# Patient Record
Sex: MALE | Race: WHITE | Employment: OTHER | URBAN - METROPOLITAN AREA
[De-identification: names, ages, dates, MRNs, and addresses within clinical notes are randomized per-mention and may not be internally consistent; named-entity substitution may affect disease eponyms.]

---

## 2017-04-24 ENCOUNTER — HOSPITAL ENCOUNTER (OUTPATIENT)
Age: 62
Setting detail: SPECIMEN
Discharge: HOME | End: 2017-04-24
Payer: COMMERCIAL

## 2017-04-24 ENCOUNTER — OFFICE VISIT (OUTPATIENT)
Dept: FAMILY MEDICINE CLINIC | Age: 62
End: 2017-04-24

## 2017-04-24 VITALS
HEIGHT: 69 IN | HEART RATE: 81 BPM | TEMPERATURE: 98.1 F | OXYGEN SATURATION: 91 % | BODY MASS INDEX: 34.6 KG/M2 | WEIGHT: 233.6 LBS | RESPIRATION RATE: 20 BRPM | SYSTOLIC BLOOD PRESSURE: 132 MMHG | DIASTOLIC BLOOD PRESSURE: 82 MMHG

## 2017-04-24 DIAGNOSIS — M19.041 PRIMARY OSTEOARTHRITIS OF BOTH HANDS: ICD-10-CM

## 2017-04-24 DIAGNOSIS — M19.042 PRIMARY OSTEOARTHRITIS OF BOTH HANDS: ICD-10-CM

## 2017-04-24 DIAGNOSIS — Z12.11 COLON CANCER SCREENING: ICD-10-CM

## 2017-04-24 DIAGNOSIS — I10 ESSENTIAL HYPERTENSION: ICD-10-CM

## 2017-04-24 DIAGNOSIS — Z00.00 ANNUAL PHYSICAL EXAM: ICD-10-CM

## 2017-04-24 DIAGNOSIS — G47.30 SLEEP APNEA, UNSPECIFIED TYPE: ICD-10-CM

## 2017-04-24 DIAGNOSIS — J44.9 CHRONIC OBSTRUCTIVE PULMONARY DISEASE, UNSPECIFIED COPD TYPE (HCC): ICD-10-CM

## 2017-04-24 DIAGNOSIS — N52.9 ERECTILE DYSFUNCTION, UNSPECIFIED ERECTILE DYSFUNCTION TYPE: ICD-10-CM

## 2017-04-24 DIAGNOSIS — Z23 NEED FOR PROPHYLACTIC VACCINATION AGAINST STREPTOCOCCUS PNEUMONIAE (PNEUMOCOCCUS): ICD-10-CM

## 2017-04-24 DIAGNOSIS — Z72.0 TOBACCO USE: ICD-10-CM

## 2017-04-24 DIAGNOSIS — Z00.00 ANNUAL PHYSICAL EXAM: Primary | ICD-10-CM

## 2017-04-24 DIAGNOSIS — I49.9 IRREGULAR HEART BEAT: ICD-10-CM

## 2017-04-24 LAB
ALBUMIN SERPL-MCNC: 4.2 G/DL (ref 3.9–4.9)
ALP BLD-CCNC: 77 U/L (ref 35–104)
ALT SERPL-CCNC: 12 U/L (ref 0–41)
ANION GAP SERPL CALCULATED.3IONS-SCNC: 12 MEQ/L (ref 7–13)
AST SERPL-CCNC: 10 U/L (ref 0–40)
BILIRUB SERPL-MCNC: 0.6 MG/DL (ref 0–1.2)
BUN BLDV-MCNC: 10 MG/DL (ref 8–23)
CALCIUM SERPL-MCNC: 9.9 MG/DL (ref 8.6–10.2)
CHLORIDE BLD-SCNC: 97 MEQ/L (ref 98–107)
CHOLESTEROL, TOTAL: 140 MG/DL (ref 0–199)
CO2: 26 MEQ/L (ref 22–29)
CREAT SERPL-MCNC: 0.78 MG/DL (ref 0.7–1.2)
GFR AFRICAN AMERICAN: >60
GFR NON-AFRICAN AMERICAN: >60
GLOBULIN: 2.5 G/DL (ref 2.3–3.5)
GLUCOSE BLD-MCNC: 87 MG/DL (ref 74–109)
HDLC SERPL-MCNC: 49 MG/DL (ref 40–59)
LDL CHOLESTEROL CALCULATED: 69 MG/DL (ref 0–129)
POTASSIUM SERPL-SCNC: 4.5 MEQ/L (ref 3.5–5.1)
SODIUM BLD-SCNC: 135 MEQ/L (ref 132–144)
TOTAL PROTEIN: 6.7 G/DL (ref 6.4–8.1)
TRIGL SERPL-MCNC: 112 MG/DL (ref 0–200)

## 2017-04-24 PROCEDURE — 99396 PREV VISIT EST AGE 40-64: CPT | Performed by: FAMILY MEDICINE

## 2017-04-24 PROCEDURE — 90471 IMMUNIZATION ADMIN: CPT | Performed by: FAMILY MEDICINE

## 2017-04-24 PROCEDURE — 80061 LIPID PANEL: CPT

## 2017-04-24 PROCEDURE — 93000 ELECTROCARDIOGRAM COMPLETE: CPT | Performed by: FAMILY MEDICINE

## 2017-04-24 PROCEDURE — 80053 COMPREHEN METABOLIC PANEL: CPT

## 2017-04-24 PROCEDURE — 90732 PPSV23 VACC 2 YRS+ SUBQ/IM: CPT | Performed by: FAMILY MEDICINE

## 2017-04-24 RX ORDER — BUPROPION HYDROCHLORIDE 150 MG/1
150 TABLET, EXTENDED RELEASE ORAL 2 TIMES DAILY
Qty: 60 TABLET | Refills: 3 | Status: SHIPPED | OUTPATIENT
Start: 2017-04-24 | End: 2018-05-01 | Stop reason: SDUPTHER

## 2017-04-24 RX ORDER — LISINOPRIL 40 MG/1
TABLET ORAL
Qty: 30 TABLET | Refills: 11 | Status: SHIPPED | OUTPATIENT
Start: 2017-04-24 | End: 2018-05-01 | Stop reason: SDUPTHER

## 2017-04-24 RX ORDER — SILDENAFIL 100 MG/1
100 TABLET, FILM COATED ORAL PRN
Qty: 2 TABLET | Refills: 0 | Status: SHIPPED | OUTPATIENT
Start: 2017-04-24 | End: 2020-01-22 | Stop reason: SDUPTHER

## 2017-04-24 RX ORDER — CELECOXIB 200 MG/1
CAPSULE ORAL
Refills: 5 | COMMUNITY
Start: 2017-03-04 | End: 2017-04-24 | Stop reason: SDUPTHER

## 2017-04-24 RX ORDER — BUDESONIDE AND FORMOTEROL FUMARATE DIHYDRATE 160; 4.5 UG/1; UG/1
1 AEROSOL RESPIRATORY (INHALATION) 2 TIMES DAILY
Qty: 1 INHALER | Refills: 0 | Status: SHIPPED | OUTPATIENT
Start: 2017-04-24 | End: 2017-12-28 | Stop reason: SDUPTHER

## 2017-04-24 RX ORDER — CELECOXIB 200 MG/1
CAPSULE ORAL
Qty: 60 CAPSULE | Refills: 5 | Status: SHIPPED | OUTPATIENT
Start: 2017-04-24 | End: 2018-05-01 | Stop reason: SDUPTHER

## 2017-04-24 ASSESSMENT — ENCOUNTER SYMPTOMS
DIARRHEA: 0
ABDOMINAL PAIN: 0
CONSTIPATION: 0
SHORTNESS OF BREATH: 0
SORE THROAT: 0
COUGH: 0
RHINORRHEA: 0
WHEEZING: 0

## 2017-07-25 ENCOUNTER — HOSPITAL ENCOUNTER (OUTPATIENT)
Dept: ULTRASOUND IMAGING | Age: 62
Discharge: HOME | End: 2017-07-25
Payer: COMMERCIAL

## 2017-07-25 ENCOUNTER — OFFICE VISIT (OUTPATIENT)
Dept: FAMILY MEDICINE CLINIC | Age: 62
End: 2017-07-25

## 2017-07-25 VITALS
BODY MASS INDEX: 32.35 KG/M2 | TEMPERATURE: 98.4 F | SYSTOLIC BLOOD PRESSURE: 130 MMHG | HEIGHT: 70 IN | WEIGHT: 226 LBS | DIASTOLIC BLOOD PRESSURE: 76 MMHG | HEART RATE: 80 BPM

## 2017-07-25 DIAGNOSIS — F51.04 PSYCHOPHYSIOLOGICAL INSOMNIA: ICD-10-CM

## 2017-07-25 DIAGNOSIS — M79.89 LEFT LEG SWELLING: Primary | ICD-10-CM

## 2017-07-25 DIAGNOSIS — Z72.0 TOBACCO USE: ICD-10-CM

## 2017-07-25 DIAGNOSIS — M79.89 LEFT LEG SWELLING: ICD-10-CM

## 2017-07-25 DIAGNOSIS — I10 ESSENTIAL HYPERTENSION, BENIGN: ICD-10-CM

## 2017-07-25 PROCEDURE — 93971 EXTREMITY STUDY: CPT

## 2017-07-25 PROCEDURE — G8427 DOCREV CUR MEDS BY ELIG CLIN: HCPCS | Performed by: FAMILY MEDICINE

## 2017-07-25 PROCEDURE — G8417 CALC BMI ABV UP PARAM F/U: HCPCS | Performed by: FAMILY MEDICINE

## 2017-07-25 PROCEDURE — 3017F COLORECTAL CA SCREEN DOC REV: CPT | Performed by: FAMILY MEDICINE

## 2017-07-25 PROCEDURE — 99214 OFFICE O/P EST MOD 30 MIN: CPT | Performed by: FAMILY MEDICINE

## 2017-07-25 PROCEDURE — 4004F PT TOBACCO SCREEN RCVD TLK: CPT | Performed by: FAMILY MEDICINE

## 2017-07-25 ASSESSMENT — ENCOUNTER SYMPTOMS
ABDOMINAL PAIN: 0
COUGH: 1
RHINORRHEA: 0
WHEEZING: 0
SORE THROAT: 0
DIARRHEA: 0
SHORTNESS OF BREATH: 0
CONSTIPATION: 0

## 2017-12-28 DIAGNOSIS — J44.9 CHRONIC OBSTRUCTIVE PULMONARY DISEASE, UNSPECIFIED COPD TYPE (HCC): ICD-10-CM

## 2017-12-28 RX ORDER — BUDESONIDE AND FORMOTEROL FUMARATE DIHYDRATE 160; 4.5 UG/1; UG/1
1 AEROSOL RESPIRATORY (INHALATION) 2 TIMES DAILY
Qty: 10.2 G | Refills: 5 | Status: SHIPPED | OUTPATIENT
Start: 2017-12-28 | End: 2018-05-01 | Stop reason: SDUPTHER

## 2018-05-01 ENCOUNTER — OFFICE VISIT (OUTPATIENT)
Dept: FAMILY MEDICINE CLINIC | Age: 63
End: 2018-05-01
Payer: COMMERCIAL

## 2018-05-01 ENCOUNTER — HOSPITAL ENCOUNTER (OUTPATIENT)
Age: 63
Setting detail: SPECIMEN
Discharge: HOME | End: 2018-05-01
Payer: COMMERCIAL

## 2018-05-01 VITALS
SYSTOLIC BLOOD PRESSURE: 136 MMHG | BODY MASS INDEX: 34.3 KG/M2 | DIASTOLIC BLOOD PRESSURE: 88 MMHG | HEIGHT: 70 IN | WEIGHT: 239.6 LBS | RESPIRATION RATE: 16 BRPM | HEART RATE: 90 BPM

## 2018-05-01 DIAGNOSIS — Z11.59 ENCOUNTER FOR HEPATITIS C SCREENING TEST FOR LOW RISK PATIENT: ICD-10-CM

## 2018-05-01 DIAGNOSIS — M19.041 PRIMARY OSTEOARTHRITIS OF BOTH HANDS: ICD-10-CM

## 2018-05-01 DIAGNOSIS — Z00.00 ANNUAL PHYSICAL EXAM: Primary | ICD-10-CM

## 2018-05-01 DIAGNOSIS — Z12.11 COLON CANCER SCREENING: ICD-10-CM

## 2018-05-01 DIAGNOSIS — R40.0 DAYTIME SLEEPINESS: ICD-10-CM

## 2018-05-01 DIAGNOSIS — M79.89 LEFT LEG SWELLING: ICD-10-CM

## 2018-05-01 DIAGNOSIS — M19.042 PRIMARY OSTEOARTHRITIS OF BOTH HANDS: ICD-10-CM

## 2018-05-01 DIAGNOSIS — Z72.0 TOBACCO USE: ICD-10-CM

## 2018-05-01 DIAGNOSIS — J44.9 CHRONIC OBSTRUCTIVE PULMONARY DISEASE, UNSPECIFIED COPD TYPE (HCC): ICD-10-CM

## 2018-05-01 DIAGNOSIS — Z00.00 ANNUAL PHYSICAL EXAM: ICD-10-CM

## 2018-05-01 DIAGNOSIS — I10 ESSENTIAL HYPERTENSION: ICD-10-CM

## 2018-05-01 LAB
ALBUMIN SERPL-MCNC: 4.2 G/DL (ref 3.9–4.9)
ALP BLD-CCNC: 93 U/L (ref 35–104)
ALT SERPL-CCNC: 11 U/L (ref 0–41)
ANION GAP SERPL CALCULATED.3IONS-SCNC: 16 MEQ/L (ref 7–13)
AST SERPL-CCNC: 15 U/L (ref 0–40)
BILIRUB SERPL-MCNC: 0.6 MG/DL (ref 0–1.2)
BUN BLDV-MCNC: 12 MG/DL (ref 8–23)
CALCIUM SERPL-MCNC: 9.4 MG/DL (ref 8.6–10.2)
CHLORIDE BLD-SCNC: 90 MEQ/L (ref 98–107)
CHOLESTEROL, FASTING: 133 MG/DL (ref 0–199)
CO2: 29 MEQ/L (ref 22–29)
CREAT SERPL-MCNC: 0.72 MG/DL (ref 0.7–1.2)
GFR AFRICAN AMERICAN: >60
GFR NON-AFRICAN AMERICAN: >60
GLOBULIN: 2.6 G/DL (ref 2.3–3.5)
GLUCOSE FASTING: 80 MG/DL (ref 74–109)
HDLC SERPL-MCNC: 53 MG/DL (ref 40–59)
HEPATITIS C ANTIBODY INTERPRETATION: NORMAL
LDL CHOLESTEROL CALCULATED: 69 MG/DL (ref 0–129)
POTASSIUM SERPL-SCNC: 4.9 MEQ/L (ref 3.5–5.1)
PROSTATE SPECIFIC ANTIGEN: 0.47 NG/ML (ref 0–5.4)
SODIUM BLD-SCNC: 135 MEQ/L (ref 132–144)
TOTAL PROTEIN: 6.8 G/DL (ref 6.4–8.1)
TRIGLYCERIDE, FASTING: 57 MG/DL (ref 0–200)
VITAMIN B-12: 528 PG/ML (ref 232–1245)

## 2018-05-01 PROCEDURE — 99396 PREV VISIT EST AGE 40-64: CPT | Performed by: FAMILY MEDICINE

## 2018-05-01 PROCEDURE — 80061 LIPID PANEL: CPT

## 2018-05-01 PROCEDURE — 86803 HEPATITIS C AB TEST: CPT

## 2018-05-01 PROCEDURE — 84153 ASSAY OF PSA TOTAL: CPT

## 2018-05-01 PROCEDURE — 80053 COMPREHEN METABOLIC PANEL: CPT

## 2018-05-01 PROCEDURE — 82607 VITAMIN B-12: CPT

## 2018-05-01 RX ORDER — VARENICLINE TARTRATE 1 MG/1
1 TABLET, FILM COATED ORAL 2 TIMES DAILY
Qty: 60 TABLET | Refills: 5 | Status: SHIPPED | OUTPATIENT
Start: 2018-05-01 | End: 2019-08-02 | Stop reason: SDUPTHER

## 2018-05-01 RX ORDER — LISINOPRIL 40 MG/1
TABLET ORAL
Qty: 30 TABLET | Refills: 11 | Status: SHIPPED | OUTPATIENT
Start: 2018-05-01 | End: 2018-12-24 | Stop reason: SDUPTHER

## 2018-05-01 RX ORDER — BUDESONIDE AND FORMOTEROL FUMARATE DIHYDRATE 160; 4.5 UG/1; UG/1
1 AEROSOL RESPIRATORY (INHALATION) 2 TIMES DAILY
Qty: 10.2 G | Refills: 11 | Status: SHIPPED | OUTPATIENT
Start: 2018-05-01 | End: 2018-12-24 | Stop reason: SDUPTHER

## 2018-05-01 RX ORDER — ALBUTEROL SULFATE 90 UG/1
2 AEROSOL, METERED RESPIRATORY (INHALATION) EVERY 6 HOURS PRN
Qty: 1 INHALER | Refills: 3 | Status: SHIPPED | OUTPATIENT
Start: 2018-05-01 | End: 2018-12-24 | Stop reason: SDUPTHER

## 2018-05-01 RX ORDER — BUPROPION HYDROCHLORIDE 150 MG/1
150 TABLET, EXTENDED RELEASE ORAL 2 TIMES DAILY
Qty: 60 TABLET | Refills: 11 | Status: SHIPPED | OUTPATIENT
Start: 2018-05-01 | End: 2018-12-24 | Stop reason: SDUPTHER

## 2018-05-01 RX ORDER — CELECOXIB 200 MG/1
CAPSULE ORAL
Qty: 60 CAPSULE | Refills: 5 | Status: ON HOLD | OUTPATIENT
Start: 2018-05-01 | End: 2019-07-27

## 2018-05-01 RX ORDER — VARENICLINE TARTRATE 25 MG
KIT ORAL
Qty: 1 EACH | Refills: 0 | Status: SHIPPED | OUTPATIENT
Start: 2018-05-01 | End: 2019-08-02

## 2018-05-01 ASSESSMENT — PATIENT HEALTH QUESTIONNAIRE - PHQ9
SUM OF ALL RESPONSES TO PHQ9 QUESTIONS 1 & 2: 0
1. LITTLE INTEREST OR PLEASURE IN DOING THINGS: 0
2. FEELING DOWN, DEPRESSED OR HOPELESS: 0
SUM OF ALL RESPONSES TO PHQ QUESTIONS 1-9: 0

## 2018-05-31 DIAGNOSIS — M79.89 LEFT LEG SWELLING: ICD-10-CM

## 2018-06-15 ENCOUNTER — HOSPITAL ENCOUNTER (OUTPATIENT)
Dept: SLEEP CENTER | Age: 63
Discharge: HOME | End: 2018-06-17
Payer: COMMERCIAL

## 2018-06-15 PROCEDURE — 95810 POLYSOM 6/> YRS 4/> PARAM: CPT

## 2018-06-19 ENCOUNTER — TELEPHONE (OUTPATIENT)
Dept: FAMILY MEDICINE CLINIC | Age: 63
End: 2018-06-19

## 2018-06-19 DIAGNOSIS — G47.33 OSA (OBSTRUCTIVE SLEEP APNEA): Primary | ICD-10-CM

## 2018-07-03 ENCOUNTER — HOSPITAL ENCOUNTER (OUTPATIENT)
Dept: SLEEP CENTER | Age: 63
Discharge: HOME | End: 2018-07-05
Payer: COMMERCIAL

## 2018-07-03 PROCEDURE — 95811 POLYSOM 6/>YRS CPAP 4/> PARM: CPT

## 2018-07-16 ENCOUNTER — OFFICE VISIT (OUTPATIENT)
Dept: PULMONOLOGY | Age: 63
End: 2018-07-16
Payer: COMMERCIAL

## 2018-07-16 VITALS
HEART RATE: 100 BPM | OXYGEN SATURATION: 93 % | SYSTOLIC BLOOD PRESSURE: 134 MMHG | DIASTOLIC BLOOD PRESSURE: 70 MMHG | BODY MASS INDEX: 34.07 KG/M2 | WEIGHT: 238 LBS | HEIGHT: 70 IN | TEMPERATURE: 97.9 F | RESPIRATION RATE: 16 BRPM

## 2018-07-16 DIAGNOSIS — G47.33 OSA (OBSTRUCTIVE SLEEP APNEA): Primary | ICD-10-CM

## 2018-07-16 DIAGNOSIS — J44.9 CHRONIC OBSTRUCTIVE PULMONARY DISEASE, UNSPECIFIED COPD TYPE (HCC): ICD-10-CM

## 2018-07-16 DIAGNOSIS — Z72.0 TOBACCO USE: ICD-10-CM

## 2018-07-16 DIAGNOSIS — R09.02 HYPOXIA: ICD-10-CM

## 2018-07-16 PROCEDURE — 4040F PNEUMOC VAC/ADMIN/RCVD: CPT | Performed by: INTERNAL MEDICINE

## 2018-07-16 PROCEDURE — G8926 SPIRO NO PERF OR DOC: HCPCS | Performed by: INTERNAL MEDICINE

## 2018-07-16 PROCEDURE — 3017F COLORECTAL CA SCREEN DOC REV: CPT | Performed by: INTERNAL MEDICINE

## 2018-07-16 PROCEDURE — 4004F PT TOBACCO SCREEN RCVD TLK: CPT | Performed by: INTERNAL MEDICINE

## 2018-07-16 PROCEDURE — 1123F ACP DISCUSS/DSCN MKR DOCD: CPT | Performed by: INTERNAL MEDICINE

## 2018-07-16 PROCEDURE — 99215 OFFICE O/P EST HI 40 MIN: CPT | Performed by: INTERNAL MEDICINE

## 2018-07-16 PROCEDURE — G8427 DOCREV CUR MEDS BY ELIG CLIN: HCPCS | Performed by: INTERNAL MEDICINE

## 2018-07-16 PROCEDURE — 3023F SPIROM DOC REV: CPT | Performed by: INTERNAL MEDICINE

## 2018-07-16 PROCEDURE — 1101F PT FALLS ASSESS-DOCD LE1/YR: CPT | Performed by: INTERNAL MEDICINE

## 2018-07-16 PROCEDURE — G8417 CALC BMI ABV UP PARAM F/U: HCPCS | Performed by: INTERNAL MEDICINE

## 2018-07-16 ASSESSMENT — ENCOUNTER SYMPTOMS
DIARRHEA: 0
NAUSEA: 0
ABDOMINAL PAIN: 0
SORE THROAT: 0
CHEST TIGHTNESS: 0
SHORTNESS OF BREATH: 1
COUGH: 1
WHEEZING: 1
VOICE CHANGE: 0
VOMITING: 0
RHINORRHEA: 0
EYE ITCHING: 0

## 2018-07-16 NOTE — PROGRESS NOTES
Subjective:     Cruzito Spencer is a 70 y.o. male who complains today of:     Chief Complaint   Patient presents with    Established New Doctor     referred by Dr. Gee Montes for JONNY. HPI  Patient has PSG done on  an shows JONNY. AHI  97.8 RDI 99  CPAP titration study done on shows therapeutic CPAP at 7 cm, still low O2 sat below 89% for 53 min    Patient is complaining of snoring and daytime sleepiness and tiredness. C/o witness apnea. Patient does not wakes up with gasping for air. C/o wakes up frequently during sleep . No complaint of morning headache. Patient does not have restful sleep. Patient does not take daily naps. Patient does  fall asleep while watching TV. Patient does not have a complaint of sleepiness while driving  Patient does not fall a sleep at stop sign. Patient has no c/o vivid dreams or night izaguirre. Patient does not have difficulty falling sleep but staying asleep  20 lbs Weight gain in last 6 month      Patient is using symbicort and proairHFA 2 puff QID prn   C/o shortness of breath with exertion. Occasional Wheezing at night . Cough with  Yellowish Sputum. No Hemoptysis. No Chest tightness   No Chest pain with radiation  or pleuritic pain  No Fever or chills. No Rhinorrhea and postnasal drip.  he smoke 1 ppd, he is trying to quit with chantix. Allergies:  Patient has no known allergies. Past Medical History:   Diagnosis Date    COPD (chronic obstructive pulmonary disease) ([address]     Hypertension     Osteoarthritis     hands worst     Past Surgical History:   Procedure Laterality Date    CHOLECYSTECTOMY      ROTATOR CUFF REPAIR      right     Family History   Problem Relation Age of Onset    Heart Disease Mother     Other Father      Social History     Social History    Marital status: Single     Spouse name: N/A    Number of children: N/A    Years of education: N/A     Occupational History    Not on file.      Social History Main Topics    Smoking status: Current Every Day Smoker     Packs/day: 2.00     Years: 41.00     Types: Cigarettes    Smokeless tobacco: Never Used    Alcohol use 0.0 oz/week      Comment: 12 pk per week    Drug use: No    Sexual activity: Not on file     Other Topics Concern    Not on file     Social History Narrative    No narrative on file         Review of Systems   Constitutional: Negative for chills, diaphoresis, fatigue and fever. HENT: Negative for congestion, mouth sores, nosebleeds, postnasal drip, rhinorrhea, sneezing, sore throat and voice change. Eyes: Negative for itching and visual disturbance. Respiratory: Positive for cough, shortness of breath and wheezing. Negative for chest tightness. Cardiovascular: Negative. Negative for chest pain, palpitations and leg swelling. Gastrointestinal: Negative for abdominal pain, diarrhea, nausea and vomiting. Genitourinary: Negative for difficulty urinating and hematuria. Musculoskeletal: Negative for arthralgias, joint swelling and myalgias. Skin: Negative for rash. Allergic/Immunologic: Negative for environmental allergies. Neurological: Negative for dizziness, tremors, weakness and headaches. Psychiatric/Behavioral: Positive for sleep disturbance. Negative for behavioral problems. Objective:     Vitals:    07/16/18 0848 07/16/18 0920   BP: 134/70    Pulse: 100    Resp: 16    Temp: 97.9 °F (36.6 °C)    TempSrc: Tympanic    SpO2: (!) 80% 93%   Weight: 238 lb (108 kg)    Height: 5' 10\" (1.778 m)          Physical Exam   Constitutional: He is oriented to person, place, and time. He appears well-developed and well-nourished. obese   HENT:   Head: Normocephalic and atraumatic. Nose: Nose normal.   Mouth/Throat: Oropharynx is clear and moist.   Eyes: Conjunctivae and EOM are normal. Pupils are equal, round, and reactive to light. Neck: No JVD present. No tracheal deviation present. No thyromegaly present.    Cardiovascular: Normal rate and regular

## 2018-10-26 ENCOUNTER — HOSPITAL ENCOUNTER (OUTPATIENT)
Dept: PULMONOLOGY | Age: 63
Discharge: HOME | End: 2018-10-26
Payer: COMMERCIAL

## 2018-10-26 ENCOUNTER — HOSPITAL ENCOUNTER (OUTPATIENT)
Dept: GENERAL RADIOLOGY | Age: 63
Discharge: HOME | End: 2018-10-28
Payer: COMMERCIAL

## 2018-10-26 DIAGNOSIS — J44.9 CHRONIC OBSTRUCTIVE PULMONARY DISEASE, UNSPECIFIED COPD TYPE (HCC): ICD-10-CM

## 2018-10-26 PROCEDURE — 71046 X-RAY EXAM CHEST 2 VIEWS: CPT

## 2018-10-26 PROCEDURE — 94060 EVALUATION OF WHEEZING: CPT | Performed by: INTERNAL MEDICINE

## 2018-10-26 PROCEDURE — 94726 PLETHYSMOGRAPHY LUNG VOLUMES: CPT

## 2018-10-26 PROCEDURE — 94726 PLETHYSMOGRAPHY LUNG VOLUMES: CPT | Performed by: INTERNAL MEDICINE

## 2018-10-26 PROCEDURE — 94729 DIFFUSING CAPACITY: CPT

## 2018-10-26 PROCEDURE — 94729 DIFFUSING CAPACITY: CPT | Performed by: INTERNAL MEDICINE

## 2018-10-26 PROCEDURE — 6360000002 HC RX W HCPCS: Performed by: INTERNAL MEDICINE

## 2018-10-26 PROCEDURE — 94060 EVALUATION OF WHEEZING: CPT

## 2018-10-26 RX ORDER — ALBUTEROL SULFATE 2.5 MG/3ML
2.5 SOLUTION RESPIRATORY (INHALATION) ONCE
Status: COMPLETED | OUTPATIENT
Start: 2018-10-26 | End: 2018-10-26

## 2018-10-26 RX ADMIN — ALBUTEROL SULFATE 2.5 MG: 2.5 SOLUTION RESPIRATORY (INHALATION) at 11:14

## 2018-10-30 ENCOUNTER — OFFICE VISIT (OUTPATIENT)
Dept: PULMONOLOGY | Age: 63
End: 2018-10-30
Payer: COMMERCIAL

## 2018-10-30 VITALS
RESPIRATION RATE: 16 BRPM | BODY MASS INDEX: 34.07 KG/M2 | WEIGHT: 238 LBS | SYSTOLIC BLOOD PRESSURE: 126 MMHG | DIASTOLIC BLOOD PRESSURE: 76 MMHG | HEIGHT: 70 IN | HEART RATE: 91 BPM | TEMPERATURE: 97.8 F | OXYGEN SATURATION: 88 %

## 2018-10-30 DIAGNOSIS — J44.9 CHRONIC OBSTRUCTIVE PULMONARY DISEASE, UNSPECIFIED COPD TYPE (HCC): Primary | ICD-10-CM

## 2018-10-30 DIAGNOSIS — E66.9 OBESITY (BMI 30-39.9): ICD-10-CM

## 2018-10-30 DIAGNOSIS — G47.33 OSA (OBSTRUCTIVE SLEEP APNEA): ICD-10-CM

## 2018-10-30 DIAGNOSIS — Z72.0 TOBACCO ABUSE: ICD-10-CM

## 2018-10-30 DIAGNOSIS — R09.02 HYPOXIA: ICD-10-CM

## 2018-10-30 PROCEDURE — G8484 FLU IMMUNIZE NO ADMIN: HCPCS | Performed by: INTERNAL MEDICINE

## 2018-10-30 PROCEDURE — G8427 DOCREV CUR MEDS BY ELIG CLIN: HCPCS | Performed by: INTERNAL MEDICINE

## 2018-10-30 PROCEDURE — 3023F SPIROM DOC REV: CPT | Performed by: INTERNAL MEDICINE

## 2018-10-30 PROCEDURE — 3017F COLORECTAL CA SCREEN DOC REV: CPT | Performed by: INTERNAL MEDICINE

## 2018-10-30 PROCEDURE — 4040F PNEUMOC VAC/ADMIN/RCVD: CPT | Performed by: INTERNAL MEDICINE

## 2018-10-30 PROCEDURE — 99214 OFFICE O/P EST MOD 30 MIN: CPT | Performed by: INTERNAL MEDICINE

## 2018-10-30 PROCEDURE — 4004F PT TOBACCO SCREEN RCVD TLK: CPT | Performed by: INTERNAL MEDICINE

## 2018-10-30 PROCEDURE — G8417 CALC BMI ABV UP PARAM F/U: HCPCS | Performed by: INTERNAL MEDICINE

## 2018-10-30 PROCEDURE — G8926 SPIRO NO PERF OR DOC: HCPCS | Performed by: INTERNAL MEDICINE

## 2018-10-30 PROCEDURE — 1123F ACP DISCUSS/DSCN MKR DOCD: CPT | Performed by: INTERNAL MEDICINE

## 2018-10-30 PROCEDURE — 1101F PT FALLS ASSESS-DOCD LE1/YR: CPT | Performed by: INTERNAL MEDICINE

## 2018-10-30 ASSESSMENT — ENCOUNTER SYMPTOMS
SORE THROAT: 0
EYE ITCHING: 0
WHEEZING: 1
SHORTNESS OF BREATH: 1
VOMITING: 0
COUGH: 1
NAUSEA: 0
DIARRHEA: 0
CHEST TIGHTNESS: 0
RHINORRHEA: 0
VOICE CHANGE: 0
ABDOMINAL PAIN: 0

## 2018-10-30 NOTE — PROGRESS NOTES
activity: Not on file     Other Topics Concern    Not on file     Social History Narrative    No narrative on file         Review of Systems   Constitutional: Negative for chills, diaphoresis, fatigue and fever. HENT: Negative for congestion, mouth sores, nosebleeds, postnasal drip, rhinorrhea, sneezing, sore throat and voice change. Eyes: Negative for itching and visual disturbance. Respiratory: Positive for cough, shortness of breath and wheezing. Negative for chest tightness. Cardiovascular: Negative. Negative for chest pain, palpitations and leg swelling. Gastrointestinal: Negative for abdominal pain, diarrhea, nausea and vomiting. Genitourinary: Negative for difficulty urinating and hematuria. Musculoskeletal: Negative for arthralgias, joint swelling and myalgias. Skin: Negative for rash. Allergic/Immunologic: Negative for environmental allergies. Neurological: Negative for dizziness, tremors, weakness and headaches. Psychiatric/Behavioral: Positive for sleep disturbance. Negative for behavioral problems. Objective:     Vitals:    10/30/18 1007   BP: 126/76   Pulse: 91   Resp: 16   Temp: 97.8 °F (36.6 °C)   TempSrc: Tympanic   SpO2: (!) 88%   Weight: 238 lb (108 kg)   Height: 5' 10\" (1.778 m)         Physical Exam   Constitutional: He is oriented to person, place, and time. He appears well-developed and well-nourished. HENT:   Head: Normocephalic and atraumatic. Nose: Nose normal.   Mouth/Throat: Oropharynx is clear and moist.   Eyes: Pupils are equal, round, and reactive to light. Conjunctivae and EOM are normal.   Neck: No JVD present. No tracheal deviation present. No thyromegaly present. Cardiovascular: Normal rate and regular rhythm. Exam reveals no gallop and no friction rub. No murmur heard. Pulmonary/Chest: Effort normal and breath sounds normal. No respiratory distress. He has no wheezes. He has no rales. He exhibits no tenderness.    Abdominal: He exhibits no distension. Musculoskeletal: Normal range of motion. Lymphadenopathy:     He has no cervical adenopathy. Neurological: He is alert and oriented to person, place, and time. No cranial nerve deficit. Skin: Skin is warm and dry. No rash noted. Psychiatric: He has a normal mood and affect. His behavior is normal.       Current Outpatient Prescriptions   Medication Sig Dispense Refill    CPAP Machine MISC by Does not apply route New CPAP with 7 cm with 3 lit O2 bled 1 each 0    Compression Bandages KIT LLE: knee high: 20-30mmhg 2 kit 1    varenicline (CHANTIX STARTING MONTH PAK) 0.5 MG X 11 & 1 MG X 42 tablet Take by mouth. 1 each 0    varenicline (CHANTIX CONTINUING MONTH PAK) 1 MG tablet Take 1 tablet by mouth 2 times daily 60 tablet 5    budesonide-formoterol (SYMBICORT) 160-4.5 MCG/ACT AERO Inhale 1 puff into the lungs 2 times daily 10.2 g 11    albuterol sulfate HFA (PROAIR HFA) 108 (90 Base) MCG/ACT inhaler Inhale 2 puffs into the lungs every 6 hours as needed for Wheezing 1 Inhaler 3    buPROPion (WELLBUTRIN SR) 150 MG extended release tablet Take 1 tablet by mouth 2 times daily 60 tablet 11    celecoxib (CELEBREX) 200 MG capsule TAKE 1 CAPSULE BY MOUTH DAILY 60 capsule 5    lisinopril (PRINIVIL;ZESTRIL) 40 MG tablet Take 1 tablet by mouth daily. 30 tablet 11    sildenafil (VIAGRA) 100 MG tablet Take 1 tablet by mouth as needed for Erectile Dysfunction LOT [id number] EXP 04/2017 2 tablet 0     No current facility-administered medications for this visit. Results for orders placed during the hospital encounter of 10/26/18   XR CHEST STANDARD (2 VW)    Narrative EXAMINATION: XR CHEST (2 VW)    CLINICAL HISTORY: CHRONIC OBSTRUCTIVE PULMONARY DISEASE    COMPARISONS: None available. FINDINGS: Osseous structures intact.  Cardiopericardial silhouette normal. Pulmonary vasculature normal. Lungs clear      Impression NO ACUTE CARDIOPULMONARY DISEASE      PFT  Done report

## 2018-12-24 DIAGNOSIS — I10 ESSENTIAL HYPERTENSION: ICD-10-CM

## 2018-12-24 DIAGNOSIS — J44.9 CHRONIC OBSTRUCTIVE PULMONARY DISEASE, UNSPECIFIED COPD TYPE (HCC): ICD-10-CM

## 2018-12-24 DIAGNOSIS — Z72.0 TOBACCO USE: ICD-10-CM

## 2018-12-24 RX ORDER — LISINOPRIL 40 MG/1
TABLET ORAL
Qty: 90 TABLET | Refills: 3 | Status: ON HOLD | OUTPATIENT
Start: 2018-12-24 | End: 2019-07-30 | Stop reason: HOSPADM

## 2018-12-24 RX ORDER — BUPROPION HYDROCHLORIDE 150 MG/1
150 TABLET, EXTENDED RELEASE ORAL 2 TIMES DAILY
Qty: 180 TABLET | Refills: 3 | Status: SHIPPED | OUTPATIENT
Start: 2018-12-24 | End: 2020-01-06

## 2018-12-24 NOTE — TELEPHONE ENCOUNTER
Rx requested:  Requested Prescriptions     Pending Prescriptions Disp Refills    lisinopril (PRINIVIL;ZESTRIL) 40 MG tablet 90 tablet 3     Sig: Take 1 tablet by mouth daily.     buPROPion (WELLBUTRIN SR) 150 MG extended release tablet 180 tablet 3     Sig: Take 1 tablet by mouth 2 times daily       Last Office Visit:   Visit date not found    Last Labs:  5/1/2018    Last filled:  Needs 90 day Rx for new insurance    Next Visit Date:  Future Appointments  Date Time Provider Efrem Camacho   3/5/2019 11:15 AM Karmen Persaud MD [facility]

## 2018-12-26 RX ORDER — ALBUTEROL SULFATE 90 UG/1
2 AEROSOL, METERED RESPIRATORY (INHALATION) EVERY 6 HOURS PRN
Qty: 3 INHALER | Refills: 3 | Status: SHIPPED | OUTPATIENT
Start: 2018-12-26 | End: 2022-01-07 | Stop reason: SDUPTHER

## 2018-12-26 RX ORDER — BUDESONIDE AND FORMOTEROL FUMARATE DIHYDRATE 160; 4.5 UG/1; UG/1
1 AEROSOL RESPIRATORY (INHALATION) 2 TIMES DAILY
Qty: 30.6 G | Refills: 3 | Status: SHIPPED | OUTPATIENT
Start: 2018-12-26 | End: 2019-09-06 | Stop reason: SDUPTHER

## 2019-03-05 ENCOUNTER — OFFICE VISIT (OUTPATIENT)
Dept: PULMONOLOGY | Age: 64
End: 2019-03-05
Payer: MEDICARE

## 2019-03-05 VITALS
TEMPERATURE: 98.6 F | BODY MASS INDEX: 33.79 KG/M2 | HEART RATE: 100 BPM | HEIGHT: 70 IN | DIASTOLIC BLOOD PRESSURE: 80 MMHG | RESPIRATION RATE: 16 BRPM | WEIGHT: 236 LBS | OXYGEN SATURATION: 90 % | SYSTOLIC BLOOD PRESSURE: 138 MMHG

## 2019-03-05 DIAGNOSIS — G47.33 OSA (OBSTRUCTIVE SLEEP APNEA): Primary | ICD-10-CM

## 2019-03-05 DIAGNOSIS — J44.9 CHRONIC OBSTRUCTIVE PULMONARY DISEASE, UNSPECIFIED COPD TYPE (HCC): ICD-10-CM

## 2019-03-05 DIAGNOSIS — F51.04 PSYCHOPHYSIOLOGICAL INSOMNIA: ICD-10-CM

## 2019-03-05 DIAGNOSIS — R09.02 HYPOXIA: ICD-10-CM

## 2019-03-05 DIAGNOSIS — E66.9 OBESITY (BMI 30-39.9): ICD-10-CM

## 2019-03-05 PROCEDURE — 99214 OFFICE O/P EST MOD 30 MIN: CPT | Performed by: INTERNAL MEDICINE

## 2019-03-05 ASSESSMENT — ENCOUNTER SYMPTOMS
CHEST TIGHTNESS: 0
SHORTNESS OF BREATH: 1
VOICE CHANGE: 0
EYE ITCHING: 0
VOMITING: 0
WHEEZING: 1
NAUSEA: 0
RHINORRHEA: 0
DIARRHEA: 0
SORE THROAT: 0
ABDOMINAL PAIN: 0
COUGH: 1

## 2019-05-02 ENCOUNTER — HOSPITAL ENCOUNTER (OUTPATIENT)
Age: 64
Setting detail: SPECIMEN
Discharge: HOME | End: 2019-05-02
Payer: MEDICARE

## 2019-05-02 ENCOUNTER — OFFICE VISIT (OUTPATIENT)
Dept: FAMILY MEDICINE CLINIC | Age: 64
End: 2019-05-02
Payer: MEDICARE

## 2019-05-02 VITALS
TEMPERATURE: 97.2 F | OXYGEN SATURATION: 98 % | DIASTOLIC BLOOD PRESSURE: 60 MMHG | SYSTOLIC BLOOD PRESSURE: 126 MMHG | HEART RATE: 76 BPM | HEIGHT: 70 IN | WEIGHT: 238 LBS | RESPIRATION RATE: 14 BRPM | BODY MASS INDEX: 34.07 KG/M2

## 2019-05-02 DIAGNOSIS — Z23 ENCOUNTER FOR IMMUNIZATION: ICD-10-CM

## 2019-05-02 DIAGNOSIS — Z00.00 ANNUAL PHYSICAL EXAM: Primary | ICD-10-CM

## 2019-05-02 DIAGNOSIS — Z11.4 ENCOUNTER FOR SCREENING FOR HIV: ICD-10-CM

## 2019-05-02 DIAGNOSIS — Z00.00 ANNUAL PHYSICAL EXAM: ICD-10-CM

## 2019-05-02 DIAGNOSIS — J44.9 CHRONIC OBSTRUCTIVE PULMONARY DISEASE, UNSPECIFIED COPD TYPE (HCC): ICD-10-CM

## 2019-05-02 DIAGNOSIS — E53.8 LOW SERUM VITAMIN B12: ICD-10-CM

## 2019-05-02 DIAGNOSIS — Z12.11 COLON CANCER SCREENING: ICD-10-CM

## 2019-05-02 LAB
ANION GAP SERPL CALCULATED.3IONS-SCNC: 12 MEQ/L (ref 9–15)
BUN BLDV-MCNC: 12 MG/DL (ref 8–23)
CALCIUM SERPL-MCNC: 10.1 MG/DL (ref 8.5–9.9)
CHLORIDE BLD-SCNC: 95 MEQ/L (ref 95–107)
CHOLESTEROL, FASTING: 152 MG/DL (ref 0–199)
CO2: 32 MEQ/L (ref 20–31)
CREAT SERPL-MCNC: 1.04 MG/DL (ref 0.7–1.2)
GFR AFRICAN AMERICAN: >60
GFR NON-AFRICAN AMERICAN: >60
GLUCOSE BLD-MCNC: 91 MG/DL (ref 70–99)
HDLC SERPL-MCNC: 42 MG/DL (ref 40–59)
LDL CHOLESTEROL CALCULATED: 96 MG/DL (ref 0–129)
POTASSIUM SERPL-SCNC: 6.1 MEQ/L (ref 3.4–4.9)
SODIUM BLD-SCNC: 139 MEQ/L (ref 135–144)
TRIGLYCERIDE, FASTING: 71 MG/DL (ref 0–150)
VITAMIN B-12: 369 PG/ML (ref 232–1245)

## 2019-05-02 PROCEDURE — 80048 BASIC METABOLIC PNL TOTAL CA: CPT

## 2019-05-02 PROCEDURE — 80061 LIPID PANEL: CPT

## 2019-05-02 PROCEDURE — G0438 PPPS, INITIAL VISIT: HCPCS | Performed by: FAMILY MEDICINE

## 2019-05-02 PROCEDURE — G0009 ADMIN PNEUMOCOCCAL VACCINE: HCPCS | Performed by: FAMILY MEDICINE

## 2019-05-02 PROCEDURE — 90670 PCV13 VACCINE IM: CPT | Performed by: FAMILY MEDICINE

## 2019-05-02 PROCEDURE — 82607 VITAMIN B-12: CPT

## 2019-05-02 PROCEDURE — 87389 HIV-1 AG W/HIV-1&-2 AB AG IA: CPT

## 2019-05-02 ASSESSMENT — ENCOUNTER SYMPTOMS
CONSTIPATION: 0
RHINORRHEA: 0
SORE THROAT: 0
COUGH: 0
DIARRHEA: 0
ABDOMINAL PAIN: 0
SHORTNESS OF BREATH: 1
WHEEZING: 0

## 2019-05-02 ASSESSMENT — PATIENT HEALTH QUESTIONNAIRE - PHQ9
2. FEELING DOWN, DEPRESSED OR HOPELESS: 0
SUM OF ALL RESPONSES TO PHQ QUESTIONS 1-9: 0
SUM OF ALL RESPONSES TO PHQ9 QUESTIONS 1 & 2: 0
1. LITTLE INTEREST OR PLEASURE IN DOING THINGS: 0
SUM OF ALL RESPONSES TO PHQ QUESTIONS 1-9: 0

## 2019-05-02 NOTE — PROGRESS NOTES
[address] PRIMARY CARE  [address] [place] [id number]  Dept: [phone]  Dept Fax: : [phone]   Chief Complaint  Chief Complaint   Patient presents with    Annual Exam       HPI:  70 y. o.male who presents for annual exam:    Annual: smoking 3/4ppd from 2ppd previously; Currently retired. Taking symbicort and albuterol for the COPD. Uses CPAP. Past Medical History:   Diagnosis Date    [facility] (chronic obstructive [facility] disease) ([facility]     Hypertension     Lung disease     Osteoarthritis     hands worst     Past Surgical History:   Procedure Laterality Date    CHOLECYSTECTOMY      ROTATOR CUFF REPAIR      right     Social History     Socioeconomic History    Marital status: Single     Spouse name: Not on file    Number of children: Not on file    Years of education: Not on file    Highest education level: Not on file   Occupational History    Not on file   Social Needs    Financial resource strain: Not on file    Food insecurity:     Worry: Not on file     Inability: Not on file    Transportation needs:     Medical: Not on file     Non-medical: Not on file   Tobacco Use    Smoking status: Current Every Day Smoker     Packs/day: 2.00     Years: 41.00     Pack years: 82.00     Types: Cigarettes    Smokeless tobacco: Never Used   Substance and Sexual Activity    Alcohol use:  Yes     Alcohol/week: 0.0 oz     Comment: 12 pk per week    Drug use: No    Sexual activity: Not on file   Lifestyle    Physical activity:     Days per week: Not on file     Minutes per session: Not on file    Stress: Not on file   Relationships    Social connections:     Talks on phone: Not on file     Gets together: Not on file     Attends Sikh service: Not on file     Active member of club or organization: Not on file     Attends meetings of clubs or organizations: Not on file     Relationship status: Not on file    Intimate partner violence:     Fear of current or ex partner: Not on file     Emotionally abused: Not on file     Physically abused: Not on file     Forced sexual activity: Not on file   Other Topics Concern    Not on file   Social History Narrative    Not on file     No Known Allergies  Current Outpatient Medications   Medication Sig Dispense Refill    OXYGEN POC    3 lit via NC     Dx copd 1 Units 0    albuterol sulfate HFA (PROAIR HFA) 108 (90 Base) MCG/ACT inhaler Inhale 2 puffs into the lungs every 6 hours as needed for Wheezing 3 Inhaler 3    budesonide-formoterol (SYMBICORT) 160-4.5 MCG/ACT AERO Inhale 1 puff into the lungs 2 times daily 30.6 g 3    lisinopril (PRINIVIL;ZESTRIL) 40 MG tablet Take 1 tablet by mouth daily. 90 tablet 3    buPROPion (WELLBUTRIN SR) 150 MG extended release tablet Take 1 tablet by mouth 2 times daily 180 tablet 3    CPAP Machine MISC by Does not apply route New CPAP with 7 cm with 3 lit O2 bled 1 each 0    Compression Bandages KIT LLE: knee high: 20-30mmhg 2 kit 1    varenicline (CHANTIX STARTING MONTH PAK) 0.5 MG X 11 & 1 MG X 42 tablet Take by mouth. 1 each 0    varenicline (CHANTIX CONTINUING MONTH PAK) 1 MG tablet Take 1 tablet by mouth 2 times daily 60 tablet 5    celecoxib (CELEBREX) 200 MG capsule TAKE 1 CAPSULE BY MOUTH DAILY 60 capsule 5    sildenafil (VIAGRA) 100 MG tablet Take 1 tablet by mouth as needed for Erectile Dysfunction LOT [id number] EXP 04/2017 2 tablet 0     No current facility-administered medications for this visit. ROS:  Review of Systems   Constitutional: Negative for chills and fever. HENT: Negative for rhinorrhea and sore throat. Respiratory: Positive for shortness of breath. Negative for cough and wheezing. Gastrointestinal: Negative for abdominal pain, constipation and diarrhea. Endocrine: Negative for polydipsia and polyuria. Genitourinary: Negative for dysuria, frequency and urgency.    Neurological: Negative for syncope, light-headedness, numbness and headaches. Psychiatric/Behavioral: Negative for sleep disturbance. The patient is not nervous/anxious. Vitals:    05/02/19 0928   BP: 126/60   Site: Right Upper Arm   Position: Sitting   Cuff Size: Medium Adult   Pulse: 76   Resp: 14   Temp: 97.2 °F (36.2 °C)   TempSrc: Temporal   SpO2: 98%   Weight: 238 lb (108 kg)   Height: 5' 10\" (1.778 m)       Physical exam:  Physical Exam   Constitutional: He is oriented to person, place, and time. He appears well-developed and well-nourished. No distress. HENT:   Head: Normocephalic and atraumatic. Mouth/Throat: No oropharyngeal exudate. Eyes: EOM are normal.   Neck: Normal range of motion. No thyromegaly present. Cardiovascular: Normal rate, regular rhythm and normal heart sounds. No murmur heard. Pulmonary/Chest: Effort normal and breath sounds normal. No respiratory distress. He has no wheezes. Abdominal: Soft. He exhibits no distension. There is no tenderness. There is no rebound and no guarding. Lymphadenopathy:     He has no cervical adenopathy. Neurological: He is alert and oriented to person, place, and time. Skin: Skin is warm and dry. Psychiatric: He has a normal mood and affect. His behavior is normal.   Vitals reviewed. Assessment/Plan:  70 y.o. male here mainly for Annual exam:  - due for routine labs; hx of low B12  - COPD: due for pneumonia vaccines     Diagnosis Orders   1. Annual physical exam  Basic Metabolic Panel    Lipid, Fasting   2. Encounter for screening for HIV  HIV-1,2 Combo Ag/Ab By DAPHNEY, Reflexive Panel   3. Low serum vitamin B12  Vitamin B12   4. Chronic obstructive pulmonary disease, unspecified COPD type (HCC)  PREVNAR 13 IM (Pneumococcal conjugate vaccine 13-valent)   5. Encounter for immunization   PREVNAR 13 IM (Pneumococcal conjugate vaccine 13-valent)   6.  Colon cancer screening  COLOGUARD        Return in about 1 year (around 5/2/2020) for annual exam.    Keenan Avitia MD

## 2019-05-03 DIAGNOSIS — E87.5 HYPERKALEMIA: Primary | ICD-10-CM

## 2019-05-03 RX ORDER — SODIUM POLYSTYRENE SULFONATE 15 G/60ML
30 SUSPENSION ORAL; RECTAL ONCE
Qty: 1 BOTTLE | Refills: 0 | Status: SHIPPED | OUTPATIENT
Start: 2019-05-03 | End: 2019-05-07

## 2019-05-04 LAB — HIV 1,2 COMBO ANTIGEN/ANTIBODY: NEGATIVE

## 2019-05-06 ENCOUNTER — NURSE ONLY (OUTPATIENT)
Dept: FAMILY MEDICINE CLINIC | Age: 64
End: 2019-05-06

## 2019-05-06 ENCOUNTER — HOSPITAL ENCOUNTER (OUTPATIENT)
Age: 64
Setting detail: SPECIMEN
Discharge: HOME | End: 2019-05-06
Payer: MEDICARE

## 2019-05-06 DIAGNOSIS — R89.9 ABNORMAL LABORATORY TEST RESULT: Primary | ICD-10-CM

## 2019-05-06 DIAGNOSIS — R89.9 ABNORMAL LABORATORY TEST RESULT: ICD-10-CM

## 2019-05-06 LAB
ALBUMIN SERPL-MCNC: 3.9 G/DL (ref 3.5–4.6)
ALP BLD-CCNC: 89 U/L (ref 35–104)
ALT SERPL-CCNC: 6 U/L (ref 0–41)
ANION GAP SERPL CALCULATED.3IONS-SCNC: 13 MEQ/L (ref 9–15)
AST SERPL-CCNC: 17 U/L (ref 0–40)
BILIRUB SERPL-MCNC: 0.6 MG/DL (ref 0.2–0.7)
BUN BLDV-MCNC: 12 MG/DL (ref 8–23)
CALCIUM SERPL-MCNC: 9.3 MG/DL (ref 8.5–9.9)
CHLORIDE BLD-SCNC: 96 MEQ/L (ref 95–107)
CO2: 31 MEQ/L (ref 20–31)
CREAT SERPL-MCNC: 0.84 MG/DL (ref 0.7–1.2)
GFR AFRICAN AMERICAN: >60
GFR NON-AFRICAN AMERICAN: >60
GLOBULIN: 2.9 G/DL (ref 2.3–3.5)
GLUCOSE BLD-MCNC: 81 MG/DL (ref 70–99)
POTASSIUM SERPL-SCNC: 4.7 MEQ/L (ref 3.4–4.9)
SODIUM BLD-SCNC: 140 MEQ/L (ref 135–144)
TOTAL PROTEIN: 6.8 G/DL (ref 6.3–8)

## 2019-05-06 PROCEDURE — 80053 COMPREHEN METABOLIC PANEL: CPT

## 2019-05-07 DIAGNOSIS — N52.9 ERECTILE DYSFUNCTION, UNSPECIFIED ERECTILE DYSFUNCTION TYPE: Primary | ICD-10-CM

## 2019-05-09 ENCOUNTER — NURSE ONLY (OUTPATIENT)
Dept: FAMILY MEDICINE CLINIC | Age: 64
End: 2019-05-09
Payer: MEDICARE

## 2019-05-09 ENCOUNTER — HOSPITAL ENCOUNTER (OUTPATIENT)
Age: 64
Setting detail: SPECIMEN
Discharge: HOME | End: 2019-05-09
Payer: MEDICARE

## 2019-05-09 DIAGNOSIS — R53.83 FATIGUE, UNSPECIFIED TYPE: ICD-10-CM

## 2019-05-09 DIAGNOSIS — N52.9 ERECTILE DYSFUNCTION, UNSPECIFIED ERECTILE DYSFUNCTION TYPE: ICD-10-CM

## 2019-05-09 PROCEDURE — 84270 ASSAY OF SEX HORMONE GLOBUL: CPT

## 2019-05-09 PROCEDURE — 84403 ASSAY OF TOTAL TESTOSTERONE: CPT

## 2019-05-09 PROCEDURE — 36415 COLL VENOUS BLD VENIPUNCTURE: CPT | Performed by: FAMILY MEDICINE

## 2019-05-09 NOTE — PROGRESS NOTES
[address] [facility] [address] [facility] PRIMARY CARE  [address] [place] [phone]  Dept: [phone]  Dept Fax: : [phone]   Chief Complaint  Chief Complaint   Patient presents with    Blood Work       HPI:  75 y.o.male who presents for ***:    ***    Past Medical History:   Diagnosis Date    [facility] obstructive [facility]) [address]     Hypertension     Lung disease     Osteoarthritis     hands worst     Past Surgical History:   Procedure Laterality Date    CHOLECYSTECTOMY      ROTATOR CUFF REPAIR      right     Social History     Socioeconomic History    Marital status: Single     Spouse name: Not on file    Number of children: Not on file    Years of education: Not on file    Highest education level: Not on file   Occupational History    Not on file   Social Needs    Financial resource strain: Not on file    Food insecurity:     Worry: Not on file     Inability: Not on file    Transportation needs:     Medical: Not on file     Non-medical: Not on file   Tobacco Use    Smoking status: Current Every Day Smoker     Packs/day: 2.00     Years: 41.00     Pack years: 82.00     Types: Cigarettes    Smokeless tobacco: Never Used   Substance and Sexual Activity    Alcohol use:  Yes     Alcohol/week: 0.0 oz     Comment: 12 pk per week    Drug use: No    Sexual activity: Not on file   Lifestyle    Physical activity:     Days per week: Not on file     Minutes per session: Not on file    Stress: Not on file   Relationships    Social connections:     Talks on phone: Not on file     Gets together: Not on file     Attends Buddhism service: Not on file     Active member of club or organization: Not on file     Attends meetings of clubs or organizations: Not on file     Relationship status: Not on file    Intimate partner violence:     Fear of current or ex partner: Not on file     Emotionally abused: Not on file Physically abused: Not on file     Forced sexual activity: Not on file   Other Topics Concern    Not on file   Social History Narrative    Not on file     No Known Allergies  Current Outpatient Medications   Medication Sig Dispense Refill    OXYGEN POC    3 lit via NC     Dx copd 1 Units 0    albuterol sulfate HFA (PROAIR HFA) 108 (90 Base) MCG/ACT inhaler Inhale 2 puffs into the lungs every 6 hours as needed for Wheezing 3 Inhaler 3    budesonide-formoterol (SYMBICORT) 160-4.5 MCG/ACT AERO Inhale 1 puff into the lungs 2 times daily 30.6 g 3    lisinopril (PRINIVIL;ZESTRIL) 40 MG tablet Take 1 tablet by mouth daily. 90 tablet 3    buPROPion (WELLBUTRIN SR) 150 MG extended release tablet Take 1 tablet by mouth 2 times daily 180 tablet 3    CPAP Machine MISC by Does not apply route New CPAP with 7 cm with 3 lit O2 bled 1 each 0    Compression Bandages KIT LLE: knee high: 20-30mmhg 2 kit 1    varenicline (CHANTIX STARTING MONTH PAK) 0.5 MG X 11 & 1 MG X 42 tablet Take by mouth. 1 each 0    varenicline (CHANTIX CONTINUING MONTH PAK) 1 MG tablet Take 1 tablet by mouth 2 times daily 60 tablet 5    celecoxib (CELEBREX) 200 MG capsule TAKE 1 CAPSULE BY MOUTH DAILY 60 capsule 5    sildenafil (VIAGRA) 100 MG tablet Take 1 tablet by mouth as needed for Erectile Dysfunction LOT [id number] EXP 04/2017 2 tablet 0     No current facility-administered medications for this visit. ROS:  Review of Systems    There were no vitals filed for this visit. Physical exam:  Physical Exam    Assessment/Plan:  75 y.o. male here mainly for ***:  - ***    {No diagnosis found. (Refresh or delete this SmartLink)}     No follow-ups on file.     [address]

## 2019-05-11 LAB
SEX HORMONE BINDING GLOBULIN: 57 NMOL/L (ref 11–80)
TESTOSTERONE FREE PERCENT: 1.3 % (ref 1.6–2.9)
TESTOSTERONE FREE, CALC: 44 PG/ML (ref 47–244)
TESTOSTERONE TOTAL-MALE: 341 NG/DL (ref 300–720)

## 2019-06-11 ENCOUNTER — OFFICE VISIT (OUTPATIENT)
Dept: PULMONOLOGY | Age: 64
End: 2019-06-11
Payer: MEDICARE

## 2019-06-11 VITALS
RESPIRATION RATE: 16 BRPM | HEIGHT: 70 IN | SYSTOLIC BLOOD PRESSURE: 132 MMHG | DIASTOLIC BLOOD PRESSURE: 64 MMHG | OXYGEN SATURATION: 92 % | WEIGHT: 235.8 LBS | TEMPERATURE: 98.7 F | HEART RATE: 104 BPM | BODY MASS INDEX: 33.76 KG/M2

## 2019-06-11 DIAGNOSIS — E66.9 OBESITY (BMI 30-39.9): ICD-10-CM

## 2019-06-11 DIAGNOSIS — J44.9 CHRONIC OBSTRUCTIVE PULMONARY DISEASE, UNSPECIFIED COPD TYPE (HCC): ICD-10-CM

## 2019-06-11 DIAGNOSIS — R09.02 HYPOXIA: ICD-10-CM

## 2019-06-11 DIAGNOSIS — G47.33 OSA (OBSTRUCTIVE SLEEP APNEA): Primary | ICD-10-CM

## 2019-06-11 PROCEDURE — 4040F PNEUMOC VAC/ADMIN/RCVD: CPT | Performed by: INTERNAL MEDICINE

## 2019-06-11 PROCEDURE — 99214 OFFICE O/P EST MOD 30 MIN: CPT | Performed by: INTERNAL MEDICINE

## 2019-06-11 PROCEDURE — G8417 CALC BMI ABV UP PARAM F/U: HCPCS | Performed by: INTERNAL MEDICINE

## 2019-06-11 PROCEDURE — 3017F COLORECTAL CA SCREEN DOC REV: CPT | Performed by: INTERNAL MEDICINE

## 2019-06-11 PROCEDURE — G8926 SPIRO NO PERF OR DOC: HCPCS | Performed by: INTERNAL MEDICINE

## 2019-06-11 PROCEDURE — G8427 DOCREV CUR MEDS BY ELIG CLIN: HCPCS | Performed by: INTERNAL MEDICINE

## 2019-06-11 PROCEDURE — 4004F PT TOBACCO SCREEN RCVD TLK: CPT | Performed by: INTERNAL MEDICINE

## 2019-06-11 PROCEDURE — 1123F ACP DISCUSS/DSCN MKR DOCD: CPT | Performed by: INTERNAL MEDICINE

## 2019-06-11 PROCEDURE — 3023F SPIROM DOC REV: CPT | Performed by: INTERNAL MEDICINE

## 2019-06-11 ASSESSMENT — ENCOUNTER SYMPTOMS
VOMITING: 0
SHORTNESS OF BREATH: 1
CHEST TIGHTNESS: 0
NAUSEA: 0
COUGH: 1
RHINORRHEA: 0
DIARRHEA: 0
ABDOMINAL PAIN: 0
WHEEZING: 0
SORE THROAT: 0
EYE ITCHING: 0
VOICE CHANGE: 0

## 2019-06-11 NOTE — PROGRESS NOTES
Subjective:     Waylon Cool is a 75 y.o. male who complains today of:     Chief Complaint   Patient presents with    Follow-up     f/u for COPD, JONNY, and Hypoxia on O2. HPI  Patient is using CPAP with  7 centimeters of H2O with heated humidity with 3 lit   Patient is using CPAP for about  6-7hours every night. Patient is using CPAP with nasal pillow, he said he is opening his mouth and want to try full face mask   Patient said  sleep is restful with the CPAP use. Patient is compliant with CPAP therapy and benefiting with CPAP use. No snoring with CPAP use. No early morning headache. Patient has no c/o vivid dreams or night izaguirre. No complaint of daytime sleepiness or tiredness with CPAP use. Patient denies taking naps. No sleepiness with driving. Patient denies difficulty falling asleep or staying asleep. Patient is using O2 during daytime , he said he did not get portable O2. C/o shortness of breath , worse with exertion. No Wheezing   C/o Cough with white to green  Sputum  No Hemoptysis  No Chest tightness   No Chest pain with radiation  or pleuritic pain  No Fever or chills. No Rhinorrhea and postnasal drip. Using bronchodilator with symbicort and proair HFA 2 puff QID prn   He is still smoking 1/2 -3/4 ppd. Allergies:  Patient has no known allergies.   Past Medical History:   Diagnosis Date    COPD (chronic obstructive pulmonary disease) ([address]     Hypertension     Lung disease     Osteoarthritis     hands worst    Sleep apnea      Past Surgical History:   Procedure Laterality Date    CHOLECYSTECTOMY      ROTATOR CUFF REPAIR      right     Family History   Problem Relation Age of Onset    Heart Disease Mother     Other Father      Social History     Socioeconomic History    Marital status: Single     Spouse name: Not on file    Number of children: Not on file    Years of education: Not on file    Highest education level: Not on file   Occupational History    Not on file   Social Needs    Financial resource strain: Not on file    Food insecurity:     Worry: Not on file     Inability: Not on file    Transportation needs:     Medical: Not on file     Non-medical: Not on file   Tobacco Use    Smoking status: Current Every Day Smoker     Packs/day: 2.00     Years: 41.00     Pack years: 82.00     Types: Cigarettes    Smokeless tobacco: Never Used   Substance and Sexual Activity    Alcohol use: Yes     Alcohol/week: 0.0 oz     Comment: 12 pk per week    Drug use: No    Sexual activity: Not on file   Lifestyle    Physical activity:     Days per week: Not on file     Minutes per session: Not on file    Stress: Not on file   Relationships    Social connections:     Talks on phone: Not on file     Gets together: Not on file     Attends Yazidi service: Not on file     Active member of club or organization: Not on file     Attends meetings of clubs or organizations: Not on file     Relationship status: Not on file    Intimate partner violence:     Fear of current or ex partner: Not on file     Emotionally abused: Not on file     Physically abused: Not on file     Forced sexual activity: Not on file   Other Topics Concern    Not on file   Social History Narrative    Not on file         Review of Systems   Constitutional: Negative for chills, diaphoresis, fatigue and fever. HENT: Negative for congestion, mouth sores, nosebleeds, postnasal drip, rhinorrhea, sneezing, sore throat and voice change. Eyes: Negative for itching and visual disturbance. Respiratory: Positive for cough and shortness of breath. Negative for chest tightness and wheezing. Cardiovascular: Negative. Negative for chest pain, palpitations and leg swelling. Gastrointestinal: Negative for abdominal pain, diarrhea, nausea and vomiting. Genitourinary: Negative for difficulty urinating and hematuria. Musculoskeletal: Negative for arthralgias, joint swelling and myalgias.    Skin: Negative for rash. Allergic/Immunologic: Negative for environmental allergies. Neurological: Negative for dizziness, tremors, weakness and headaches. Psychiatric/Behavioral: Positive for sleep disturbance. Negative for behavioral problems. Objective:     Vitals:    06/11/19 0927 06/11/19 0936   BP: 132/64    Pulse: 104    Resp: 16    Temp: 98.7 °F (37.1 °C)    TempSrc: Tympanic    SpO2: (!) 83% 92%   Weight: 235 lb 12.8 oz (107 kg)    Height: 5' 10\" (1.778 m)          Physical Exam   Constitutional: He is oriented to person, place, and time. He appears well-developed and well-nourished. HENT:   Head: Normocephalic and atraumatic. Nose: Nose normal.   Mouth/Throat: Oropharynx is clear and moist.   Eyes: Pupils are equal, round, and reactive to light. Conjunctivae and EOM are normal.   Neck: No JVD present. No tracheal deviation present. No thyromegaly present. Cardiovascular: Normal rate and regular rhythm. Exam reveals no gallop and no friction rub. No murmur heard. Pulmonary/Chest: Effort normal and breath sounds normal. No respiratory distress. He has no wheezes. He has no rales. He exhibits no tenderness. Abdominal: He exhibits no distension. Musculoskeletal: Normal range of motion. Lymphadenopathy:     He has no cervical adenopathy. Neurological: He is alert and oriented to person, place, and time. No cranial nerve deficit. Skin: Skin is warm and dry. No rash noted. Psychiatric: He has a normal mood and affect. His behavior is normal.       Current Outpatient Medications   Medication Sig Dispense Refill    albuterol sulfate HFA (PROAIR HFA) 108 (90 Base) MCG/ACT inhaler Inhale 2 puffs into the lungs every 6 hours as needed for Wheezing 3 Inhaler 3    budesonide-formoterol (SYMBICORT) 160-4.5 MCG/ACT AERO Inhale 1 puff into the lungs 2 times daily 30.6 g 3    lisinopril (PRINIVIL;ZESTRIL) 40 MG tablet Take 1 tablet by mouth daily.  90 tablet 3    buPROPion (WELLBUTRIN SR) 150 MG request POC again. 3. Hypoxia  He is on 3 lit O2 with CPAP , he also qualify for portable O2 , he did not receive it    4. Obesity (BMI 30-39. 9)  Patient patient is advised try to lose weight. obesity related risk explained to the patient ,  Current weight:  235 lb 12.8 oz (107 kg) Lbs. BMI:  Body mass index is 33.83 kg/m². Return in about 4 months (around 10/11/2019) for denny, hypoxia on O2.       Osman Penaloza MD

## 2019-07-27 ENCOUNTER — APPOINTMENT (OUTPATIENT)
Dept: GENERAL RADIOLOGY | Age: 64
End: 2019-07-27
Payer: MEDICARE

## 2019-07-27 ENCOUNTER — APPOINTMENT (OUTPATIENT)
Dept: ULTRASOUND IMAGING | Age: 64
DRG: 292 | End: 2019-07-27
Attending: INTERNAL MEDICINE
Payer: MEDICARE

## 2019-07-27 ENCOUNTER — HOSPITAL ENCOUNTER (EMERGENCY)
Age: 64
Discharge: ANOTHER HOSPITAL | End: 2019-07-27
Attending: EMERGENCY MEDICINE
Payer: MEDICARE

## 2019-07-27 ENCOUNTER — HOSPITAL ENCOUNTER (INPATIENT)
Age: 64
LOS: 3 days | Discharge: HOME | DRG: 292 | End: 2019-07-30
Attending: INTERNAL MEDICINE | Admitting: INTERNAL MEDICINE
Payer: MEDICARE

## 2019-07-27 ENCOUNTER — APPOINTMENT (OUTPATIENT)
Dept: CT IMAGING | Age: 64
End: 2019-07-27
Payer: MEDICARE

## 2019-07-27 VITALS
TEMPERATURE: 98.7 F | RESPIRATION RATE: 18 BRPM | HEART RATE: 78 BPM | OXYGEN SATURATION: 95 % | BODY MASS INDEX: 33.64 KG/M2 | WEIGHT: 235 LBS | DIASTOLIC BLOOD PRESSURE: 114 MMHG | SYSTOLIC BLOOD PRESSURE: 159 MMHG | HEIGHT: 70 IN

## 2019-07-27 DIAGNOSIS — R77.8 ELEVATED TROPONIN: ICD-10-CM

## 2019-07-27 DIAGNOSIS — J44.1 COPD EXACERBATION (HCC): ICD-10-CM

## 2019-07-27 DIAGNOSIS — R60.0 BILATERAL LOWER EXTREMITY EDEMA: Primary | ICD-10-CM

## 2019-07-27 DIAGNOSIS — I50.20 SYSTOLIC CONGESTIVE HEART FAILURE, UNSPECIFIED HF CHRONICITY (HCC): ICD-10-CM

## 2019-07-27 LAB
ALBUMIN SERPL-MCNC: 4 G/DL (ref 3.5–4.6)
ALP BLD-CCNC: 104 U/L (ref 35–104)
ALT SERPL-CCNC: 15 U/L (ref 0–41)
ANION GAP SERPL CALCULATED.3IONS-SCNC: 10 MEQ/L (ref 9–15)
AST SERPL-CCNC: 16 U/L (ref 0–40)
BASOPHILS ABSOLUTE: 0 K/UL (ref 0–0.2)
BASOPHILS RELATIVE PERCENT: 0.5 %
BILIRUB SERPL-MCNC: 0.8 MG/DL (ref 0.2–0.7)
BILIRUBIN URINE: NEGATIVE
BLOOD, URINE: NEGATIVE
BUN BLDV-MCNC: 9 MG/DL (ref 8–23)
CALCIUM SERPL-MCNC: 9.5 MG/DL (ref 8.5–9.9)
CHLORIDE BLD-SCNC: 96 MEQ/L (ref 95–107)
CLARITY: CLEAR
CO2: 31 MEQ/L (ref 20–31)
COLOR: COLORLESS
CREAT SERPL-MCNC: 0.83 MG/DL (ref 0.7–1.2)
D DIMER: 0.78 MG/L FEU (ref 0–0.5)
EKG ATRIAL RATE: 92 BPM
EKG P AXIS: 48 DEGREES
EKG P-R INTERVAL: 166 MS
EKG Q-T INTERVAL: 360 MS
EKG QRS DURATION: 88 MS
EKG QTC CALCULATION (BAZETT): 445 MS
EKG R AXIS: -7 DEGREES
EKG T AXIS: 23 DEGREES
EKG VENTRICULAR RATE: 92 BPM
EOSINOPHILS ABSOLUTE: 0.3 K/UL (ref 0–0.7)
EOSINOPHILS RELATIVE PERCENT: 3.7 %
GFR AFRICAN AMERICAN: >60
GFR NON-AFRICAN AMERICAN: >60
GLOBULIN: 3.5 G/DL (ref 2.3–3.5)
GLUCOSE BLD-MCNC: 138 MG/DL (ref 70–99)
GLUCOSE URINE: NEGATIVE MG/DL
HCT VFR BLD CALC: 51.5 % (ref 42–52)
HEMOGLOBIN: 16.9 G/DL (ref 14–18)
KETONES, URINE: NEGATIVE MG/DL
LEUKOCYTE ESTERASE, URINE: NEGATIVE
LYMPHOCYTES ABSOLUTE: 1.8 K/UL (ref 1–4.8)
LYMPHOCYTES RELATIVE PERCENT: 21.2 %
MAGNESIUM: 2.1 MG/DL (ref 1.7–2.4)
MCH RBC QN AUTO: 30.2 PG (ref 27–31.3)
MCHC RBC AUTO-ENTMCNC: 32.9 % (ref 33–37)
MCV RBC AUTO: 91.8 FL (ref 80–100)
MONOCYTES ABSOLUTE: 0.8 K/UL (ref 0.2–0.8)
MONOCYTES RELATIVE PERCENT: 8.8 %
NEUTROPHILS ABSOLUTE: 5.6 K/UL (ref 1.4–6.5)
NEUTROPHILS RELATIVE PERCENT: 65.8 %
NITRITE, URINE: NEGATIVE
PDW BLD-RTO: 14.6 % (ref 11.5–14.5)
PH UA: 6.5 (ref 5–9)
PLATELET # BLD: 229 K/UL (ref 130–400)
POTASSIUM SERPL-SCNC: 4.2 MEQ/L (ref 3.4–4.9)
PRO-BNP: 277 PG/ML
PROTEIN UA: NEGATIVE MG/DL
RBC # BLD: 5.61 M/UL (ref 4.7–6.1)
SODIUM BLD-SCNC: 137 MEQ/L (ref 135–144)
SPECIFIC GRAVITY UA: 1.01 (ref 1–1.03)
TOTAL PROTEIN: 7.5 G/DL (ref 6.3–8)
TROPONIN: 0.01 NG/ML (ref 0–0.01)
URINE REFLEX TO CULTURE: ABNORMAL
URINE TYPE: ABNORMAL
UROBILINOGEN, URINE: 0.2 E.U./DL
WBC # BLD: 8.5 K/UL (ref 4.8–10.8)

## 2019-07-27 PROCEDURE — 6370000000 HC RX 637 (ALT 250 FOR IP): Performed by: INTERNAL MEDICINE

## 2019-07-27 PROCEDURE — 80053 COMPREHEN METABOLIC PANEL: CPT

## 2019-07-27 PROCEDURE — 6360000002 HC RX W HCPCS: Performed by: EMERGENCY MEDICINE

## 2019-07-27 PROCEDURE — 99285 EMERGENCY DEPT VISIT HI MDM: CPT

## 2019-07-27 PROCEDURE — 94664 DEMO&/EVAL PT USE INHALER: CPT

## 2019-07-27 PROCEDURE — 94667 MNPJ CHEST WALL 1ST: CPT

## 2019-07-27 PROCEDURE — 87040 BLOOD CULTURE FOR BACTERIA: CPT

## 2019-07-27 PROCEDURE — 81003 URINALYSIS AUTO W/O SCOPE: CPT

## 2019-07-27 PROCEDURE — 93970 EXTREMITY STUDY: CPT

## 2019-07-27 PROCEDURE — 6360000004 HC RX CONTRAST MEDICATION: Performed by: EMERGENCY MEDICINE

## 2019-07-27 PROCEDURE — 93005 ELECTROCARDIOGRAM TRACING: CPT

## 2019-07-27 PROCEDURE — 71045 X-RAY EXAM CHEST 1 VIEW: CPT

## 2019-07-27 PROCEDURE — 83735 ASSAY OF MAGNESIUM: CPT

## 2019-07-27 PROCEDURE — 6360000002 HC RX W HCPCS: Performed by: INTERNAL MEDICINE

## 2019-07-27 PROCEDURE — 71275 CT ANGIOGRAPHY CHEST: CPT

## 2019-07-27 PROCEDURE — 84484 ASSAY OF TROPONIN QUANT: CPT

## 2019-07-27 PROCEDURE — 36415 COLL VENOUS BLD VENIPUNCTURE: CPT

## 2019-07-27 PROCEDURE — 83880 ASSAY OF NATRIURETIC PEPTIDE: CPT

## 2019-07-27 PROCEDURE — 85379 FIBRIN DEGRADATION QUANT: CPT

## 2019-07-27 PROCEDURE — 2060000000 HC ICU INTERMEDIATE R&B

## 2019-07-27 PROCEDURE — 2580000003 HC RX 258: Performed by: EMERGENCY MEDICINE

## 2019-07-27 PROCEDURE — 85025 COMPLETE CBC W/AUTO DIFF WBC: CPT

## 2019-07-27 PROCEDURE — 96374 THER/PROPH/DIAG INJ IV PUSH: CPT

## 2019-07-27 RX ORDER — FUROSEMIDE 10 MG/ML
40 INJECTION INTRAMUSCULAR; INTRAVENOUS 2 TIMES DAILY
Status: DISCONTINUED | OUTPATIENT
Start: 2019-07-27 | End: 2019-07-28

## 2019-07-27 RX ORDER — CARVEDILOL 3.12 MG/1
3.12 TABLET ORAL 2 TIMES DAILY WITH MEALS
Status: DISCONTINUED | OUTPATIENT
Start: 2019-07-27 | End: 2019-07-30 | Stop reason: HOSPADM

## 2019-07-27 RX ORDER — ASPIRIN 81 MG/1
81 TABLET ORAL DAILY
Status: DISCONTINUED | OUTPATIENT
Start: 2019-07-27 | End: 2019-07-30 | Stop reason: HOSPADM

## 2019-07-27 RX ORDER — VARENICLINE TARTRATE 1 MG/1
1 TABLET, FILM COATED ORAL 2 TIMES DAILY
Status: DISCONTINUED | OUTPATIENT
Start: 2019-07-27 | End: 2019-07-30 | Stop reason: HOSPADM

## 2019-07-27 RX ORDER — IPRATROPIUM BROMIDE AND ALBUTEROL SULFATE 2.5; .5 MG/3ML; MG/3ML
1 SOLUTION RESPIRATORY (INHALATION) EVERY 8 HOURS PRN
Status: DISCONTINUED | OUTPATIENT
Start: 2019-07-27 | End: 2019-07-27

## 2019-07-27 RX ORDER — IPRATROPIUM BROMIDE AND ALBUTEROL SULFATE 2.5; .5 MG/3ML; MG/3ML
1 SOLUTION RESPIRATORY (INHALATION) EVERY 4 HOURS PRN
Status: DISCONTINUED | OUTPATIENT
Start: 2019-07-27 | End: 2019-07-30 | Stop reason: HOSPADM

## 2019-07-27 RX ORDER — HYDRALAZINE HYDROCHLORIDE 20 MG/ML
10 INJECTION INTRAMUSCULAR; INTRAVENOUS EVERY 4 HOURS PRN
Status: DISCONTINUED | OUTPATIENT
Start: 2019-07-27 | End: 2019-07-30 | Stop reason: HOSPADM

## 2019-07-27 RX ORDER — FUROSEMIDE 10 MG/ML
80 INJECTION INTRAMUSCULAR; INTRAVENOUS ONCE
Status: COMPLETED | OUTPATIENT
Start: 2019-07-27 | End: 2019-07-27

## 2019-07-27 RX ORDER — NITROGLYCERIN 0.4 MG/1
0.4 TABLET SUBLINGUAL EVERY 5 MIN PRN
Status: DISCONTINUED | OUTPATIENT
Start: 2019-07-27 | End: 2019-07-30 | Stop reason: HOSPADM

## 2019-07-27 RX ORDER — GUAIFENESIN 600 MG/1
600 TABLET, EXTENDED RELEASE ORAL 2 TIMES DAILY
Status: DISCONTINUED | OUTPATIENT
Start: 2019-07-27 | End: 2019-07-30 | Stop reason: HOSPADM

## 2019-07-27 RX ORDER — SODIUM CHLORIDE 9 MG/ML
INJECTION, SOLUTION INTRAVENOUS CONTINUOUS
Status: DISCONTINUED | OUTPATIENT
Start: 2019-07-27 | End: 2019-07-27 | Stop reason: HOSPADM

## 2019-07-27 RX ADMIN — VARENICLINE TARTRATE 1 MG: 1 TABLET, FILM COATED ORAL at 22:24

## 2019-07-27 RX ADMIN — IOPAMIDOL 100 ML: 612 INJECTION, SOLUTION INTRAVENOUS at 14:53

## 2019-07-27 RX ADMIN — CARVEDILOL 3.12 MG: 3.12 TABLET, FILM COATED ORAL at 20:44

## 2019-07-27 RX ADMIN — FUROSEMIDE 80 MG: 10 INJECTION, SOLUTION INTRAMUSCULAR; INTRAVENOUS at 14:05

## 2019-07-27 RX ADMIN — GUAIFENESIN 600 MG: 600 TABLET, EXTENDED RELEASE ORAL at 20:44

## 2019-07-27 RX ADMIN — ENOXAPARIN SODIUM 40 MG: 40 INJECTION SUBCUTANEOUS at 20:44

## 2019-07-27 RX ADMIN — ASPIRIN 81 MG: 81 TABLET ORAL at 20:44

## 2019-07-27 RX ADMIN — SODIUM CHLORIDE: 9 INJECTION, SOLUTION INTRAVENOUS at 14:05

## 2019-07-27 RX ADMIN — FUROSEMIDE 40 MG: 10 INJECTION, SOLUTION INTRAMUSCULAR; INTRAVENOUS at 20:45

## 2019-07-27 ASSESSMENT — ENCOUNTER SYMPTOMS
SHORTNESS OF BREATH: 0
APNEA: 0
NAUSEA: 0
WHEEZING: 0
ABDOMINAL PAIN: 0
BACK PAIN: 0
CONSTIPATION: 0
VOMITING: 0
DIARRHEA: 0
ABDOMINAL DISTENTION: 0
SINUS PRESSURE: 0
EYE PAIN: 0
COUGH: 0
COLOR CHANGE: 0
PHOTOPHOBIA: 0
SORE THROAT: 0
RHINORRHEA: 0

## 2019-07-27 NOTE — PROGRESS NOTES
pack years  []   Pulmonary Disorder  (acute or chronic)  [x]   Severe or Chronic w/ Exacerbation  []     Surgical Status No [x]   Surgeries     General []   Surgery Lower []   Abdominal Thoracic or []   Upper Abdominal Thoracic with  PulmonaryDisorder  []     Chest X-ray Clear/Not  Ordered     []  Chronic Changes  Results Pending  []  Infiltrates, atelectasis, pleural effusion, or edema  [x]  Infiltrates in more than one lobe []  Infiltrate + Atelectasis, &/or pleural effusion  []    Respiratory Pattern Regular,  RR = 12-20 [x]  Increased,  RR = 21-25 []  GARNETT, irregular,  or RR = 26-30 []  Decreased FEV1  or RR = 31-35 []  Severe SOB, use  of accessory muscles, or RR ? 35  []    Mental Status Alert, oriented,  Cooperative [x]  Confused but Follows commands []  Lethargic or unable to follow commands []  Obtunded  []  Comatose  []    Breath Sounds Clear to  auscultation  [x]  Decreased unilaterally or  in bases only []  Decreased  bilaterally  []  Crackles or intermittent wheezes []  Wheezes []    Cough Strong, Spontan., & nonproductive [x]  Strong,  spontaneous, &  productive []  Weak,  Nonproductive []  Weak, productive or  with wheezes []  No spontaneous  cough or may require suctioning []    Level of Activity Ambulatory [x]  Ambulatory w/ Assist  []  Non-ambulatory []  Paraplegic []  Quadriplegic []    Total    Score:_____5__     Triage Score:____5____      Tri       Triage:     1. (>20) Freq: Q3    2. (16-20) Freq: Q4   3. (11-15) Freq: QID & Albuterol Q2 PRN    4. (6-10) Freq: TID & Albuterol Q2 PRN    5. (0-5) Freq Q4prn

## 2019-07-27 NOTE — H&P
COLORU COLORLESS*   PHUR 6.5   CLARITYU Clear   SPECGRAV 1.010   LEUKOCYTESUR Negative   UROBILINOGEN 0.2   BILIRUBINUR Negative   BLOODU Negative   GLUCOSEU Negative     -----------------------------------------------------------------   Cta Chest W Wo  (pe Study)    Result Date: 7/27/2019  EXAMINATION:  CT ANGIOGRAPHY CHEST WITH 3-D MAXIMUM INTENSITY PROJECTION RENDERING. CLINICAL HISTORY:   DYSPNEA, CARDIAC ORIGIN SUSPECTED COMPARISONS:  NONE AVAILABLE TECHNIQUE: Spiral scans with IV bolus administration of 100 ml of Isovue-370. Images were obtained with bolus tracking in order to opacify the pulmonary arteries. Multiplanar 2D and thick slab MIP 3-D  reconstructions were performed. All CT scans at this  facility use dose modulation, iterative reconstruction, and/or weight based dosing when appropriate to reduce radiation dose to as low as reasonably achievable. FINDINGS:  There is no evidence of acute pulmonary embolism. There is mild cardiomegaly. There are coronary artery calcifications. There are emphysematous changes and peripheral lung reticularity consistent with chronic interstitial disease. There is dilated main pulmonary artery consistent with pulmonary hypertension. Right ventricular chamber size is borderline dilated. There is mild superior mediastinal adenopathy with the largest node being a right paratracheal node with short axis diameter of 2.2 cm. There is no axillary or hilar adenopathy. There are atherosclerotic calcifications in the aortic arch. There is no thoracic aortic aneurysm or dissection. There is a small sliding-type hiatal hernia; the esophagus is otherwise unremarkable. There  is mild spondylosis. No acute or suspicious bone lesions are identified. Limited scans of the subdiaphragmatic regions show no acute pathology. Patient is status post cholecystectomy. 3-D maximum intensity projection reconstructions confirm the above findings.      EMPHYSEMA, CHRONIC INTERSTITIAL LUNG DISEASE, Problem List   Diagnosis Code    Essential hypertension, benign I10    Chronic obstructive pulmonary disease (HCC) J44.9    Osteoarthritis M19.90    Tobacco use Z72.0    Psychophysiological insomnia F51.04    JONNY (obstructive sleep apnea) G47.33    Hypoxia R09.02    Obesity (BMI 30-39. 9) E66.9       Isai López MD  Admitting Hospitalist    Emergency Contact:

## 2019-07-27 NOTE — ED NOTES
Spoke with Arland Gilford at transfer center. Pt assigned bed 166-1. Number for report is [phone].       [facility]  07/27/19 1029

## 2019-07-27 NOTE — ED PROVIDER NOTES
headaches. Psychiatric/Behavioral: Negative for agitation, confusion and hallucinations. All other systems reviewed and are negative. Except as noted above the remainder of the review of systems was reviewed and negative. PAST MEDICAL HISTORY     Past Medical History:   Diagnosis Date    COPD (chronic obstructive pulmonary disease) ([address]     Hypertension     Lung disease     Osteoarthritis     hands worst    Sleep apnea          SURGICAL HISTORY       Past Surgical History:   Procedure Laterality Date    CHOLECYSTECTOMY      ROTATOR CUFF REPAIR      right         CURRENT MEDICATIONS       Discharge Medication List as of 7/27/2019  5:51 PM      CONTINUE these medications which have NOT CHANGED    Details   !! OXYGEN Start oxygen therapy at 3lit via NC , give POC, Disp-1 Units, R-0Print      Respiratory Therapy Supplies DREW Disp-1 Device, R-0, PrintNew CPAP Full face mask and supplies  Air fit F30 large size      !! OXYGEN POC    3 lit via NC     Dx copd, Disp-1 Units, R-0Print      albuterol sulfate HFA (PROAIR HFA) 108 (90 Base) MCG/ACT inhaler Inhale 2 puffs into the lungs every 6 hours as needed for Wheezing, Disp-3 Inhaler, R-3Normal      budesonide-formoterol (SYMBICORT) 160-4.5 MCG/ACT AERO Inhale 1 puff into the lungs 2 times daily, Disp-30.6 g, R-3Normal      lisinopril (PRINIVIL;ZESTRIL) 40 MG tablet Take 1 tablet by mouth daily. , Disp-90 tablet, R-3Normal      buPROPion (WELLBUTRIN SR) 150 MG extended release tablet Take 1 tablet by mouth 2 times daily, Disp-180 tablet, R-3Normal      CPAP Machine MISC Starting Fri 8/3/2018, Disp-1 each, R-0, PrintNew CPAP with 7 cm with 3 lit O2 bled      Compression Bandages KIT Disp-2 kit, R-1, PrintLLE: knee high: 20-30mmhg      varenicline (CHANTIX STARTING MONTH PAK) 0.5 MG X 11 & 1 MG X 42 tablet Take by mouth., Disp-1 each, R-0Normal      varenicline (CHANTIX CONTINUING MONTH PAK) 1 MG tablet Take 1 tablet by mouth 2 times daily, Disp-60

## 2019-07-28 PROBLEM — I50.23 CHF (CONGESTIVE HEART FAILURE), NYHA CLASS III, ACUTE ON CHRONIC, SYSTOLIC (HCC): Status: ACTIVE | Noted: 2019-07-28

## 2019-07-28 LAB
ANION GAP SERPL CALCULATED.3IONS-SCNC: 10 MEQ/L (ref 9–15)
BASOPHILS ABSOLUTE: 0.1 K/UL (ref 0–0.2)
BASOPHILS RELATIVE PERCENT: 0.8 %
BUN BLDV-MCNC: 10 MG/DL (ref 8–23)
CALCIUM SERPL-MCNC: 9.3 MG/DL (ref 8.5–9.9)
CHLORIDE BLD-SCNC: 97 MEQ/L (ref 95–107)
CHOLESTEROL, TOTAL: 129 MG/DL (ref 0–199)
CO2: 34 MEQ/L (ref 20–31)
CREAT SERPL-MCNC: 0.82 MG/DL (ref 0.7–1.2)
EOSINOPHILS ABSOLUTE: 0.4 K/UL (ref 0–0.7)
EOSINOPHILS RELATIVE PERCENT: 5.4 %
GFR AFRICAN AMERICAN: >60
GFR NON-AFRICAN AMERICAN: >60
GLUCOSE BLD-MCNC: 100 MG/DL (ref 70–99)
HCT VFR BLD CALC: 51.1 % (ref 42–52)
HDLC SERPL-MCNC: 43 MG/DL (ref 40–59)
HEMOGLOBIN: 17.1 G/DL (ref 14–18)
LDL CHOLESTEROL CALCULATED: 65 MG/DL (ref 0–129)
LYMPHOCYTES ABSOLUTE: 1.3 K/UL (ref 1–4.8)
LYMPHOCYTES RELATIVE PERCENT: 18 %
MAGNESIUM: 2.1 MG/DL (ref 1.7–2.4)
MCH RBC QN AUTO: 30.9 PG (ref 27–31.3)
MCHC RBC AUTO-ENTMCNC: 33.5 % (ref 33–37)
MCV RBC AUTO: 92.3 FL (ref 80–100)
MONOCYTES ABSOLUTE: 0.8 K/UL (ref 0.2–0.8)
MONOCYTES RELATIVE PERCENT: 10.2 %
NEUTROPHILS ABSOLUTE: 4.8 K/UL (ref 1.4–6.5)
NEUTROPHILS RELATIVE PERCENT: 65.6 %
PDW BLD-RTO: 15.2 % (ref 11.5–14.5)
PLATELET # BLD: 211 K/UL (ref 130–400)
POTASSIUM SERPL-SCNC: 4.4 MEQ/L (ref 3.4–4.9)
PRO-BNP: 159 PG/ML
RBC # BLD: 5.54 M/UL (ref 4.7–6.1)
SODIUM BLD-SCNC: 141 MEQ/L (ref 135–144)
TRIGL SERPL-MCNC: 105 MG/DL (ref 0–150)
TROPONIN: <0.01 NG/ML (ref 0–0.01)
TROPONIN: <0.01 NG/ML (ref 0–0.01)
TSH SERPL DL<=0.05 MIU/L-ACNC: 0.81 UIU/ML (ref 0.44–3.86)
WBC # BLD: 7.4 K/UL (ref 4.8–10.8)

## 2019-07-28 PROCEDURE — 99223 1ST HOSP IP/OBS HIGH 75: CPT | Performed by: INTERNAL MEDICINE

## 2019-07-28 PROCEDURE — 83735 ASSAY OF MAGNESIUM: CPT

## 2019-07-28 PROCEDURE — 83880 ASSAY OF NATRIURETIC PEPTIDE: CPT

## 2019-07-28 PROCEDURE — 2700000000 HC OXYGEN THERAPY PER DAY

## 2019-07-28 PROCEDURE — 6370000000 HC RX 637 (ALT 250 FOR IP): Performed by: INTERNAL MEDICINE

## 2019-07-28 PROCEDURE — 6360000002 HC RX W HCPCS: Performed by: INTERNAL MEDICINE

## 2019-07-28 PROCEDURE — 2060000000 HC ICU INTERMEDIATE R&B

## 2019-07-28 PROCEDURE — 85025 COMPLETE CBC W/AUTO DIFF WBC: CPT

## 2019-07-28 PROCEDURE — 94150 VITAL CAPACITY TEST: CPT

## 2019-07-28 PROCEDURE — 84484 ASSAY OF TROPONIN QUANT: CPT

## 2019-07-28 PROCEDURE — 36415 COLL VENOUS BLD VENIPUNCTURE: CPT

## 2019-07-28 PROCEDURE — 80048 BASIC METABOLIC PNL TOTAL CA: CPT

## 2019-07-28 PROCEDURE — 84443 ASSAY THYROID STIM HORMONE: CPT

## 2019-07-28 PROCEDURE — 80061 LIPID PANEL: CPT

## 2019-07-28 RX ORDER — LISINOPRIL 10 MG/1
10 TABLET ORAL DAILY
Status: DISCONTINUED | OUTPATIENT
Start: 2019-07-28 | End: 2019-07-30 | Stop reason: HOSPADM

## 2019-07-28 RX ORDER — POTASSIUM CHLORIDE 20 MEQ/1
20 TABLET, EXTENDED RELEASE ORAL 2 TIMES DAILY WITH MEALS
Status: DISCONTINUED | OUTPATIENT
Start: 2019-07-28 | End: 2019-07-30

## 2019-07-28 RX ORDER — FUROSEMIDE 10 MG/ML
40 INJECTION INTRAMUSCULAR; INTRAVENOUS 3 TIMES DAILY
Status: DISCONTINUED | OUTPATIENT
Start: 2019-07-28 | End: 2019-07-29

## 2019-07-28 RX ADMIN — POTASSIUM CHLORIDE 20 MEQ: 20 TABLET, EXTENDED RELEASE ORAL at 17:25

## 2019-07-28 RX ADMIN — CARVEDILOL 3.12 MG: 3.12 TABLET, FILM COATED ORAL at 08:27

## 2019-07-28 RX ADMIN — GUAIFENESIN 600 MG: 600 TABLET, EXTENDED RELEASE ORAL at 08:26

## 2019-07-28 RX ADMIN — LISINOPRIL 10 MG: 10 TABLET ORAL at 08:27

## 2019-07-28 RX ADMIN — POTASSIUM CHLORIDE 20 MEQ: 20 TABLET, EXTENDED RELEASE ORAL at 08:30

## 2019-07-28 RX ADMIN — VARENICLINE TARTRATE 1 MG: 1 TABLET, FILM COATED ORAL at 08:27

## 2019-07-28 RX ADMIN — FUROSEMIDE 40 MG: 10 INJECTION, SOLUTION INTRAMUSCULAR; INTRAVENOUS at 17:27

## 2019-07-28 RX ADMIN — ASPIRIN 81 MG: 81 TABLET ORAL at 08:27

## 2019-07-28 RX ADMIN — CARVEDILOL 3.12 MG: 3.12 TABLET, FILM COATED ORAL at 17:25

## 2019-07-28 RX ADMIN — VARENICLINE TARTRATE 1 MG: 1 TABLET, FILM COATED ORAL at 21:18

## 2019-07-28 RX ADMIN — GUAIFENESIN 600 MG: 600 TABLET, EXTENDED RELEASE ORAL at 21:18

## 2019-07-28 RX ADMIN — FUROSEMIDE 40 MG: 10 INJECTION, SOLUTION INTRAMUSCULAR; INTRAVENOUS at 08:26

## 2019-07-28 RX ADMIN — FUROSEMIDE 40 MG: 10 INJECTION, SOLUTION INTRAMUSCULAR; INTRAVENOUS at 13:18

## 2019-07-28 RX ADMIN — ENOXAPARIN SODIUM 40 MG: 40 INJECTION SUBCUTANEOUS at 08:26

## 2019-07-28 ASSESSMENT — ENCOUNTER SYMPTOMS
BLOOD IN STOOL: 0
COUGH: 0
EYES NEGATIVE: 1
RESPIRATORY NEGATIVE: 1
STRIDOR: 0
SHORTNESS OF BREATH: 0
CHEST TIGHTNESS: 0
NAUSEA: 0
WHEEZING: 0
VOMITING: 0
GASTROINTESTINAL NEGATIVE: 1

## 2019-07-28 ASSESSMENT — PAIN SCALES - GENERAL
PAINLEVEL_OUTOF10: 0
PAINLEVEL_OUTOF10: 0

## 2019-07-28 NOTE — FLOWSHEET NOTE
Perfect serve to hospitalist to review/order pts home meds.  Electronically signed by Irene Vivas RN on 7/27/2019 at 9:09 PM

## 2019-07-28 NOTE — CONSULTS
Consults    Patient Name: Christian Cordova  Admit Date: 2019  6:20 PM  MR #: [id number]  : 1955    Attending Physician: Fred Harrison MD  Reason for consult: HF    History of Presenting Illness:      Christian Cordova is a 75 y.o. male on hospital day 1 with a history of . History Obtained From:  patient, electronic medical record  Admitted with 7-10 days of LE edema. Swelling was getting so bad that made ambualtion a little difficult. However, he denies sob. No cp. He has COPD and JONNY. He has not been using his cpap lately. He is a continue 2.5 PPD smoker. No prior documented CAD PAD CVA    History:      EKG:SR  Past Medical History:   Diagnosis Date    [address] obstructive [address]) [address] [address]     Hypertension     Lung disease     Osteoarthritis     hands worst    Sleep apnea      Past Surgical History:   Procedure Laterality Date    CHOLECYSTECTOMY      ROTATOR CUFF REPAIR      right       Family History  Family History   Problem Relation Age of Onset    Heart Disease Mother     Other Father      [] Unable to obtain due to ventilated and/ or neurologic status    Social History     Socioeconomic History    Marital status: Single     Spouse name: Not on file    Number of children: Not on file    Years of education: Not on file    Highest education level: Not on file   Occupational History    Not on file   Social Needs    Financial resource strain: Not on file    Food insecurity:     Worry: Not on file     Inability: Not on file    Transportation needs:     Medical: Not on file     Non-medical: Not on file   Tobacco Use    Smoking status: Current Every Day Smoker     Packs/day: 2.00     Years: 41.00     Pack years: 82.00     Types: Cigarettes    Smokeless tobacco: Never Used   Substance and Sexual Activity    Alcohol use:  Yes     Alcohol/week: 0.0 standard drinks     Comment: 12 pk per week    Drug use: No    Sexual activity: Not on file   Lifestyle    size is borderline dilated. There is mild superior mediastinal adenopathy with the largest node being a right paratracheal node with short axis diameter of 2.2 cm. There is no axillary or hilar adenopathy. There are atherosclerotic calcifications in the aortic arch. There is no thoracic aortic aneurysm or dissection. There is a small sliding-type hiatal hernia; the esophagus is otherwise unremarkable. There  is mild spondylosis. No acute or suspicious bone lesions are identified. Limited scans of the subdiaphragmatic regions show no acute pathology. Patient is status post cholecystectomy. 3-D maximum intensity projection reconstructions confirm the above findings. EMPHYSEMA, CHRONIC INTERSTITIAL LUNG DISEASE, AND PULMONARY HYPERTENSION. MILD SUPERIOR MEDIASTINAL ADENOPATHY. NEGATIVE FOR PULMONARY EMBOLISM. Xr Chest Portable    Result Date: 7/27/2019  EXAMINATION: XR CHEST PORTABLE CLINICAL HISTORY:  Legs swelling, shortness of breath COMPARISONS: None available. FINDINGS: There is moderate cardiomegaly. Pulmonary vascularity is prominent. There are coarsened interstitial markings consistent with fibrosis or interstitial edema. There is blunting of the left costophrenic angle which could represent scarring or trace effusion. There are atherosclerotic changes of the thoracic aorta. There are degenerative changes in the spine and right shoulder. CARDIOMEGALY WITH PULMONARY VASCULAR CONGESTION. COARSE INTERSTITIAL MARKINGS WHICH COULD REPRESENT FIBROSIS OR INTERSTITIAL EDEMA. SMALL LEFT PLEURAL EFFUSION VERSUS PLEURAL SCARRING. Active Hospital Problems    Diagnosis Date Noted    CHF (congestive heart failure), NYHA class III, acute on chronic, systolic (HCC) [F32.54] 17/63/4735     Priority: Low         Impression/Plan:   1. Acute on chronic decompensated RHF with Cor Pulmonale. Suspect baseline Diastolic HF - advance Lasix. Supplement K.  strict I/Os  2. HTN- not to gaol.  Add ACEI  3. IS  4. COPD  5. Stop smoking - counseled. 6. JONNY- noncompliant  7. Will review Echo     Thank you for allowing us to participate in the care of this patient. Will continue to follow. Please call if questions or concerns arise.     Electronically signed by Jojo Dominguez MD on 7/28/2019 at 7:39 AM

## 2019-07-29 LAB
ANION GAP SERPL CALCULATED.3IONS-SCNC: 10 MEQ/L (ref 9–15)
BUN BLDV-MCNC: 19 MG/DL (ref 8–23)
CALCIUM SERPL-MCNC: 9.6 MG/DL (ref 8.5–9.9)
CHLORIDE BLD-SCNC: 92 MEQ/L (ref 95–107)
CO2: 35 MEQ/L (ref 20–31)
CREAT SERPL-MCNC: 1.08 MG/DL (ref 0.7–1.2)
GFR AFRICAN AMERICAN: >60
GFR NON-AFRICAN AMERICAN: >60
GLUCOSE BLD-MCNC: 97 MG/DL (ref 70–99)
LV EF: 60 %
LVEF MODALITY: NORMAL
POTASSIUM SERPL-SCNC: 4.5 MEQ/L (ref 3.4–4.9)
SODIUM BLD-SCNC: 137 MEQ/L (ref 135–144)

## 2019-07-29 PROCEDURE — 99233 SBSQ HOSP IP/OBS HIGH 50: CPT | Performed by: INTERNAL MEDICINE

## 2019-07-29 PROCEDURE — 80048 BASIC METABOLIC PNL TOTAL CA: CPT

## 2019-07-29 PROCEDURE — 6370000000 HC RX 637 (ALT 250 FOR IP): Performed by: INTERNAL MEDICINE

## 2019-07-29 PROCEDURE — 6360000002 HC RX W HCPCS: Performed by: INTERNAL MEDICINE

## 2019-07-29 PROCEDURE — 93306 TTE W/DOPPLER COMPLETE: CPT

## 2019-07-29 PROCEDURE — 2060000000 HC ICU INTERMEDIATE R&B

## 2019-07-29 PROCEDURE — 2700000000 HC OXYGEN THERAPY PER DAY

## 2019-07-29 PROCEDURE — 36415 COLL VENOUS BLD VENIPUNCTURE: CPT

## 2019-07-29 RX ORDER — FUROSEMIDE 10 MG/ML
40 INJECTION INTRAMUSCULAR; INTRAVENOUS 2 TIMES DAILY
Status: DISCONTINUED | OUTPATIENT
Start: 2019-07-29 | End: 2019-07-30

## 2019-07-29 RX ADMIN — CARVEDILOL 3.12 MG: 3.12 TABLET, FILM COATED ORAL at 09:52

## 2019-07-29 RX ADMIN — VARENICLINE TARTRATE 1 MG: 1 TABLET, FILM COATED ORAL at 22:57

## 2019-07-29 RX ADMIN — GUAIFENESIN 600 MG: 600 TABLET, EXTENDED RELEASE ORAL at 09:51

## 2019-07-29 RX ADMIN — CARVEDILOL 3.12 MG: 3.12 TABLET, FILM COATED ORAL at 17:29

## 2019-07-29 RX ADMIN — ENOXAPARIN SODIUM 40 MG: 40 INJECTION SUBCUTANEOUS at 09:51

## 2019-07-29 RX ADMIN — POTASSIUM CHLORIDE 20 MEQ: 20 TABLET, EXTENDED RELEASE ORAL at 17:29

## 2019-07-29 RX ADMIN — FUROSEMIDE 40 MG: 10 INJECTION, SOLUTION INTRAMUSCULAR; INTRAVENOUS at 17:29

## 2019-07-29 RX ADMIN — VARENICLINE TARTRATE 1 MG: 1 TABLET, FILM COATED ORAL at 09:51

## 2019-07-29 RX ADMIN — FUROSEMIDE 40 MG: 10 INJECTION, SOLUTION INTRAMUSCULAR; INTRAVENOUS at 05:29

## 2019-07-29 RX ADMIN — LISINOPRIL 10 MG: 10 TABLET ORAL at 09:51

## 2019-07-29 RX ADMIN — GUAIFENESIN 600 MG: 600 TABLET, EXTENDED RELEASE ORAL at 20:48

## 2019-07-29 RX ADMIN — ASPIRIN 81 MG: 81 TABLET ORAL at 09:51

## 2019-07-29 RX ADMIN — POTASSIUM CHLORIDE 20 MEQ: 20 TABLET, EXTENDED RELEASE ORAL at 09:52

## 2019-07-29 ASSESSMENT — ENCOUNTER SYMPTOMS
NAUSEA: 0
GASTROINTESTINAL NEGATIVE: 1
WHEEZING: 1
COUGH: 1
SHORTNESS OF BREATH: 0
EYES NEGATIVE: 1
STRIDOR: 0
BLOOD IN STOOL: 0
CHEST TIGHTNESS: 0

## 2019-07-29 ASSESSMENT — PAIN SCALES - GENERAL
PAINLEVEL_OUTOF10: 0

## 2019-07-29 NOTE — PROGRESS NOTES
Department of Internal Medicine  Progress Note      SUBJECTIVE: decreased swelling of lower extremities. Blood pressure stable. On chronic 3 L NC prior to admission. No acute events overnight.        ROS:  All 12 systems reviewed and negative except mentioned in HPI and Assessment and plan    MEDICATIONS:  Current Facility-Administered Medications   Medication Dose Route Frequency Provider Last Rate Last Dose    furosemide (LASIX) injection 40 mg  40 mg Intravenous BID Boo Ron MD        potassium chloride (KLOR-CON M) extended release tablet 20 mEq  20 mEq Oral BID  Kina Villagran MD   20 mEq at 07/29/19 0952    lisinopril (PRINIVIL;ZESTRIL) tablet 10 mg  10 mg Oral Daily Kina Villagran MD   10 mg at 07/29/19 0951    enoxaparin (LOVENOX) injection 40 mg  40 mg Subcutaneous Daily Genevie Samira KIMBROUGH Sedar, DO   40 mg at 07/29/19 0951    carvedilol (COREG) tablet 3.125 mg  3.125 mg Oral BID  Kehinde KIMBROUGH Sedar, DO   3.125 mg at 07/29/19 5346    nitroGLYCERIN (NITROSTAT) SL tablet 0.4 mg  0.4 mg Sublingual Q5 Min PRN Anderson Brian Sedar, DO        guaiFENesin [facility]) extended release tablet 600 mg  600 mg Oral BID Anderson Brian Sedar, DO   600 mg at 07/29/19 5973    hydrALAZINE (APRESOLINE) injection 10 mg  10 mg Intravenous Q4H PRN Anderson Brian Sedar, DO        aspirin EC tablet 81 mg  81 mg Oral Daily Kehinde KIMBROUGH Sedar, DO   81 mg at 07/29/19 0951    ipratropium-albuterol (DUONEB) nebulizer solution 1 ampule  1 ampule Inhalation Q4H PRN Palmer Flakes, DO        varenicline (CHANTIX) tablet 1 mg  1 mg Oral BID Samer Sydnee Dunbar MD   1 mg at 07/29/19 0951         OBJECTIVE:  Vital Signs:  Vitals:    07/29/19 0653   BP: 134/84   Pulse: 94   Resp: 18   Temp: 97.9 °F (36.6 °C)   SpO2: 91%       General Exam (except as mentioned above):  CONSTITUTIONAL: Awake, alert, no apparent distress  EYES:  PERRL, conjunctiva normal  ENT:  Normocephalic, atraumatic  NECK:  Supple  BACK:  Symmetric  LUNGS:  CTAB except bilateral basilar

## 2019-07-29 NOTE — PROGRESS NOTES
Nutrition Education    Type and Reason for Visit: Consult, Patient Education    Nutrition Assessment:  CHF education,Pt states he has significantly decreased his sodium intake in the last 2 month. Does not at salt at the table or during cooking. States he 'rarely' eats beans and mercado, his favorite meal becasue he knows its too high in sodium. CHF diet education completed per protocol. Reviewed high sodium foods to avoid and low sodium foods recommended. Utilizing food label information included as well as handout on alternative spices/seasonings provided. · Verbally reviewed information with pt  · Written educational materials provided. · Contact name and number provided. · Refer to Patient Education activity for more details.     Electronically signed by Jordi Vigil RD, LD on 7/29/19 at 12:00 PM

## 2019-07-29 NOTE — PROGRESS NOTES
Progress Note  Patient: Barney Cosme  Unit/Bed: D170/W188-02  YOB: 1955  MRN: [id number]  Acct: [de-identified]   Admitting Diagnosis: CHF (congestive heart failure), NYHA class III, acute on chronic, systolic (HCC) [O60.62]  Admit Date:  7/27/2019  Hospital Day: 2    Chief Complaint: hf htn  Copd denny    Histories:  Past Medical History:   Diagnosis Date    COPD (chronic obstructive pulmonary disease) ([address]     Hypertension     Lung disease     Osteoarthritis     hands worst    Sleep apnea      Past Surgical History:   Procedure Laterality Date    CHOLECYSTECTOMY      ROTATOR CUFF REPAIR      right     Family History   Problem Relation Age of Onset    Heart Disease Mother     Other Father      Social History     Socioeconomic History    Marital status: Single     Spouse name: None    Number of children: None    Years of education: None    Highest education level: None   Occupational History    None   Social Needs    Financial resource strain: None    Food insecurity:     Worry: None     Inability: None    Transportation needs:     Medical: None     Non-medical: None   Tobacco Use    Smoking status: Current Every Day Smoker     Packs/day: 2.00     Years: 41.00     Pack years: 82.00     Types: Cigarettes    Smokeless tobacco: Never Used   Substance and Sexual Activity    Alcohol use:  Yes     Alcohol/week: 0.0 standard drinks     Comment: 12 pk per week    Drug use: No    Sexual activity: None   Lifestyle    Physical activity:     Days per week: None     Minutes per session: None    Stress: None   Relationships    Social connections:     Talks on phone: None     Gets together: None     Attends Orthodox service: None     Active member of club or organization: None     Attends meetings of clubs or organizations: None     Relationship status: None    Intimate partner violence:     Fear of current or ex partner: None     Emotionally abused: None     Physically abused: None     Forced Assessment/Plan:  1. Acute on chronic decompensated RHF with Cor Pulmonale. Suspect baseline Diastolic HF -  Renal stable- continue Lasix iv today. Diuresed well- not confident I/Os accurate. Will discuss with staff. 2. HTN- stable   3. IS  4. COPD with diffuse wheezing and coughing. IS/Acapella  5. Stop smoking - counseled. 6. JONNY- noncompliant  7.  Will review Echo       Electronically signed by Jessica Williamson MD on 7/29/2019 at 6:41 AM

## 2019-07-29 NOTE — CARE COORDINATION
Care Transition Nurse provided CHF books and zone sheets and reviewed at length, Stressed importance of contacting clinician with symptoms in yellow zone and seeking emergency treatment for any symptoms in red zone. Pt states this is a new diagnosis for him. CHF education, causes, symptoms, and management. Discussed importance of daily weights as an early indicator that he may be going into fluid overload. Pt states he never weighs himself and does not have a scale. Will provide scale if unable to purchase one, reviewed calling physician with weight gain of 3 pounds or more in 1 to 7 days, reminded to use his home going weight as dry weight and call clinician with increase of 3 pounds or more in 1 to 7 days, pt states BP has been in good range. He does not have  A BP monitor, but checks it at MD appointments and at drug store when he goes, states he takes his BP meds as ordered, reports he takes all medications as ordered, does not use a pill minder, but states he is consistent with his medication regimen. Reminded to monitor sodium intake and not consume more than 2000 mg of sodium a day, Pt does admit to having a taste for some saltier foods like wieners and beans , but states he has tried not to use table salt for a while now Reminded processed and restaurant foods are higher in sodium content and should be limited, reinforced to check labels for sodium content and trying to keep sodium intake at 600 mg per meal. Pt verbalized understanding. Reinforced need to take all medications as ordered, pt states no problem purchasing meds and he takes them as ordered. Discussed sleep apnea as a contributory factor to his CHF, pt states he uses his CPAP machine, but had been using pillow pads nasal cannula, he did not feel it was providing enough air, as he is a mouth breather, he had just received a mask to use with CPAP, but did not have time to experiment with it as he received it the day he was admitted.  Pt also states it was recommended that he start using O2 during the day , Dr. Debra Gee was in process of ordering o2 and portability, care coordinator made aware, as pt has requested pulmonary eval for his 02 discharge needs. Reviewed causes of COPD, how lungs work, avoiding respiratory infection, good handwashing,  avoiding inhaled irritants,energy conservation, copd medications. Pt states he is aware of  triggers. Pt admits he still smokes, 1 PPD, advised of negative effects of cigarettes on lung and heart and dangers of smoking around 02, pt states he is aware and is trying to stop, states he smoked up to 2 PPD in the recent past, Advised of free smoking cessation programs through [facility] ([facility]) and [place] [id number], pt remains adamant that he is continuing to cut done and will try to quit on his own . Reminded of importance of 7 day follow up with clinician, I left my contact information and requested he contact me with any questions or concerns.

## 2019-07-30 VITALS
BODY MASS INDEX: 33.6 KG/M2 | SYSTOLIC BLOOD PRESSURE: 146 MMHG | OXYGEN SATURATION: 94 % | RESPIRATION RATE: 19 BRPM | TEMPERATURE: 98.3 F | HEART RATE: 75 BPM | DIASTOLIC BLOOD PRESSURE: 88 MMHG | HEIGHT: 70 IN | WEIGHT: 234.7 LBS

## 2019-07-30 PROBLEM — I50.33 ACUTE ON CHRONIC DIASTOLIC (CONGESTIVE) HEART FAILURE (HCC): Status: ACTIVE | Noted: 2019-07-30

## 2019-07-30 LAB
ANION GAP SERPL CALCULATED.3IONS-SCNC: 9 MEQ/L (ref 9–15)
BUN BLDV-MCNC: 20 MG/DL (ref 8–23)
CALCIUM SERPL-MCNC: 9.4 MG/DL (ref 8.5–9.9)
CHLORIDE BLD-SCNC: 94 MEQ/L (ref 95–107)
CO2: 33 MEQ/L (ref 20–31)
CREAT SERPL-MCNC: 0.82 MG/DL (ref 0.7–1.2)
GFR AFRICAN AMERICAN: >60
GFR NON-AFRICAN AMERICAN: >60
GLUCOSE BLD-MCNC: 99 MG/DL (ref 70–99)
POTASSIUM SERPL-SCNC: 4.5 MEQ/L (ref 3.4–4.9)
SODIUM BLD-SCNC: 136 MEQ/L (ref 135–144)

## 2019-07-30 PROCEDURE — 6370000000 HC RX 637 (ALT 250 FOR IP): Performed by: INTERNAL MEDICINE

## 2019-07-30 PROCEDURE — 99232 SBSQ HOSP IP/OBS MODERATE 35: CPT | Performed by: INTERNAL MEDICINE

## 2019-07-30 PROCEDURE — 36415 COLL VENOUS BLD VENIPUNCTURE: CPT

## 2019-07-30 PROCEDURE — 6360000002 HC RX W HCPCS: Performed by: INTERNAL MEDICINE

## 2019-07-30 PROCEDURE — 80048 BASIC METABOLIC PNL TOTAL CA: CPT

## 2019-07-30 RX ORDER — ASPIRIN 81 MG/1
81 TABLET ORAL DAILY
Qty: 30 TABLET | Refills: 3 | Status: SHIPPED | OUTPATIENT
Start: 2019-07-30

## 2019-07-30 RX ORDER — POTASSIUM CHLORIDE 20 MEQ/1
10 TABLET, EXTENDED RELEASE ORAL 2 TIMES DAILY WITH MEALS
Status: DISCONTINUED | OUTPATIENT
Start: 2019-07-30 | End: 2019-07-30 | Stop reason: HOSPADM

## 2019-07-30 RX ORDER — FUROSEMIDE 40 MG/1
40 TABLET ORAL DAILY
Qty: 60 TABLET | Refills: 3 | Status: SHIPPED | OUTPATIENT
Start: 2019-07-31 | End: 2020-08-10 | Stop reason: SDUPTHER

## 2019-07-30 RX ORDER — ATORVASTATIN CALCIUM 20 MG/1
20 TABLET, FILM COATED ORAL NIGHTLY
Qty: 30 TABLET | Refills: 3 | Status: SHIPPED | OUTPATIENT
Start: 2019-07-30 | End: 2019-09-06 | Stop reason: SDUPTHER

## 2019-07-30 RX ORDER — LISINOPRIL 10 MG/1
10 TABLET ORAL DAILY
Qty: 30 TABLET | Refills: 3 | Status: SHIPPED | OUTPATIENT
Start: 2019-07-31 | End: 2020-01-22 | Stop reason: SDUPTHER

## 2019-07-30 RX ORDER — ATORVASTATIN CALCIUM 20 MG/1
20 TABLET, FILM COATED ORAL NIGHTLY
Status: DISCONTINUED | OUTPATIENT
Start: 2019-07-30 | End: 2019-07-30 | Stop reason: HOSPADM

## 2019-07-30 RX ORDER — GUAIFENESIN 600 MG/1
600 TABLET, EXTENDED RELEASE ORAL 2 TIMES DAILY
Qty: 10 TABLET | Refills: 0 | Status: SHIPPED | OUTPATIENT
Start: 2019-07-30

## 2019-07-30 RX ORDER — CARVEDILOL 3.12 MG/1
3.12 TABLET ORAL 2 TIMES DAILY WITH MEALS
Qty: 60 TABLET | Refills: 3 | Status: SHIPPED | OUTPATIENT
Start: 2019-07-30 | End: 2020-01-22 | Stop reason: SDUPTHER

## 2019-07-30 RX ORDER — POTASSIUM CHLORIDE 750 MG/1
10 TABLET, EXTENDED RELEASE ORAL 2 TIMES DAILY WITH MEALS
Qty: 60 TABLET | Refills: 3 | Status: SHIPPED | OUTPATIENT
Start: 2019-07-30 | End: 2020-07-29 | Stop reason: SDUPTHER

## 2019-07-30 RX ORDER — FUROSEMIDE 40 MG/1
40 TABLET ORAL DAILY
Status: DISCONTINUED | OUTPATIENT
Start: 2019-07-30 | End: 2019-07-30 | Stop reason: HOSPADM

## 2019-07-30 RX ORDER — ASPIRIN 81 MG/1
81 TABLET ORAL DAILY
Qty: 30 TABLET | Refills: 3 | Status: CANCELLED | OUTPATIENT
Start: 2019-07-31

## 2019-07-30 RX ADMIN — CARVEDILOL 3.12 MG: 3.12 TABLET, FILM COATED ORAL at 16:31

## 2019-07-30 RX ADMIN — CARVEDILOL 3.12 MG: 3.12 TABLET, FILM COATED ORAL at 07:57

## 2019-07-30 RX ADMIN — ASPIRIN 81 MG: 81 TABLET ORAL at 07:57

## 2019-07-30 RX ADMIN — FUROSEMIDE 40 MG: 40 TABLET ORAL at 07:57

## 2019-07-30 RX ADMIN — LISINOPRIL 10 MG: 10 TABLET ORAL at 07:57

## 2019-07-30 RX ADMIN — POTASSIUM CHLORIDE 10 MEQ: 20 TABLET, EXTENDED RELEASE ORAL at 16:31

## 2019-07-30 RX ADMIN — ENOXAPARIN SODIUM 40 MG: 40 INJECTION SUBCUTANEOUS at 07:57

## 2019-07-30 RX ADMIN — VARENICLINE TARTRATE 1 MG: 1 TABLET, FILM COATED ORAL at 07:57

## 2019-07-30 RX ADMIN — POTASSIUM CHLORIDE 10 MEQ: 20 TABLET, EXTENDED RELEASE ORAL at 07:57

## 2019-07-30 RX ADMIN — GUAIFENESIN 600 MG: 600 TABLET, EXTENDED RELEASE ORAL at 07:57

## 2019-07-30 ASSESSMENT — ENCOUNTER SYMPTOMS
NAUSEA: 0
BLOOD IN STOOL: 0
COUGH: 0
CHEST TIGHTNESS: 0
EYES NEGATIVE: 1
SHORTNESS OF BREATH: 1
STRIDOR: 0
WHEEZING: 0
GASTROINTESTINAL NEGATIVE: 1

## 2019-07-30 ASSESSMENT — PAIN SCALES - GENERAL
PAINLEVEL_OUTOF10: 0
PAINLEVEL_OUTOF10: 0

## 2019-07-30 NOTE — CARE COORDINATION
SPOKE WITH [place] MULTIPLE TIMES. 1ST TIME WAS WITH LATANYA AND SHE STATED PT DID NOT HAVE PORTABLE OR CONTINUOUS 02 AT HOME. LATER I SPOKE WITH THE SUPERVISOR AND SENT 02 ORDER AND POC ORDER. RECEIVED CALL BACK STATING PT DID GET PORTABLE. PT STATES HE DID NOT AND SPOKE WITH THE SUPERVISOR ON THE PHONE. PT RECEIVED A CALL FROM MARTIN AT [facility] IN [facility] OFFICE AND ARRANGED FOR PT TO  PORTABLE TANKS TOMORROW UNTIL POC CAN BE DELIVERED. THEY ARE SENDING SOMEONE TO DROP OFF A PORTABLE TANK TO GO HOME WITH FROM [facility].  PT AWARE OF PROCESS

## 2019-08-01 LAB
BLOOD CULTURE, ROUTINE: NORMAL
CULTURE, BLOOD 2: NORMAL

## 2019-08-02 DIAGNOSIS — Z72.0 TOBACCO USE: ICD-10-CM

## 2019-08-02 DIAGNOSIS — J44.9 CHRONIC OBSTRUCTIVE PULMONARY DISEASE, UNSPECIFIED COPD TYPE (HCC): ICD-10-CM

## 2019-08-02 RX ORDER — VARENICLINE TARTRATE 1 MG/1
TABLET, FILM COATED ORAL
Qty: 60 TABLET | Refills: 0 | Status: SHIPPED | OUTPATIENT
Start: 2019-08-02 | End: 2019-10-15

## 2019-08-06 ENCOUNTER — OFFICE VISIT (OUTPATIENT)
Dept: FAMILY MEDICINE CLINIC | Age: 64
End: 2019-08-06
Payer: MEDICARE

## 2019-08-06 VITALS
WEIGHT: 237.2 LBS | HEIGHT: 70 IN | BODY MASS INDEX: 33.96 KG/M2 | HEART RATE: 94 BPM | OXYGEN SATURATION: 95 % | DIASTOLIC BLOOD PRESSURE: 72 MMHG | SYSTOLIC BLOOD PRESSURE: 130 MMHG | TEMPERATURE: 98.1 F

## 2019-08-06 DIAGNOSIS — Z09 HOSPITAL DISCHARGE FOLLOW-UP: Primary | ICD-10-CM

## 2019-08-06 DIAGNOSIS — J44.9 CHRONIC OBSTRUCTIVE PULMONARY DISEASE, UNSPECIFIED COPD TYPE (HCC): ICD-10-CM

## 2019-08-06 DIAGNOSIS — I50.33 ACUTE ON CHRONIC DIASTOLIC (CONGESTIVE) HEART FAILURE (HCC): ICD-10-CM

## 2019-08-06 PROCEDURE — 4004F PT TOBACCO SCREEN RCVD TLK: CPT | Performed by: FAMILY MEDICINE

## 2019-08-06 PROCEDURE — 1123F ACP DISCUSS/DSCN MKR DOCD: CPT | Performed by: FAMILY MEDICINE

## 2019-08-06 PROCEDURE — 3023F SPIROM DOC REV: CPT | Performed by: FAMILY MEDICINE

## 2019-08-06 PROCEDURE — 99214 OFFICE O/P EST MOD 30 MIN: CPT | Performed by: FAMILY MEDICINE

## 2019-08-06 PROCEDURE — G8926 SPIRO NO PERF OR DOC: HCPCS | Performed by: FAMILY MEDICINE

## 2019-08-06 PROCEDURE — G8417 CALC BMI ABV UP PARAM F/U: HCPCS | Performed by: FAMILY MEDICINE

## 2019-08-06 PROCEDURE — 4040F PNEUMOC VAC/ADMIN/RCVD: CPT | Performed by: FAMILY MEDICINE

## 2019-08-06 PROCEDURE — G8427 DOCREV CUR MEDS BY ELIG CLIN: HCPCS | Performed by: FAMILY MEDICINE

## 2019-08-06 PROCEDURE — 3017F COLORECTAL CA SCREEN DOC REV: CPT | Performed by: FAMILY MEDICINE

## 2019-08-06 PROCEDURE — 1111F DSCHRG MED/CURRENT MED MERGE: CPT | Performed by: FAMILY MEDICINE

## 2019-08-06 RX ORDER — BUDESONIDE AND FORMOTEROL FUMARATE DIHYDRATE 160; 4.5 UG/1; UG/1
2 AEROSOL RESPIRATORY (INHALATION) 2 TIMES DAILY
Qty: 1 INHALER | Refills: 0 | Status: ON HOLD | COMMUNITY
Start: 2019-08-06 | End: 2020-12-14

## 2019-08-06 ASSESSMENT — ENCOUNTER SYMPTOMS
SORE THROAT: 0
COUGH: 0
CONSTIPATION: 0
RHINORRHEA: 0
SHORTNESS OF BREATH: 0
WHEEZING: 0
ABDOMINAL PAIN: 0
DIARRHEA: 0

## 2019-08-06 NOTE — PROGRESS NOTES
[address] PRIMARY CARE  [address] [place] [id number]  Dept: [phone]  Dept Fax: : [phone]   Chief Complaint  Chief Complaint   Patient presents with    Follow-Up from Hospital     pt f/u from hospital on 7/30 for edema in legs and feet     Discuss Medications     pt wants to discuss the chantix, also wants to know if he can get sample of the symbicort        HPI:  75 y.o.male who presents for [facility] f/u:    Seen in ED for leg swelling and SOB; admitted for newly diagnosed HF (diastolic); improved with diuresis. Started on BB and daily lasix. Pt wonders if he truly has HF because the SOB isn't abnormal for him in the setting of COPD; BNP was elevated in the ED with L atrial dilation. He is feeling back to baseline with O2 today. Smoker: working on quitting smoking with chantix; went fro 2.5 ppd to 1 ppd. Past Medical History:   Diagnosis Date    COPD (chronic obstructive pulmonary disease) ([address]     Hypertension     Lung disease     Osteoarthritis     hands worst    Sleep apnea      Past Surgical History:   Procedure Laterality Date    CHOLECYSTECTOMY      ROTATOR CUFF REPAIR      right     Social History     Socioeconomic History    Marital status: Single     Spouse name: Not on file    Number of children: Not on file    Years of education: Not on file    Highest education level: Not on file   Occupational History    Not on file   Social Needs    Financial resource strain: Not on file    Food insecurity:     Worry: Not on file     Inability: Not on file    Transportation needs:     Medical: Not on file     Non-medical: Not on file   Tobacco Use    Smoking status: Current Every Day Smoker     Packs/day: 2.00     Years: 41.00     Pack years: 82.00     Types: Cigarettes    Smokeless tobacco: Never Used   Substance and Sexual Activity    Alcohol use:  Yes     Alcohol/week: 0.0 standard drinks     Comment: 12 pk per week    Drug use: No    Sexual activity: Not on file   Lifestyle    Physical activity:     Days per week: Not on file     Minutes per session: Not on file    Stress: Not on file   Relationships    Social connections:     Talks on phone: Not on file     Gets together: Not on file     Attends [employer] service: Not on file     Active member of club or organization: Not on file     Attends meetings of clubs or organizations: Not on file     Relationship status: Not on file    Intimate partner violence:     Fear of current or ex partner: Not on file     Emotionally abused: Not on file     Physically abused: Not on file     Forced sexual activity: Not on file   Other Topics Concern    Not on file   Social History Narrative    Not on file     No Known Allergies  Current Outpatient Medications   Medication Sig Dispense Refill    budesonide-formoterol (SYMBICORT) 160-4.5 MCG/ACT AERO Inhale 2 puffs into the lungs 2 times daily Sample medication labeled with directions for administration and patient instructed on use.  Lot: [id number] Exp: 06/2020 1 Inhaler 0    CHANTIX 1 MG tablet TAKE 1 TABLET BY MOUTH TWICE DAILY 60 tablet 0    carvedilol (COREG) 3.125 MG tablet Take 1 tablet by mouth 2 times daily (with meals) 60 tablet 3    atorvastatin (LIPITOR) 20 MG tablet Take 1 tablet by mouth nightly 30 tablet 3    furosemide (LASIX) 40 MG tablet Take 1 tablet by mouth daily 60 tablet 3    guaiFENesin (MUCINEX) 600 MG extended release tablet Take 1 tablet by mouth 2 times daily 10 tablet 0    potassium chloride (KLOR-CON M) 10 MEQ extended release tablet Take 1 tablet by mouth 2 times daily (with meals) 60 tablet 3    aspirin EC 81 MG EC tablet Take 1 tablet by mouth daily 30 tablet 3    lisinopril (PRINIVIL;ZESTRIL) 10 MG tablet Take 1 tablet by mouth daily 30 tablet 3    OXYGEN Start oxygen therapy at 3lit via NC , give POC 1 Units 0    Respiratory Therapy Supplies DREW

## 2019-08-15 ENCOUNTER — OFFICE VISIT (OUTPATIENT)
Dept: CARDIOLOGY CLINIC | Age: 64
End: 2019-08-15
Payer: MEDICARE

## 2019-08-15 VITALS
DIASTOLIC BLOOD PRESSURE: 76 MMHG | BODY MASS INDEX: 32.99 KG/M2 | HEIGHT: 70 IN | WEIGHT: 230.4 LBS | RESPIRATION RATE: 22 BRPM | SYSTOLIC BLOOD PRESSURE: 118 MMHG | OXYGEN SATURATION: 100 % | HEART RATE: 95 BPM

## 2019-08-15 DIAGNOSIS — R09.89 BILATERAL CAROTID BRUITS: ICD-10-CM

## 2019-08-15 DIAGNOSIS — I10 ESSENTIAL HYPERTENSION, BENIGN: ICD-10-CM

## 2019-08-15 DIAGNOSIS — I50.33 ACUTE ON CHRONIC DIASTOLIC (CONGESTIVE) HEART FAILURE (HCC): Primary | ICD-10-CM

## 2019-08-15 DIAGNOSIS — J42 CHRONIC BRONCHITIS, UNSPECIFIED CHRONIC BRONCHITIS TYPE (HCC): ICD-10-CM

## 2019-08-15 DIAGNOSIS — F17.200 SMOKER: ICD-10-CM

## 2019-08-15 DIAGNOSIS — R06.02 SHORTNESS OF BREATH: ICD-10-CM

## 2019-08-15 DIAGNOSIS — I27.81 COR PULMONALE (CHRONIC) (HCC): ICD-10-CM

## 2019-08-15 PROCEDURE — 3017F COLORECTAL CA SCREEN DOC REV: CPT | Performed by: INTERNAL MEDICINE

## 2019-08-15 PROCEDURE — 99215 OFFICE O/P EST HI 40 MIN: CPT | Performed by: INTERNAL MEDICINE

## 2019-08-15 PROCEDURE — 3023F SPIROM DOC REV: CPT | Performed by: INTERNAL MEDICINE

## 2019-08-15 PROCEDURE — 1123F ACP DISCUSS/DSCN MKR DOCD: CPT | Performed by: INTERNAL MEDICINE

## 2019-08-15 PROCEDURE — 4004F PT TOBACCO SCREEN RCVD TLK: CPT | Performed by: INTERNAL MEDICINE

## 2019-08-15 PROCEDURE — 1111F DSCHRG MED/CURRENT MED MERGE: CPT | Performed by: INTERNAL MEDICINE

## 2019-08-15 PROCEDURE — G8417 CALC BMI ABV UP PARAM F/U: HCPCS | Performed by: INTERNAL MEDICINE

## 2019-08-15 PROCEDURE — 4040F PNEUMOC VAC/ADMIN/RCVD: CPT | Performed by: INTERNAL MEDICINE

## 2019-08-15 PROCEDURE — G8926 SPIRO NO PERF OR DOC: HCPCS | Performed by: INTERNAL MEDICINE

## 2019-08-15 PROCEDURE — G8427 DOCREV CUR MEDS BY ELIG CLIN: HCPCS | Performed by: INTERNAL MEDICINE

## 2019-08-15 ASSESSMENT — ENCOUNTER SYMPTOMS
CHEST TIGHTNESS: 0
EYES NEGATIVE: 1
SHORTNESS OF BREATH: 1
BLOOD IN STOOL: 0
NAUSEA: 0
STRIDOR: 0
WHEEZING: 0
GASTROINTESTINAL NEGATIVE: 1
COUGH: 0

## 2019-08-15 NOTE — PROGRESS NOTES
Subsequent Progress Note  Patient: Solange Doshi  YOB: 1955  MRN: [id number]    Chief Complaint: hf le edema copd crowder   Chief Complaint   Patient presents with    Follow-Up from [facility] ER    Congestive Heart Failure     ECHO 7/27/19    Hypertension       CV Data:  7/2019 echo EF 60  Subjective/HPI:  + crowder . No cp takes meds. No falls no bleed. Edema much improved   prior 2.5 PPD now < 1ppd   Retired -[employer]    EKG:    Past Medical History:   Diagnosis Date    COPD (chronic obstructive pulmonary disease) ([address]     Hypertension     Lung disease     Osteoarthritis     hands worst    Sleep apnea        Past Surgical History:   Procedure Laterality Date    CHOLECYSTECTOMY      ROTATOR CUFF REPAIR      right       Family History   Problem Relation Age of Onset    Heart Disease Mother     Other Father        Social History     Socioeconomic History    Marital status: Single     Spouse name: None    Number of children: None    Years of education: None    Highest education level: None   Occupational History    None   Social Needs    Financial resource strain: None    Food insecurity:     Worry: None     Inability: None    Transportation needs:     Medical: None     Non-medical: None   Tobacco Use    Smoking status: Current Every Day Smoker     Packs/day: 2.00     Years: 41.00     Pack years: 82.00     Types: Cigarettes    Smokeless tobacco: Never Used   Substance and Sexual Activity    Alcohol use:  Yes     Alcohol/week: 0.0 standard drinks     Comment: 12 pk per week    Drug use: No    Sexual activity: None   Lifestyle    Physical activity:     Days per week: None     Minutes per session: None    Stress: None   Relationships    Social connections:     Talks on phone: None     Gets together: None     Attends Bahai service: None     Active member of club or organization: None     Attends meetings of clubs or organizations: None     Relationship status: None  Intimate partner violence:     Fear of current or ex partner: None     Emotionally abused: None     Physically abused: None     Forced sexual activity: None   Other Topics Concern    None   Social History Narrative    None       No Known Allergies    Current Outpatient Medications   Medication Sig Dispense Refill    budesonide-formoterol (SYMBICORT) 160-4.5 MCG/ACT AERO Inhale 2 puffs into the lungs 2 times daily Sample medication labeled with directions for administration and patient instructed on use.  Lot: [id number] Exp: 06/2020 1 Inhaler 0    CHANTIX 1 MG tablet TAKE 1 TABLET BY MOUTH TWICE DAILY 60 tablet 0    carvedilol (COREG) 3.125 MG tablet Take 1 tablet by mouth 2 times daily (with meals) 60 tablet 3    atorvastatin (LIPITOR) 20 MG tablet Take 1 tablet by mouth nightly 30 tablet 3    furosemide (LASIX) 40 MG tablet Take 1 tablet by mouth daily 60 tablet 3    guaiFENesin (MUCINEX) 600 MG extended release tablet Take 1 tablet by mouth 2 times daily 10 tablet 0    potassium chloride (KLOR-CON M) 10 MEQ extended release tablet Take 1 tablet by mouth 2 times daily (with meals) 60 tablet 3    aspirin EC 81 MG EC tablet Take 1 tablet by mouth daily 30 tablet 3    lisinopril (PRINIVIL;ZESTRIL) 10 MG tablet Take 1 tablet by mouth daily 30 tablet 3    OXYGEN Start oxygen therapy at 3lit via NC , give POC 1 Units 0    Respiratory Therapy Supplies DERW New CPAP Full face mask and supplies    Air fit F30 large size 1 Device 0    OXYGEN POC    3 lit via NC     Dx copd 1 Units 0    albuterol sulfate HFA (PROAIR HFA) 108 (90 Base) MCG/ACT inhaler Inhale 2 puffs into the lungs every 6 hours as needed for Wheezing 3 Inhaler 3    budesonide-formoterol (SYMBICORT) 160-4.5 MCG/ACT AERO Inhale 1 puff into the lungs 2 times daily 30.6 g 3    buPROPion (WELLBUTRIN SR) 150 MG extended release tablet Take 1 tablet by mouth 2 times daily 180 tablet 3    CPAP Machine MISC by Does not apply route New CPAP with 7 cm with 3 lit O2 bled 1 each 0    Compression Bandages KIT LLE: knee high: 20-30mmhg 2 kit 1    sildenafil (VIAGRA) 100 MG tablet Take 1 tablet by mouth as needed for Erectile Dysfunction LOT [id number] EXP 04/2017 2 tablet 0     No current facility-administered medications for this visit. Review of Systems:   Review of Systems   Constitutional: Negative. Negative for diaphoresis and fatigue. HENT: Negative. Eyes: Negative. Respiratory: Positive for shortness of breath. Negative for cough, chest tightness, wheezing and stridor. Cardiovascular: Negative. Negative for chest pain, palpitations and leg swelling. Gastrointestinal: Negative. Negative for blood in stool and nausea. Genitourinary: Negative. Musculoskeletal: Negative. Skin: Negative. Neurological: Negative. Negative for dizziness, syncope, weakness and light-headedness. Hematological: Negative. Psychiatric/Behavioral: Negative. Physical Examination:    /76 (Site: Right Upper Arm, Position: Sitting, Cuff Size: Medium Adult)   Pulse 95   Resp 22   Ht 5' 10\" (1.778 m)   Wt 230 lb 6.4 oz (104.5 kg)   SpO2 100%   BMI 33.06 kg/m²    Physical Exam   Constitutional: No distress. He appears chronically ill. HENT:   Normal cephalic and Atraumatic   Eyes: Pupils are equal, round, and reactive to light. Neck: Normal range of motion and thyroid normal. Neck supple. No JVD present. No neck adenopathy. No thyromegaly present. Cardiovascular: Normal rate, regular rhythm, intact distal pulses and normal pulses. Murmur heard. Pulmonary/Chest: Effort normal and breath sounds normal. He has no wheezes. He has no rales. He exhibits no tenderness. Abdominal: Soft. Bowel sounds are normal. There is no tenderness. Musculoskeletal: Normal range of motion. He exhibits no edema or tenderness. Neurological: He is alert and oriented to person, place, and time. Skin: Skin is warm. No cyanosis.  Nails show no

## 2019-09-03 ENCOUNTER — HOSPITAL ENCOUNTER (OUTPATIENT)
Dept: NON INVASIVE DIAGNOSTICS | Age: 64
Discharge: HOME | End: 2019-09-03
Payer: MEDICARE

## 2019-09-03 ENCOUNTER — HOSPITAL ENCOUNTER (OUTPATIENT)
Dept: ULTRASOUND IMAGING | Age: 64
Discharge: HOME | End: 2019-09-05
Payer: MEDICARE

## 2019-09-03 ENCOUNTER — HOSPITAL ENCOUNTER (OUTPATIENT)
Dept: NUCLEAR MEDICINE | Age: 64
Discharge: HOME | End: 2019-09-05
Payer: MEDICARE

## 2019-09-03 VITALS — SYSTOLIC BLOOD PRESSURE: 120 MMHG | DIASTOLIC BLOOD PRESSURE: 78 MMHG | HEART RATE: 86 BPM

## 2019-09-03 DIAGNOSIS — I50.33 ACUTE ON CHRONIC DIASTOLIC (CONGESTIVE) HEART FAILURE (HCC): ICD-10-CM

## 2019-09-03 DIAGNOSIS — F17.200 SMOKER: ICD-10-CM

## 2019-09-03 DIAGNOSIS — R06.02 SHORTNESS OF BREATH: ICD-10-CM

## 2019-09-03 DIAGNOSIS — R09.89 BILATERAL CAROTID BRUITS: ICD-10-CM

## 2019-09-03 DIAGNOSIS — I10 ESSENTIAL HYPERTENSION, BENIGN: ICD-10-CM

## 2019-09-03 PROCEDURE — 93978 VASCULAR STUDY: CPT

## 2019-09-03 PROCEDURE — 93017 CV STRESS TEST TRACING ONLY: CPT

## 2019-09-03 PROCEDURE — 2580000003 HC RX 258: Performed by: INTERNAL MEDICINE

## 2019-09-03 PROCEDURE — 78452 HT MUSCLE IMAGE SPECT MULT: CPT

## 2019-09-03 PROCEDURE — 3430000000 HC RX DIAGNOSTIC RADIOPHARMACEUTICAL: Performed by: INTERNAL MEDICINE

## 2019-09-03 PROCEDURE — 6360000002 HC RX W HCPCS: Performed by: INTERNAL MEDICINE

## 2019-09-03 PROCEDURE — A9502 TC99M TETROFOSMIN: HCPCS | Performed by: INTERNAL MEDICINE

## 2019-09-03 PROCEDURE — 93880 EXTRACRANIAL BILAT STUDY: CPT | Performed by: INTERNAL MEDICINE

## 2019-09-03 PROCEDURE — 93880 EXTRACRANIAL BILAT STUDY: CPT

## 2019-09-03 RX ORDER — SODIUM CHLORIDE 0.9 % (FLUSH) 0.9 %
10 SYRINGE (ML) INJECTION PRN
Status: DISCONTINUED | OUTPATIENT
Start: 2019-09-03 | End: 2019-09-06 | Stop reason: HOSPADM

## 2019-09-03 RX ADMIN — REGADENOSON 0.4 MG: 0.08 INJECTION, SOLUTION INTRAVENOUS at 11:23

## 2019-09-03 RX ADMIN — TETROFOSMIN 11.7 MILLICURIE: 1.38 INJECTION, POWDER, LYOPHILIZED, FOR SOLUTION INTRAVENOUS at 09:23

## 2019-09-03 RX ADMIN — TETROFOSMIN 32.1 MILLICURIE: 1.38 INJECTION, POWDER, LYOPHILIZED, FOR SOLUTION INTRAVENOUS at 11:24

## 2019-09-03 RX ADMIN — Medication 10 ML: at 11:23

## 2019-09-03 RX ADMIN — Medication 10 ML: at 09:23

## 2019-09-03 RX ADMIN — Medication 10 ML: at 11:24

## 2019-09-05 PROCEDURE — 78452 HT MUSCLE IMAGE SPECT MULT: CPT | Performed by: INTERNAL MEDICINE

## 2019-09-05 PROCEDURE — 93016 CV STRESS TEST SUPVJ ONLY: CPT | Performed by: INTERNAL MEDICINE

## 2019-09-05 PROCEDURE — 93018 CV STRESS TEST I&R ONLY: CPT | Performed by: INTERNAL MEDICINE

## 2019-09-06 ENCOUNTER — OFFICE VISIT (OUTPATIENT)
Dept: CARDIOLOGY CLINIC | Age: 64
End: 2019-09-06
Payer: MEDICARE

## 2019-09-06 VITALS
DIASTOLIC BLOOD PRESSURE: 76 MMHG | OXYGEN SATURATION: 91 % | WEIGHT: 223 LBS | BODY MASS INDEX: 32 KG/M2 | RESPIRATION RATE: 16 BRPM | SYSTOLIC BLOOD PRESSURE: 126 MMHG | HEART RATE: 94 BPM

## 2019-09-06 DIAGNOSIS — I27.81 COR PULMONALE (CHRONIC) (HCC): ICD-10-CM

## 2019-09-06 DIAGNOSIS — I10 ESSENTIAL HYPERTENSION, BENIGN: Primary | ICD-10-CM

## 2019-09-06 DIAGNOSIS — R06.02 SHORTNESS OF BREATH: ICD-10-CM

## 2019-09-06 DIAGNOSIS — I50.33 ACUTE ON CHRONIC DIASTOLIC (CONGESTIVE) HEART FAILURE (HCC): ICD-10-CM

## 2019-09-06 PROCEDURE — G8427 DOCREV CUR MEDS BY ELIG CLIN: HCPCS | Performed by: INTERNAL MEDICINE

## 2019-09-06 PROCEDURE — 4040F PNEUMOC VAC/ADMIN/RCVD: CPT | Performed by: INTERNAL MEDICINE

## 2019-09-06 PROCEDURE — 99214 OFFICE O/P EST MOD 30 MIN: CPT | Performed by: INTERNAL MEDICINE

## 2019-09-06 PROCEDURE — G8417 CALC BMI ABV UP PARAM F/U: HCPCS | Performed by: INTERNAL MEDICINE

## 2019-09-06 PROCEDURE — 1123F ACP DISCUSS/DSCN MKR DOCD: CPT | Performed by: INTERNAL MEDICINE

## 2019-09-06 PROCEDURE — 3017F COLORECTAL CA SCREEN DOC REV: CPT | Performed by: INTERNAL MEDICINE

## 2019-09-06 PROCEDURE — 4004F PT TOBACCO SCREEN RCVD TLK: CPT | Performed by: INTERNAL MEDICINE

## 2019-09-06 RX ORDER — ATORVASTATIN CALCIUM 20 MG/1
20 TABLET, FILM COATED ORAL NIGHTLY
Qty: 90 TABLET | Refills: 2 | Status: SHIPPED | OUTPATIENT
Start: 2019-09-06 | End: 2021-03-04 | Stop reason: SDUPTHER

## 2019-09-06 ASSESSMENT — ENCOUNTER SYMPTOMS
GASTROINTESTINAL NEGATIVE: 1
STRIDOR: 0
NAUSEA: 0
SHORTNESS OF BREATH: 1
EYES NEGATIVE: 1
BLOOD IN STOOL: 0
CHEST TIGHTNESS: 0
COUGH: 0
WHEEZING: 0

## 2019-09-06 NOTE — PROGRESS NOTES
Subsequent Progress Note  Patient: Sofia Farias  YOB: 1955  MRN: [id number]    Chief Complaint: hf LE edema copd crowder   Chief Complaint   Patient presents with    Results     STRESS/ECHO U/S    Hypertension    Congestive Heart Failure       CV Data:  7/2019 echo EF 60  9/2019 spect negative   9/2019 CUS- mild   9/2019 Abd US negative AAA    Subjective/HPI: no edema anymore on diuretics. No cp +crowder chronic 3L o2      prior 2.5 PPD now < 1ppd   Retired -[employer]    EKG:    Past Medical History:   Diagnosis Date    COPD (chronic obstructive pulmonary disease) ([address]     Hypertension     Lung disease     Osteoarthritis     hands worst    Sleep apnea        Past Surgical History:   Procedure Laterality Date    CHOLECYSTECTOMY      ROTATOR CUFF REPAIR      right       Family History   Problem Relation Age of Onset    Heart Disease Mother     Other Father        Social History     Socioeconomic History    Marital status: Single     Spouse name: None    Number of children: None    Years of education: None    Highest education level: None   Occupational History    None   Social Needs    Financial resource strain: None    Food insecurity:     Worry: None     Inability: None    Transportation needs:     Medical: None     Non-medical: None   Tobacco Use    Smoking status: Current Every Day Smoker     Packs/day: 2.00     Years: 41.00     Pack years: 82.00     Types: Cigarettes    Smokeless tobacco: Never Used   Substance and Sexual Activity    Alcohol use:  Yes     Alcohol/week: 0.0 standard drinks     Comment: 12 pk per week    Drug use: No    Sexual activity: None   Lifestyle    Physical activity:     Days per week: None     Minutes per session: None    Stress: None   Relationships    Social connections:     Talks on phone: None     Gets together: None     Attends [employer] service: None     Active member of club or organization: None     Attends meetings of clubs or organizations: None 07/28/2019    RBC 5.51 05/29/2012    HGB 17.1 07/28/2019    HCT 51.1 07/28/2019    MCV 92.3 07/28/2019    MCH 30.9 07/28/2019    MCHC 33.5 07/28/2019    RDW 15.2 07/28/2019     07/28/2019    MPV 10.1 10/16/2014     Lipids:  Lab Results   Component Value Date    CHOL 129 07/28/2019    CHOL 140 04/24/2017    CHOL 135 04/21/2016     Lab Results   Component Value Date    TRIG 105 07/28/2019    TRIG 112 04/24/2017    TRIG 69 04/21/2016     Lab Results   Component Value Date    HDL 43 07/28/2019    HDL 42 05/02/2019    HDL 53 05/01/2018     Lab Results   Component Value Date    LDLCALC 65 07/28/2019    LDLCALC 96 05/02/2019    LDLCALC 69 05/01/2018     No results found for: LABVLDL, VLDL  Lab Results   Component Value Date    CHOLHDLRATIO 3.0 05/29/2012     CMP:    Lab Results   Component Value Date     07/30/2019    K 4.5 07/30/2019    CL 94 07/30/2019    CO2 33 07/30/2019    BUN 20 07/30/2019    CREATININE 0.82 07/30/2019    GFRAA >60.0 07/30/2019    LABGLOM >60.0 07/30/2019    GLUCOSE 99 07/30/2019    GLUCOSE 91 05/29/2012    PROT 7.5 07/27/2019    LABALBU 4.0 07/27/2019    LABALBU 4.6 05/29/2012    CALCIUM 9.4 07/30/2019    BILITOT 0.8 07/27/2019    ALKPHOS 104 07/27/2019    AST 16 07/27/2019    ALT 15 07/27/2019     BMP:    Lab Results   Component Value Date     07/30/2019    K 4.5 07/30/2019    CL 94 07/30/2019    CO2 33 07/30/2019    BUN 20 07/30/2019    LABALBU 4.0 07/27/2019    LABALBU 4.6 05/29/2012    CREATININE 0.82 07/30/2019    CALCIUM 9.4 07/30/2019    GFRAA >60.0 07/30/2019    LABGLOM >60.0 07/30/2019    GLUCOSE 99 07/30/2019    GLUCOSE 91 05/29/2012     Magnesium:    Lab Results   Component Value Date    MG 2.1 07/28/2019     TSH:  Lab Results   Component Value Date    TSH 0.813 07/28/2019       Patient Active Problem List   Diagnosis    Essential hypertension, benign    Chronic obstructive pulmonary disease (HCC)    Osteoarthritis    Tobacco use    Psychophysiological insomnia   

## 2019-10-15 ENCOUNTER — OFFICE VISIT (OUTPATIENT)
Dept: PULMONOLOGY | Age: 64
End: 2019-10-15
Payer: MEDICARE

## 2019-10-15 VITALS
HEIGHT: 70 IN | RESPIRATION RATE: 16 BRPM | WEIGHT: 217 LBS | OXYGEN SATURATION: 90 % | BODY MASS INDEX: 31.07 KG/M2 | TEMPERATURE: 97.6 F | HEART RATE: 71 BPM | SYSTOLIC BLOOD PRESSURE: 120 MMHG | DIASTOLIC BLOOD PRESSURE: 74 MMHG

## 2019-10-15 DIAGNOSIS — E66.9 OBESITY (BMI 30-39.9): ICD-10-CM

## 2019-10-15 DIAGNOSIS — Z72.0 TOBACCO ABUSE: ICD-10-CM

## 2019-10-15 DIAGNOSIS — R09.02 HYPOXIA: ICD-10-CM

## 2019-10-15 DIAGNOSIS — J44.9 CHRONIC OBSTRUCTIVE PULMONARY DISEASE, UNSPECIFIED COPD TYPE (HCC): ICD-10-CM

## 2019-10-15 DIAGNOSIS — G47.33 OSA (OBSTRUCTIVE SLEEP APNEA): Primary | ICD-10-CM

## 2019-10-15 PROCEDURE — G8484 FLU IMMUNIZE NO ADMIN: HCPCS | Performed by: INTERNAL MEDICINE

## 2019-10-15 PROCEDURE — G8926 SPIRO NO PERF OR DOC: HCPCS | Performed by: INTERNAL MEDICINE

## 2019-10-15 PROCEDURE — 3017F COLORECTAL CA SCREEN DOC REV: CPT | Performed by: INTERNAL MEDICINE

## 2019-10-15 PROCEDURE — 99214 OFFICE O/P EST MOD 30 MIN: CPT | Performed by: INTERNAL MEDICINE

## 2019-10-15 PROCEDURE — 4004F PT TOBACCO SCREEN RCVD TLK: CPT | Performed by: INTERNAL MEDICINE

## 2019-10-15 PROCEDURE — G8427 DOCREV CUR MEDS BY ELIG CLIN: HCPCS | Performed by: INTERNAL MEDICINE

## 2019-10-15 PROCEDURE — 4040F PNEUMOC VAC/ADMIN/RCVD: CPT | Performed by: INTERNAL MEDICINE

## 2019-10-15 PROCEDURE — 1123F ACP DISCUSS/DSCN MKR DOCD: CPT | Performed by: INTERNAL MEDICINE

## 2019-10-15 PROCEDURE — G8417 CALC BMI ABV UP PARAM F/U: HCPCS | Performed by: INTERNAL MEDICINE

## 2019-10-15 PROCEDURE — 3023F SPIROM DOC REV: CPT | Performed by: INTERNAL MEDICINE

## 2019-10-15 RX ORDER — ALBUTEROL SULFATE 2.5 MG/3ML
2.5 SOLUTION RESPIRATORY (INHALATION) EVERY 6 HOURS PRN
Qty: 120 EACH | Refills: 3 | Status: SHIPPED | OUTPATIENT
Start: 2019-10-15 | End: 2019-11-13 | Stop reason: SDUPTHER

## 2019-10-15 RX ORDER — ALBUTEROL SULFATE 2.5 MG/3ML
2.5 SOLUTION RESPIRATORY (INHALATION) EVERY 6 HOURS PRN
COMMUNITY
End: 2019-10-15 | Stop reason: SDUPTHER

## 2019-10-15 ASSESSMENT — ENCOUNTER SYMPTOMS
EYE ITCHING: 0
COUGH: 1
VOICE CHANGE: 0
SORE THROAT: 0
DIARRHEA: 0
SHORTNESS OF BREATH: 1
ABDOMINAL PAIN: 0
CHEST TIGHTNESS: 0
WHEEZING: 1
VOMITING: 0
NAUSEA: 0
RHINORRHEA: 0

## 2019-11-13 RX ORDER — ALBUTEROL SULFATE 2.5 MG/3ML
2.5 SOLUTION RESPIRATORY (INHALATION) EVERY 6 HOURS PRN
Qty: 120 EACH | Refills: 3 | Status: SHIPPED | OUTPATIENT
Start: 2019-11-13 | End: 2020-06-05

## 2020-01-06 ENCOUNTER — OFFICE VISIT (OUTPATIENT)
Dept: CARDIOLOGY CLINIC | Age: 65
End: 2020-01-06
Payer: MEDICARE

## 2020-01-06 VITALS
RESPIRATION RATE: 20 BRPM | WEIGHT: 238.4 LBS | BODY MASS INDEX: 34.21 KG/M2 | DIASTOLIC BLOOD PRESSURE: 90 MMHG | SYSTOLIC BLOOD PRESSURE: 140 MMHG | OXYGEN SATURATION: 93 % | HEART RATE: 115 BPM

## 2020-01-06 PROBLEM — R09.89 BILATERAL CAROTID BRUITS: Status: ACTIVE | Noted: 2020-01-06

## 2020-01-06 PROBLEM — F17.200 SMOKER: Status: ACTIVE | Noted: 2020-01-06

## 2020-01-06 PROCEDURE — 4004F PT TOBACCO SCREEN RCVD TLK: CPT | Performed by: INTERNAL MEDICINE

## 2020-01-06 PROCEDURE — 3017F COLORECTAL CA SCREEN DOC REV: CPT | Performed by: INTERNAL MEDICINE

## 2020-01-06 PROCEDURE — 99214 OFFICE O/P EST MOD 30 MIN: CPT | Performed by: INTERNAL MEDICINE

## 2020-01-06 PROCEDURE — 3023F SPIROM DOC REV: CPT | Performed by: INTERNAL MEDICINE

## 2020-01-06 PROCEDURE — 4040F PNEUMOC VAC/ADMIN/RCVD: CPT | Performed by: INTERNAL MEDICINE

## 2020-01-06 PROCEDURE — G8926 SPIRO NO PERF OR DOC: HCPCS | Performed by: INTERNAL MEDICINE

## 2020-01-06 PROCEDURE — G8417 CALC BMI ABV UP PARAM F/U: HCPCS | Performed by: INTERNAL MEDICINE

## 2020-01-06 PROCEDURE — G8484 FLU IMMUNIZE NO ADMIN: HCPCS | Performed by: INTERNAL MEDICINE

## 2020-01-06 PROCEDURE — 1123F ACP DISCUSS/DSCN MKR DOCD: CPT | Performed by: INTERNAL MEDICINE

## 2020-01-06 PROCEDURE — G8427 DOCREV CUR MEDS BY ELIG CLIN: HCPCS | Performed by: INTERNAL MEDICINE

## 2020-01-06 ASSESSMENT — ENCOUNTER SYMPTOMS
BLOOD IN STOOL: 0
COUGH: 0
SHORTNESS OF BREATH: 1
NAUSEA: 0
GASTROINTESTINAL NEGATIVE: 1
WHEEZING: 0
STRIDOR: 0
CHEST TIGHTNESS: 0
EYES NEGATIVE: 1

## 2020-01-06 NOTE — PROGRESS NOTES
together: None     Attends Nondenominational service: None     Active member of club or organization: None     Attends meetings of clubs or organizations: None     Relationship status: None    Intimate partner violence:     Fear of current or ex partner: None     Emotionally abused: None     Physically abused: None     Forced sexual activity: None   Other Topics Concern    None   Social History Narrative    None       No Known Allergies    Current Outpatient Medications   Medication Sig Dispense Refill    albuterol (PROVENTIL) (2.5 MG/3ML) 0.083% nebulizer solution Take 3 mLs by nebulization every 6 hours as needed for Wheezing 120 each 3    atorvastatin (LIPITOR) 20 MG tablet Take 1 tablet by mouth nightly 90 tablet 2    budesonide-formoterol (SYMBICORT) 160-4.5 MCG/ACT AERO Inhale 2 puffs into the lungs 2 times daily Sample medication labeled with directions for administration and patient instructed on use.  Lot: [id number] Exp: 06/2020 1 Inhaler 0    carvedilol (COREG) 3.125 MG tablet Take 1 tablet by mouth 2 times daily (with meals) 60 tablet 3    furosemide (LASIX) 40 MG tablet Take 1 tablet by mouth daily 60 tablet 3    guaiFENesin (MUCINEX) 600 MG extended release tablet Take 1 tablet by mouth 2 times daily 10 tablet 0    potassium chloride (KLOR-CON M) 10 MEQ extended release tablet Take 1 tablet by mouth 2 times daily (with meals) 60 tablet 3    aspirin EC 81 MG EC tablet Take 1 tablet by mouth daily 30 tablet 3    lisinopril (PRINIVIL;ZESTRIL) 10 MG tablet Take 1 tablet by mouth daily 30 tablet 3    OXYGEN Start oxygen therapy at 3lit via NC , give POC 1 Units 0    Respiratory Therapy Supplies DREW New CPAP Full face mask and supplies    Air fit F30 large size 1 Device 0    OXYGEN POC    3 lit via NC     Dx copd 1 Units 0    albuterol sulfate HFA (PROAIR HFA) 108 (90 Base) MCG/ACT inhaler Inhale 2 puffs into the lungs every 6 hours as needed for Wheezing 3 Inhaler 3    CPAP Machine MISC by Does not apply route New CPAP with 7 cm with 3 lit O2 bled 1 each 0    Compression Bandages KIT LLE: knee high: 20-30mmhg 2 kit 1    sildenafil (VIAGRA) 100 MG tablet Take 1 tablet by mouth as needed for Erectile Dysfunction LOT [id number] EXP 04/2017 2 tablet 0     No current facility-administered medications for this visit. Review of Systems:   Review of Systems   Constitutional: Negative. Negative for diaphoresis and fatigue. HENT: Negative. Eyes: Negative. Respiratory: Positive for shortness of breath. Negative for cough, chest tightness, wheezing and stridor. Cardiovascular: Negative. Negative for chest pain, palpitations and leg swelling. Gastrointestinal: Negative. Negative for blood in stool and nausea. Genitourinary: Negative. Musculoskeletal: Negative. Skin: Negative. Neurological: Negative. Negative for dizziness, syncope, weakness and light-headedness. Hematological: Negative. Psychiatric/Behavioral: Negative. Physical Examination:    BP (!) 140/90 (Site: Left Upper Arm, Position: Sitting, Cuff Size: Medium Adult)   Pulse 115   Resp 20   Wt 238 lb 6.4 oz (108.1 kg)   SpO2 93% Comment: 3L O2  BMI 34.21 kg/m²    Physical Exam   Constitutional: He appears healthy. No distress. HENT:   Normal cephalic and Atraumatic   Eyes: Pupils are equal, round, and reactive to light. Neck: Normal range of motion and thyroid normal. Neck supple. No JVD present. No neck adenopathy. No thyromegaly present. Cardiovascular: Normal rate, regular rhythm, intact distal pulses and normal pulses. Murmur heard. Pulmonary/Chest: Effort normal and breath sounds normal. He has no wheezes. He has no rales. He exhibits no tenderness. Abdominal: Soft. Bowel sounds are normal. There is no tenderness. Musculoskeletal: Normal range of motion. General: No tenderness or edema. Neurological: He is alert and oriented to person, place, and time. Skin: Skin is warm.  No benign    Chronic obstructive pulmonary disease (HCC)    Osteoarthritis    Tobacco abuse    Psychophysiological insomnia    JONNY (obstructive sleep apnea)    Hypoxia    Obesity (BMI 30-39. 9)    Acute on chronic diastolic (congestive) heart failure (HCC)    Cor pulmonale (chronic) (HCC)    Shortness of breath    Chronic bronchitis (HCC)    Smoker    Bilateral carotid bruits       Medications Discontinued During This Encounter   Medication Reason    buPROPion (WELLBUTRIN SR) 150 MG extended release tablet Patient Choice       Modified Medications    No medications on file       No orders of the defined types were placed in this encounter. Assessment/Plan:    1. Essential hypertension, benign  Little elevated. 2. Acute on chronic diastolic (congestive) heart failure (HCC)   compensated    3. Cor pulmonale (chronic) (HCC)   compensated     4. Shortness of breath  No worse- on O2        Counseling:  Heart Healthy Lifestyle, Stop Smoking, Low Salt Diet, Take Precautions to Prevent Falls, Regular Exercise and Walk Daily    Return in about 4 months (around 5/6/2020) for Cardiovascular care. .      Electronically signed by Jan Walker MD on 1/6/2020 at 2:18 PM

## 2020-01-22 RX ORDER — LISINOPRIL 10 MG/1
10 TABLET ORAL DAILY
Qty: 90 TABLET | Refills: 2 | Status: SHIPPED | OUTPATIENT
Start: 2020-01-22 | End: 2020-12-21 | Stop reason: SDUPTHER

## 2020-01-22 RX ORDER — SILDENAFIL 100 MG/1
100 TABLET, FILM COATED ORAL PRN
Qty: 2 TABLET | Refills: 5 | Status: SHIPPED | OUTPATIENT
Start: 2020-01-22 | End: 2022-08-15

## 2020-01-22 RX ORDER — CARVEDILOL 3.12 MG/1
3.12 TABLET ORAL 2 TIMES DAILY WITH MEALS
Qty: 180 TABLET | Refills: 2 | Status: SHIPPED | OUTPATIENT
Start: 2020-01-22 | End: 2020-07-27 | Stop reason: SDUPTHER

## 2020-02-10 ENCOUNTER — OFFICE VISIT (OUTPATIENT)
Dept: PULMONOLOGY | Age: 65
End: 2020-02-10
Payer: MEDICARE

## 2020-02-10 VITALS
OXYGEN SATURATION: 96 % | SYSTOLIC BLOOD PRESSURE: 120 MMHG | HEART RATE: 91 BPM | HEIGHT: 70 IN | DIASTOLIC BLOOD PRESSURE: 60 MMHG | RESPIRATION RATE: 16 BRPM | WEIGHT: 238 LBS | TEMPERATURE: 97.2 F | BODY MASS INDEX: 34.07 KG/M2

## 2020-02-10 PROCEDURE — G8926 SPIRO NO PERF OR DOC: HCPCS | Performed by: INTERNAL MEDICINE

## 2020-02-10 PROCEDURE — G8427 DOCREV CUR MEDS BY ELIG CLIN: HCPCS | Performed by: INTERNAL MEDICINE

## 2020-02-10 PROCEDURE — 1123F ACP DISCUSS/DSCN MKR DOCD: CPT | Performed by: INTERNAL MEDICINE

## 2020-02-10 PROCEDURE — 3017F COLORECTAL CA SCREEN DOC REV: CPT | Performed by: INTERNAL MEDICINE

## 2020-02-10 PROCEDURE — 3023F SPIROM DOC REV: CPT | Performed by: INTERNAL MEDICINE

## 2020-02-10 PROCEDURE — G8417 CALC BMI ABV UP PARAM F/U: HCPCS | Performed by: INTERNAL MEDICINE

## 2020-02-10 PROCEDURE — G8484 FLU IMMUNIZE NO ADMIN: HCPCS | Performed by: INTERNAL MEDICINE

## 2020-02-10 PROCEDURE — 99214 OFFICE O/P EST MOD 30 MIN: CPT | Performed by: INTERNAL MEDICINE

## 2020-02-10 PROCEDURE — 4004F PT TOBACCO SCREEN RCVD TLK: CPT | Performed by: INTERNAL MEDICINE

## 2020-02-10 PROCEDURE — 4040F PNEUMOC VAC/ADMIN/RCVD: CPT | Performed by: INTERNAL MEDICINE

## 2020-02-10 ASSESSMENT — ENCOUNTER SYMPTOMS
NAUSEA: 0
SHORTNESS OF BREATH: 1
CHEST TIGHTNESS: 0
ABDOMINAL PAIN: 0
EYE ITCHING: 0
VOICE CHANGE: 0
RHINORRHEA: 0
VOMITING: 0
DIARRHEA: 0
SORE THROAT: 0
WHEEZING: 1
COUGH: 1

## 2020-04-15 ENCOUNTER — VIRTUAL VISIT (OUTPATIENT)
Dept: PULMONOLOGY | Age: 65
End: 2020-04-15
Payer: MEDICARE

## 2020-04-15 PROCEDURE — 99214 OFFICE O/P EST MOD 30 MIN: CPT | Performed by: INTERNAL MEDICINE

## 2020-04-15 PROCEDURE — 1123F ACP DISCUSS/DSCN MKR DOCD: CPT | Performed by: INTERNAL MEDICINE

## 2020-04-15 PROCEDURE — G8428 CUR MEDS NOT DOCUMENT: HCPCS | Performed by: INTERNAL MEDICINE

## 2020-04-15 PROCEDURE — 4040F PNEUMOC VAC/ADMIN/RCVD: CPT | Performed by: INTERNAL MEDICINE

## 2020-04-15 PROCEDURE — 3017F COLORECTAL CA SCREEN DOC REV: CPT | Performed by: INTERNAL MEDICINE

## 2020-04-15 ASSESSMENT — ENCOUNTER SYMPTOMS
DIARRHEA: 0
RHINORRHEA: 0
CHEST TIGHTNESS: 0
NAUSEA: 0
EYE ITCHING: 0
SORE THROAT: 0
VOICE CHANGE: 0
VOMITING: 0
WHEEZING: 1
ABDOMINAL PAIN: 0
COUGH: 1
SHORTNESS OF BREATH: 1

## 2020-04-15 NOTE — PROGRESS NOTES
Subjective:     Desiree Tran is a 75 y.o. male who complains today of:     Chief Complaint   Patient presents with    Follow-up       HPI  Patient is using  3 lit 24 hour a day. C/o shortness of breath with exertion. Occasional Wheezing   C/o Cough with white to yellow Sputum  No Hemoptysis  No Chest tightness   No Chest pain with radiation  or pleuritic pain  No Fever or chills. No Rhinorrhea and postnasal drip. Smoking less than 1 ppd  Using  bronchodilator with symbicort and proair HFA 2 puff QID prn , nebulizer with albuterol .  He has been having complaint of dry mouth and he is not able to use CPAP therapy. Allergies:  Patient has no known allergies. Past Medical History:   Diagnosis Date    COPD (chronic obstructive pulmonary disease) ([address]     Hypertension     Lung disease     Osteoarthritis     hands worst    Sleep apnea      Past Surgical History:   Procedure Laterality Date    CHOLECYSTECTOMY      ROTATOR CUFF REPAIR      right     Family History   Problem Relation Age of Onset    Heart Disease Mother     Other Father      Social History     Socioeconomic History    Marital status: Single     Spouse name: Not on file    Number of children: Not on file    Years of education: Not on file    Highest education level: Not on file   Occupational History    Not on file   Social Needs    Financial resource strain: Not on file    Food insecurity     Worry: Not on file     Inability: Not on file    Transportation needs     Medical: Not on file     Non-medical: Not on file   Tobacco Use    Smoking status: Current Every Day Smoker     Packs/day: 2.00     Years: 41.00     Pack years: 82.00     Types: Cigarettes    Smokeless tobacco: Never Used   Substance and Sexual Activity    Alcohol use:  Yes     Alcohol/week: 0.0 standard drinks     Comment: 12 pk per week    Drug use: No    Sexual activity: Not on file   Lifestyle    Physical activity     Days per week: Not on file     Minutes Respiratory Therapy Supplies DREW New CPAP Full face  mask and supplies. Dispense heated tubing and adjust humidity in CPAP due to c/o dry mouth. 1 Device 0    carvedilol (COREG) 3.125 MG tablet Take 1 tablet by mouth 2 times daily (with meals) 180 tablet 2    lisinopril (PRINIVIL;ZESTRIL) 10 MG tablet Take 1 tablet by mouth daily 90 tablet 2    sildenafil (VIAGRA) 100 MG tablet Take 1 tablet by mouth as needed for Erectile Dysfunction LOT [id number] EXP 04/2017 2 tablet 5    albuterol (PROVENTIL) (2.5 MG/3ML) 0.083% nebulizer solution Take 3 mLs by nebulization every 6 hours as needed for Wheezing 120 each 3    atorvastatin (LIPITOR) 20 MG tablet Take 1 tablet by mouth nightly 90 tablet 2    budesonide-formoterol (SYMBICORT) 160-4.5 MCG/ACT AERO Inhale 2 puffs into the lungs 2 times daily Sample medication labeled with directions for administration and patient instructed on use. Lot: [id number] Exp: 06/2020 1 Inhaler 0    furosemide (LASIX) 40 MG tablet Take 1 tablet by mouth daily 60 tablet 3    guaiFENesin (MUCINEX) 600 MG extended release tablet Take 1 tablet by mouth 2 times daily 10 tablet 0    potassium chloride (KLOR-CON M) 10 MEQ extended release tablet Take 1 tablet by mouth 2 times daily (with meals) 60 tablet 3    aspirin EC 81 MG EC tablet Take 1 tablet by mouth daily 30 tablet 3    OXYGEN Start oxygen therapy at 3lit via NC , give POC 1 Units 0    OXYGEN POC    3 lit via NC     Dx copd 1 Units 0    albuterol sulfate HFA (PROAIR HFA) 108 (90 Base) MCG/ACT inhaler Inhale 2 puffs into the lungs every 6 hours as needed for Wheezing 3 Inhaler 3    CPAP Machine MISC by Does not apply route New CPAP with 7 cm with 3 lit O2 bled 1 each 0    Compression Bandages KIT LLE: knee high: 20-30mmhg 2 kit 1     No current facility-administered medications for this visit.         Results for orders placed during the hospital encounter of 10/26/18   XR CHEST STANDARD (2 VW)    Narrative EXAMINATION: XR CHEST (2 VW)    CLINICAL HISTORY: CHRONIC OBSTRUCTIVE PULMONARY DISEASE    COMPARISONS: None available. FINDINGS: Osseous structures intact. Cardiopericardial silhouette normal. Pulmonary vasculature normal. Lungs clear      Impression NO ACUTE CARDIOPULMONARY DISEASE    ]  Results for orders placed during the hospital encounter of 07/27/19   XR CHEST PORTABLE    Narrative EXAMINATION: XR CHEST PORTABLE    CLINICAL HISTORY:  Legs swelling, shortness of breath     COMPARISONS: None available. FINDINGS: There is moderate cardiomegaly. Pulmonary vascularity is prominent. There are coarsened interstitial markings consistent with fibrosis or interstitial edema. There is blunting of the left costophrenic angle which could represent scarring or   trace effusion. There are atherosclerotic changes of the thoracic aorta. There are degenerative changes in the spine and right shoulder. Impression CARDIOMEGALY WITH PULMONARY VASCULAR CONGESTION. COARSE INTERSTITIAL MARKINGS WHICH COULD REPRESENT FIBROSIS OR INTERSTITIAL EDEMA. SMALL LEFT PLEURAL EFFUSION VERSUS PLEURAL SCARRING. Assessment/Plan:     1. Chronic obstructive pulmonary disease, unspecified COPD type ([address]  he  is using  3 lit 24 hour a day. C/o shortness of breath with exertion. Occasional Wheezing . C/o Cough with white to yellow Sputum. She is on  bronchodilator with symbicort and proair HFA 2 puff QID prn , nebulizer with albuterol .  Continue bronchodilator therapy as before    2. Hypoxia  He is on 3 L O2 via nasal cannula 24 hours a day. 3. Obesity (BMI 30-39. 9)  Patient patient is advised try to lose weight. obesity related risk explained to the patient ,  Current weight:  238 lb (108 kg) Lbs. BMI:  Body mass index is 34.15 kg/m². Suggested weight control approaches, including dietary changes , exercise, behavioral modification. 4. Tobacco abuse  Patient advised try to quit smoking.   Risks related to smoking explained to the

## 2020-04-19 VITALS — HEIGHT: 70 IN | WEIGHT: 238 LBS | BODY MASS INDEX: 34.07 KG/M2

## 2020-05-19 ENCOUNTER — VIRTUAL VISIT (OUTPATIENT)
Dept: CARDIOLOGY CLINIC | Age: 65
End: 2020-05-19
Payer: MEDICARE

## 2020-05-19 PROCEDURE — 3017F COLORECTAL CA SCREEN DOC REV: CPT | Performed by: INTERNAL MEDICINE

## 2020-05-19 PROCEDURE — 4040F PNEUMOC VAC/ADMIN/RCVD: CPT | Performed by: INTERNAL MEDICINE

## 2020-05-19 PROCEDURE — G8428 CUR MEDS NOT DOCUMENT: HCPCS | Performed by: INTERNAL MEDICINE

## 2020-05-19 PROCEDURE — 99214 OFFICE O/P EST MOD 30 MIN: CPT | Performed by: INTERNAL MEDICINE

## 2020-05-19 PROCEDURE — 1123F ACP DISCUSS/DSCN MKR DOCD: CPT | Performed by: INTERNAL MEDICINE

## 2020-05-19 ASSESSMENT — ENCOUNTER SYMPTOMS
COUGH: 0
GASTROINTESTINAL NEGATIVE: 1
EYES NEGATIVE: 1
CHEST TIGHTNESS: 0
WHEEZING: 0
BLOOD IN STOOL: 0
STRIDOR: 0
NAUSEA: 0
SHORTNESS OF BREATH: 1

## 2020-05-19 NOTE — PROGRESS NOTES
activity     Days per week: Not on file     Minutes per session: Not on file    Stress: Not on file   Relationships    Social connections     Talks on phone: Not on file     Gets together: Not on file     Attends [employer] service: Not on file     Active member of club or organization: Not on file     Attends meetings of clubs or organizations: Not on file     Relationship status: Not on file    Intimate partner violence     Fear of current or ex partner: Not on file     Emotionally abused: Not on file     Physically abused: Not on file     Forced sexual activity: Not on file   Other Topics Concern    Not on file   Social History Narrative    Not on file       No Known Allergies    Current Outpatient Medications   Medication Sig Dispense Refill    Respiratory Therapy Supplies DREW Heated tubing , full face mask 1 Device 0    Respiratory Therapy Supplies DREW New CPAP Full face  mask and supplies. Dispense heated tubing and adjust humidity in CPAP due to c/o dry mouth. 1 Device 0    carvedilol (COREG) 3.125 MG tablet Take 1 tablet by mouth 2 times daily (with meals) 180 tablet 2    lisinopril (PRINIVIL;ZESTRIL) 10 MG tablet Take 1 tablet by mouth daily 90 tablet 2    sildenafil (VIAGRA) 100 MG tablet Take 1 tablet by mouth as needed for Erectile Dysfunction LOT [id number] EXP 04/2017 2 tablet 5    albuterol (PROVENTIL) (2.5 MG/3ML) 0.083% nebulizer solution Take 3 mLs by nebulization every 6 hours as needed for Wheezing 120 each 3    atorvastatin (LIPITOR) 20 MG tablet Take 1 tablet by mouth nightly 90 tablet 2    budesonide-formoterol (SYMBICORT) 160-4.5 MCG/ACT AERO Inhale 2 puffs into the lungs 2 times daily Sample medication labeled with directions for administration and patient instructed on use.  Lot: [id number] Exp: 06/2020 1 Inhaler 0    furosemide (LASIX) 40 MG tablet Take 1 tablet by mouth daily 60 tablet 3    guaiFENesin (MUCINEX) 600 MG extended release tablet Take 1 tablet by mouth 2 times daily 10 tablet 0    potassium chloride (KLOR-CON M) 10 MEQ extended release tablet Take 1 tablet by mouth 2 times daily (with meals) 60 tablet 3    aspirin EC 81 MG EC tablet Take 1 tablet by mouth daily 30 tablet 3    OXYGEN Start oxygen therapy at 3lit via NC , give POC 1 Units 0    OXYGEN POC    3 lit via NC     Dx copd 1 Units 0    albuterol sulfate HFA (PROAIR HFA) 108 (90 Base) MCG/ACT inhaler Inhale 2 puffs into the lungs every 6 hours as needed for Wheezing 3 Inhaler 3    CPAP Machine MISC by Does not apply route New CPAP with 7 cm with 3 lit O2 bled 1 each 0    Compression Bandages KIT LLE: knee high: 20-30mmhg 2 kit 1     No current facility-administered medications for this visit. Review of Systems:   Review of Systems   Constitutional: Negative. Negative for diaphoresis and fatigue. HENT: Negative. Eyes: Negative. Respiratory: Positive for shortness of breath. Negative for cough, chest tightness, wheezing and stridor. Cardiovascular: Negative. Negative for chest pain, palpitations and leg swelling. Gastrointestinal: Negative. Negative for blood in stool and nausea. Genitourinary: Negative. Musculoskeletal: Negative. Skin: Negative. Neurological: Negative. Negative for dizziness, syncope, weakness and light-headedness. Hematological: Negative. Psychiatric/Behavioral: Negative. Physical Examination:    There were no vitals taken for this visit.    Physical Exam       LABS:  CBC:   Lab Results   Component Value Date    WBC 7.4 07/28/2019    RBC 5.54 07/28/2019    RBC 5.51 05/29/2012    HGB 17.1 07/28/2019    HCT 51.1 07/28/2019    MCV 92.3 07/28/2019    MCH 30.9 07/28/2019    MCHC 33.5 07/28/2019    RDW 15.2 07/28/2019     07/28/2019    MPV 10.1 10/16/2014     Lipids:  Lab Results   Component Value Date    CHOL 129 07/28/2019    CHOL 140 04/24/2017    CHOL 135 04/21/2016     Lab Results   Component Value Date    TRIG 105 07/28/2019    TRIG 112 04/24/2017    TRIG 69 04/21/2016     Lab Results   Component Value Date    HDL 43 07/28/2019    HDL 42 05/02/2019    HDL 53 05/01/2018     Lab Results   Component Value Date    LDLCALC 65 07/28/2019    LDLCALC 96 05/02/2019    LDLCALC 69 05/01/2018     No results found for: LABVLDL, VLDL  Lab Results   Component Value Date    CHOLHDLRATIO 3.0 05/29/2012     CMP:    Lab Results   Component Value Date     07/30/2019    K 4.5 07/30/2019    CL 94 07/30/2019    CO2 33 07/30/2019    BUN 20 07/30/2019    CREATININE 0.82 07/30/2019    GFRAA >60.0 07/30/2019    LABGLOM >60.0 07/30/2019    GLUCOSE 99 07/30/2019    GLUCOSE 91 05/29/2012    PROT 7.5 07/27/2019    LABALBU 4.0 07/27/2019    LABALBU 4.6 05/29/2012    CALCIUM 9.4 07/30/2019    BILITOT 0.8 07/27/2019    ALKPHOS 104 07/27/2019    AST 16 07/27/2019    ALT 15 07/27/2019     BMP:    Lab Results   Component Value Date     07/30/2019    K 4.5 07/30/2019    CL 94 07/30/2019    CO2 33 07/30/2019    BUN 20 07/30/2019    LABALBU 4.0 07/27/2019    LABALBU 4.6 05/29/2012    CREATININE 0.82 07/30/2019    CALCIUM 9.4 07/30/2019    GFRAA >60.0 07/30/2019    LABGLOM >60.0 07/30/2019    GLUCOSE 99 07/30/2019    GLUCOSE 91 05/29/2012     Magnesium:    Lab Results   Component Value Date    MG 2.1 07/28/2019     TSH:  Lab Results   Component Value Date    TSH 0.813 07/28/2019       Patient Active Problem List   Diagnosis    Essential hypertension, benign    Chronic obstructive pulmonary disease (HCC)    Osteoarthritis    Tobacco abuse    Psychophysiological insomnia    JONNY (obstructive sleep apnea)    Hypoxia    Obesity (BMI 30-39. 9)    Acute on chronic diastolic (congestive) heart failure (HCC)    Cor pulmonale (chronic) (HCC)    Shortness of breath    Chronic bronchitis (HCC)    Smoker    Bilateral carotid bruits       There are no discontinued medications.     Modified Medications    No medications on file       No orders of the defined types were placed in this encounter. Assessment/Plan:    1. Essential hypertension, benign      2. Acute on chronic diastolic (congestive) heart failure (HCC)   compensated - denies Leg edema    3. Cor pulmonale (chronic) (HCC)   compensated     4. Shortness of breath  No worse- on O2      Pursuant to the emergency declaration under the [facility], [facility] waiver authority and the [employer] Act, this Virtual Visit was conducted, with patient's consent, to reduce the patient's risk of exposure to COVID-19 and provide continuity of care for an established patient. Services were provided through a video synchronous discussion virtually to substitute for in-person clinic visit. Visit completed using interclick me. Patient was located at home and I was located in the clinic. Counseling:  Heart Healthy Lifestyle, Stop Smoking, Low Salt Diet, Take Precautions to Prevent Falls, Regular Exercise and Walk Daily    Return in about 2 months (around 7/19/2020).       Electronically signed by Nae Persaud MD on 5/19/2020 at 12:06 PM

## 2020-06-02 ENCOUNTER — OFFICE VISIT (OUTPATIENT)
Dept: PULMONOLOGY | Age: 65
End: 2020-06-02
Payer: MEDICARE

## 2020-06-02 VITALS
DIASTOLIC BLOOD PRESSURE: 82 MMHG | WEIGHT: 238 LBS | HEART RATE: 101 BPM | BODY MASS INDEX: 34.07 KG/M2 | OXYGEN SATURATION: 90 % | TEMPERATURE: 97.6 F | SYSTOLIC BLOOD PRESSURE: 140 MMHG | RESPIRATION RATE: 16 BRPM | HEIGHT: 70 IN

## 2020-06-02 PROCEDURE — 1123F ACP DISCUSS/DSCN MKR DOCD: CPT | Performed by: INTERNAL MEDICINE

## 2020-06-02 PROCEDURE — G8417 CALC BMI ABV UP PARAM F/U: HCPCS | Performed by: INTERNAL MEDICINE

## 2020-06-02 PROCEDURE — 4004F PT TOBACCO SCREEN RCVD TLK: CPT | Performed by: INTERNAL MEDICINE

## 2020-06-02 PROCEDURE — 3017F COLORECTAL CA SCREEN DOC REV: CPT | Performed by: INTERNAL MEDICINE

## 2020-06-02 PROCEDURE — G8926 SPIRO NO PERF OR DOC: HCPCS | Performed by: INTERNAL MEDICINE

## 2020-06-02 PROCEDURE — 99214 OFFICE O/P EST MOD 30 MIN: CPT | Performed by: INTERNAL MEDICINE

## 2020-06-02 PROCEDURE — 3023F SPIROM DOC REV: CPT | Performed by: INTERNAL MEDICINE

## 2020-06-02 PROCEDURE — 4040F PNEUMOC VAC/ADMIN/RCVD: CPT | Performed by: INTERNAL MEDICINE

## 2020-06-02 PROCEDURE — G8427 DOCREV CUR MEDS BY ELIG CLIN: HCPCS | Performed by: INTERNAL MEDICINE

## 2020-06-02 ASSESSMENT — ENCOUNTER SYMPTOMS
RHINORRHEA: 0
CHEST TIGHTNESS: 0
WHEEZING: 1
NAUSEA: 0
SHORTNESS OF BREATH: 1
VOICE CHANGE: 0
DIARRHEA: 0
EYE ITCHING: 0
VOMITING: 0
SORE THROAT: 0
ABDOMINAL PAIN: 0
COUGH: 1

## 2020-06-02 NOTE — PROGRESS NOTES
and atraumatic. Nose: Nose normal.   Eyes:      Conjunctiva/sclera: Conjunctivae normal.      Pupils: Pupils are equal, round, and reactive to light. Neck:      Thyroid: No thyromegaly. Vascular: No JVD. Trachea: No tracheal deviation. Cardiovascular:      Rate and Rhythm: Normal rate and regular rhythm. Heart sounds: No murmur. No friction rub. No gallop. Pulmonary:      Effort: Pulmonary effort is normal. No respiratory distress. Breath sounds: Normal breath sounds. No wheezing or rales. Comments: diminished Breath sound bilaterally. Chest:      Chest wall: No tenderness. Abdominal:      General: There is no distension. Musculoskeletal: Normal range of motion. Right lower leg: Edema (trace) present. Left lower leg: Edema (trace) present. Lymphadenopathy:      Cervical: No cervical adenopathy. Skin:     General: Skin is warm and dry. Findings: No rash. Neurological:      Mental Status: He is alert and oriented to person, place, and time. Cranial Nerves: No cranial nerve deficit. Psychiatric:         Behavior: Behavior normal.         Current Outpatient Medications   Medication Sig Dispense Refill    Respiratory Therapy Supplies DREW Heated tubing , full face mask 1 Device 0    Respiratory Therapy Supplies DREW New CPAP Full face  mask and supplies. Dispense heated tubing and adjust humidity in CPAP due to c/o dry mouth.  1 Device 0    carvedilol (COREG) 3.125 MG tablet Take 1 tablet by mouth 2 times daily (with meals) 180 tablet 2    lisinopril (PRINIVIL;ZESTRIL) 10 MG tablet Take 1 tablet by mouth daily 90 tablet 2    sildenafil (VIAGRA) 100 MG tablet Take 1 tablet by mouth as needed for Erectile Dysfunction LOT [id number] EXP 04/2017 2 tablet 5    albuterol (PROVENTIL) (2.5 MG/3ML) 0.083% nebulizer solution Take 3 mLs by nebulization every 6 hours as needed for Wheezing 120 each 3    atorvastatin (LIPITOR) 20 MG tablet Take 1 tablet by mouth nightly 90 tablet 2    budesonide-formoterol (SYMBICORT) 160-4.5 MCG/ACT AERO Inhale 2 puffs into the lungs 2 times daily Sample medication labeled with directions for administration and patient instructed on use. Lot: [id number] Exp: 06/2020 1 Inhaler 0    furosemide (LASIX) 40 MG tablet Take 1 tablet by mouth daily 60 tablet 3    guaiFENesin (MUCINEX) 600 MG extended release tablet Take 1 tablet by mouth 2 times daily 10 tablet 0    potassium chloride (KLOR-CON M) 10 MEQ extended release tablet Take 1 tablet by mouth 2 times daily (with meals) 60 tablet 3    aspirin EC 81 MG EC tablet Take 1 tablet by mouth daily 30 tablet 3    OXYGEN Start oxygen therapy at 3lit via NC , give POC 1 Units 0    OXYGEN POC    3 lit via NC     Dx copd 1 Units 0    albuterol sulfate HFA (PROAIR HFA) 108 (90 Base) MCG/ACT inhaler Inhale 2 puffs into the lungs every 6 hours as needed for Wheezing 3 Inhaler 3    CPAP Machine MISC by Does not apply route New CPAP with 7 cm with 3 lit O2 bled 1 each 0    Compression Bandages KIT LLE: knee high: 20-30mmhg 2 kit 1     No current facility-administered medications for this visit. Results for orders placed during the hospital encounter of 10/26/18   XR CHEST STANDARD (2 VW)    Narrative EXAMINATION: XR CHEST (2 VW)    CLINICAL HISTORY: CHRONIC OBSTRUCTIVE PULMONARY DISEASE    COMPARISONS: None available. FINDINGS: Osseous structures intact. Cardiopericardial silhouette normal. Pulmonary vasculature normal. Lungs clear      Impression NO ACUTE CARDIOPULMONARY DISEASE    ]  Results for orders placed during the hospital encounter of 07/27/19   XR CHEST PORTABLE    Narrative EXAMINATION: XR CHEST PORTABLE    CLINICAL HISTORY:  Legs swelling, shortness of breath     COMPARISONS: None available. FINDINGS: There is moderate cardiomegaly. Pulmonary vascularity is prominent. There are coarsened interstitial markings consistent with fibrosis or interstitial edema.  There is

## 2020-06-05 RX ORDER — ALBUTEROL SULFATE 2.5 MG/3ML
SOLUTION RESPIRATORY (INHALATION)
Qty: 360 ML | Refills: 0 | Status: SHIPPED | OUTPATIENT
Start: 2020-06-05 | End: 2020-08-03

## 2020-06-08 ENCOUNTER — HOSPITAL ENCOUNTER (OUTPATIENT)
Age: 65
Setting detail: SPECIMEN
Discharge: HOME | End: 2020-06-08
Payer: MEDICARE

## 2020-06-08 ENCOUNTER — OFFICE VISIT (OUTPATIENT)
Dept: FAMILY MEDICINE CLINIC | Age: 65
End: 2020-06-08
Payer: MEDICARE

## 2020-06-08 VITALS
WEIGHT: 240 LBS | BODY MASS INDEX: 34.36 KG/M2 | HEART RATE: 87 BPM | OXYGEN SATURATION: 88 % | DIASTOLIC BLOOD PRESSURE: 76 MMHG | HEIGHT: 70 IN | TEMPERATURE: 98.2 F | SYSTOLIC BLOOD PRESSURE: 124 MMHG

## 2020-06-08 LAB
ALBUMIN SERPL-MCNC: 4.1 G/DL (ref 3.5–4.6)
ALP BLD-CCNC: 108 U/L (ref 35–104)
ALT SERPL-CCNC: 21 U/L (ref 0–41)
ANION GAP SERPL CALCULATED.3IONS-SCNC: 9 MEQ/L (ref 9–15)
AST SERPL-CCNC: 22 U/L (ref 0–40)
BILIRUB SERPL-MCNC: 1.2 MG/DL (ref 0.2–0.7)
BUN BLDV-MCNC: 9 MG/DL (ref 8–23)
CALCIUM SERPL-MCNC: 10.2 MG/DL (ref 8.5–9.9)
CHLORIDE BLD-SCNC: 94 MEQ/L (ref 95–107)
CHOLESTEROL, FASTING: 142 MG/DL (ref 0–199)
CO2: 35 MEQ/L (ref 20–31)
CREAT SERPL-MCNC: 0.87 MG/DL (ref 0.7–1.2)
GFR AFRICAN AMERICAN: >60
GFR NON-AFRICAN AMERICAN: >60
GLOBULIN: 3 G/DL (ref 2.3–3.5)
GLUCOSE FASTING: 75 MG/DL (ref 70–99)
HDLC SERPL-MCNC: 45 MG/DL (ref 40–59)
LDL CHOLESTEROL CALCULATED: 78 MG/DL (ref 0–129)
POTASSIUM SERPL-SCNC: 4.7 MEQ/L (ref 3.4–4.9)
SODIUM BLD-SCNC: 138 MEQ/L (ref 135–144)
TOTAL PROTEIN: 7.1 G/DL (ref 6.3–8)
TRIGLYCERIDE, FASTING: 93 MG/DL (ref 0–150)

## 2020-06-08 PROCEDURE — 1123F ACP DISCUSS/DSCN MKR DOCD: CPT | Performed by: FAMILY MEDICINE

## 2020-06-08 PROCEDURE — 80061 LIPID PANEL: CPT

## 2020-06-08 PROCEDURE — 4040F PNEUMOC VAC/ADMIN/RCVD: CPT | Performed by: FAMILY MEDICINE

## 2020-06-08 PROCEDURE — G0439 PPPS, SUBSEQ VISIT: HCPCS | Performed by: FAMILY MEDICINE

## 2020-06-08 PROCEDURE — 3017F COLORECTAL CA SCREEN DOC REV: CPT | Performed by: FAMILY MEDICINE

## 2020-06-08 PROCEDURE — 80053 COMPREHEN METABOLIC PANEL: CPT

## 2020-06-08 ASSESSMENT — LIFESTYLE VARIABLES
HOW OFTEN DO YOU HAVE SIX OR MORE DRINKS ON ONE OCCASION: 1
AUDIT-C TOTAL SCORE: 4
HOW MANY STANDARD DRINKS CONTAINING ALCOHOL DO YOU HAVE ON A TYPICAL DAY: 1
HOW OFTEN DO YOU HAVE A DRINK CONTAINING ALCOHOL: 2

## 2020-06-08 ASSESSMENT — PATIENT HEALTH QUESTIONNAIRE - PHQ9
SUM OF ALL RESPONSES TO PHQ QUESTIONS 1-9: 0
SUM OF ALL RESPONSES TO PHQ QUESTIONS 1-9: 0

## 2020-06-08 NOTE — PROGRESS NOTES
problems were reviewed today and where indicated follow up appointments were made and/or referrals ordered. Positive Risk Factor Screenings with Interventions:     Substance Abuse:  Social History     Tobacco History     Smoking Status  Current Every Day Smoker Smoking Frequency  2 packs/day for 41 years (80 pk yrs) Smoking Tobacco Type  Cigarettes    Smokeless Tobacco Use  Never Used          Alcohol History     Alcohol Use Status  Yes Drinks/Week  0 Standard drinks or equivalent per week Amount  0.0 standard drinks of alcohol/wk Comment  12 pk per week          Drug Use     Drug Use Status  No          Sexual Activity     Sexually Active  Not Asked               Audit Questionnaire: Screen for Alcohol Misuse  How often do you have a drink containing alcohol?: Two to four times a month  How many standard drinks containing alcohol do you have on a typical day when drinking?: Three or four  How often do you have six or more drinks on one occasion?: Less than monthly  Audit-C Score: 4  Substance Abuse Interventions:  · Tobacco abuse:  patient is not ready to work toward tobacco cessation at this time  · Alcohol misuse/dependence:  patient is not ready to change his/her alcohol consumption behavior at this time    General Health:  General  In general, how would you say your health is?: Good  In the past 7 days, have you experienced any of the following?  New or Increased Pain, New or Increased Fatigue, Loneliness, Social Isolation, Stress or Anger?: None of These  Do you get the social and emotional support that you need?: Yes  Do you have a Living Will?: (!) No  General Health Risk Interventions:  · No Living Will: patient declined    Health Habits/Nutrition:  Health Habits/Nutrition  Do you exercise for at least 20 minutes 2-3 times per week?: (!) No  Have you lost any weight without trying in the past 3 months?: No  Do you eat fewer than 2 meals per day?: No  Have you seen a dentist within the past year?: (!)

## 2020-06-10 ENCOUNTER — TELEPHONE (OUTPATIENT)
Dept: FAMILY MEDICINE CLINIC | Age: 65
End: 2020-06-10

## 2020-06-12 ENCOUNTER — TELEPHONE (OUTPATIENT)
Dept: CARDIOLOGY CLINIC | Age: 65
End: 2020-06-12

## 2020-06-22 ENCOUNTER — VIRTUAL VISIT (OUTPATIENT)
Dept: CARDIOLOGY CLINIC | Age: 65
End: 2020-06-22
Payer: MEDICARE

## 2020-06-22 PROBLEM — R60.0 BILATERAL LOWER EXTREMITY EDEMA: Status: ACTIVE | Noted: 2020-06-22

## 2020-06-22 PROCEDURE — 4040F PNEUMOC VAC/ADMIN/RCVD: CPT | Performed by: INTERNAL MEDICINE

## 2020-06-22 PROCEDURE — G8428 CUR MEDS NOT DOCUMENT: HCPCS | Performed by: INTERNAL MEDICINE

## 2020-06-22 PROCEDURE — 99214 OFFICE O/P EST MOD 30 MIN: CPT | Performed by: INTERNAL MEDICINE

## 2020-06-22 PROCEDURE — 1123F ACP DISCUSS/DSCN MKR DOCD: CPT | Performed by: INTERNAL MEDICINE

## 2020-06-22 PROCEDURE — 3017F COLORECTAL CA SCREEN DOC REV: CPT | Performed by: INTERNAL MEDICINE

## 2020-06-22 ASSESSMENT — ENCOUNTER SYMPTOMS
SHORTNESS OF BREATH: 1
STRIDOR: 0
GASTROINTESTINAL NEGATIVE: 1
CHEST TIGHTNESS: 0
BLOOD IN STOOL: 0
WHEEZING: 0
COUGH: 0
NAUSEA: 0
EYES NEGATIVE: 1

## 2020-06-22 NOTE — PROGRESS NOTES
Subsequent Progress Note  Patient: Denise Greene  YOB: 1955  MRN: [id number]    Chief Complaint: hf LE edema copd crowder   Chief Complaint   Patient presents with    Leg Swelling       CV Data:  7/2019 echo EF 60  9/2019 spect negative   9/2019 CUS- mild   9/2019 Abd US negative AAA    Subjective/HPI: no edema anymore on diuretics. No cp +crowder chronic 3L o2      1/6/2020 still on 3L O2 SUBJECTIVE: till struggles to stop smoke. No cp. No falls no bleed. Takes meds. Walks. 5/19/2020 TELEHEALTH EVALUATION -- Audio/Visual (During IOWVK-77 public health emergency)    Doing well no cp still has sob. Still trying to stop smoke. Uses 3L O2    6/22/2020 TELEHEALTH EVALUATION -- Audio/Visual (During PLXAZ-31 public health emergency)    Called in 10 days ago c/o LE Edema. We advised low salt, walk and compression. Edema is now completely resolved. He feels much better. He admits he took lots of salty foods few weeks ago.  No cp    prior 2.5 PPD now < 1ppd   Retired -[employer]    EKG:    Past Medical History:   Diagnosis Date    COPD (chronic obstructive pulmonary disease) ([address]     Hypertension     Lung disease     Osteoarthritis     hands worst    Sleep apnea        Past Surgical History:   Procedure Laterality Date    CHOLECYSTECTOMY      ROTATOR CUFF REPAIR      right       Family History   Problem Relation Age of Onset    Heart Disease Mother     Other Father        Social History     Socioeconomic History    Marital status: Single     Spouse name: Not on file    Number of children: Not on file    Years of education: Not on file    Highest education level: Not on file   Occupational History    Not on file   Social Needs    Financial resource strain: Not on file    Food insecurity     Worry: Not on file     Inability: Not on file    Transportation needs     Medical: Not on file     Non-medical: Not on file   Tobacco Use    Smoking status: Current Every Day Smoker     Packs/day: 2.00 Years: 41.00     Pack years: 82.00     Types: Cigarettes    Smokeless tobacco: Never Used   Substance and Sexual Activity    Alcohol use: Yes     Alcohol/week: 0.0 standard drinks     Comment: 12 pk per week    Drug use: No    Sexual activity: Not on file   Lifestyle    Physical activity     Days per week: Not on file     Minutes per session: Not on file    Stress: Not on file   Relationships    Social connections     Talks on phone: Not on file     Gets together: Not on file     Attends Religion service: Not on file     Active member of club or organization: Not on file     Attends meetings of clubs or organizations: Not on file     Relationship status: Not on file    Intimate partner violence     Fear of current or ex partner: Not on file     Emotionally abused: Not on file     Physically abused: Not on file     Forced sexual activity: Not on file   Other Topics Concern    Not on file   Social History Narrative    Not on file       No Known Allergies    Current Outpatient Medications   Medication Sig Dispense Refill    Elastic Bandages & Supports (MEDICAL COMPRESSION SOCKS) MISC Disp knee high compression socks 20-30 mmHg, on daily off nightly 2 each 1    albuterol (PROVENTIL) (2.5 MG/3ML) 0.083% nebulizer solution USE 1 VIAL PER NEBULIZER EVERY 6 HOURS AS NEEDED FOR WHEEZING 360 mL 0    Respiratory Therapy Supplies DREW Heated tubing , full face mask 1 Device 0    Respiratory Therapy Supplies DREW New CPAP Full face  mask and supplies. Dispense heated tubing and adjust humidity in CPAP due to c/o dry mouth.  1 Device 0    carvedilol (COREG) 3.125 MG tablet Take 1 tablet by mouth 2 times daily (with meals) 180 tablet 2    lisinopril (PRINIVIL;ZESTRIL) 10 MG tablet Take 1 tablet by mouth daily 90 tablet 2    sildenafil (VIAGRA) 100 MG tablet Take 1 tablet by mouth as needed for Erectile Dysfunction LOT [id number] EXP 04/2017 2 tablet 5    atorvastatin (LIPITOR) 20 MG tablet Take 1 tablet by mouth nightly 90 tablet 2    budesonide-formoterol (SYMBICORT) 160-4.5 MCG/ACT AERO Inhale 2 puffs into the lungs 2 times daily Sample medication labeled with directions for administration and patient instructed on use. Lot: [id number] Exp: 06/2020 1 Inhaler 0    furosemide (LASIX) 40 MG tablet Take 1 tablet by mouth daily 60 tablet 3    guaiFENesin (MUCINEX) 600 MG extended release tablet Take 1 tablet by mouth 2 times daily 10 tablet 0    potassium chloride (KLOR-CON M) 10 MEQ extended release tablet Take 1 tablet by mouth 2 times daily (with meals) 60 tablet 3    aspirin EC 81 MG EC tablet Take 1 tablet by mouth daily 30 tablet 3    OXYGEN Start oxygen therapy at 3lit via NC , give POC 1 Units 0    OXYGEN POC    3 lit via NC     Dx copd 1 Units 0    albuterol sulfate HFA (PROAIR HFA) 108 (90 Base) MCG/ACT inhaler Inhale 2 puffs into the lungs every 6 hours as needed for Wheezing 3 Inhaler 3    CPAP Machine MISC by Does not apply route New CPAP with 7 cm with 3 lit O2 bled 1 each 0    Compression Bandages KIT LLE: knee high: 20-30mmhg 2 kit 1     No current facility-administered medications for this visit. Review of Systems:   Review of Systems   Constitutional: Negative. Negative for diaphoresis and fatigue. HENT: Negative. Eyes: Negative. Respiratory: Positive for shortness of breath. Negative for cough, chest tightness, wheezing and stridor. Cardiovascular: Negative. Negative for chest pain, palpitations and leg swelling. Gastrointestinal: Negative. Negative for blood in stool and nausea. Genitourinary: Negative. Musculoskeletal: Negative. Skin: Negative. Neurological: Negative. Negative for dizziness, syncope, weakness and light-headedness. Hematological: Negative. Psychiatric/Behavioral: Negative. Physical Examination:    There were no vitals taken for this visit.    Physical Exam    LABS:  CBC:   Lab Results   Component Value Date    WBC 7.4 07/28/2019

## 2020-07-27 ENCOUNTER — OFFICE VISIT (OUTPATIENT)
Dept: CARDIOLOGY CLINIC | Age: 65
End: 2020-07-27
Payer: MEDICARE

## 2020-07-27 VITALS
RESPIRATION RATE: 18 BRPM | SYSTOLIC BLOOD PRESSURE: 116 MMHG | HEART RATE: 98 BPM | OXYGEN SATURATION: 92 % | DIASTOLIC BLOOD PRESSURE: 74 MMHG | BODY MASS INDEX: 34.44 KG/M2 | WEIGHT: 240 LBS

## 2020-07-27 PROCEDURE — 4040F PNEUMOC VAC/ADMIN/RCVD: CPT | Performed by: INTERNAL MEDICINE

## 2020-07-27 PROCEDURE — 1123F ACP DISCUSS/DSCN MKR DOCD: CPT | Performed by: INTERNAL MEDICINE

## 2020-07-27 PROCEDURE — 4004F PT TOBACCO SCREEN RCVD TLK: CPT | Performed by: INTERNAL MEDICINE

## 2020-07-27 PROCEDURE — 99215 OFFICE O/P EST HI 40 MIN: CPT | Performed by: INTERNAL MEDICINE

## 2020-07-27 PROCEDURE — 3017F COLORECTAL CA SCREEN DOC REV: CPT | Performed by: INTERNAL MEDICINE

## 2020-07-27 PROCEDURE — 93000 ELECTROCARDIOGRAM COMPLETE: CPT | Performed by: INTERNAL MEDICINE

## 2020-07-27 PROCEDURE — G8427 DOCREV CUR MEDS BY ELIG CLIN: HCPCS | Performed by: INTERNAL MEDICINE

## 2020-07-27 PROCEDURE — G8417 CALC BMI ABV UP PARAM F/U: HCPCS | Performed by: INTERNAL MEDICINE

## 2020-07-27 RX ORDER — CARVEDILOL 3.12 MG/1
3.12 TABLET ORAL 2 TIMES DAILY WITH MEALS
Qty: 180 TABLET | Refills: 2 | Status: ON HOLD | OUTPATIENT
Start: 2020-07-27 | End: 2020-12-15 | Stop reason: HOSPADM

## 2020-07-27 ASSESSMENT — ENCOUNTER SYMPTOMS
GASTROINTESTINAL NEGATIVE: 1
WHEEZING: 0
COUGH: 0
SHORTNESS OF BREATH: 1
STRIDOR: 0
EYES NEGATIVE: 1
BLOOD IN STOOL: 0
CHEST TIGHTNESS: 0
NAUSEA: 0

## 2020-07-27 NOTE — PROGRESS NOTES
Subsequent Progress Note  Patient: Tesha Brown  YOB: 1955  MRN: [id number]    Chief Complaint: hf LE edema copd crowder   Chief Complaint   Patient presents with    1 Month Follow-Up    Congestive Heart Failure    Hypertension    Leg Swelling     BLE-intermittent       CV Data:  7/2019 echo EF 60  9/2019 spect negative   9/2019 CUS- mild   9/2019 Abd US negative AAA    Subjective/HPI: no edema anymore on diuretics. No cp +crowder chronic 3L o2      1/6/2020 still on 3L O2 SUBJECTIVE: till struggles to stop smoke. No cp. No falls no bleed. Takes meds. Walks. 5/19/2020 TELEHEALTH EVALUATION -- Audio/Visual (During QJKQA-07 public health emergency)    Doing well no cp still has sob. Still trying to stop smoke. Uses 3L O2    6/22/2020 TELEHEALTH EVALUATION -- Audio/Visual (During JOJXV-34 public health emergency)    Called in 10 days ago c/o LE Edema. We advised low salt, walk and compression. Edema is now completely resolved. He feels much better. He admits he took lots of salty foods few weeks ago. No cp    7/27/2020  Leg edema was better but now came back again.  He recently ran out of couple of his meds to include ACEI and BB.     prior 2.5 PPD now < 1ppd   Retired [employer]    EKG:    Past Medical History:   Diagnosis Date    [facility]estive [facility] failure) [address] [facility]     COPD (chronic obstructive pulmonary disease) [facility]     Hypertension     Lung disease     Osteoarthritis     hands worst    Sleep apnea        Past Surgical History:   Procedure Laterality Date    CHOLECYSTECTOMY      ROTATOR CUFF REPAIR      right       Family History   Problem Relation Age of Onset    Heart Disease Mother     Other Father        Social History     Socioeconomic History    Marital status: Single     Spouse name: None    Number of children: None    Years of education: None    Highest education level: None   Occupational History    None   Social Needs    Financial resource strain: None    Food insecurity Worry: None     Inability: None    Transportation needs     Medical: None     Non-medical: None   Tobacco Use    Smoking status: Current Every Day Smoker     Packs/day: 2.00     Years: 41.00     Pack years: 82.00     Types: Cigarettes    Smokeless tobacco: Never Used   Substance and Sexual Activity    Alcohol use: Yes     Alcohol/week: 0.0 standard drinks     Comment: 12 pk per week    Drug use: No    Sexual activity: None   Lifestyle    Physical activity     Days per week: None     Minutes per session: None    Stress: None   Relationships    Social connections     Talks on phone: None     Gets together: None     Attends Restoration service: None     Active member of club or organization: None     Attends meetings of clubs or organizations: None     Relationship status: None    Intimate partner violence     Fear of current or ex partner: None     Emotionally abused: None     Physically abused: None     Forced sexual activity: None   Other Topics Concern    None   Social History Narrative    None       No Known Allergies    Current Outpatient Medications   Medication Sig Dispense Refill    carvedilol (COREG) 3.125 MG tablet Take 1 tablet by mouth 2 times daily (with meals) 180 tablet 2    Elastic Bandages & Supports (MEDICAL COMPRESSION SOCKS) MISC Disp knee high compression socks 20-30 mmHg, on daily off nightly 2 each 1    albuterol (PROVENTIL) (2.5 MG/3ML) 0.083% nebulizer solution USE 1 VIAL PER NEBULIZER EVERY 6 HOURS AS NEEDED FOR WHEEZING 360 mL 0    Respiratory Therapy Supplies DREW Heated tubing , full face mask 1 Device 0    Respiratory Therapy Supplies DREW New CPAP Full face  mask and supplies. Dispense heated tubing and adjust humidity in CPAP due to c/o dry mouth.  1 Device 0    lisinopril (PRINIVIL;ZESTRIL) 10 MG tablet Take 1 tablet by mouth daily 90 tablet 2    sildenafil (VIAGRA) 100 MG tablet Take 1 tablet by mouth as needed for Erectile Dysfunction LOT [id number] EXP 04/2017 2 tablet 5    atorvastatin (LIPITOR) 20 MG tablet Take 1 tablet by mouth nightly 90 tablet 2    budesonide-formoterol (SYMBICORT) 160-4.5 MCG/ACT AERO Inhale 2 puffs into the lungs 2 times daily Sample medication labeled with directions for administration and patient instructed on use. Lot: [id number] Exp: 06/2020 1 Inhaler 0    furosemide (LASIX) 40 MG tablet Take 1 tablet by mouth daily 60 tablet 3    guaiFENesin (MUCINEX) 600 MG extended release tablet Take 1 tablet by mouth 2 times daily 10 tablet 0    potassium chloride (KLOR-CON M) 10 MEQ extended release tablet Take 1 tablet by mouth 2 times daily (with meals) 60 tablet 3    aspirin EC 81 MG EC tablet Take 1 tablet by mouth daily 30 tablet 3    OXYGEN Start oxygen therapy at 3lit via NC , give POC 1 Units 0    OXYGEN POC    3 lit via NC     Dx copd 1 Units 0    albuterol sulfate HFA (PROAIR HFA) 108 (90 Base) MCG/ACT inhaler Inhale 2 puffs into the lungs every 6 hours as needed for Wheezing 3 Inhaler 3    CPAP Machine MISC by Does not apply route New CPAP with 7 cm with 3 lit O2 bled 1 each 0    Compression Bandages KIT LLE: knee high: 20-30mmhg 2 kit 1     No current facility-administered medications for this visit. Review of Systems:   Review of Systems   Constitutional: Negative. Negative for diaphoresis and fatigue. HENT: Negative. Eyes: Negative. Respiratory: Positive for shortness of breath. Negative for cough, chest tightness, wheezing and stridor. Cardiovascular: Negative. Negative for chest pain, palpitations and leg swelling. Gastrointestinal: Negative. Negative for blood in stool and nausea. Genitourinary: Negative. Musculoskeletal: Negative. Skin: Negative. Neurological: Negative. Negative for dizziness, syncope, weakness and light-headedness. Hematological: Negative. Psychiatric/Behavioral: Negative.           Physical Examination:    /74 (Site: Right Upper Arm, Position: Sitting, Cuff Size: Medium Adult)   Pulse 98   Resp 18   Wt 240 lb (108.9 kg)   SpO2 92% Comment: 3L O2  BMI 34.44 kg/m²    Physical Exam   Constitutional: He appears healthy. No distress. HENT:   Normal cephalic and Atraumatic   Eyes: Pupils are equal, round, and reactive to light. Neck: Normal range of motion and thyroid normal. Neck supple. No JVD present. No neck adenopathy. No thyromegaly present. Cardiovascular: Normal rate, regular rhythm, intact distal pulses and normal pulses. Murmur heard. Pulmonary/Chest: Effort normal and breath sounds normal. He has no wheezes. He has no rales. He exhibits no tenderness. Abdominal: Soft. Bowel sounds are normal. There is no abdominal tenderness. Musculoskeletal: Normal range of motion. General: Edema (2+) present. No tenderness. Neurological: He is alert and oriented to person, place, and time. Skin: Skin is warm. No cyanosis. Nails show no clubbing.        LABS:  CBC:   Lab Results   Component Value Date    WBC 7.4 07/28/2019    RBC 5.54 07/28/2019    RBC 5.51 05/29/2012    HGB 17.1 07/28/2019    HCT 51.1 07/28/2019    MCV 92.3 07/28/2019    MCH 30.9 07/28/2019    MCHC 33.5 07/28/2019    RDW 15.2 07/28/2019     07/28/2019    MPV 10.1 10/16/2014     Lipids:  Lab Results   Component Value Date    CHOL 129 07/28/2019    CHOL 140 04/24/2017    CHOL 135 04/21/2016     Lab Results   Component Value Date    TRIG 105 07/28/2019    TRIG 112 04/24/2017    TRIG 69 04/21/2016     Lab Results   Component Value Date    HDL 45 06/08/2020    HDL 43 07/28/2019    HDL 42 05/02/2019     Lab Results   Component Value Date    LDLCALC 78 06/08/2020    LDLCALC 65 07/28/2019    LDLCALC 96 05/02/2019     No results found for: LABVLDL, VLDL  Lab Results   Component Value Date    CHOLHDLRATIO 3.0 05/29/2012     CMP:    Lab Results   Component Value Date     06/08/2020    K 4.7 06/08/2020    CL 94 06/08/2020    CO2 35 06/08/2020    BUN 9 06/08/2020    CREATININE 0.87

## 2020-07-29 RX ORDER — POTASSIUM CHLORIDE 750 MG/1
10 TABLET, EXTENDED RELEASE ORAL 2 TIMES DAILY WITH MEALS
Qty: 60 TABLET | Refills: 3 | Status: SHIPPED | OUTPATIENT
Start: 2020-07-29 | End: 2021-03-04 | Stop reason: SDUPTHER

## 2020-08-03 RX ORDER — ALBUTEROL SULFATE 2.5 MG/3ML
SOLUTION RESPIRATORY (INHALATION)
Qty: 360 ML | Refills: 0 | Status: SHIPPED | OUTPATIENT
Start: 2020-08-03 | End: 2020-10-27

## 2020-08-10 ENCOUNTER — OFFICE VISIT (OUTPATIENT)
Dept: CARDIOLOGY CLINIC | Age: 65
End: 2020-08-10
Payer: MEDICARE

## 2020-08-10 VITALS
BODY MASS INDEX: 33.58 KG/M2 | SYSTOLIC BLOOD PRESSURE: 126 MMHG | RESPIRATION RATE: 18 BRPM | OXYGEN SATURATION: 94 % | DIASTOLIC BLOOD PRESSURE: 74 MMHG | WEIGHT: 234 LBS | HEART RATE: 83 BPM

## 2020-08-10 PROCEDURE — 4040F PNEUMOC VAC/ADMIN/RCVD: CPT | Performed by: INTERNAL MEDICINE

## 2020-08-10 PROCEDURE — 3023F SPIROM DOC REV: CPT | Performed by: INTERNAL MEDICINE

## 2020-08-10 PROCEDURE — G8926 SPIRO NO PERF OR DOC: HCPCS | Performed by: INTERNAL MEDICINE

## 2020-08-10 PROCEDURE — 99214 OFFICE O/P EST MOD 30 MIN: CPT | Performed by: INTERNAL MEDICINE

## 2020-08-10 PROCEDURE — 4004F PT TOBACCO SCREEN RCVD TLK: CPT | Performed by: INTERNAL MEDICINE

## 2020-08-10 PROCEDURE — 1123F ACP DISCUSS/DSCN MKR DOCD: CPT | Performed by: INTERNAL MEDICINE

## 2020-08-10 PROCEDURE — G8427 DOCREV CUR MEDS BY ELIG CLIN: HCPCS | Performed by: INTERNAL MEDICINE

## 2020-08-10 PROCEDURE — G8417 CALC BMI ABV UP PARAM F/U: HCPCS | Performed by: INTERNAL MEDICINE

## 2020-08-10 PROCEDURE — 3017F COLORECTAL CA SCREEN DOC REV: CPT | Performed by: INTERNAL MEDICINE

## 2020-08-10 RX ORDER — FUROSEMIDE 40 MG/1
60 TABLET ORAL DAILY
Qty: 90 TABLET | Refills: 3 | Status: SHIPPED | OUTPATIENT
Start: 2020-08-10 | End: 2021-06-07 | Stop reason: SDUPTHER

## 2020-08-10 ASSESSMENT — ENCOUNTER SYMPTOMS
BLOOD IN STOOL: 0
SHORTNESS OF BREATH: 1
NAUSEA: 0
STRIDOR: 0
WHEEZING: 0
COUGH: 0
GASTROINTESTINAL NEGATIVE: 1
CHEST TIGHTNESS: 0
EYES NEGATIVE: 1

## 2020-08-10 NOTE — PROGRESS NOTES
Subsequent Progress Note  Patient: Desmond Brock  YOB: 1955  MRN: [id number]    Chief Complaint: hf LE edema copd crowder   Chief Complaint   Patient presents with    2 Week Follow-Up    Congestive Heart Failure    Hypertension    Shortness of Breath     wears O2    Edema     BLE-improvement from last visit       CV Data:  7/2019 echo EF 60  9/2019 spect negative   9/2019 CUS- mild   9/2019 Abd US negative AAA    Subjective/HPI: no edema anymore on diuretics. No cp +crowder chronic 3L o2      1/6/2020 still on 3L O2 SUBJECTIVE: till struggles to stop smoke. No cp. No falls no bleed. Takes meds. Walks. 5/19/2020 TELEHEALTH EVALUATION -- Audio/Visual (During AYBNN-94 public health emergency)    Doing well no cp still has sob. Still trying to stop smoke. Uses 3L O2    6/22/2020 TELEHEALTH EVALUATION -- Audio/Visual (During GCFOR-10 public health emergency)    Called in 10 days ago c/o LE Edema. We advised low salt, walk and compression. Edema is now completely resolved. He feels much better. He admits he took lots of salty foods few weeks ago. No cp    7/27/2020  Leg edema was better but now came back again. He recently ran out of couple of his meds to include ACEI and BB.     8/10/2020 leg edema much improved with low salt and Fluid restrict. Now has 1-2+.  No cp chronic SOB on O2.     prior 2.5 PPD now < 1ppd   Retired [employer]    EKG:    Past Medical History:   Diagnosis Date    CHF (congestive heart failure) ([address]     COPD [facility]chronic obstructive pulmonary disease) [facility] [address]     Hypertension     Lung disease     Osteoarthritis     hands worst    Sleep apnea        Past Surgical History:   Procedure Laterality Date    CHOLECYSTECTOMY      ROTATOR CUFF REPAIR      right       Family History   Problem Relation Age of Onset    Heart Disease Mother     Other Father        Social History     Socioeconomic History    Marital status: Single     Spouse name: None    Number of children: None    Years of education: None    Highest education level: None   Occupational History    None   Social Needs    Financial resource strain: None    Food insecurity     Worry: None     Inability: None    Transportation needs     Medical: None     Non-medical: None   Tobacco Use    Smoking status: Current Every Day Smoker     Packs/day: 2.00     Years: 41.00     Pack years: 82.00     Types: Cigarettes    Smokeless tobacco: Never Used   Substance and Sexual Activity    Alcohol use: Yes     Alcohol/week: 0.0 standard drinks     Comment: 12 pk per week    Drug use: No    Sexual activity: None   Lifestyle    Physical activity     Days per week: None     Minutes per session: None    Stress: None   Relationships    Social connections     Talks on phone: None     Gets together: None     Attends [employer] service: None     Active member of club or organization: None     Attends meetings of clubs or organizations: None     Relationship status: None    Intimate partner violence     Fear of current or ex partner: None     Emotionally abused: None     Physically abused: None     Forced sexual activity: None   Other Topics Concern    None   Social History Narrative    None       No Known Allergies    Current Outpatient Medications   Medication Sig Dispense Refill    furosemide (LASIX) 40 MG tablet Take 1.5 tablets by mouth daily 90 tablet 3    Elastic Bandages & Supports (MEDICAL COMPRESSION STOCKINGS) MISC 2 each by Does not apply route daily On in QAM and off QHS.   B/L Knee high 1 each 1    albuterol (PROVENTIL) (2.5 MG/3ML) 0.083% nebulizer solution USE 1 VIAL PER NEBULIZER EVERY 6 HOURS AS NEEDED FOR WHEEZING 360 mL 0    potassium chloride (KLOR-CON M) 10 MEQ extended release tablet Take 1 tablet by mouth 2 times daily (with meals) 60 tablet 3    carvedilol (COREG) 3.125 MG tablet Take 1 tablet by mouth 2 times daily (with meals) 180 tablet 2    Elastic Bandages & Supports (MEDICAL COMPRESSION SOCKS) MISC Disp knee high compression socks 20-30 mmHg, on daily off nightly 2 each 1    Respiratory Therapy Supplies DREW Heated tubing , full face mask 1 Device 0    Respiratory Therapy Supplies DREW New CPAP Full face  mask and supplies. Dispense heated tubing and adjust humidity in CPAP due to c/o dry mouth. 1 Device 0    lisinopril (PRINIVIL;ZESTRIL) 10 MG tablet Take 1 tablet by mouth daily 90 tablet 2    sildenafil (VIAGRA) 100 MG tablet Take 1 tablet by mouth as needed for Erectile Dysfunction LOT [id number] EXP 04/2017 2 tablet 5    atorvastatin (LIPITOR) 20 MG tablet Take 1 tablet by mouth nightly 90 tablet 2    budesonide-formoterol (SYMBICORT) 160-4.5 MCG/ACT AERO Inhale 2 puffs into the lungs 2 times daily Sample medication labeled with directions for administration and patient instructed on use. Lot: [id number] Exp: 06/2020 1 Inhaler 0    guaiFENesin (MUCINEX) 600 MG extended release tablet Take 1 tablet by mouth 2 times daily 10 tablet 0    aspirin EC 81 MG EC tablet Take 1 tablet by mouth daily 30 tablet 3    OXYGEN Start oxygen therapy at 3lit via NC , give POC 1 Units 0    OXYGEN POC    3 lit via NC     Dx copd 1 Units 0    albuterol sulfate HFA (PROAIR HFA) 108 (90 Base) MCG/ACT inhaler Inhale 2 puffs into the lungs every 6 hours as needed for Wheezing 3 Inhaler 3    CPAP Machine MISC by Does not apply route New CPAP with 7 cm with 3 lit O2 bled 1 each 0    Compression Bandages KIT LLE: knee high: 20-30mmhg 2 kit 1     No current facility-administered medications for this visit. Review of Systems:   Review of Systems   Constitutional: Negative. Negative for diaphoresis and fatigue. HENT: Negative. Eyes: Negative. Respiratory: Positive for shortness of breath. Negative for cough, chest tightness, wheezing and stridor. Cardiovascular: Negative. Negative for chest pain, palpitations and leg swelling. Gastrointestinal: Negative. Negative for blood in stool and nausea.    Genitourinary: Negative. Musculoskeletal: Negative. Skin: Negative. Neurological: Negative. Negative for dizziness, syncope, weakness and light-headedness. Hematological: Negative. Psychiatric/Behavioral: Negative. Physical Examination:    /74 (Site: Left Upper Arm, Position: Sitting, Cuff Size: Large Adult)   Pulse 83   Resp 18   Wt 234 lb (106.1 kg)   SpO2 94% Comment: 3 L of O2  BMI 33.58 kg/m²    Physical Exam   Constitutional: He appears healthy. No distress. HENT:   Normal cephalic and Atraumatic   Eyes: Pupils are equal, round, and reactive to light. Neck: Normal range of motion and thyroid normal. Neck supple. No JVD present. No neck adenopathy. No thyromegaly present. Cardiovascular: Normal rate, regular rhythm, intact distal pulses and normal pulses. Murmur heard. Pulmonary/Chest: Effort normal and breath sounds normal. He has no wheezes. He has no rales. He exhibits no tenderness. Abdominal: Soft. Bowel sounds are normal. There is no abdominal tenderness. Musculoskeletal: Normal range of motion. General: Edema (2+) present. No tenderness. Neurological: He is alert and oriented to person, place, and time. Skin: Skin is warm. No cyanosis. Nails show no clubbing.        LABS:  CBC:   Lab Results   Component Value Date    WBC 7.4 07/28/2019    RBC 5.54 07/28/2019    RBC 5.51 05/29/2012    HGB 17.1 07/28/2019    HCT 51.1 07/28/2019    MCV 92.3 07/28/2019    MCH 30.9 07/28/2019    MCHC 33.5 07/28/2019    RDW 15.2 07/28/2019     07/28/2019    MPV 10.1 10/16/2014     Lipids:  Lab Results   Component Value Date    CHOL 129 07/28/2019    CHOL 140 04/24/2017    CHOL 135 04/21/2016     Lab Results   Component Value Date    TRIG 105 07/28/2019    TRIG 112 04/24/2017    TRIG 69 04/21/2016     Lab Results   Component Value Date    HDL 45 06/08/2020    HDL 43 07/28/2019    HDL 42 05/02/2019     Lab Results   Component Value Date    LDLCALC 78 06/08/2020    LDLCALC 65 07/28/2019    LDLCALC 96 05/02/2019     No results found for: LABVLDL, VLDL  Lab Results   Component Value Date    CHOLHDLRATIO 3.0 05/29/2012     CMP:    Lab Results   Component Value Date     06/08/2020    K 4.7 06/08/2020    CL 94 06/08/2020    CO2 35 06/08/2020    BUN 9 06/08/2020    CREATININE 0.87 06/08/2020    GFRAA >60.0 06/08/2020    LABGLOM >60.0 06/08/2020    GLUCOSE 99 07/30/2019    GLUCOSE 91 05/29/2012    PROT 7.1 06/08/2020    LABALBU 4.1 06/08/2020    LABALBU 4.6 05/29/2012    CALCIUM 10.2 06/08/2020    BILITOT 1.2 06/08/2020    ALKPHOS 108 06/08/2020    AST 22 06/08/2020    ALT 21 06/08/2020     BMP:    Lab Results   Component Value Date     06/08/2020    K 4.7 06/08/2020    CL 94 06/08/2020    CO2 35 06/08/2020    BUN 9 06/08/2020    LABALBU 4.1 06/08/2020    LABALBU 4.6 05/29/2012    CREATININE 0.87 06/08/2020    CALCIUM 10.2 06/08/2020    GFRAA >60.0 06/08/2020    LABGLOM >60.0 06/08/2020    GLUCOSE 99 07/30/2019    GLUCOSE 91 05/29/2012     Magnesium:    Lab Results   Component Value Date    MG 2.1 07/28/2019     TSH:  Lab Results   Component Value Date    TSH 0.813 07/28/2019       Patient Active Problem List   Diagnosis    Essential hypertension, benign    Chronic obstructive pulmonary disease (HCC)    Osteoarthritis    Tobacco abuse    Psychophysiological insomnia    JONNY (obstructive sleep apnea)    Hypoxia    Obesity (BMI 30-39. 9)    Acute on chronic diastolic (congestive) heart failure (HCC)    Cor pulmonale (chronic) (HCC)    Shortness of breath    Chronic bronchitis (HCC)    Smoker    Bilateral carotid bruits    Bilateral lower extremity edema       Medications Discontinued During This Encounter   Medication Reason    furosemide (LASIX) 40 MG tablet REORDER       Modified Medications    Modified Medication Previous Medication    FUROSEMIDE (LASIX) 40 MG TABLET furosemide (LASIX) 40 MG tablet       Take 1.5 tablets by mouth daily    Take 1 tablet by mouth daily Orders Placed This Encounter   Medications    furosemide (LASIX) 40 MG tablet     Sig: Take 1.5 tablets by mouth daily     Dispense:  90 tablet     Refill:  3    Elastic Bandages & Supports (MEDICAL COMPRESSION STOCKINGS) MISC     Si each by Does not apply route daily On in QAM and off QHS. B/L Knee high     Dispense:  1 each     Refill:  1       Assessment/Plan:    1. Essential hypertension, benign      2. Acute on chronic diastolic (congestive) heart failure (HCC)   decompensated due to noncompliance. Resume all meds. Fluid restric to 1.5L Low salt. -  Advance Lasix to 60 mg qam.  Compression. Continue  Low salt diet. 3. Cor pulmonale (chronic)     4. Shortness of breath  No worse- on O2        Counseling:  Heart Healthy Lifestyle, Stop Smoking, Low Salt Diet, Take Precautions to Prevent Falls, Regular Exercise and Walk Daily    Return in about 1 month (around 9/10/2020).       Electronically signed by Ridge Escoto MD on 8/10/2020 at 3:33 PM

## 2020-08-12 ENCOUNTER — TELEPHONE (OUTPATIENT)
Dept: PULMONOLOGY | Age: 65
End: 2020-08-12

## 2020-08-12 NOTE — TELEPHONE ENCOUNTER
Inocencia Driscoll from [facility] faxed over his respiratory assessment of [facility] and wanted you to look at it. I scanned it into media.    Thanks

## 2020-09-01 ENCOUNTER — OFFICE VISIT (OUTPATIENT)
Dept: PULMONOLOGY | Age: 65
End: 2020-09-01
Payer: MEDICARE

## 2020-09-01 VITALS
TEMPERATURE: 97.6 F | WEIGHT: 238 LBS | DIASTOLIC BLOOD PRESSURE: 64 MMHG | SYSTOLIC BLOOD PRESSURE: 120 MMHG | HEART RATE: 94 BPM | OXYGEN SATURATION: 90 % | RESPIRATION RATE: 16 BRPM | BODY MASS INDEX: 34.07 KG/M2 | HEIGHT: 70 IN

## 2020-09-01 PROCEDURE — 1123F ACP DISCUSS/DSCN MKR DOCD: CPT | Performed by: INTERNAL MEDICINE

## 2020-09-01 PROCEDURE — 3023F SPIROM DOC REV: CPT | Performed by: INTERNAL MEDICINE

## 2020-09-01 PROCEDURE — 4040F PNEUMOC VAC/ADMIN/RCVD: CPT | Performed by: INTERNAL MEDICINE

## 2020-09-01 PROCEDURE — 4004F PT TOBACCO SCREEN RCVD TLK: CPT | Performed by: INTERNAL MEDICINE

## 2020-09-01 PROCEDURE — 99215 OFFICE O/P EST HI 40 MIN: CPT | Performed by: INTERNAL MEDICINE

## 2020-09-01 PROCEDURE — G8417 CALC BMI ABV UP PARAM F/U: HCPCS | Performed by: INTERNAL MEDICINE

## 2020-09-01 PROCEDURE — G8427 DOCREV CUR MEDS BY ELIG CLIN: HCPCS | Performed by: INTERNAL MEDICINE

## 2020-09-01 PROCEDURE — G8926 SPIRO NO PERF OR DOC: HCPCS | Performed by: INTERNAL MEDICINE

## 2020-09-01 PROCEDURE — 3017F COLORECTAL CA SCREEN DOC REV: CPT | Performed by: INTERNAL MEDICINE

## 2020-09-01 RX ORDER — PREDNISONE 10 MG/1
TABLET ORAL
Qty: 40 TABLET | Refills: 0 | Status: ON HOLD | OUTPATIENT
Start: 2020-09-01 | End: 2020-12-14

## 2020-09-01 ASSESSMENT — ENCOUNTER SYMPTOMS
DIARRHEA: 0
SORE THROAT: 0
VOMITING: 0
EYE ITCHING: 0
ABDOMINAL PAIN: 0
VOICE CHANGE: 0
COUGH: 1
RHINORRHEA: 0
SHORTNESS OF BREATH: 1
CHEST TIGHTNESS: 0
WHEEZING: 1
NAUSEA: 0

## 2020-09-01 NOTE — PROGRESS NOTES
file   Lifestyle    Physical activity     Days per week: Not on file     Minutes per session: Not on file    Stress: Not on file   Relationships    Social connections     Talks on phone: Not on file     Gets together: Not on file     Attends Roman [employer] service: Not on file     Active member of club or organization: Not on file     Attends meetings of clubs or organizations: Not on file     Relationship status: Not on file    Intimate partner violence     Fear of current or ex partner: Not on file     Emotionally abused: Not on file     Physically abused: Not on file     Forced sexual activity: Not on file   Other Topics Concern    Not on file   Social History Narrative    Not on file         Review of Systems   Constitutional: Negative for chills, diaphoresis, fatigue and fever. HENT: Negative for congestion, mouth sores, nosebleeds, postnasal drip, rhinorrhea, sneezing, sore throat and voice change. Eyes: Negative for itching and visual disturbance. Respiratory: Positive for cough, shortness of breath and wheezing. Negative for chest tightness. Cardiovascular: Negative. Negative for chest pain, palpitations and leg swelling. Gastrointestinal: Negative for abdominal pain, diarrhea, nausea and vomiting. Genitourinary: Negative for difficulty urinating and hematuria. Musculoskeletal: Negative for arthralgias, joint swelling and myalgias. Skin: Negative for rash. Allergic/Immunologic: Negative for environmental allergies. Neurological: Negative for dizziness, tremors, weakness and headaches.    Psychiatric/Behavioral: Negative for behavioral problems and sleep disturbance.         :     Vitals:    09/01/20 1110 09/01/20 1120   BP: 120/64    Pulse: 94    Resp: 16    Temp: 97.6 °F (36.4 °C)    TempSrc: Temporal    SpO2: (!) 67% 90%   Weight: 238 lb (108 kg)    Height: 5' 10\" (1.778 m)      Wt Readings from Last 3 Encounters:   09/01/20 238 lb (108 kg)   08/10/20 234 lb (106.1 kg)   07/27/20 240 times daily (with meals) 180 tablet 2    Elastic Bandages & Supports (MEDICAL COMPRESSION SOCKS) MISC Disp knee high compression socks 20-30 mmHg, on daily off nightly 2 each 1    Respiratory Therapy Supplies DREW Heated tubing , full face mask 1 Device 0    Respiratory Therapy Supplies DREW New CPAP Full face  mask and supplies. Dispense heated tubing and adjust humidity in CPAP due to c/o dry mouth. 1 Device 0    lisinopril (PRINIVIL;ZESTRIL) 10 MG tablet Take 1 tablet by mouth daily 90 tablet 2    sildenafil (VIAGRA) 100 MG tablet Take 1 tablet by mouth as needed for Erectile Dysfunction LOT [id number] EXP 04/2017 2 tablet 5    atorvastatin (LIPITOR) 20 MG tablet Take 1 tablet by mouth nightly 90 tablet 2    budesonide-formoterol (SYMBICORT) 160-4.5 MCG/ACT AERO Inhale 2 puffs into the lungs 2 times daily Sample medication labeled with directions for administration and patient instructed on use. Lot: [id number] Exp: 06/2020 1 Inhaler 0    guaiFENesin (MUCINEX) 600 MG extended release tablet Take 1 tablet by mouth 2 times daily 10 tablet 0    aspirin EC 81 MG EC tablet Take 1 tablet by mouth daily 30 tablet 3    OXYGEN Start oxygen therapy at 3lit via NC , give POC 1 Units 0    OXYGEN POC    3 lit via NC     Dx copd 1 Units 0    albuterol sulfate HFA (PROAIR HFA) 108 (90 Base) MCG/ACT inhaler Inhale 2 puffs into the lungs every 6 hours as needed for Wheezing 3 Inhaler 3    CPAP Machine MISC by Does not apply route New CPAP with 7 cm with 3 lit O2 bled 1 each 0    Compression Bandages KIT LLE: knee high: 20-30mmhg 2 kit 1     No current facility-administered medications for this visit. Results for orders placed during the hospital encounter of 10/26/18   XR CHEST STANDARD (2 VW)    Narrative EXAMINATION: XR CHEST (2 VW)    CLINICAL HISTORY: CHRONIC OBSTRUCTIVE PULMONARY DISEASE    COMPARISONS: None available. FINDINGS: Osseous structures intact.  Cardiopericardial silhouette normal. Pulmonary using 3 L O2 via nasal cannula 24 hours a day. 5. Obesity (BMI 30-39. 9)  Patient patient is advised try to lose weight. obesity related risk explained to the patient ,  Current weight:  238 lb (108 kg) Lbs. BMI:  Body mass index is 34.15 kg/m². Suggested weight control approaches, including dietary changes , exercise, behavioral modification. Time spent over 40 minutes    Return in about 3 months (around 12/1/2020).       Lisa Gonzalez MD

## 2020-09-11 ENCOUNTER — OFFICE VISIT (OUTPATIENT)
Dept: CARDIOLOGY CLINIC | Age: 65
End: 2020-09-11
Payer: MEDICARE

## 2020-09-11 VITALS
OXYGEN SATURATION: 81 % | WEIGHT: 239 LBS | HEIGHT: 70 IN | DIASTOLIC BLOOD PRESSURE: 97 MMHG | HEART RATE: 101 BPM | BODY MASS INDEX: 34.22 KG/M2 | RESPIRATION RATE: 18 BRPM | SYSTOLIC BLOOD PRESSURE: 169 MMHG

## 2020-09-11 PROCEDURE — 99214 OFFICE O/P EST MOD 30 MIN: CPT | Performed by: INTERNAL MEDICINE

## 2020-09-11 PROCEDURE — G8417 CALC BMI ABV UP PARAM F/U: HCPCS | Performed by: INTERNAL MEDICINE

## 2020-09-11 PROCEDURE — 4040F PNEUMOC VAC/ADMIN/RCVD: CPT | Performed by: INTERNAL MEDICINE

## 2020-09-11 PROCEDURE — G8427 DOCREV CUR MEDS BY ELIG CLIN: HCPCS | Performed by: INTERNAL MEDICINE

## 2020-09-11 PROCEDURE — 4004F PT TOBACCO SCREEN RCVD TLK: CPT | Performed by: INTERNAL MEDICINE

## 2020-09-11 PROCEDURE — 1123F ACP DISCUSS/DSCN MKR DOCD: CPT | Performed by: INTERNAL MEDICINE

## 2020-09-11 PROCEDURE — 3017F COLORECTAL CA SCREEN DOC REV: CPT | Performed by: INTERNAL MEDICINE

## 2020-09-11 ASSESSMENT — ENCOUNTER SYMPTOMS
BLOOD IN STOOL: 0
CHEST TIGHTNESS: 0
COUGH: 0
WHEEZING: 0
SHORTNESS OF BREATH: 1
GASTROINTESTINAL NEGATIVE: 1
EYES NEGATIVE: 1
STRIDOR: 0
NAUSEA: 0

## 2020-09-11 NOTE — PROGRESS NOTES
Subsequent Progress Note  Patient: Dean Fischer  YOB: 1955  MRN: [id number]    Chief Complaint: hf LE edema copd garnett   Chief Complaint   Patient presents with    1 Month Follow-Up    Hypertension    Congestive Heart Failure       CV Data:  7/2019 echo EF 60  9/2019 spect negative   9/2019 CUS- mild   9/2019 Abd US negative AAA    Subjective/HPI: no edema anymore on diuretics. No cp +garnett chronic 3L o2      1/6/2020 still on 3L O2 SUBJECTIVE: till struggles to stop smoke. No cp. No falls no bleed. Takes meds. Walks. 5/19/2020 TELEHEALTH EVALUATION -- Audio/Visual (During ZWTBX-70 public health emergency)    Doing well no cp still has sob. Still trying to stop smoke. Uses 3L O2    6/22/2020 TELEHEALTH EVALUATION -- Audio/Visual (During ENCVW-04 public health emergency)    Called in 10 days ago c/o LE Edema. We advised low salt, walk and compression. Edema is now completely resolved. He feels much better. He admits he took lots of salty foods few weeks ago. No cp    7/27/2020  Leg edema was better but now came back again. He recently ran out of couple of his meds to include ACEI and BB.     8/10/2020 leg edema much improved with low salt and Fluid restrict. Now has 1-2+. No cp chronic SOB on O2.     9/11/2020 still has GARNETT. Uses 3L O2. Sate are 80s but feels comfortable at rest.  No CP no bleed no falls.      prior 2.5 PPD now < 1ppd   Retired -[employer]    EKG:    Past Medical History:   Diagnosis Date    CHF (congestive heart failure) ([address]     COPD (chronic obstructive pulmonary disease) ([address]     Hypertension     Lung disease     Osteoarthritis     hands worst    Sleep apnea        Past Surgical History:   Procedure Laterality Date    CHOLECYSTECTOMY      ROTATOR CUFF REPAIR      right       Family History   Problem Relation Age of Onset    Heart Disease Mother     Other Father        Social History     Socioeconomic History    Marital status: Single     Spouse name: None    Number daily (with meals) 60 tablet 3    carvedilol (COREG) 3.125 MG tablet Take 1 tablet by mouth 2 times daily (with meals) 180 tablet 2    Elastic Bandages & Supports (MEDICAL COMPRESSION SOCKS) MISC Disp knee high compression socks 20-30 mmHg, on daily off nightly 2 each 1    Respiratory Therapy Supplies DREW Heated tubing , full face mask 1 Device 0    Respiratory Therapy Supplies DREW New CPAP Full face  mask and supplies. Dispense heated tubing and adjust humidity in CPAP due to c/o dry mouth. 1 Device 0    lisinopril (PRINIVIL;ZESTRIL) 10 MG tablet Take 1 tablet by mouth daily 90 tablet 2    sildenafil (VIAGRA) 100 MG tablet Take 1 tablet by mouth as needed for Erectile Dysfunction LOT [id number] EXP 04/2017 2 tablet 5    atorvastatin (LIPITOR) 20 MG tablet Take 1 tablet by mouth nightly 90 tablet 2    budesonide-formoterol (SYMBICORT) 160-4.5 MCG/ACT AERO Inhale 2 puffs into the lungs 2 times daily Sample medication labeled with directions for administration and patient instructed on use. Lot: [id number] Exp: 06/2020 1 Inhaler 0    guaiFENesin (MUCINEX) 600 MG extended release tablet Take 1 tablet by mouth 2 times daily 10 tablet 0    aspirin EC 81 MG EC tablet Take 1 tablet by mouth daily 30 tablet 3    OXYGEN Start oxygen therapy at 3lit via NC , give POC 1 Units 0    OXYGEN POC    3 lit via NC     Dx copd 1 Units 0    albuterol sulfate HFA (PROAIR HFA) 108 (90 Base) MCG/ACT inhaler Inhale 2 puffs into the lungs every 6 hours as needed for Wheezing 3 Inhaler 3    CPAP Machine MISC by Does not apply route New CPAP with 7 cm with 3 lit O2 bled 1 each 0    Compression Bandages KIT LLE: knee high: 20-30mmhg 2 kit 1     No current facility-administered medications for this visit. Review of Systems:   Review of Systems   Constitutional: Negative. Negative for diaphoresis and fatigue. HENT: Negative. Eyes: Negative. Respiratory: Positive for shortness of breath.  Negative for cough, chest tightness, wheezing and stridor. Cardiovascular: Positive for leg swelling. Negative for chest pain and palpitations. Gastrointestinal: Negative. Negative for blood in stool and nausea. Genitourinary: Negative. Musculoskeletal: Negative. Skin: Negative. Neurological: Negative. Negative for dizziness, syncope, weakness and light-headedness. Hematological: Negative. Psychiatric/Behavioral: Negative. Physical Examination:    BP (!) 169/97 (Site: Left Upper Arm, Position: Sitting, Cuff Size: Large Adult)   Pulse 101   Resp 18   Ht 5' 10\" (1.778 m)   Wt 239 lb (108.4 kg)   SpO2 (!) 81% Comment: 3 liters oxygen continuous  BMI 34.29 kg/m²    Physical Exam   Constitutional: He appears healthy. No distress. HENT:   Normal cephalic and Atraumatic   Eyes: Pupils are equal, round, and reactive to light. Neck: Normal range of motion and thyroid normal. Neck supple. No JVD present. No neck adenopathy. No thyromegaly present. Cardiovascular: Normal rate, regular rhythm, intact distal pulses and normal pulses. Murmur heard. Pulmonary/Chest: Effort normal and breath sounds normal. He has no wheezes. He has no rales. He exhibits no tenderness. Abdominal: Soft. Bowel sounds are normal. There is no abdominal tenderness. Musculoskeletal: Normal range of motion. General: Edema (2+) present. No tenderness. Neurological: He is alert and oriented to person, place, and time. Skin: Skin is warm. No cyanosis. Nails show no clubbing.        LABS:  CBC:   Lab Results   Component Value Date    WBC 7.4 07/28/2019    RBC 5.54 07/28/2019    RBC 5.51 05/29/2012    HGB 17.1 07/28/2019    HCT 51.1 07/28/2019    MCV 92.3 07/28/2019    MCH 30.9 07/28/2019    MCHC 33.5 07/28/2019    RDW 15.2 07/28/2019     07/28/2019    MPV 10.1 10/16/2014     Lipids:  Lab Results   Component Value Date    CHOL 129 07/28/2019    CHOL 140 04/24/2017    CHOL 135 04/21/2016     Lab Results   Component Value Date    TRIG 105 07/28/2019    TRIG 112 04/24/2017    TRIG 69 04/21/2016     Lab Results   Component Value Date    HDL 45 06/08/2020    HDL 43 07/28/2019    HDL 42 05/02/2019     Lab Results   Component Value Date    LDLCALC 78 06/08/2020    LDLCALC 65 07/28/2019    LDLCALC 96 05/02/2019     No results found for: LABVLDL, VLDL  Lab Results   Component Value Date    CHOLHDLRATIO 3.0 05/29/2012     CMP:    Lab Results   Component Value Date     06/08/2020    K 4.7 06/08/2020    CL 94 06/08/2020    CO2 35 06/08/2020    BUN 9 06/08/2020    CREATININE 0.87 06/08/2020    GFRAA >60.0 06/08/2020    LABGLOM >60.0 06/08/2020    GLUCOSE 99 07/30/2019    GLUCOSE 91 05/29/2012    PROT 7.1 06/08/2020    LABALBU 4.1 06/08/2020    LABALBU 4.6 05/29/2012    CALCIUM 10.2 06/08/2020    BILITOT 1.2 06/08/2020    ALKPHOS 108 06/08/2020    AST 22 06/08/2020    ALT 21 06/08/2020     BMP:    Lab Results   Component Value Date     06/08/2020    K 4.7 06/08/2020    CL 94 06/08/2020    CO2 35 06/08/2020    BUN 9 06/08/2020    LABALBU 4.1 06/08/2020    LABALBU 4.6 05/29/2012    CREATININE 0.87 06/08/2020    CALCIUM 10.2 06/08/2020    GFRAA >60.0 06/08/2020    LABGLOM >60.0 06/08/2020    GLUCOSE 99 07/30/2019    GLUCOSE 91 05/29/2012     Magnesium:    Lab Results   Component Value Date    MG 2.1 07/28/2019     TSH:  Lab Results   Component Value Date    TSH 0.813 07/28/2019       Patient Active Problem List   Diagnosis    Essential hypertension, benign    Chronic obstructive pulmonary disease (HCC)    Osteoarthritis    Tobacco abuse    Psychophysiological insomnia    JONNY (obstructive sleep apnea)    Hypoxia    Obesity (BMI 30-39. 9)    Acute on chronic diastolic (congestive) heart failure (HCC)    Cor pulmonale (chronic) (HCC)    Shortness of breath    Chronic bronchitis (HCC)    Smoker    Bilateral carotid bruits    Bilateral lower extremity edema       There are no discontinued medications.     Modified

## 2020-09-28 ENCOUNTER — TELEPHONE (OUTPATIENT)
Dept: PULMONOLOGY | Age: 65
End: 2020-09-28

## 2020-09-28 NOTE — TELEPHONE ENCOUNTER
Patient needs new hoses and supplies for his nebulizer.  Please send it to [facility]

## 2020-10-22 ENCOUNTER — TELEPHONE (OUTPATIENT)
Dept: PULMONOLOGY | Age: 65
End: 2020-10-22

## 2020-10-27 RX ORDER — ALBUTEROL SULFATE 2.5 MG/3ML
SOLUTION RESPIRATORY (INHALATION)
Qty: 360 ML | Refills: 0 | Status: SHIPPED | OUTPATIENT
Start: 2020-10-27

## 2020-12-03 ENCOUNTER — OFFICE VISIT (OUTPATIENT)
Dept: PULMONOLOGY | Age: 65
End: 2020-12-03
Payer: MEDICARE

## 2020-12-03 ENCOUNTER — PREP FOR PROCEDURE (OUTPATIENT)
Dept: CARDIOLOGY CLINIC | Age: 65
End: 2020-12-03

## 2020-12-03 ENCOUNTER — OFFICE VISIT (OUTPATIENT)
Dept: CARDIOLOGY CLINIC | Age: 65
End: 2020-12-03
Payer: MEDICARE

## 2020-12-03 VITALS
HEIGHT: 70 IN | TEMPERATURE: 97 F | WEIGHT: 231 LBS | SYSTOLIC BLOOD PRESSURE: 112 MMHG | OXYGEN SATURATION: 91 % | BODY MASS INDEX: 33.07 KG/M2 | HEART RATE: 102 BPM | RESPIRATION RATE: 16 BRPM | DIASTOLIC BLOOD PRESSURE: 70 MMHG

## 2020-12-03 VITALS
HEART RATE: 76 BPM | DIASTOLIC BLOOD PRESSURE: 70 MMHG | WEIGHT: 232 LBS | SYSTOLIC BLOOD PRESSURE: 110 MMHG | BODY MASS INDEX: 33.29 KG/M2 | OXYGEN SATURATION: 80 %

## 2020-12-03 PROCEDURE — 1123F ACP DISCUSS/DSCN MKR DOCD: CPT | Performed by: INTERNAL MEDICINE

## 2020-12-03 PROCEDURE — G8417 CALC BMI ABV UP PARAM F/U: HCPCS | Performed by: INTERNAL MEDICINE

## 2020-12-03 PROCEDURE — G8427 DOCREV CUR MEDS BY ELIG CLIN: HCPCS | Performed by: INTERNAL MEDICINE

## 2020-12-03 PROCEDURE — G8926 SPIRO NO PERF OR DOC: HCPCS | Performed by: INTERNAL MEDICINE

## 2020-12-03 PROCEDURE — G8484 FLU IMMUNIZE NO ADMIN: HCPCS | Performed by: INTERNAL MEDICINE

## 2020-12-03 PROCEDURE — 99214 OFFICE O/P EST MOD 30 MIN: CPT | Performed by: INTERNAL MEDICINE

## 2020-12-03 PROCEDURE — 3017F COLORECTAL CA SCREEN DOC REV: CPT | Performed by: INTERNAL MEDICINE

## 2020-12-03 PROCEDURE — 99215 OFFICE O/P EST HI 40 MIN: CPT | Performed by: INTERNAL MEDICINE

## 2020-12-03 PROCEDURE — 4040F PNEUMOC VAC/ADMIN/RCVD: CPT | Performed by: INTERNAL MEDICINE

## 2020-12-03 PROCEDURE — 3023F SPIROM DOC REV: CPT | Performed by: INTERNAL MEDICINE

## 2020-12-03 PROCEDURE — 4004F PT TOBACCO SCREEN RCVD TLK: CPT | Performed by: INTERNAL MEDICINE

## 2020-12-03 RX ORDER — ONDANSETRON 2 MG/ML
4 INJECTION INTRAMUSCULAR; INTRAVENOUS EVERY 6 HOURS PRN
Status: CANCELLED | OUTPATIENT
Start: 2020-12-03

## 2020-12-03 RX ORDER — IPRATROPIUM BROMIDE AND ALBUTEROL SULFATE 2.5; .5 MG/3ML; MG/3ML
1 SOLUTION RESPIRATORY (INHALATION) EVERY 4 HOURS
Qty: 360 ML | Refills: 3 | Status: SHIPPED | OUTPATIENT
Start: 2020-12-03 | End: 2021-06-01

## 2020-12-03 RX ORDER — SODIUM CHLORIDE 450 MG/100ML
75 INJECTION, SOLUTION INTRAVENOUS CONTINUOUS
Status: CANCELLED | OUTPATIENT
Start: 2020-12-03

## 2020-12-03 RX ORDER — DIPHENHYDRAMINE HYDROCHLORIDE 50 MG/ML
50 INJECTION INTRAMUSCULAR; INTRAVENOUS ONCE
Status: CANCELLED | OUTPATIENT
Start: 2020-12-03 | End: 2020-12-03

## 2020-12-03 RX ORDER — NITROGLYCERIN 0.4 MG/1
0.4 TABLET SUBLINGUAL EVERY 5 MIN PRN
Status: CANCELLED | OUTPATIENT
Start: 2020-12-03

## 2020-12-03 RX ORDER — SODIUM CHLORIDE 0.9 % (FLUSH) 0.9 %
10 SYRINGE (ML) INJECTION PRN
Status: CANCELLED | OUTPATIENT
Start: 2020-12-03

## 2020-12-03 RX ORDER — SODIUM CHLORIDE 0.9 % (FLUSH) 0.9 %
10 SYRINGE (ML) INJECTION EVERY 12 HOURS SCHEDULED
Status: CANCELLED | OUTPATIENT
Start: 2020-12-03

## 2020-12-03 ASSESSMENT — ENCOUNTER SYMPTOMS
CHEST TIGHTNESS: 0
NAUSEA: 0
GASTROINTESTINAL NEGATIVE: 1
CHEST TIGHTNESS: 0
DIARRHEA: 0
WHEEZING: 0
WHEEZING: 1
SORE THROAT: 0
STRIDOR: 0
EYES NEGATIVE: 1
ABDOMINAL PAIN: 0
BLOOD IN STOOL: 0
SHORTNESS OF BREATH: 1
SHORTNESS OF BREATH: 1
VOICE CHANGE: 0
COUGH: 1
EYE ITCHING: 0
VOMITING: 0
NAUSEA: 0
RHINORRHEA: 0
COUGH: 0

## 2020-12-03 NOTE — PROGRESS NOTES
Subjective:     Lion Polk is a 77 y.o. male who complains today of:     Chief Complaint   Patient presents with    Follow-up     three month f/u for COPD and JONNY. HPI  He is using NIV/Trilogy  By brook and he is trying to use it. C/o shortness of breath  with exertion. C/o Occasional Wheezing   C/o Cough with yellow Sputum which is chronic   No Hemoptysis  No Chest tightness   No Chest pain with radiation  or pleuritic pain  No Fever or chills. No Rhinorrhea and postnasal drip. He is using bronchodilator with bronchodilator with  nebulizer with albuterol . He said symbicort is expensive and can not afford it. He is still smoking less than 1 ppd  He is going for cardiac cath 12/14/20 by dr. Cristobal Montez        Allergies:  Patient has no known allergies. Past Medical History:   Diagnosis Date    CHF (congestive heart failure) (HCC)     COPD (chronic obstructive pulmonary disease) (HCC)     Hypertension     Lung disease     Osteoarthritis     hands worst    Sleep apnea      Past Surgical History:   Procedure Laterality Date    CHOLECYSTECTOMY      ROTATOR CUFF REPAIR      right     Family History   Problem Relation Age of Onset    Heart Disease Mother     Other Father      Social History     Socioeconomic History    Marital status: Single     Spouse name: Not on file    Number of children: Not on file    Years of education: Not on file    Highest education level: Not on file   Occupational History    Not on file   Social Needs    Financial resource strain: Not on file    Food insecurity     Worry: Not on file     Inability: Not on file    Transportation needs     Medical: Not on file     Non-medical: Not on file   Tobacco Use    Smoking status: Current Every Day Smoker     Packs/day: 2.00     Years: 41.00     Pack years: 82.00     Types: Cigarettes    Smokeless tobacco: Never Used   Substance and Sexual Activity    Alcohol use:  Yes     Alcohol/week: 0.0 standard drinks 12/03/20 231 lb (104.8 kg)   12/03/20 232 lb (105.2 kg)   09/11/20 239 lb (108.4 kg)         Physical Exam  Constitutional:       Appearance: He is well-developed. HENT:      Head: Normocephalic and atraumatic. Nose: Nose normal.   Eyes:      Conjunctiva/sclera: Conjunctivae normal.      Pupils: Pupils are equal, round, and reactive to light. Neck:      Thyroid: No thyromegaly. Vascular: No JVD. Trachea: No tracheal deviation. Cardiovascular:      Rate and Rhythm: Normal rate and regular rhythm. Heart sounds: No murmur. No friction rub. No gallop. Pulmonary:      Effort: Pulmonary effort is normal. No respiratory distress. Breath sounds: Wheezing present. No rales. Comments: diminished Breath sound bilaterally. Chest:      Chest wall: No tenderness. Abdominal:      General: There is no distension. Musculoskeletal: Normal range of motion. Lymphadenopathy:      Cervical: No cervical adenopathy. Skin:     General: Skin is warm and dry. Findings: No rash. Neurological:      Mental Status: He is alert and oriented to person, place, and time. Cranial Nerves: No cranial nerve deficit. Psychiatric:         Behavior: Behavior normal.         Current Outpatient Medications   Medication Sig Dispense Refill    ipratropium-albuterol (DUONEB) 0.5-2.5 (3) MG/3ML SOLN nebulizer solution Inhale 3 mLs into the lungs every 4 hours 360 mL 3    albuterol (PROVENTIL) (2.5 MG/3ML) 0.083% nebulizer solution USE 1 VIAL PER NEBULIZER EVERY 6 HOURS AS NEEDED FOR WHEEZING 360 mL 0    Respiratory Therapy Supplies DREW New CPAP mask and supplies 1 Device 0    predniSONE (DELTASONE) 10 MG tablet 4 tablets daily for 4 days, 3 tablets daily for 4 days, 2 tablets daily for 4 days, then 1 tablet daily for 4 days 40 tablet 0    Elastic Bandages & Supports (MEDICAL COMPRESSION STOCKINGS) MISC 2 each by Does not apply route daily On in QAM and off QHS.   B/L Knee high, 20-30mmHg 1 each 1    furosemide (LASIX) 40 MG tablet Take 1.5 tablets by mouth daily 90 tablet 3    potassium chloride (KLOR-CON M) 10 MEQ extended release tablet Take 1 tablet by mouth 2 times daily (with meals) 60 tablet 3    carvedilol (COREG) 3.125 MG tablet Take 1 tablet by mouth 2 times daily (with meals) 180 tablet 2    Elastic Bandages & Supports (MEDICAL COMPRESSION SOCKS) MISC Disp knee high compression socks 20-30 mmHg, on daily off nightly 2 each 1    Respiratory Therapy Supplies DREW Heated tubing , full face mask 1 Device 0    Respiratory Therapy Supplies DREW New CPAP Full face  mask and supplies. Dispense heated tubing and adjust humidity in CPAP due to c/o dry mouth. 1 Device 0    lisinopril (PRINIVIL;ZESTRIL) 10 MG tablet Take 1 tablet by mouth daily 90 tablet 2    sildenafil (VIAGRA) 100 MG tablet Take 1 tablet by mouth as needed for Erectile Dysfunction LOT [id number] EXP 04/2017 2 tablet 5    atorvastatin (LIPITOR) 20 MG tablet Take 1 tablet by mouth nightly 90 tablet 2    budesonide-formoterol (SYMBICORT) 160-4.5 MCG/ACT AERO Inhale 2 puffs into the lungs 2 times daily Sample medication labeled with directions for administration and patient instructed on use. Lot: [id number] Exp: 06/2020 1 Inhaler 0    guaiFENesin (MUCINEX) 600 MG extended release tablet Take 1 tablet by mouth 2 times daily 10 tablet 0    aspirin EC 81 MG EC tablet Take 1 tablet by mouth daily 30 tablet 3    OXYGEN Start oxygen therapy at 3lit via NC , give POC 1 Units 0    OXYGEN POC    3 lit via NC     Dx copd 1 Units 0    albuterol sulfate HFA (PROAIR HFA) 108 (90 Base) MCG/ACT inhaler Inhale 2 puffs into the lungs every 6 hours as needed for Wheezing 3 Inhaler 3    CPAP Machine MISC by Does not apply route New CPAP with 7 cm with 3 lit O2 bled 1 each 0    Compression Bandages KIT LLE: knee high: 20-30mmhg 2 kit 1     No current facility-administered medications for this visit.         Results for orders placed during the trilogy was given by Amaya Keller. 3. Tobacco abuse  He is advised try to quit smoking. Risks related to smoking explained to the patient. Different ways to help to quit smoking were discussed today. He is still smoking less than 1 ppd    4. JONNY (obstructive sleep apnea)  She could not tolerate CPAP but he is trying to use noninvasive ventilator/trilogy at night    5. Obesity (BMI 30-39. 9)  He is advised try to lose weight. obesity related risk explained to the patient ,  Current weight:  231 lb (104.8 kg) Lbs. BMI:  Body mass index is 33.15 kg/m². Suggested weight control approaches, including dietary changes , exercise, behavioral modification. Return in about 3 months (around 3/3/2021) for COPD, hypoxia on O2, chronic respiratory failure.       Donna Stephens MD

## 2020-12-03 NOTE — PROGRESS NOTES
Subsequent Progress Note  Patient: Diego Sheets  YOB: 1955  MRN: [id number]    Chief Complaint: hf LE edema copd garnett   Chief Complaint   Patient presents with    Hypertension    3 Month Follow-Up       CV Data:  7/2019 echo EF 60  9/2019 spect negative   9/2019 CUS- mild   9/2019 Abd US negative AAA    Subjective/HPI: no edema anymore on diuretics. No cp +garnett chronic 3L o2      1/6/2020 still on 3L O2 SUBJECTIVE: till struggles to stop smoke. No cp. No falls no bleed. Takes meds. Walks. 5/19/2020 TELEHEALTH EVALUATION -- Audio/Visual (During KLK-14 public health emergency)    Doing well no cp still has sob. Still trying to stop smoke. Uses 3L O2    6/22/2020 TELEHEALTH EVALUATION -- Audio/Visual (During KEDXK-19 public health emergency)    Called in 10 days ago c/o LE Edema. We advised low salt, walk and compression. Edema is now completely resolved. He feels much better. He admits he took lots of salty foods few weeks ago. No cp    7/27/2020  Leg edema was better but now came back again. He recently ran out of couple of his meds to include ACEI and BB.     8/10/2020 leg edema much improved with low salt and Fluid restrict. Now has 1-2+. No cp chronic SOB on O2.     9/11/2020 still has GARNETT. Uses 3L O2. Sate are 80s but feels comfortable at rest.  No CP no bleed no falls. 12/3/2020 no cp . GARNETT is worse. Weak and tired. No bleed. No falls.    prior 2.5 PPD now < 1ppd   Retired -[employer]    EKG:    Past Medical History:   Diagnosis Date    CHF (congestive heart failure) ([address]     COPD [facility] disease[facility] [address]     Hypertension     Lung disease     Osteoarthritis     hands worst    Sleep apnea        Past Surgical History:   Procedure Laterality Date    CHOLECYSTECTOMY      ROTATOR CUFF REPAIR      right       Family History   Problem Relation Age of Onset    Heart Disease Mother     Other Father        Social History     Socioeconomic History    Marital status: Single     Spouse name: Not on file    Number of children: Not on file    Years of education: Not on file    Highest education level: Not on file   Occupational History    Not on file   Social Needs    Financial resource strain: Not on file    Food insecurity     Worry: Not on file     Inability: Not on file    Transportation needs     Medical: Not on file     Non-medical: Not on file   Tobacco Use    Smoking status: Current Every Day Smoker     Packs/day: 2.00     Years: 41.00     Pack years: 82.00     Types: Cigarettes    Smokeless tobacco: Never Used   Substance and Sexual Activity    Alcohol use:  Yes     Alcohol/week: 0.0 standard drinks     Comment: 12 pk per week    Drug use: No    Sexual activity: Not on file   Lifestyle    Physical activity     Days per week: Not on file     Minutes per session: Not on file    Stress: Not on file   Relationships    Social connections     Talks on phone: Not on file     Gets together: Not on file     Attends Zoroastrian service: Not on file     Active member of club or organization: Not on file     Attends meetings of clubs or organizations: Not on file     Relationship status: Not on file    Intimate partner violence     Fear of current or ex partner: Not on file     Emotionally abused: Not on file     Physically abused: Not on file     Forced sexual activity: Not on file   Other Topics Concern    Not on file   Social History Narrative    Not on file       No Known Allergies    Current Outpatient Medications   Medication Sig Dispense Refill    albuterol (PROVENTIL) (2.5 MG/3ML) 0.083% nebulizer solution USE 1 VIAL PER NEBULIZER EVERY 6 HOURS AS NEEDED FOR WHEEZING 360 mL 0    Respiratory Therapy Supplies DREW New CPAP mask and supplies 1 Device 0    predniSONE (DELTASONE) 10 MG tablet 4 tablets daily for 4 days, 3 tablets daily for 4 days, 2 tablets daily for 4 days, then 1 tablet daily for 4 days 40 tablet 0    Elastic Bandages & Supports (MEDICAL COMPRESSION STOCKINGS) MISC 2 each by Does not apply route daily On in QAM and off QHS. B/L Knee high, 20-30mmHg 1 each 1    furosemide (LASIX) 40 MG tablet Take 1.5 tablets by mouth daily 90 tablet 3    potassium chloride (KLOR-CON M) 10 MEQ extended release tablet Take 1 tablet by mouth 2 times daily (with meals) 60 tablet 3    carvedilol (COREG) 3.125 MG tablet Take 1 tablet by mouth 2 times daily (with meals) 180 tablet 2    Elastic Bandages & Supports (MEDICAL COMPRESSION SOCKS) MISC Disp knee high compression socks 20-30 mmHg, on daily off nightly 2 each 1    Respiratory Therapy Supplies DREW Heated tubing , full face mask 1 Device 0    Respiratory Therapy Supplies DREW New CPAP Full face  mask and supplies. Dispense heated tubing and adjust humidity in CPAP due to c/o dry mouth. 1 Device 0    lisinopril (PRINIVIL;ZESTRIL) 10 MG tablet Take 1 tablet by mouth daily 90 tablet 2    sildenafil (VIAGRA) 100 MG tablet Take 1 tablet by mouth as needed for Erectile Dysfunction LOT [id number] EXP 04/2017 2 tablet 5    atorvastatin (LIPITOR) 20 MG tablet Take 1 tablet by mouth nightly 90 tablet 2    budesonide-formoterol (SYMBICORT) 160-4.5 MCG/ACT AERO Inhale 2 puffs into the lungs 2 times daily Sample medication labeled with directions for administration and patient instructed on use.  Lot: [id number] Exp: 06/2020 1 Inhaler 0    guaiFENesin (MUCINEX) 600 MG extended release tablet Take 1 tablet by mouth 2 times daily 10 tablet 0    aspirin EC 81 MG EC tablet Take 1 tablet by mouth daily 30 tablet 3    OXYGEN Start oxygen therapy at 3lit via NC , give POC 1 Units 0    OXYGEN POC    3 lit via NC     Dx copd 1 Units 0    albuterol sulfate HFA (PROAIR HFA) 108 (90 Base) MCG/ACT inhaler Inhale 2 puffs into the lungs every 6 hours as needed for Wheezing 3 Inhaler 3    CPAP Machine MISC by Does not apply route New CPAP with 7 cm with 3 lit O2 bled 1 each 0    Compression Bandages KIT LLE: knee high: 20-30mmhg 2 kit 1     No current facility-administered medications for this visit. Review of Systems:   Review of Systems   Constitutional: Negative. Negative for diaphoresis and fatigue. HENT: Negative. Eyes: Negative. Respiratory: Positive for shortness of breath. Negative for cough, chest tightness, wheezing and stridor. Cardiovascular: Positive for leg swelling. Negative for chest pain and palpitations. Gastrointestinal: Negative. Negative for blood in stool and nausea. Genitourinary: Negative. Musculoskeletal: Negative. Skin: Negative. Neurological: Negative. Negative for dizziness, syncope, weakness and light-headedness. Hematological: Negative. Psychiatric/Behavioral: Negative. Physical Examination:    /70 (Site: Left Upper Arm, Position: Sitting, Cuff Size: Large Adult)   Pulse 76   Wt 232 lb (105.2 kg)   SpO2 (!) 80%   BMI 33.29 kg/m²    Physical Exam   Constitutional: He appears healthy. No distress. HENT:   Normal cephalic and Atraumatic   Eyes: Pupils are equal, round, and reactive to light. Neck: Normal range of motion and thyroid normal. Neck supple. No JVD present. No neck adenopathy. No thyromegaly present. Cardiovascular: Normal rate and regular rhythm. Exam reveals decreased pulses. Murmur heard. Pulses:       Carotid pulses are on the right side with bruit and on the left side with bruit. Pulmonary/Chest: Effort normal and breath sounds normal. He has no wheezes. He has no rales. He exhibits no tenderness. Abdominal: Soft. Bowel sounds are normal. There is no abdominal tenderness. Musculoskeletal: Normal range of motion. General: Edema (2+) present. No tenderness. Neurological: He is alert and oriented to person, place, and time. Skin: Skin is warm. No cyanosis. Nails show no clubbing.        LABS:  CBC:   Lab Results   Component Value Date    WBC 7.4 07/28/2019    RBC 5.54 07/28/2019    RBC 5.51 05/29/2012    HGB Hypoxia    Obesity (BMI 30-39. 9)    Acute on chronic diastolic (congestive) heart failure (HCC)    Cor pulmonale (chronic) (HCC)    Shortness of breath    Chronic bronchitis (HCC)    Smoker    Bilateral carotid bruits    Bilateral lower extremity edema       There are no discontinued medications. Modified Medications    No medications on file       No orders of the defined types were placed in this encounter. Assessment/Plan:    1. Essential hypertension, benign - stable     2. Acute on chronic diastolic (congestive) heart failure (HCC)   decompensated due to noncompliance. Resume all meds. Fluid restric to 1.5L Low salt. -  Advance Lasix to 60 mg qam.  Compression. Continue  Low salt diet. - Edema is better. breating is worse. - RBA R&L Cath discussed- casey proceed    3. Cor pulmonale (chronic)     4. Shortness of breath  No worse- on O2 3L     5. LE pulse weak- PVR    6. Carotid Bruits- CUS      Counseling:  Heart Healthy Lifestyle, Stop Smoking, Low Salt Diet, Take Precautions to Prevent Falls, Regular Exercise and Walk Daily    No follow-ups on file.       Electronically signed by Cori Jones MD on 12/3/2020 at 12:55 PM

## 2020-12-07 ENCOUNTER — NURSE ONLY (OUTPATIENT)
Dept: PRIMARY CARE CLINIC | Age: 65
End: 2020-12-07

## 2020-12-08 ENCOUNTER — HOSPITAL ENCOUNTER (OUTPATIENT)
Dept: LAB | Age: 65
Discharge: HOME | End: 2020-12-08
Payer: MEDICARE

## 2020-12-08 LAB
ANION GAP SERPL CALCULATED.3IONS-SCNC: 4 MEQ/L (ref 9–15)
BUN BLDV-MCNC: 7 MG/DL (ref 8–23)
CALCIUM SERPL-MCNC: 9.7 MG/DL (ref 8.5–9.9)
CHLORIDE BLD-SCNC: 92 MEQ/L (ref 95–107)
CO2: 38 MEQ/L (ref 20–31)
CREAT SERPL-MCNC: 0.76 MG/DL (ref 0.7–1.2)
GFR AFRICAN AMERICAN: >60
GFR NON-AFRICAN AMERICAN: >60
GLUCOSE BLD-MCNC: 99 MG/DL (ref 70–99)
HCT VFR BLD CALC: 56.4 % (ref 42–52)
HEMOGLOBIN: 18.6 G/DL (ref 14–18)
MCH RBC QN AUTO: 30.7 PG (ref 27–31.3)
MCHC RBC AUTO-ENTMCNC: 33 % (ref 33–37)
MCV RBC AUTO: 93.1 FL (ref 80–100)
PDW BLD-RTO: 15.3 % (ref 11.5–14.5)
PLATELET # BLD: 212 K/UL (ref 130–400)
POTASSIUM SERPL-SCNC: 4.2 MEQ/L (ref 3.4–4.9)
RBC # BLD: 6.05 M/UL (ref 4.7–6.1)
SARS-COV-2: NOT DETECTED
SODIUM BLD-SCNC: 134 MEQ/L (ref 135–144)
SOURCE: NORMAL
WBC # BLD: 7.3 K/UL (ref 4.8–10.8)

## 2020-12-08 PROCEDURE — 85027 COMPLETE CBC AUTOMATED: CPT

## 2020-12-08 PROCEDURE — 80048 BASIC METABOLIC PNL TOTAL CA: CPT

## 2020-12-08 PROCEDURE — 36415 COLL VENOUS BLD VENIPUNCTURE: CPT

## 2020-12-14 ENCOUNTER — HOSPITAL ENCOUNTER (OUTPATIENT)
Dept: CARDIAC CATH/INVASIVE PROCEDURES | Age: 65
Discharge: HOME | End: 2020-12-15
Attending: INTERNAL MEDICINE | Admitting: INTERNAL MEDICINE
Payer: MEDICARE

## 2020-12-14 PROBLEM — R06.09 DOE (DYSPNEA ON EXERTION): Status: ACTIVE | Noted: 2019-08-15

## 2020-12-14 LAB
EKG ATRIAL RATE: 91 BPM
EKG P AXIS: 59 DEGREES
EKG P-R INTERVAL: 184 MS
EKG Q-T INTERVAL: 362 MS
EKG QRS DURATION: 80 MS
EKG QTC CALCULATION (BAZETT): 445 MS
EKG R AXIS: 87 DEGREES
EKG T AXIS: 46 DEGREES
EKG VENTRICULAR RATE: 91 BPM
PERFORMED ON: ABNORMAL
PERFORMED ON: ABNORMAL
POC ACTIVATED CLOTTING TIME KAOLIN: 153 SEC (ref 82–152)
POC ACTIVATED CLOTTING TIME KAOLIN: 247 SEC (ref 82–152)
POC SAMPLE TYPE: ABNORMAL
POC SAMPLE TYPE: ABNORMAL

## 2020-12-14 PROCEDURE — C1751 CATH, INF, PER/CENT/MIDLINE: HCPCS

## 2020-12-14 PROCEDURE — 6360000004 HC RX CONTRAST MEDICATION: Performed by: INTERNAL MEDICINE

## 2020-12-14 PROCEDURE — 2580000003 HC RX 258

## 2020-12-14 PROCEDURE — C1874 STENT, COATED/COV W/DEL SYS: HCPCS

## 2020-12-14 PROCEDURE — C1894 INTRO/SHEATH, NON-LASER: HCPCS

## 2020-12-14 PROCEDURE — 92928 PRQ TCAT PLMT NTRAC ST 1 LES: CPT | Performed by: INTERNAL MEDICINE

## 2020-12-14 PROCEDURE — 93005 ELECTROCARDIOGRAM TRACING: CPT | Performed by: INTERNAL MEDICINE

## 2020-12-14 PROCEDURE — C1769 GUIDE WIRE: HCPCS

## 2020-12-14 PROCEDURE — 85347 COAGULATION TIME ACTIVATED: CPT

## 2020-12-14 PROCEDURE — 2580000003 HC RX 258: Performed by: INTERNAL MEDICINE

## 2020-12-14 PROCEDURE — C1887 CATHETER, GUIDING: HCPCS

## 2020-12-14 PROCEDURE — 2709999900 HC NON-CHARGEABLE SUPPLY

## 2020-12-14 PROCEDURE — 94664 DEMO&/EVAL PT USE INHALER: CPT

## 2020-12-14 PROCEDURE — C9600 PERC DRUG-EL COR STENT SING: HCPCS | Performed by: INTERNAL MEDICINE

## 2020-12-14 PROCEDURE — 93460 R&L HRT ART/VENTRICLE ANGIO: CPT | Performed by: INTERNAL MEDICINE

## 2020-12-14 PROCEDURE — 93010 ELECTROCARDIOGRAM REPORT: CPT | Performed by: INTERNAL MEDICINE

## 2020-12-14 PROCEDURE — 6370000000 HC RX 637 (ALT 250 FOR IP): Performed by: INTERNAL MEDICINE

## 2020-12-14 PROCEDURE — C1725 CATH, TRANSLUMIN NON-LASER: HCPCS

## 2020-12-14 PROCEDURE — 6360000002 HC RX W HCPCS

## 2020-12-14 PROCEDURE — 6370000000 HC RX 637 (ALT 250 FOR IP)

## 2020-12-14 PROCEDURE — 2500000003 HC RX 250 WO HCPCS

## 2020-12-14 RX ORDER — LISINOPRIL 10 MG/1
10 TABLET ORAL DAILY
Status: DISCONTINUED | OUTPATIENT
Start: 2020-12-14 | End: 2020-12-15 | Stop reason: HOSPADM

## 2020-12-14 RX ORDER — SODIUM CHLORIDE 0.9 % (FLUSH) 0.9 %
10 SYRINGE (ML) INJECTION EVERY 12 HOURS SCHEDULED
Status: DISCONTINUED | OUTPATIENT
Start: 2020-12-14 | End: 2020-12-15 | Stop reason: HOSPADM

## 2020-12-14 RX ORDER — ATORVASTATIN CALCIUM 20 MG/1
20 TABLET, FILM COATED ORAL NIGHTLY
Status: DISCONTINUED | OUTPATIENT
Start: 2020-12-14 | End: 2020-12-15 | Stop reason: HOSPADM

## 2020-12-14 RX ORDER — SODIUM CHLORIDE 450 MG/100ML
75 INJECTION, SOLUTION INTRAVENOUS CONTINUOUS
Status: DISCONTINUED | OUTPATIENT
Start: 2020-12-14 | End: 2020-12-15 | Stop reason: HOSPADM

## 2020-12-14 RX ORDER — IPRATROPIUM BROMIDE AND ALBUTEROL SULFATE 2.5; .5 MG/3ML; MG/3ML
1 SOLUTION RESPIRATORY (INHALATION) EVERY 4 HOURS
Status: DISCONTINUED | OUTPATIENT
Start: 2020-12-14 | End: 2020-12-14

## 2020-12-14 RX ORDER — ASPIRIN 81 MG/1
81 TABLET ORAL DAILY
Status: DISCONTINUED | OUTPATIENT
Start: 2020-12-14 | End: 2020-12-15 | Stop reason: HOSPADM

## 2020-12-14 RX ORDER — LABETALOL HYDROCHLORIDE 5 MG/ML
10 INJECTION, SOLUTION INTRAVENOUS EVERY 30 MIN PRN
Status: DISCONTINUED | OUTPATIENT
Start: 2020-12-14 | End: 2020-12-15 | Stop reason: HOSPADM

## 2020-12-14 RX ORDER — ACETAMINOPHEN 325 MG/1
650 TABLET ORAL EVERY 4 HOURS PRN
Status: DISCONTINUED | OUTPATIENT
Start: 2020-12-14 | End: 2020-12-15 | Stop reason: HOSPADM

## 2020-12-14 RX ORDER — ALBUTEROL SULFATE 90 UG/1
2 AEROSOL, METERED RESPIRATORY (INHALATION) EVERY 6 HOURS PRN
Status: DISCONTINUED | OUTPATIENT
Start: 2020-12-14 | End: 2020-12-15 | Stop reason: HOSPADM

## 2020-12-14 RX ORDER — CARVEDILOL 3.12 MG/1
3.12 TABLET ORAL 2 TIMES DAILY WITH MEALS
Status: DISCONTINUED | OUTPATIENT
Start: 2020-12-14 | End: 2020-12-15 | Stop reason: HOSPADM

## 2020-12-14 RX ORDER — MORPHINE SULFATE 2 MG/ML
2 INJECTION, SOLUTION INTRAMUSCULAR; INTRAVENOUS
Status: ACTIVE | OUTPATIENT
Start: 2020-12-14 | End: 2020-12-14

## 2020-12-14 RX ORDER — ONDANSETRON 2 MG/ML
4 INJECTION INTRAMUSCULAR; INTRAVENOUS EVERY 6 HOURS PRN
Status: DISCONTINUED | OUTPATIENT
Start: 2020-12-14 | End: 2020-12-15 | Stop reason: HOSPADM

## 2020-12-14 RX ORDER — IPRATROPIUM BROMIDE AND ALBUTEROL SULFATE 2.5; .5 MG/3ML; MG/3ML
1 SOLUTION RESPIRATORY (INHALATION) EVERY 4 HOURS PRN
Status: DISCONTINUED | OUTPATIENT
Start: 2020-12-14 | End: 2020-12-15 | Stop reason: HOSPADM

## 2020-12-14 RX ORDER — NITROGLYCERIN 0.4 MG/1
0.4 TABLET SUBLINGUAL EVERY 5 MIN PRN
Status: DISCONTINUED | OUTPATIENT
Start: 2020-12-14 | End: 2020-12-15 | Stop reason: HOSPADM

## 2020-12-14 RX ORDER — POTASSIUM CHLORIDE 20 MEQ/1
10 TABLET, EXTENDED RELEASE ORAL 2 TIMES DAILY
Status: DISCONTINUED | OUTPATIENT
Start: 2020-12-14 | End: 2020-12-15 | Stop reason: HOSPADM

## 2020-12-14 RX ORDER — DIPHENHYDRAMINE HYDROCHLORIDE 50 MG/ML
50 INJECTION INTRAMUSCULAR; INTRAVENOUS ONCE
Status: DISCONTINUED | OUTPATIENT
Start: 2020-12-14 | End: 2020-12-15 | Stop reason: HOSPADM

## 2020-12-14 RX ORDER — SODIUM CHLORIDE 9 MG/ML
INJECTION, SOLUTION INTRAVENOUS CONTINUOUS
Status: DISCONTINUED | OUTPATIENT
Start: 2020-12-14 | End: 2020-12-15 | Stop reason: HOSPADM

## 2020-12-14 RX ORDER — SODIUM CHLORIDE 0.9 % (FLUSH) 0.9 %
10 SYRINGE (ML) INJECTION PRN
Status: DISCONTINUED | OUTPATIENT
Start: 2020-12-14 | End: 2020-12-15 | Stop reason: HOSPADM

## 2020-12-14 RX ORDER — GUAIFENESIN 600 MG/1
600 TABLET, EXTENDED RELEASE ORAL 2 TIMES DAILY
Status: DISCONTINUED | OUTPATIENT
Start: 2020-12-14 | End: 2020-12-15 | Stop reason: HOSPADM

## 2020-12-14 RX ORDER — ALBUTEROL SULFATE 2.5 MG/3ML
2.5 SOLUTION RESPIRATORY (INHALATION) EVERY 6 HOURS PRN
Status: DISCONTINUED | OUTPATIENT
Start: 2020-12-14 | End: 2020-12-15 | Stop reason: HOSPADM

## 2020-12-14 RX ADMIN — CARVEDILOL 3.12 MG: 3.12 TABLET, FILM COATED ORAL at 21:57

## 2020-12-14 RX ADMIN — IOPAMIDOL 230 ML: 612 INJECTION, SOLUTION INTRAVENOUS at 10:19

## 2020-12-14 RX ADMIN — SODIUM CHLORIDE 75 ML/HR: 4.5 INJECTION, SOLUTION INTRAVENOUS at 16:17

## 2020-12-14 RX ADMIN — TICAGRELOR 90 MG: 90 TABLET ORAL at 21:57

## 2020-12-14 RX ADMIN — POTASSIUM CHLORIDE 10 MEQ: 20 TABLET, EXTENDED RELEASE ORAL at 21:57

## 2020-12-14 RX ADMIN — Medication 10 ML: at 21:58

## 2020-12-14 RX ADMIN — ATORVASTATIN CALCIUM 20 MG: 20 TABLET, FILM COATED ORAL at 21:56

## 2020-12-14 RX ADMIN — GUAIFENESIN 600 MG: 600 TABLET ORAL at 21:58

## 2020-12-14 RX ADMIN — SODIUM CHLORIDE: 9 INJECTION, SOLUTION INTRAVENOUS at 17:52

## 2020-12-14 NOTE — BRIEF OP NOTE
Brief Postoperative Note      Patient: Irena Roman  YOB: 1955  MRN: [id number]    Section of Cardiology  Adult Brief Cardiac Cath Procedure Note        Procedure(s):  LHC, RHC LVGram b/l coronary angio    Pre-operative Diagnosis:   GARNETT    H&P Status: Completed and reviewed.      Post-operative Diagnosis:  RCA 80-90 Mid  LAD mid 30  EF 60 EDP 10  PCWP 14    Findings:  See full report    Complications:  none    Primary Proceduralist:   Tiffany Starks MD

## 2020-12-14 NOTE — PROGRESS NOTES
Dung Pulled 7 fr venous sheath at 12:45 and held for 15min. Site is soft with no hematoma. Pt laying flat no complaints of pain.

## 2020-12-14 NOTE — PROGRESS NOTES
Arrived to pre/post from home and name and date of birth verified along with consents for procedure and allergies.   Oriented to surroundings and rights and responsibility handout given to patient

## 2020-12-14 NOTE — PROGRESS NOTES
[facility] EMERGENCY [facility] AT [facility] Respiratory Therapy Evaluation   Current Order:  Albuterol Q6PRN, Alb 2 Puffs Q6PRN, Duoneb Q4   Home Regimen: PRN      Ordering Physician: Solo  Re-evaluation Date:       Diagnosis: Heart Cath      Patient Status: Stable     The following MDI Criteria must be met in order to convert aerosol to MDI with spacer. If unable to meet, MDI will be converted to aerosol:  []  Patient able to demonstrate the ability to use MDI effectively  []  Patient alert and cooperative  []  Patient able to take deep breath with 5-10 second hold  []  Medication(s) available in this delivery method   []  Peak flow greater than or equal to 200 ml/min            Current Order Substituted To  (same drug, same frequency)   Aerosol to MDI [] Albuterol Sulfate 0.083% unit dose by aerosol Albuterol Sulfate MDI 2 puffs by inhalation with spacer    [] Levalbuterol 1.25 mg unit dose by aerosol Levalbuterol MDI 2 puffs by inhalation with spacer    [] Levalbuterol 0.63 mg unit dose by aerosol Levalbuterol MDI 2 puffs by inhalation with spacer    [] Ipratropium Bromide 0.02% unit dose by aerosol Ipratropium Bromide MDI 2 puffs by inhalation with spacer    [] Duoneb (Ipratropium + Albuterol) unit dose by aerosol Ipratropium MDI + Albuterol MDI 2 puffs by inhalation w/spacer   MDI to Aerosol [] Albuterol Sulfate MDI Albuterol Sulfate 0.083% unit dose by aerosol    [] Levalbuterol MDI 2 puffs by inhalation Levalbuterol 1.25 mg unit dose by aerosol    [] Ipratropium Bromide MDI by inhalation Ipratropium Bromide 0.02% unit dose by aerosol    [] Combivent (Ipratropium + Albuterol) MDI by inhalation Duoneb (Ipratropium + Albuterol) unit dose by aerosol   Treatment Assessment [Frequency/Schedule]:  Change frequency to: ______Duoneb Q4PRN____________________________________________per Protocol, P&T, MEC      Points 0 1 2 3 4   Pulmonary Status  Non-Smoker  []   Smoking history   < 20 pack years  []   Smoking history  ?  20 pack years  []   Pulmonary Disorder  (acute or chronic)  [x]   Severe or Chronic w/ Exacerbation  []     Surgical Status No [x]   Surgeries     General []   Surgery Lower []   Abdominal Thoracic or []   Upper Abdominal Thoracic with  PulmonaryDisorder  []     Chest X-ray Clear/Not  Ordered     [x]  Chronic Changes  Results Pending  []  Infiltrates, atelectasis, pleural effusion, or edema  []  Infiltrates in more than one lobe []  Infiltrate + Atelectasis, &/or pleural effusion  []    Respiratory Pattern Regular,  RR = 12-20 [x]  Increased,  RR = 21-25 []  GARNETT, irregular,  or RR = 26-30 []  Decreased FEV1  or RR = 31-35 []  Severe SOB, use  of accessory muscles, or RR ? 35  []    Mental Status Alert, oriented,  Cooperative [x]  Confused but Follows commands []  Lethargic or unable to follow commands []  Obtunded  []  Comatose  []    Breath Sounds Clear to  auscultation  [x]  Decreased unilaterally or  in bases only []  Decreased  bilaterally  []  Crackles or intermittent wheezes []  Wheezes []    Cough Strong, Spontan., & nonproductive [x]  Strong,  spontaneous, &  productive []  Weak,  Nonproductive []  Weak, productive or  with wheezes []  No spontaneous  cough or may require suctioning []    Level of Activity Ambulatory []  Ambulatory w/ Assist  []  Non-ambulatory [x]  Paraplegic []  Quadriplegic []    Total    Score:___5___     Triage Score:___5_____      Tri       Triage:     1. (>20) Freq: Q3    2. (16-20) Freq: Q4   3. (11-15) Freq: QID & Albuterol Q2 PRN    4. (6-10) Freq: TID & Albuterol Q2 PRN    5. (0-5) Freq Q4prn

## 2020-12-14 NOTE — BRIEF OP NOTE
Section of Cardiology  Adult Brief Cardiac Cath Procedure Note        Procedure(s):  PCI of RCA with DESx2    Pre-operative Diagnosis:  angina    H&P Status: Completed and reviewed. Post-operative Diagnosis:      90% mid RCA    Findings:  See full report    Complications:  none    Primary Proceduralist:   Dr.Wes Walden DO    Plan    DAPT. IVF hydration.        Full procedure note to follow

## 2020-12-15 VITALS
HEIGHT: 70 IN | WEIGHT: 232 LBS | HEART RATE: 80 BPM | DIASTOLIC BLOOD PRESSURE: 75 MMHG | RESPIRATION RATE: 19 BRPM | SYSTOLIC BLOOD PRESSURE: 128 MMHG | BODY MASS INDEX: 33.21 KG/M2 | TEMPERATURE: 97.7 F | OXYGEN SATURATION: 93 %

## 2020-12-15 LAB
ANION GAP SERPL CALCULATED.3IONS-SCNC: 5 MEQ/L (ref 9–15)
BUN BLDV-MCNC: 9 MG/DL (ref 8–23)
CALCIUM SERPL-MCNC: 9 MG/DL (ref 8.5–9.9)
CHLORIDE BLD-SCNC: 96 MEQ/L (ref 95–107)
CO2: 38 MEQ/L (ref 20–31)
CREAT SERPL-MCNC: 0.79 MG/DL (ref 0.7–1.2)
GFR AFRICAN AMERICAN: >60
GFR NON-AFRICAN AMERICAN: >60
GLUCOSE BLD-MCNC: 95 MG/DL (ref 70–99)
HCT VFR BLD CALC: 51.7 % (ref 42–52)
HEMOGLOBIN: 17 G/DL (ref 14–18)
MCH RBC QN AUTO: 30.8 PG (ref 27–31.3)
MCHC RBC AUTO-ENTMCNC: 32.9 % (ref 33–37)
MCV RBC AUTO: 93.4 FL (ref 80–100)
PDW BLD-RTO: 14.4 % (ref 11.5–14.5)
PLATELET # BLD: 171 K/UL (ref 130–400)
POTASSIUM SERPL-SCNC: 4.2 MEQ/L (ref 3.4–4.9)
RBC # BLD: 5.53 M/UL (ref 4.7–6.1)
SODIUM BLD-SCNC: 139 MEQ/L (ref 135–144)
WBC # BLD: 9 K/UL (ref 4.8–10.8)

## 2020-12-15 PROCEDURE — 6370000000 HC RX 637 (ALT 250 FOR IP): Performed by: INTERNAL MEDICINE

## 2020-12-15 PROCEDURE — 85027 COMPLETE CBC AUTOMATED: CPT

## 2020-12-15 PROCEDURE — 80048 BASIC METABOLIC PNL TOTAL CA: CPT

## 2020-12-15 PROCEDURE — 2700000000 HC OXYGEN THERAPY PER DAY

## 2020-12-15 PROCEDURE — 2580000003 HC RX 258: Performed by: INTERNAL MEDICINE

## 2020-12-15 PROCEDURE — 99217 PR OBSERVATION CARE DISCHARGE MANAGEMENT: CPT | Performed by: INTERNAL MEDICINE

## 2020-12-15 PROCEDURE — 36415 COLL VENOUS BLD VENIPUNCTURE: CPT

## 2020-12-15 RX ORDER — NITROGLYCERIN 0.4 MG/1
TABLET SUBLINGUAL
Qty: 25 TABLET | Refills: 3 | Status: SHIPPED | OUTPATIENT
Start: 2020-12-15

## 2020-12-15 RX ADMIN — CARVEDILOL 3.12 MG: 3.12 TABLET, FILM COATED ORAL at 09:21

## 2020-12-15 RX ADMIN — LISINOPRIL 10 MG: 10 TABLET ORAL at 09:21

## 2020-12-15 RX ADMIN — TICAGRELOR 90 MG: 90 TABLET ORAL at 09:21

## 2020-12-15 RX ADMIN — ASPIRIN 81 MG: 81 TABLET, COATED ORAL at 09:21

## 2020-12-15 RX ADMIN — Medication 10 ML: at 09:20

## 2020-12-15 RX ADMIN — GUAIFENESIN 600 MG: 600 TABLET ORAL at 09:21

## 2020-12-15 RX ADMIN — POTASSIUM CHLORIDE 10 MEQ: 20 TABLET, EXTENDED RELEASE ORAL at 09:20

## 2020-12-15 ASSESSMENT — PAIN SCALES - GENERAL: PAINLEVEL_OUTOF10: 0

## 2020-12-15 NOTE — FLOWSHEET NOTE
Dr. Era Nieto notified of pt having \"small bursts of complete heart block. \" Pt asymptomatic.  Electronically signed by Jer Hollis RN on 12/15/2020 at 8:07 AM

## 2020-12-15 NOTE — DISCHARGE SUMMARY
Cardiology Discharge Summary      Patient Identification:  Jason Browning  : 1955  MRN: [id number]   Account: [de-identified]     Admit date: 2020  Discharge date: 12/15/2020   Attending provider: Corina Cordoba MD        Primary care provider: Nikki Cobos MD     Admission Diagnoses:  <principal problem not specified>     GARNETT  CAD    Discharge Diagnoses: Active Hospital Problems    Diagnosis Date Noted    Angina at rest [facility]) [I20.8]      Priority: High    GARNETT (dyspnea on exertion) [R06.00] 08/15/2019          Hospital Course:   Jason Browning is a 77 y.o. male admitted to [facility] on 2020 for . SHIRIN X2 RCA  Telemetry revealed early am nonconducted P waves briefly - pt asymptomatic  OBS for iv Fluid      Procedures:   1. CaTH/pci     Consults:   IP CONSULT TO CARDIAC REHAB    Examination:  /75   Pulse 80   Temp 97.7 °F (36.5 °C) (Oral)   Resp 19   Ht 5' 10\" (1.778 m)   Wt 232 lb (105.2 kg)   SpO2 93%   BMI 33.29 kg/m²    Physical Exam   Constitutional: He appears healthy. No distress. HENT:   Normal cephalic and Atraumatic   Eyes: Pupils are equal, round, and reactive to light. Neck: Normal range of motion and thyroid normal. Neck supple. No JVD present. No neck adenopathy. No thyromegaly present. Cardiovascular: Normal rate, regular rhythm, intact distal pulses and normal pulses. Murmur heard. Pulmonary/Chest: Effort normal. He has no rales. He has scattered wheezes. He exhibits no tenderness. Abdominal: Soft. Bowel sounds are normal. There is no abdominal tenderness. Musculoskeletal: Normal range of motion. General: No tenderness or edema. Neurological: He is alert and oriented to person, place, and time. Skin: Skin is warm. No cyanosis. Nails show no clubbing.    Right Groin site stable     Medications:  Current Discharge Medication List      START taking these medications Details   nitroGLYCERIN (NITROSTAT) 0.4 MG SL tablet up to max of 3 total doses. If no relief after 1 dose, call 911. Qty: 25 tablet, Refills: 3      ticagrelor (BRILINTA) 90 MG TABS tablet Take 1 tablet by mouth 2 times daily  Qty: 60 tablet, Refills: 5         CONTINUE these medications which have CHANGED    Details   !! Elastic Bandages & Supports (MEDICAL COMPRESSION SOCKS) MISC Disp knee high compression socks 20-30 mmHg, on daily off nightly  Qty: 2 each, Refills: 1    Associated Diagnoses: Acute on chronic diastolic (congestive) heart failure ([facility] [address] Bilateral lower extremity edema       !! - Potential duplicate medications found. Please discuss with provider. CONTINUE these medications which have NOT CHANGED    Details   ipratropium-albuterol (DUONEB) 0.5-2.5 (3) MG/3ML SOLN nebulizer solution Inhale 3 mLs into the lungs every 4 hours  Qty: 360 mL, Refills: 3    Associated Diagnoses: Chronic obstructive pulmonary disease, unspecified COPD type ([facility])      albuterol (PROVENTIL) (2.5 MG/3ML) 0.083% nebulizer solution USE 1 VIAL PER NEBULIZER EVERY 6 HOURS AS NEEDED FOR WHEEZING  Qty: 360 mL, Refills: 0      !! Respiratory Therapy Supplies DREW New CPAP mask and supplies  Qty: 1 Device, Refills: 0      !! Elastic Bandages & Supports (MEDICAL COMPRESSION STOCKINGS) MISC 2 each by Does not apply route daily On in QAM and off QHS. B/L Knee high, 20-30mmHg  Qty: 1 each, Refills: 1    Associated Diagnoses: Bilateral leg edema; Acute on chronic diastolic (congestive) heart failure (HCC)      furosemide (LASIX) 40 MG tablet Take 1.5 tablets by mouth daily  Qty: 90 tablet, Refills: 3      potassium chloride (KLOR-CON M) 10 MEQ extended release tablet Take 1 tablet by mouth 2 times daily (with meals)  Qty: 60 tablet, Refills: 3      !! Respiratory Therapy Supplies DREW Heated tubing , full face mask  Qty: 1 Device, Refills: 0    Associated Diagnoses: JONNY (obstructive sleep apnea)      !!  Respiratory Therapy Supplies DREW New CPAP Full face  mask and supplies. Dispense heated tubing and adjust humidity in CPAP due to c/o dry mouth. Qty: 1 Device, Refills: 0    Associated Diagnoses: JONNY (obstructive sleep apnea)      lisinopril (PRINIVIL;ZESTRIL) 10 MG tablet Take 1 tablet by mouth daily  Qty: 90 tablet, Refills: 2      atorvastatin (LIPITOR) 20 MG tablet Take 1 tablet by mouth nightly  Qty: 90 tablet, Refills: 2      guaiFENesin (MUCINEX) 600 MG extended release tablet Take 1 tablet by mouth 2 times daily  Qty: 10 tablet, Refills: 0      aspirin EC 81 MG EC tablet Take 1 tablet by mouth daily  Qty: 30 tablet, Refills: 3      OXYGEN POC    3 lit via NC     Dx copd  Qty: 1 Units, Refills: 0      albuterol sulfate HFA (PROAIR HFA) 108 (90 Base) MCG/ACT inhaler Inhale 2 puffs into the lungs every 6 hours as needed for Wheezing  Qty: 3 Inhaler, Refills: 3    Associated Diagnoses: Chronic obstructive pulmonary disease, unspecified COPD type ([facility])      CPAP Machine MISC by Does not apply route New CPAP with 7 cm with 3 lit O2 bled  Qty: 1 each, Refills: 0      sildenafil (VIAGRA) 100 MG tablet Take 1 tablet by mouth as needed for Erectile Dysfunction LOT [id number] EXP 04/2017  Qty: 2 tablet, Refills: 5    Associated Diagnoses: Erectile dysfunction, unspecified erectile dysfunction type      Compression Bandages KIT LLE: knee high: 20-30mmhg  Qty: 2 kit, Refills: 1    Associated Diagnoses: Left leg swelling       !! - Potential duplicate medications found. Please discuss with provider. STOP taking these medications       predniSONE (DELTASONE) 10 MG tablet Comments:   Reason for Stopping:         carvedilol (COREG) 3.125 MG tablet Comments:   Reason for Stopping:         budesonide-formoterol (SYMBICORT) 160-4.5 MCG/ACT AERO Comments:   Reason for Stopping:               Significant Diagnostics:   Radiology: No results found.     Labs:   Recent Results (from the past 72 hour(s))   EKG 12 Lead    Collection Time: 12/14/20 8:12 AM   Result Value Ref Range    Ventricular Rate 91 BPM    Atrial Rate 91 BPM    P-R Interval 184 ms    QRS Duration 80 ms    Q-T Interval 362 ms    QTc Calculation (Bazett) 445 ms    P Axis 59 degrees    R Axis 87 degrees    T Axis 46 degrees   POCT Arterial    Collection Time: 12/14/20  9:59 AM   Result Value Ref Range    ACT-K 247 (H) 82 - 152 sec    Sample Type ART     Performed on SEE BELOW    POCT Arterial    Collection Time: 12/14/20 12:05 PM   Result Value Ref Range    ACT-K 153 (H) 82 - 152 sec    Sample Type ART     Performed on SEE BELOW    CBC    Collection Time: 12/15/20  5:35 AM   Result Value Ref Range    WBC 9.0 4.8 - 10.8 K/uL    RBC 5.53 4.70 - 6.10 M/uL    Hemoglobin 17.0 14.0 - 18.0 g/dL    Hematocrit 51.7 42.0 - 52.0 %    MCV 93.4 80.0 - 100.0 fL    MCH 30.8 27.0 - 31.3 pg    MCHC 32.9 (L) 33.0 - 37.0 %    RDW 14.4 11.5 - 14.5 %    Platelets 118 768 - 959 K/uL   Basic Metabolic Panel    Collection Time: 12/15/20  5:35 AM   Result Value Ref Range    Sodium 139 135 - 144 mEq/L    Potassium 4.2 3.4 - 4.9 mEq/L    Chloride 96 95 - 107 mEq/L    CO2 38 (H) 20 - 31 mEq/L    Anion Gap 5 (L) 9 - 15 mEq/L    Glucose 95 70 - 99 mg/dL    BUN 9 8 - 23 mg/dL    CREATININE 0.79 0.70 - 1.20 mg/dL    GFR Non-African American >60.0 >60    GFR  >60.0 >60    Calcium 9.0 8.5 - 9.9 mg/dL           Follow-up visits:   Larene Canavan, MD  [address] [facility]  [place] [address]  [phone]    In 1 week         [facility]    Diagnosis Date Noted    Angina at rest [facility]) [I20.8]      Priority: High    GARNETT (dyspnea on exertion) [R06.00] 08/15/2019     1. CAD -SHIRIN X2 RCA  2. LVEF 65%  3. Nonconducted P waves- dc Coreg 3.125 bid. RTO 1 week.  Will plan for EM               Electronically signed by Hitesh Ibrahim MD on 12/15/2020 at 10:14 AM

## 2020-12-15 NOTE — CONSULTS
Consult received with thanks. Program introduced & brochure given. Pt has concerns due to COVID and his increased risk with COPD. Will follow up on location nearer to him in [facility].

## 2020-12-15 NOTE — FLOWSHEET NOTE
DC instructions given to pt, pt verbalized understanding of info. Brilinta card provided. Pt dressed and waiting for friend to pick him up.  Electronically signed by Suleman Griffiths RN on 12/15/2020 at 1:26 PM    [phone]- Pt left floor via wc by nurse to front where ride is Electronically signed by Suleman Griffiths RN on 12/15/2020 at 2:14 PM

## 2020-12-21 ENCOUNTER — OFFICE VISIT (OUTPATIENT)
Dept: CARDIOLOGY CLINIC | Age: 65
End: 2020-12-21
Payer: MEDICARE

## 2020-12-21 VITALS
RESPIRATION RATE: 18 BRPM | HEIGHT: 70 IN | WEIGHT: 228 LBS | DIASTOLIC BLOOD PRESSURE: 80 MMHG | OXYGEN SATURATION: 90 % | SYSTOLIC BLOOD PRESSURE: 107 MMHG | BODY MASS INDEX: 32.64 KG/M2 | HEART RATE: 105 BPM

## 2020-12-21 PROCEDURE — 4040F PNEUMOC VAC/ADMIN/RCVD: CPT | Performed by: INTERNAL MEDICINE

## 2020-12-21 PROCEDURE — 99214 OFFICE O/P EST MOD 30 MIN: CPT | Performed by: INTERNAL MEDICINE

## 2020-12-21 PROCEDURE — 4004F PT TOBACCO SCREEN RCVD TLK: CPT | Performed by: INTERNAL MEDICINE

## 2020-12-21 PROCEDURE — 1123F ACP DISCUSS/DSCN MKR DOCD: CPT | Performed by: INTERNAL MEDICINE

## 2020-12-21 PROCEDURE — G8484 FLU IMMUNIZE NO ADMIN: HCPCS | Performed by: INTERNAL MEDICINE

## 2020-12-21 PROCEDURE — G8427 DOCREV CUR MEDS BY ELIG CLIN: HCPCS | Performed by: INTERNAL MEDICINE

## 2020-12-21 PROCEDURE — 3017F COLORECTAL CA SCREEN DOC REV: CPT | Performed by: INTERNAL MEDICINE

## 2020-12-21 PROCEDURE — G8417 CALC BMI ABV UP PARAM F/U: HCPCS | Performed by: INTERNAL MEDICINE

## 2020-12-21 RX ORDER — LISINOPRIL 10 MG/1
10 TABLET ORAL EVERY EVENING
Qty: 90 TABLET | Refills: 2 | Status: SHIPPED | OUTPATIENT
Start: 2020-12-21 | End: 2022-02-24 | Stop reason: SDUPTHER

## 2020-12-21 ASSESSMENT — ENCOUNTER SYMPTOMS
SHORTNESS OF BREATH: 1
BLOOD IN STOOL: 0
GASTROINTESTINAL NEGATIVE: 1
COUGH: 0
STRIDOR: 0
EYES NEGATIVE: 1
NAUSEA: 0
CHEST TIGHTNESS: 0
WHEEZING: 0

## 2020-12-21 NOTE — PROGRESS NOTES
Subsequent Progress Note  Patient: Junior Lerner  YOB: 1955  MRN: [id number]    Chief Complaint: hf LE edema copd garnett   Chief Complaint   Patient presents with    Follow Up After Procedure     S/P CATH 12/14    Congestive Heart Failure    Hypertension       CV Data:  7/2019 echo EF 60  9/2019 spect negative   9/2019 CUS- mild   9/2019 Abd US negative AAA  12/14/2020 RCA SHIRIN EF 60    Subjective/HPI: no edema anymore on diuretics. No cp +garnett chronic 3L o2      1/6/2020 still on 3L O2 SUBJECTIVE: till struggles to stop smoke. No cp. No falls no bleed. Takes meds. Walks. 5/19/2020 TELEHEALTH EVALUATION -- Audio/Visual (During XHWXV-28 public health emergency)    Doing well no cp still has sob. Still trying to stop smoke. Uses 3L O2    6/22/2020 TELEHEALTH EVALUATION -- Audio/Visual (During NSCRI-63 public health emergency)    Called in 10 days ago c/o LE Edema. We advised low salt, walk and compression. Edema is now completely resolved. He feels much better. He admits he took lots of salty foods few weeks ago. No cp    7/27/2020  Leg edema was better but now came back again. He recently ran out of couple of his meds to include ACEI and BB.     8/10/2020 leg edema much improved with low salt and Fluid restrict. Now has 1-2+. No cp chronic SOB on O2.     9/11/2020 still has GARNETT. Uses 3L O2. Sate are 80s but feels comfortable at rest.  No CP no bleed no falls. 12/3/2020 no cp . GARNETT is worse. Weak and tired. No bleed. No falls. 12/121/220 feels ok. No cp no sob no falls no bleed. Takes meds.  Trying to quit cigs  prior 2.5 PPD now < 1ppd   Retired -[employer]    EKG:    Past Medical History:   Diagnosis Date    [facility]F [facility]congestive heart failure) ([facility] [address]     COPD (chronic obstructive pulmonary disease) ([facility] [address]     Hypertension     Lung disease     Osteoarthritis     hands worst    Sleep apnea        Past Surgical History:   Procedure Laterality Date    CHOLECYSTECTOMY  CORONARY ANGIOPLASTY WITH STENT PLACEMENT  12/14/2020    DIAGNOSTIC CARDIAC CATH LAB PROCEDURE  12/14/2020    PTCA  12/14/2020    ROTATOR CUFF REPAIR      right       Family History   Problem Relation Age of Onset    Heart Disease Mother     Other Father        Social History     Socioeconomic History    Marital status: Single     Spouse name: None    Number of children: None    Years of education: None    Highest education level: None   Occupational History    None   Social Needs    Financial resource strain: None    Food insecurity     Worry: None     Inability: None    Transportation needs     Medical: None     Non-medical: None   Tobacco Use    Smoking status: Current Every Day Smoker     Packs/day: 2.00     Years: 41.00     Pack years: 82.00     Types: Cigarettes    Smokeless tobacco: Never Used   Substance and Sexual Activity    Alcohol use:  Yes     Alcohol/week: 0.0 standard drinks     Comment: 12 pk per week    Drug use: No    Sexual activity: None   Lifestyle    Physical activity     Days per week: None     Minutes per session: None    Stress: None   Relationships    Social connections     Talks on phone: None     Gets together: None     Attends Sabianism service: None     Active member of club or organization: None     Attends meetings of clubs or organizations: None     Relationship status: None    Intimate partner violence     Fear of current or ex partner: None     Emotionally abused: None     Physically abused: None     Forced sexual activity: None   Other Topics Concern    None   Social History Narrative    None       No Known Allergies    Current Outpatient Medications   Medication Sig Dispense Refill    lisinopril (PRINIVIL;ZESTRIL) 10 MG tablet Take 1 tablet by mouth every evening 90 tablet 2    metoprolol tartrate (LOPRESSOR) 25 MG tablet Take 1 tablet by mouth 2 times daily 180 tablet 3  Elastic Bandages & Supports (MEDICAL COMPRESSION SOCKS) MISC Disp knee high compression socks 20-30 mmHg, on daily off nightly 2 each 1    nitroGLYCERIN (NITROSTAT) 0.4 MG SL tablet up to max of 3 total doses. If no relief after 1 dose, call 911. 25 tablet 3    ticagrelor (BRILINTA) 90 MG TABS tablet Take 1 tablet by mouth 2 times daily 60 tablet 5    ipratropium-albuterol (DUONEB) 0.5-2.5 (3) MG/3ML SOLN nebulizer solution Inhale 3 mLs into the lungs every 4 hours 360 mL 3    albuterol (PROVENTIL) (2.5 MG/3ML) 0.083% nebulizer solution USE 1 VIAL PER NEBULIZER EVERY 6 HOURS AS NEEDED FOR WHEEZING (Patient taking differently: Take 2.5 mg by nebulization every 6 hours as needed ) 360 mL 0    Respiratory Therapy Supplies DREW New CPAP mask and supplies (Patient taking differently: daily New CPAP mask and supplies) 1 Device 0    Elastic Bandages & Supports (MEDICAL COMPRESSION STOCKINGS) MISC 2 each by Does not apply route daily On in QAM and off QHS. B/L Knee high, 20-30mmHg 1 each 1    furosemide (LASIX) 40 MG tablet Take 1.5 tablets by mouth daily (Patient taking differently: Take 40 mg by mouth daily ) 90 tablet 3    potassium chloride (KLOR-CON M) 10 MEQ extended release tablet Take 1 tablet by mouth 2 times daily (with meals) (Patient taking differently: Take 10 mEq by mouth 2 times daily ) 60 tablet 3    Respiratory Therapy Supplies DREW Heated tubing , full face mask (Patient taking differently: 1 Device daily Heated tubing , full face mask) 1 Device 0    Respiratory Therapy Supplies DREW New CPAP Full face  mask and supplies. Dispense heated tubing and adjust humidity in CPAP due to c/o dry mouth. (Patient taking differently: 1 Device daily New CPAP Full face  mask and supplies.  Dispense heated tubing and adjust humidity in CPAP due to c/o dry mouth.) 1 Device 0    sildenafil (VIAGRA) 100 MG tablet Take 1 tablet by mouth as needed for Erectile Dysfunction LOT [id number] EXP 04/2017 2 tablet 5  atorvastatin (LIPITOR) 20 MG tablet Take 1 tablet by mouth nightly 90 tablet 2    guaiFENesin (MUCINEX) 600 MG extended release tablet Take 1 tablet by mouth 2 times daily 10 tablet 0    aspirin EC 81 MG EC tablet Take 1 tablet by mouth daily 30 tablet 3    OXYGEN POC    3 lit via NC     Dx copd 1 Units 0    albuterol sulfate HFA (PROAIR HFA) 108 (90 Base) MCG/ACT inhaler Inhale 2 puffs into the lungs every 6 hours as needed for Wheezing 3 Inhaler 3    CPAP Machine MISC by Does not apply route New CPAP with 7 cm with 3 lit O2 bled (Patient taking differently: 1 Device by Does not apply route New CPAP with 7 cm with 3 lit O2 bled) 1 each 0    Compression Bandages KIT LLE: knee high: 20-30mmhg 2 kit 1     No current facility-administered medications for this visit. Review of Systems:   Review of Systems   Constitutional: Negative. Negative for diaphoresis and fatigue. HENT: Negative. Eyes: Negative. Respiratory: Positive for shortness of breath. Negative for cough, chest tightness, wheezing and stridor. Cardiovascular: Positive for leg swelling. Negative for chest pain and palpitations. Gastrointestinal: Negative. Negative for blood in stool and nausea. Genitourinary: Negative. Musculoskeletal: Negative. Skin: Negative. Neurological: Negative. Negative for dizziness, syncope, weakness and light-headedness. Hematological: Negative. Psychiatric/Behavioral: Negative. Physical Examination:    /80 (Site: Left Upper Arm, Position: Sitting, Cuff Size: Medium Adult)   Pulse 105   Resp 18   Ht 5' 10\" (1.778 m)   Wt 228 lb (103.4 kg)   SpO2 90% Comment: 3 L O2  BMI 32.71 kg/m²    Physical Exam   Constitutional: He appears healthy. No distress. HENT:   Normal cephalic and Atraumatic   Eyes: Pupils are equal, round, and reactive to light. Neck: Normal range of motion and thyroid normal. Neck supple. No JVD present. No neck adenopathy. No thyromegaly present. Cardiovascular: Normal rate and regular rhythm. Exam reveals decreased pulses. Murmur heard. Pulses:       Carotid pulses are on the right side with bruit and on the left side with bruit. Pulmonary/Chest: Effort normal and breath sounds normal. He has no wheezes. He has no rales. He exhibits no tenderness. Abdominal: Soft. Bowel sounds are normal. There is no abdominal tenderness. Musculoskeletal: Normal range of motion. General: Edema (2+) present. No tenderness. Neurological: He is alert and oriented to person, place, and time. Skin: Skin is warm. No cyanosis. Nails show no clubbing.        LABS:  CBC:   Lab Results   Component Value Date    WBC 9.0 12/15/2020    RBC 5.53 12/15/2020    RBC 5.51 05/29/2012    HGB 17.0 12/15/2020    HCT 51.7 12/15/2020    MCV 93.4 12/15/2020    MCH 30.8 12/15/2020    MCHC 32.9 12/15/2020    RDW 14.4 12/15/2020     12/15/2020    MPV 10.1 10/16/2014     Lipids:  Lab Results   Component Value Date    CHOL 129 07/28/2019    CHOL 140 04/24/2017    CHOL 135 04/21/2016     Lab Results   Component Value Date    TRIG 105 07/28/2019    TRIG 112 04/24/2017    TRIG 69 04/21/2016     Lab Results   Component Value Date    HDL 45 06/08/2020    HDL 43 07/28/2019    HDL 42 05/02/2019     Lab Results   Component Value Date    LDLCALC 78 06/08/2020    LDLCALC 65 07/28/2019    LDLCALC 96 05/02/2019     No results found for: LABVLDL, VLDL  Lab Results   Component Value Date    CHOLHDLRATIO 3.0 05/29/2012     CMP:    Lab Results   Component Value Date     12/15/2020    K 4.2 12/15/2020    CL 96 12/15/2020    CO2 38 12/15/2020    BUN 9 12/15/2020    CREATININE 0.79 12/15/2020    GFRAA >60.0 12/15/2020    LABGLOM >60.0 12/15/2020    GLUCOSE 95 12/15/2020    GLUCOSE 91 05/29/2012    PROT 7.1 06/08/2020    LABALBU 4.1 06/08/2020 LABALBU 4.6 05/29/2012    CALCIUM 9.0 12/15/2020    BILITOT 1.2 06/08/2020    ALKPHOS 108 06/08/2020    AST 22 06/08/2020    ALT 21 06/08/2020     BMP:    Lab Results   Component Value Date     12/15/2020    K 4.2 12/15/2020    CL 96 12/15/2020    CO2 38 12/15/2020    BUN 9 12/15/2020    LABALBU 4.1 06/08/2020    LABALBU 4.6 05/29/2012    CREATININE 0.79 12/15/2020    CALCIUM 9.0 12/15/2020    GFRAA >60.0 12/15/2020    LABGLOM >60.0 12/15/2020    GLUCOSE 95 12/15/2020    GLUCOSE 91 05/29/2012     Magnesium:    Lab Results   Component Value Date    MG 2.1 07/28/2019     TSH:  Lab Results   Component Value Date    TSH 0.813 07/28/2019       Patient Active Problem List   Diagnosis    Essential hypertension, benign    Chronic obstructive pulmonary disease (HCC)    Osteoarthritis    Tobacco abuse    Psychophysiological insomnia    JONNY (obstructive sleep apnea)    Hypoxia    Obesity (BMI 30-39. 9)    Acute on chronic diastolic (congestive) heart failure (HCC)    Cor pulmonale (chronic) (HCC)    GARNETT (dyspnea on exertion)    Chronic bronchitis (HCC)    Smoker    Bilateral carotid bruits    Bilateral lower extremity edema    Angina at rest [facility])       Medications Discontinued During This Encounter   Medication Reason    lisinopril (PRINIVIL;ZESTRIL) 10 MG tablet REORDER       Modified Medications    Modified Medication Previous Medication    LISINOPRIL (PRINIVIL;ZESTRIL) 10 MG TABLET lisinopril (PRINIVIL;ZESTRIL) 10 MG tablet       Take 1 tablet by mouth every evening    Take 1 tablet by mouth daily       Orders Placed This Encounter   Medications    lisinopril (PRINIVIL;ZESTRIL) 10 MG tablet     Sig: Take 1 tablet by mouth every evening     Dispense:  90 tablet     Refill:  2    metoprolol tartrate (LOPRESSOR) 25 MG tablet     Sig: Take 1 tablet by mouth 2 times daily     Dispense:  180 tablet     Refill:  3       Assessment/Plan:    1.  Essential hypertension, benign - stable

## 2021-01-07 ENCOUNTER — HOSPITAL ENCOUNTER (OUTPATIENT)
Dept: ULTRASOUND IMAGING | Age: 66
Discharge: HOME | End: 2021-01-09
Payer: MEDICARE

## 2021-01-07 DIAGNOSIS — R09.89 BILATERAL CAROTID BRUITS: ICD-10-CM

## 2021-01-07 DIAGNOSIS — R09.89 DECREASED PULSE: ICD-10-CM

## 2021-01-07 PROCEDURE — 93880 EXTRACRANIAL BILAT STUDY: CPT

## 2021-01-07 PROCEDURE — 93880 EXTRACRANIAL BILAT STUDY: CPT | Performed by: INTERNAL MEDICINE

## 2021-01-07 PROCEDURE — 93922 UPR/L XTREMITY ART 2 LEVELS: CPT | Performed by: INTERNAL MEDICINE

## 2021-01-07 PROCEDURE — 93922 UPR/L XTREMITY ART 2 LEVELS: CPT

## 2021-01-13 ENCOUNTER — TELEPHONE (OUTPATIENT)
Dept: FAMILY MEDICINE CLINIC | Age: 66
End: 2021-01-13

## 2021-01-13 NOTE — TELEPHONE ENCOUNTER
Yes. He has multiple medical problems that put him at high risk of complications if he developed pneumonia. He should get the pneumonia vaccination.

## 2021-03-03 ENCOUNTER — OFFICE VISIT (OUTPATIENT)
Dept: CARDIOLOGY CLINIC | Age: 66
End: 2021-03-03
Payer: MEDICARE

## 2021-03-03 ENCOUNTER — OFFICE VISIT (OUTPATIENT)
Dept: PULMONOLOGY | Age: 66
End: 2021-03-03
Payer: MEDICARE

## 2021-03-03 VITALS
BODY MASS INDEX: 34.66 KG/M2 | WEIGHT: 234 LBS | TEMPERATURE: 97.5 F | SYSTOLIC BLOOD PRESSURE: 138 MMHG | HEART RATE: 68 BPM | DIASTOLIC BLOOD PRESSURE: 78 MMHG | HEIGHT: 69 IN | OXYGEN SATURATION: 90 %

## 2021-03-03 VITALS
DIASTOLIC BLOOD PRESSURE: 86 MMHG | OXYGEN SATURATION: 92 % | HEART RATE: 67 BPM | BODY MASS INDEX: 33.64 KG/M2 | HEIGHT: 70 IN | SYSTOLIC BLOOD PRESSURE: 122 MMHG | WEIGHT: 235 LBS | RESPIRATION RATE: 20 BRPM

## 2021-03-03 DIAGNOSIS — I10 ESSENTIAL HYPERTENSION, BENIGN: ICD-10-CM

## 2021-03-03 DIAGNOSIS — M19.042 PRIMARY OSTEOARTHRITIS OF BOTH HANDS: ICD-10-CM

## 2021-03-03 DIAGNOSIS — R60.0 BILATERAL LOWER EXTREMITY EDEMA: ICD-10-CM

## 2021-03-03 DIAGNOSIS — R06.09 DOE (DYSPNEA ON EXERTION): ICD-10-CM

## 2021-03-03 DIAGNOSIS — J44.9 CHRONIC OBSTRUCTIVE PULMONARY DISEASE, UNSPECIFIED COPD TYPE (HCC): ICD-10-CM

## 2021-03-03 DIAGNOSIS — I50.33 ACUTE ON CHRONIC DIASTOLIC (CONGESTIVE) HEART FAILURE (HCC): Primary | ICD-10-CM

## 2021-03-03 DIAGNOSIS — I50.33 ACUTE ON CHRONIC DIASTOLIC (CONGESTIVE) HEART FAILURE (HCC): ICD-10-CM

## 2021-03-03 DIAGNOSIS — J96.10 CHRONIC RESPIRATORY FAILURE, UNSPECIFIED WHETHER WITH HYPOXIA OR HYPERCAPNIA (HCC): ICD-10-CM

## 2021-03-03 DIAGNOSIS — I27.81 COR PULMONALE (CHRONIC) (HCC): Primary | ICD-10-CM

## 2021-03-03 DIAGNOSIS — Z72.0 TOBACCO ABUSE: ICD-10-CM

## 2021-03-03 DIAGNOSIS — I25.10 CORONARY ARTERY DISEASE INVOLVING NATIVE CORONARY ARTERY OF NATIVE HEART WITHOUT ANGINA PECTORIS: ICD-10-CM

## 2021-03-03 DIAGNOSIS — J44.9 CHRONIC OBSTRUCTIVE PULMONARY DISEASE, UNSPECIFIED COPD TYPE (HCC): Primary | ICD-10-CM

## 2021-03-03 DIAGNOSIS — G47.33 OSA (OBSTRUCTIVE SLEEP APNEA): ICD-10-CM

## 2021-03-03 DIAGNOSIS — M19.041 PRIMARY OSTEOARTHRITIS OF BOTH HANDS: ICD-10-CM

## 2021-03-03 DIAGNOSIS — E66.9 OBESITY (BMI 30-39.9): ICD-10-CM

## 2021-03-03 PROCEDURE — G8427 DOCREV CUR MEDS BY ELIG CLIN: HCPCS | Performed by: INTERNAL MEDICINE

## 2021-03-03 PROCEDURE — G8417 CALC BMI ABV UP PARAM F/U: HCPCS | Performed by: INTERNAL MEDICINE

## 2021-03-03 PROCEDURE — 3017F COLORECTAL CA SCREEN DOC REV: CPT | Performed by: INTERNAL MEDICINE

## 2021-03-03 PROCEDURE — 3023F SPIROM DOC REV: CPT | Performed by: INTERNAL MEDICINE

## 2021-03-03 PROCEDURE — 99214 OFFICE O/P EST MOD 30 MIN: CPT | Performed by: INTERNAL MEDICINE

## 2021-03-03 PROCEDURE — G8926 SPIRO NO PERF OR DOC: HCPCS | Performed by: INTERNAL MEDICINE

## 2021-03-03 PROCEDURE — G8484 FLU IMMUNIZE NO ADMIN: HCPCS | Performed by: INTERNAL MEDICINE

## 2021-03-03 PROCEDURE — 4040F PNEUMOC VAC/ADMIN/RCVD: CPT | Performed by: INTERNAL MEDICINE

## 2021-03-03 PROCEDURE — 4004F PT TOBACCO SCREEN RCVD TLK: CPT | Performed by: INTERNAL MEDICINE

## 2021-03-03 PROCEDURE — 1123F ACP DISCUSS/DSCN MKR DOCD: CPT | Performed by: INTERNAL MEDICINE

## 2021-03-03 ASSESSMENT — ENCOUNTER SYMPTOMS
EYES NEGATIVE: 1
STRIDOR: 0
GASTROINTESTINAL NEGATIVE: 1
NAUSEA: 0
BLOOD IN STOOL: 0
VOMITING: 0
DIARRHEA: 0
NAUSEA: 0
WHEEZING: 1
VOICE CHANGE: 0
ABDOMINAL PAIN: 0
WHEEZING: 0
SORE THROAT: 0
SHORTNESS OF BREATH: 1
EYE ITCHING: 0
SHORTNESS OF BREATH: 1
CHEST TIGHTNESS: 0
RHINORRHEA: 0
CHEST TIGHTNESS: 0
COUGH: 0
COUGH: 1

## 2021-03-03 NOTE — PROGRESS NOTES
Subsequent Progress Note  Patient: Serina Acosta  YOB: 1955  MRN: [id number]    Chief Complaint: hf LE edema copd garnett   Chief Complaint   Patient presents with    Follow-up     2 month    Hypertension    Congestive Heart Failure    Discuss Medications     Brilinta cost       CV Data:  7/2019 echo EF 60  9/2019 spect negative   9/2019 CUS- mild   9/2019 Abd US negative AAA  12/14/2020 RCA SHIRIN EF 60  1/21 PVR negative  1/21 CUS mild     Subjective/HPI: no edema anymore on diuretics. No cp +garnett chronic 3L o2      1/6/2020 still on 3L O2 SUBJECTIVE: till struggles to stop smoke. No cp. No falls no bleed. Takes meds. Walks. 5/19/2020 TELEHEALTH EVALUATION -- Audio/Visual (During BPTTV-92 public health emergency)    Doing well no cp still has sob. Still trying to stop smoke. Uses 3L O2    6/22/2020 TELEHEALTH EVALUATION -- Audio/Visual (During DXTIU-03 public health emergency)    Called in 10 days ago c/o LE Edema. We advised low salt, walk and compression. Edema is now completely resolved. He feels much better. He admits he took lots of salty foods few weeks ago. No cp    7/27/2020  Leg edema was better but now came back again. He recently ran out of couple of his meds to include ACEI and BB.     8/10/2020 leg edema much improved with low salt and Fluid restrict. Now has 1-2+. No cp chronic SOB on O2.     9/11/2020 still has GARNETT. Uses 3L O2. Sate are 80s but feels comfortable at rest.  No CP no bleed no falls. 12/3/2020 no cp . GARNETT is worse. Weak and tired. No bleed. No falls. 12/121/220 feels ok. No cp no sob no falls no bleed. Takes meds. Trying to quit cigs    3/3/21 still SOB using 3L O2. Low stamina no cp no falls no bleed no edema takes meds. Not ilene active.        prior 2.5 PPD - stopped 12/2020   Retired -[employer]    EKG:    Past Medical History:   Diagnosis Date    [facility])     COPD (chronic obstructive pulmonary disease) ([facility] [address]     Hypertension     Lung disease     Osteoarthritis     hands worst    Sleep apnea        Past Surgical History:   Procedure Laterality Date    CHOLECYSTECTOMY      CORONARY ANGIOPLASTY WITH STENT PLACEMENT  12/14/2020    DIAGNOSTIC CARDIAC CATH LAB PROCEDURE  12/14/2020    PTCA  12/14/2020    ROTATOR CUFF REPAIR      right       Family History   Problem Relation Age of Onset    Heart Disease Mother     Other Father        Social History     Socioeconomic History    Marital status: Single     Spouse name: None    Number of children: None    Years of education: None    Highest education level: None   Occupational History    None   Social Needs    Financial resource strain: None    Food insecurity     Worry: None     Inability: None    Transportation needs     Medical: None     Non-medical: None   Tobacco Use    Smoking status: Current Every Day Smoker     Packs/day: 2.00     Years: 41.00     Pack years: 82.00     Types: Cigarettes    Smokeless tobacco: Never Used   Substance and Sexual Activity    Alcohol use:  Yes     Alcohol/week: 0.0 standard drinks     Comment: 12 pk per week    Drug use: No    Sexual activity: None   Lifestyle    Physical activity     Days per week: None     Minutes per session: None    Stress: None   Relationships    Social connections     Talks on phone: None     Gets together: None     Attends Taoism service: None     Active member of club or organization: None     Attends meetings of clubs or organizations: None     Relationship status: None    Intimate partner violence     Fear of current or ex partner: None     Emotionally abused: None     Physically abused: None     Forced sexual activity: None   Other Topics Concern    None   Social History Narrative    None       No Known Allergies    Current Outpatient Medications   Medication Sig Dispense Refill    lisinopril (PRINIVIL;ZESTRIL) 10 MG tablet Take 1 tablet by mouth every evening 90 tablet 2    metoprolol tartrate (LOPRESSOR) 25 MG tablet Take 1 tablet by mouth 2 times daily 180 tablet 3    Elastic Bandages & Supports (MEDICAL COMPRESSION SOCKS) MISC Disp knee high compression socks 20-30 mmHg, on daily off nightly 2 each 1    nitroGLYCERIN (NITROSTAT) 0.4 MG SL tablet up to max of 3 total doses. If no relief after 1 dose, call 911. 25 tablet 3    ticagrelor (BRILINTA) 90 MG TABS tablet Take 1 tablet by mouth 2 times daily 60 tablet 5    ipratropium-albuterol (DUONEB) 0.5-2.5 (3) MG/3ML SOLN nebulizer solution Inhale 3 mLs into the lungs every 4 hours 360 mL 3    albuterol (PROVENTIL) (2.5 MG/3ML) 0.083% nebulizer solution USE 1 VIAL PER NEBULIZER EVERY 6 HOURS AS NEEDED FOR WHEEZING 360 mL 0    Respiratory Therapy Supplies DREW New CPAP mask and supplies (Patient taking differently: daily New CPAP mask and supplies) 1 Device 0    Elastic Bandages & Supports (MEDICAL COMPRESSION STOCKINGS) MISC 2 each by Does not apply route daily On in QAM and off QHS. B/L Knee high, 20-30mmHg 1 each 1    furosemide (LASIX) 40 MG tablet Take 1.5 tablets by mouth daily (Patient taking differently: Take 40 mg by mouth daily ) 90 tablet 3    potassium chloride (KLOR-CON M) 10 MEQ extended release tablet Take 1 tablet by mouth 2 times daily (with meals) 60 tablet 3    Respiratory Therapy Supplies DREW Heated tubing , full face mask (Patient taking differently: 1 Device daily Heated tubing , full face mask) 1 Device 0    Respiratory Therapy Supplies DREW New CPAP Full face  mask and supplies. Dispense heated tubing and adjust humidity in CPAP due to c/o dry mouth. (Patient taking differently: 1 Device daily New CPAP Full face  mask and supplies.  Dispense heated tubing and adjust humidity in CPAP due to c/o dry mouth.) 1 Device 0    sildenafil (VIAGRA) 100 MG tablet Take 1 tablet by mouth as needed for Erectile Dysfunction LOT [id number] EXP 04/2017 2 tablet 5    atorvastatin (LIPITOR) 20 MG tablet Take 1 tablet by mouth nightly 90 tablet 2    guaiFENesin (MUCINEX) 600 MG extended release tablet Take 1 tablet by mouth 2 times daily 10 tablet 0    aspirin EC 81 MG EC tablet Take 1 tablet by mouth daily 30 tablet 3    OXYGEN POC    3 lit via NC     Dx copd 1 Units 0    albuterol sulfate HFA (PROAIR HFA) 108 (90 Base) MCG/ACT inhaler Inhale 2 puffs into the lungs every 6 hours as needed for Wheezing 3 Inhaler 3    CPAP Machine MISC by Does not apply route New CPAP with 7 cm with 3 lit O2 bled (Patient taking differently: 1 Device by Does not apply route New CPAP with 7 cm with 3 lit O2 bled) 1 each 0    Compression Bandages KIT LLE: knee high: 20-30mmhg 2 kit 1     No current facility-administered medications for this visit. Review of Systems:   Review of Systems   Constitutional: Negative. Negative for diaphoresis and fatigue. HENT: Negative. Eyes: Negative. Respiratory: Positive for shortness of breath. Negative for cough, chest tightness, wheezing and stridor. Cardiovascular: Positive for leg swelling. Negative for chest pain and palpitations. Gastrointestinal: Negative. Negative for blood in stool and nausea. Genitourinary: Negative. Musculoskeletal: Negative. Skin: Negative. Neurological: Negative. Negative for dizziness, syncope, weakness and light-headedness. Hematological: Negative. Psychiatric/Behavioral: Negative. Physical Examination:    /86 (Site: Left Upper Arm, Position: Sitting, Cuff Size: Medium Adult)   Pulse 67   Resp 20   Ht 5' 10\" (1.778 m)   Wt 235 lb (106.6 kg)   SpO2 92% Comment: 3L O2  BMI 33.72 kg/m²    Physical Exam   Constitutional: He appears healthy. No distress. HENT:   Normal cephalic and Atraumatic   Eyes: Pupils are equal, round, and reactive to light. Neck: Normal range of motion and thyroid normal. Neck supple. No JVD present. No neck adenopathy. No thyromegaly present. Cardiovascular: Normal rate and regular rhythm. Exam reveals decreased pulses. Murmur heard. Pulses:       Carotid pulses are on the right side with bruit and on the left side with bruit. Pulmonary/Chest: Effort normal and breath sounds normal. He has no wheezes. He has no rales. He exhibits no tenderness. Abdominal: Soft. Bowel sounds are normal. There is no abdominal tenderness. Musculoskeletal: Normal range of motion. General: Edema (Trace ) present. No tenderness. Neurological: He is alert and oriented to person, place, and time. Skin: Skin is warm. No cyanosis. Nails show no clubbing.        LABS:  CBC:   Lab Results   Component Value Date    WBC 9.0 12/15/2020    RBC 5.53 12/15/2020    RBC 5.51 05/29/2012    HGB 17.0 12/15/2020    HCT 51.7 12/15/2020    MCV 93.4 12/15/2020    MCH 30.8 12/15/2020    MCHC 32.9 12/15/2020    RDW 14.4 12/15/2020     12/15/2020    MPV 10.1 10/16/2014     Lipids:  Lab Results   Component Value Date    CHOL 129 07/28/2019    CHOL 140 04/24/2017    CHOL 135 04/21/2016     Lab Results   Component Value Date    TRIG 105 07/28/2019    TRIG 112 04/24/2017    TRIG 69 04/21/2016     Lab Results   Component Value Date    HDL 45 06/08/2020    HDL 43 07/28/2019    HDL 42 05/02/2019     Lab Results   Component Value Date    LDLCALC 78 06/08/2020    LDLCALC 65 07/28/2019    LDLCALC 96 05/02/2019     No results found for: LABVLDL, VLDL  Lab Results   Component Value Date    CHOLHDLRATIO 3.0 05/29/2012     CMP:    Lab Results   Component Value Date     12/15/2020    K 4.2 12/15/2020    CL 96 12/15/2020    CO2 38 12/15/2020    BUN 9 12/15/2020    CREATININE 0.79 12/15/2020    GFRAA >60.0 12/15/2020    LABGLOM >60.0 12/15/2020    GLUCOSE 95 12/15/2020    GLUCOSE 91 05/29/2012    PROT 7.1 06/08/2020    LABALBU 4.1 06/08/2020    LABALBU 4.6 05/29/2012    CALCIUM 9.0 12/15/2020    BILITOT 1.2 06/08/2020    ALKPHOS 108 06/08/2020    AST 22 06/08/2020    ALT 21 06/08/2020     BMP:    Lab Results   Component Value Date     12/15/2020    K 4.2 12/15/2020    CL 96 12/15/2020    CO2 38 12/15/2020    BUN 9 12/15/2020    LABALBU 4.1 06/08/2020    LABALBU 4.6 05/29/2012    CREATININE 0.79 12/15/2020    CALCIUM 9.0 12/15/2020    GFRAA >60.0 12/15/2020    LABGLOM >60.0 12/15/2020    GLUCOSE 95 12/15/2020    GLUCOSE 91 05/29/2012     Magnesium:    Lab Results   Component Value Date    MG 2.1 07/28/2019     TSH:  Lab Results   Component Value Date    TSH 0.813 07/28/2019       Patient Active Problem List   Diagnosis    Essential hypertension, benign    Chronic obstructive pulmonary disease (HCC)    Osteoarthritis    Tobacco abuse    Psychophysiological insomnia    JONNY (obstructive sleep apnea)    Hypoxia    Obesity (BMI 30-39. 9)    Acute on chronic diastolic (congestive) heart failure (HCC)    Cor pulmonale (chronic) (HCC)    GARNETT (dyspnea on exertion)    Chronic bronchitis (HCC)    Smoker    Bilateral carotid bruits    Bilateral lower extremity edema    Angina at rest [facility])       There are no discontinued medications. Modified Medications    No medications on file       No orders of the defined types were placed in this encounter. Assessment/Plan:    1. Essential hypertension, benign - stable     2. Acute on chronic diastolic (congestive) heart failure (HCC)   compensated    3. Cor pulmonale (chronic)     4. Shortness of breath  No worse- on O2 3L. Recently stopped smoking    5. LE pulse weak- PVR    6. Carotid Bruits- CUS      Counseling:  Heart Healthy Lifestyle, Stop Smoking, Low Salt Diet, Take Precautions to Prevent Falls, Regular Exercise and Walk Daily    Return in about 3 months (around 6/3/2021).       Electronically signed by Shania Mathew MD on 3/3/2021 at 2:35 PM

## 2021-03-03 NOTE — PROGRESS NOTES
Subjective:     Humera Saini is a 77 y.o. male who complains today of:     Chief Complaint   Patient presents with    Sleep Apnea     3 month f/u       HPI  He is using NIV/Trilogy  By brook and he is using it but he has c/o dry mouth and can use few hours  C/o shortness of breath with exertion and at Rest.  He is on 3 L O2 via nasal cannula 24 hours a day. C/o on and off  Wheezing   C/o Cough with chronic Yellow Sputum  No Hemoptysis  No Chest tightness   No Chest pain with radiation  or pleuritic pain  No Fever or chills. No Rhinorrhea and postnasal drip. He is using bronchodilator with bronchodilator with  nebulizer with albuterol   He is still smoking less than 1 ppd. Allergies:  Patient has no known allergies.   Past Medical History:   Diagnosis Date    [facility]cong[facility] failure) ([facility])     COPD [facility]chronic obstructive pulmonary disease) ([facility])     Hypertension     Lung disease     Osteoarthritis     hands worst    Sleep apnea      Past Surgical History:   Procedure Laterality Date    CHOLECYSTECTOMY      CORONARY ANGIOPLASTY WITH STENT PLACEMENT  12/14/2020    DIAGNOSTIC CARDIAC CATH LAB PROCEDURE  12/14/2020    PTCA  12/14/2020    ROTATOR CUFF REPAIR      right     Family History   Problem Relation Age of Onset    Heart Disease Mother     Other Father      Social History     Socioeconomic History    Marital status: Single     Spouse name: Not on file    Number of children: Not on file    Years of education: Not on file    Highest education level: Not on file   Occupational History    Not on file   Social Needs    Financial resource strain: Not on file    Food insecurity     Worry: Not on file     Inability: Not on file   [employer] needs     Medical: Not on file     Non-medical: Not on file   Tobacco Use    Smoking status: Current Every Day Smoker     Packs/day: 2.00     Years: 41.00     Pack years: 82.00     Types: Cigarettes    Smokeless tobacco: Never Used Substance and Sexual Activity    Alcohol use: Yes     Alcohol/week: 0.0 standard drinks     Comment: 12 pk per week    Drug use: No    Sexual activity: Not on file   Lifestyle    Physical activity     Days per week: Not on file     Minutes per session: Not on file    Stress: Not on file   Relationships    Social connections     Talks on phone: Not on file     Gets together: Not on file     Attends Hindu service: Not on file     Active member of club or organization: Not on file     Attends meetings of clubs or organizations: Not on file     Relationship status: Not on file    Intimate partner violence     Fear of current or ex partner: Not on file     Emotionally abused: Not on file     Physically abused: Not on file     Forced sexual activity: Not on file   Other Topics Concern    Not on file   Social History Narrative    Not on file         Review of Systems   Constitutional: Negative for chills, diaphoresis, fatigue and fever. HENT: Negative for congestion, mouth sores, nosebleeds, postnasal drip, rhinorrhea, sneezing, sore throat and voice change. Eyes: Negative for itching and visual disturbance. Respiratory: Positive for cough, shortness of breath and wheezing. Negative for chest tightness. Cardiovascular: Negative. Negative for chest pain, palpitations and leg swelling. Gastrointestinal: Negative for abdominal pain, diarrhea, nausea and vomiting. Genitourinary: Negative for difficulty urinating and hematuria. Musculoskeletal: Negative for arthralgias, joint swelling and myalgias. Skin: Negative for rash. Allergic/Immunologic: Negative for environmental allergies. Neurological: Negative for dizziness, tremors, weakness and headaches.    Psychiatric/Behavioral: Negative for behavioral problems and sleep disturbance.         :     Vitals:    03/03/21 1508 03/03/21 1512   BP:  138/78   Pulse:  68   Temp: 97.5 °F (36.4 °C) 97.5 °F (36.4 °C)   SpO2: (!) 87% 90%   Weight: 237 lb  nitroGLYCERIN (NITROSTAT) 0.4 MG SL tablet up to max of 3 total doses. If no relief after 1 dose, call 911. 25 tablet 3    ticagrelor (BRILINTA) 90 MG TABS tablet Take 1 tablet by mouth 2 times daily 60 tablet 5    ipratropium-albuterol (DUONEB) 0.5-2.5 (3) MG/3ML SOLN nebulizer solution Inhale 3 mLs into the lungs every 4 hours 360 mL 3    albuterol (PROVENTIL) (2.5 MG/3ML) 0.083% nebulizer solution USE 1 VIAL PER NEBULIZER EVERY 6 HOURS AS NEEDED FOR WHEEZING 360 mL 0    Respiratory Therapy Supplies DREW New CPAP mask and supplies (Patient taking differently: daily New CPAP mask and supplies) 1 Device 0    Elastic Bandages & Supports (MEDICAL COMPRESSION STOCKINGS) MISC 2 each by Does not apply route daily On in QAM and off QHS. B/L Knee high, 20-30mmHg 1 each 1    furosemide (LASIX) 40 MG tablet Take 1.5 tablets by mouth daily (Patient taking differently: Take 40 mg by mouth daily ) 90 tablet 3    potassium chloride (KLOR-CON M) 10 MEQ extended release tablet Take 1 tablet by mouth 2 times daily (with meals) 60 tablet 3    Respiratory Therapy Supplies DREW Heated tubing , full face mask (Patient taking differently: 1 Device daily Heated tubing , full face mask) 1 Device 0    Respiratory Therapy Supplies DREW New CPAP Full face  mask and supplies. Dispense heated tubing and adjust humidity in CPAP due to c/o dry mouth. (Patient taking differently: 1 Device daily New CPAP Full face  mask and supplies.  Dispense heated tubing and adjust humidity in CPAP due to c/o dry mouth.) 1 Device 0    sildenafil (VIAGRA) 100 MG tablet Take 1 tablet by mouth as needed for Erectile Dysfunction LOT [id number] EXP 04/2017 2 tablet 5    atorvastatin (LIPITOR) 20 MG tablet Take 1 tablet by mouth nightly 90 tablet 2    guaiFENesin (MUCINEX) 600 MG extended release tablet Take 1 tablet by mouth 2 times daily 10 tablet 0    aspirin EC 81 MG EC tablet Take 1 tablet by mouth daily 30 tablet 3    OXYGEN POC    3 lit via NC Dx copd 1 Units 0    albuterol sulfate HFA (PROAIR HFA) 108 (90 Base) MCG/ACT inhaler Inhale 2 puffs into the lungs every 6 hours as needed for Wheezing 3 Inhaler 3    CPAP Machine MISC by Does not apply route New CPAP with 7 cm with 3 lit O2 bled (Patient taking differently: 1 Device by Does not apply route New CPAP with 7 cm with 3 lit O2 bled) 1 each 0    Compression Bandages KIT LLE: knee high: 20-30mmhg 2 kit 1     No current facility-administered medications for this visit. Results for orders placed during the hospital encounter of 10/26/18   XR CHEST STANDARD (2 VW)    Narrative EXAMINATION: XR CHEST (2 VW)    CLINICAL HISTORY: CHRONIC OBSTRUCTIVE PULMONARY DISEASE    COMPARISONS: None available. FINDINGS: Osseous structures intact. Cardiopericardial silhouette normal. Pulmonary vasculature normal. Lungs clear      Impression NO ACUTE CARDIOPULMONARY DISEASE    ]  Results for orders placed during the hospital encounter of 07/27/19   XR CHEST PORTABLE    Narrative EXAMINATION: XR CHEST PORTABLE    CLINICAL HISTORY:  Legs swelling, shortness of breath     COMPARISONS: None available. FINDINGS: There is moderate cardiomegaly. Pulmonary vascularity is prominent. There are coarsened interstitial markings consistent with fibrosis or interstitial edema. There is blunting of the left costophrenic angle which could represent scarring or   trace effusion. There are atherosclerotic changes of the thoracic aorta. There are degenerative changes in the spine and right shoulder. Impression CARDIOMEGALY WITH PULMONARY VASCULAR CONGESTION. COARSE INTERSTITIAL MARKINGS WHICH COULD REPRESENT FIBROSIS OR INTERSTITIAL EDEMA. SMALL LEFT PLEURAL EFFUSION VERSUS PLEURAL SCARRING. Assessment/Plan:     1. Chronic obstructive pulmonary disease, unspecified COPD type ([address]  C/o shortness of breath with exertion and at Rest. C/o on and off  Wheezing C/o Cough with chronic Yellow Sputum.  He is using bronchodilator with bronchodilator with  nebulizer with albuterol. He is on 3 L O2 via nasal cannula 24 hours a day. Continue same. 2. Chronic respiratory failure, unspecified whether with hypoxia or hypercapnia ([address]  He is using NIV/Trilogy   given by Jane Dunbar and he is using it but he has c/o dry mouth and can use few hours. Advised to use oxygen with trilogy during sleep. 3. Tobacco abuse  He is advised try to quit smoking. Risks related to smoking explained to the patient. Different ways to help to quit smoking were discussed today. He is still smoking less than 1 ppd.    4. JONNY (obstructive sleep apnea)  He is using noninvasive ventilator/trilogy during sleep advised to use every night at least 4 to 6 hours during sleep. 5. Obesity (BMI 30-39. 9)  He is advised try to lose weight. obesity related risk explained to the patient ,  Current weight:  234 lb (106.1 kg) Lbs. BMI:  Body mass index is 34.56 kg/m². Suggested weight control approaches, including dietary changes , exercise, behavioral modification. Return in about 3 months (around 6/3/2021) for jonny, COPD, chronic respiratory failure.       Angie Sánchez MD

## 2021-03-04 RX ORDER — POTASSIUM CHLORIDE 750 MG/1
10 TABLET, EXTENDED RELEASE ORAL 2 TIMES DAILY WITH MEALS
Qty: 60 TABLET | Refills: 5 | Status: SHIPPED | OUTPATIENT
Start: 2021-03-04 | End: 2022-01-10 | Stop reason: SDUPTHER

## 2021-03-04 RX ORDER — ATORVASTATIN CALCIUM 20 MG/1
20 TABLET, FILM COATED ORAL NIGHTLY
Qty: 90 TABLET | Refills: 2 | Status: SHIPPED | OUTPATIENT
Start: 2021-03-04 | End: 2022-02-24 | Stop reason: SDUPTHER

## 2021-04-19 DIAGNOSIS — J42 CHRONIC BRONCHITIS, UNSPECIFIED CHRONIC BRONCHITIS TYPE (HCC): ICD-10-CM

## 2021-04-19 DIAGNOSIS — R06.09 DOE (DYSPNEA ON EXERTION): Primary | ICD-10-CM

## 2021-04-19 DIAGNOSIS — I27.81 COR PULMONALE (CHRONIC) (HCC): ICD-10-CM

## 2021-04-19 DIAGNOSIS — J44.9 CHRONIC OBSTRUCTIVE PULMONARY DISEASE, UNSPECIFIED COPD TYPE (HCC): ICD-10-CM

## 2021-04-19 DIAGNOSIS — R60.0 BILATERAL LOWER EXTREMITY EDEMA: ICD-10-CM

## 2021-04-19 DIAGNOSIS — I50.33 ACUTE ON CHRONIC DIASTOLIC (CONGESTIVE) HEART FAILURE (HCC): ICD-10-CM

## 2021-04-19 DIAGNOSIS — I20.8 ANGINA AT REST (HCC): ICD-10-CM

## 2021-06-01 DIAGNOSIS — J44.9 CHRONIC OBSTRUCTIVE PULMONARY DISEASE, UNSPECIFIED COPD TYPE (HCC): ICD-10-CM

## 2021-06-01 RX ORDER — IPRATROPIUM BROMIDE AND ALBUTEROL SULFATE 2.5; .5 MG/3ML; MG/3ML
SOLUTION RESPIRATORY (INHALATION)
Qty: 360 ML | Refills: 0 | Status: SHIPPED | OUTPATIENT
Start: 2021-06-01 | End: 2021-08-13

## 2021-06-03 ENCOUNTER — OFFICE VISIT (OUTPATIENT)
Dept: CARDIOLOGY CLINIC | Age: 66
End: 2021-06-03
Payer: MEDICARE

## 2021-06-03 VITALS
DIASTOLIC BLOOD PRESSURE: 73 MMHG | SYSTOLIC BLOOD PRESSURE: 109 MMHG | HEIGHT: 70 IN | BODY MASS INDEX: 32.78 KG/M2 | OXYGEN SATURATION: 93 % | RESPIRATION RATE: 18 BRPM | WEIGHT: 229 LBS | HEART RATE: 63 BPM

## 2021-06-03 DIAGNOSIS — I50.33 ACUTE ON CHRONIC DIASTOLIC (CONGESTIVE) HEART FAILURE (HCC): Primary | ICD-10-CM

## 2021-06-03 DIAGNOSIS — I10 ESSENTIAL HYPERTENSION, BENIGN: ICD-10-CM

## 2021-06-03 DIAGNOSIS — I25.10 CORONARY ARTERY DISEASE INVOLVING NATIVE CORONARY ARTERY OF NATIVE HEART WITHOUT ANGINA PECTORIS: ICD-10-CM

## 2021-06-03 PROCEDURE — G8417 CALC BMI ABV UP PARAM F/U: HCPCS | Performed by: INTERNAL MEDICINE

## 2021-06-03 PROCEDURE — 4004F PT TOBACCO SCREEN RCVD TLK: CPT | Performed by: INTERNAL MEDICINE

## 2021-06-03 PROCEDURE — 4040F PNEUMOC VAC/ADMIN/RCVD: CPT | Performed by: INTERNAL MEDICINE

## 2021-06-03 PROCEDURE — 99214 OFFICE O/P EST MOD 30 MIN: CPT | Performed by: INTERNAL MEDICINE

## 2021-06-03 PROCEDURE — G8427 DOCREV CUR MEDS BY ELIG CLIN: HCPCS | Performed by: INTERNAL MEDICINE

## 2021-06-03 PROCEDURE — 3017F COLORECTAL CA SCREEN DOC REV: CPT | Performed by: INTERNAL MEDICINE

## 2021-06-03 PROCEDURE — 1123F ACP DISCUSS/DSCN MKR DOCD: CPT | Performed by: INTERNAL MEDICINE

## 2021-06-03 ASSESSMENT — ENCOUNTER SYMPTOMS
COUGH: 0
CHEST TIGHTNESS: 0
STRIDOR: 0
SHORTNESS OF BREATH: 1
GASTROINTESTINAL NEGATIVE: 1
BLOOD IN STOOL: 0
WHEEZING: 0
EYES NEGATIVE: 1
NAUSEA: 0

## 2021-06-03 NOTE — PROGRESS NOTES
Subsequent Progress Note  Patient: Thao Valdez  YOB: 1955  MRN: [id number]    Chief Complaint: hf LE edema copd garnett   Chief Complaint   Patient presents with    3 Month Follow-Up    Congestive Heart Failure    Coronary Artery Disease    Hypertension    Shortness of Breath     wearing O2 (3L)       CV Data:  7/2019 echo EF 60  9/2019 spect negative   9/2019 CUS- mild   9/2019 Abd US negative AAA  12/14/2020 RCA SHIRIN EF 60  1/21 PVR negative  1/21 CUS mild     Subjective/HPI: no edema anymore on diuretics. No cp +garnett chronic 3L o2      1/6/2020 still on 3L O2 SUBJECTIVE: till struggles to stop smoke. No cp. No falls no bleed. Takes meds. Walks. 5/19/2020 TELEHEALTH EVALUATION -- Audio/Visual (During XDRGW-20 public health emergency)    Doing well no cp still has sob. Still trying to stop smoke. Uses 3L O2    6/22/2020 TELEHEALTH EVALUATION -- Audio/Visual (During YRZMR-92 public health emergency)    Called in 10 days ago c/o LE Edema. We advised low salt, walk and compression. Edema is now completely resolved. He feels much better. He admits he took lots of salty foods few weeks ago. No cp    7/27/2020  Leg edema was better but now came back again. He recently ran out of couple of his meds to include ACEI and BB.     8/10/2020 leg edema much improved with low salt and Fluid restrict. Now has 1-2+. No cp chronic SOB on O2.     9/11/2020 still has GARNETT. Uses 3L O2. Sate are 80s but feels comfortable at rest.  No CP no bleed no falls. 12/3/2020 no cp . GARNETT is worse. Weak and tired. No bleed. No falls. 12/121/220 feels ok. No cp no sob no falls no bleed. Takes meds. Trying to quit cigs    3/3/21 still SOB using 3L O2. Low stamina no cp no falls no bleed no edema takes meds. Not ilene active. 6/3/21 no cp still uses 3L O2. No falls no bleed.        prior 2.5 PPD - stopped 12/2020   Retired -[employer]    EKG:    Past Medical History:   Diagnosis Date    CHF (congestive heart failure) ([facility] [address]  Physically Abused:     Sexually Abused:        No Known Allergies    Current Outpatient Medications   Medication Sig Dispense Refill    ipratropium-albuterol (DUONEB) 0.5-2.5 (3) MG/3ML SOLN nebulizer solution USE 1 VIAL PER NEBULIZER EVERY 4 HOURS 360 mL 0    Handicap Placard MISC by Does not apply route The patient requires a disability parking placard due to difficulties ambulating long distances. Good 4/19/2021-4/19/2026 1 each 0    ticagrelor (BRILINTA) 60 MG TABS tablet Take 1.5 tablets by mouth 2 times daily 28 tablet 0    atorvastatin (LIPITOR) 20 MG tablet Take 1 tablet by mouth nightly 90 tablet 2    potassium chloride (KLOR-CON M) 10 MEQ extended release tablet Take 1 tablet by mouth 2 times daily (with meals) 60 tablet 5    Handicap Placard MISC by Does not apply route The patient requires a disability parking placard done due to difficulties ambulating long distances. Good 3/3/2021--3/3/2026 1 each 1    lisinopril (PRINIVIL;ZESTRIL) 10 MG tablet Take 1 tablet by mouth every evening 90 tablet 2    metoprolol tartrate (LOPRESSOR) 25 MG tablet Take 1 tablet by mouth 2 times daily 180 tablet 3    Elastic Bandages & Supports (MEDICAL COMPRESSION SOCKS) MISC Disp knee high compression socks 20-30 mmHg, on daily off nightly 2 each 1    nitroGLYCERIN (NITROSTAT) 0.4 MG SL tablet up to max of 3 total doses. If no relief after 1 dose, call 911. 25 tablet 3    albuterol (PROVENTIL) (2.5 MG/3ML) 0.083% nebulizer solution USE 1 VIAL PER NEBULIZER EVERY 6 HOURS AS NEEDED FOR WHEEZING 360 mL 0    Respiratory Therapy Supplies DREW New CPAP mask and supplies (Patient taking differently: daily New CPAP mask and supplies) 1 Device 0    Elastic Bandages & Supports (MEDICAL COMPRESSION STOCKINGS) MISC 2 each by Does not apply route daily On in QAM and off QHS.   B/L Knee high, 20-30mmHg 1 each 1    furosemide (LASIX) 40 MG tablet Take 1.5 tablets by mouth daily (Patient taking differently: Negative. Psychiatric/Behavioral: Negative. Physical Examination:    /73 (Site: Left Upper Arm, Position: Sitting, Cuff Size: Medium Adult)   Pulse 63   Resp 18   Ht 5' 10\" (1.778 m)   Wt 229 lb (103.9 kg)   SpO2 93% Comment: 3L O2  BMI 32.86 kg/m²    Physical Exam   Constitutional: He appears healthy. No distress. HENT:   Normal cephalic and Atraumatic   Eyes: Pupils are equal, round, and reactive to light. Neck: Thyroid normal. No JVD present. No neck adenopathy. No thyromegaly present. Cardiovascular: Normal rate and regular rhythm. Exam reveals decreased pulses. Murmur heard. Pulses:       Carotid pulses are on the right side with bruit and on the left side with bruit. Pulmonary/Chest: Effort normal and breath sounds normal. He has no wheezes. He has no rales. He exhibits no tenderness. Abdominal: Soft. Bowel sounds are normal. There is no abdominal tenderness. Musculoskeletal:         General: Edema (Trace ) present. No tenderness. Normal range of motion. Cervical back: Normal range of motion and neck supple. Neurological: He is alert and oriented to person, place, and time. Skin: Skin is warm. No cyanosis. Nails show no clubbing.        LABS:  CBC:   Lab Results   Component Value Date    WBC 9.0 12/15/2020    RBC 5.53 12/15/2020    RBC 5.51 05/29/2012    HGB 17.0 12/15/2020    HCT 51.7 12/15/2020    MCV 93.4 12/15/2020    MCH 30.8 12/15/2020    MCHC 32.9 12/15/2020    RDW 14.4 12/15/2020     12/15/2020    MPV 10.1 10/16/2014     Lipids:  Lab Results   Component Value Date    CHOL 129 07/28/2019    CHOL 140 04/24/2017    CHOL 135 04/21/2016     Lab Results   Component Value Date    TRIG 105 07/28/2019    TRIG 112 04/24/2017    TRIG 69 04/21/2016     Lab Results   Component Value Date    HDL 45 06/08/2020    HDL 43 07/28/2019    HDL 42 05/02/2019     Lab Results   Component Value Date    LDLCALC 78 06/08/2020    LDLCALC 65 07/28/2019    LDLCALC 96 05/02/2019 No results found for: LABVLDL, VLDL  Lab Results   Component Value Date    CHOLHDLRATIO 3.0 05/29/2012     CMP:    Lab Results   Component Value Date     12/15/2020    K 4.2 12/15/2020    CL 96 12/15/2020    CO2 38 12/15/2020    BUN 9 12/15/2020    CREATININE 0.79 12/15/2020    GFRAA >60.0 12/15/2020    LABGLOM >60.0 12/15/2020    GLUCOSE 95 12/15/2020    GLUCOSE 91 05/29/2012    PROT 7.1 06/08/2020    LABALBU 4.1 06/08/2020    LABALBU 4.6 05/29/2012    CALCIUM 9.0 12/15/2020    BILITOT 1.2 06/08/2020    ALKPHOS 108 06/08/2020    AST 22 06/08/2020    ALT 21 06/08/2020     BMP:    Lab Results   Component Value Date     12/15/2020    K 4.2 12/15/2020    CL 96 12/15/2020    CO2 38 12/15/2020    BUN 9 12/15/2020    LABALBU 4.1 06/08/2020    LABALBU 4.6 05/29/2012    CREATININE 0.79 12/15/2020    CALCIUM 9.0 12/15/2020    GFRAA >60.0 12/15/2020    LABGLOM >60.0 12/15/2020    GLUCOSE 95 12/15/2020    GLUCOSE 91 05/29/2012     Magnesium:    Lab Results   Component Value Date    MG 2.1 07/28/2019     TSH:  Lab Results   Component Value Date    TSH 0.813 07/28/2019       Patient Active Problem List   Diagnosis    Essential hypertension, benign    Chronic obstructive pulmonary disease (HCC)    Osteoarthritis    Tobacco abuse    Psychophysiological insomnia    JONNY (obstructive sleep apnea)    Hypoxia    Obesity (BMI 30-39. 9)    Acute on chronic diastolic (congestive) heart failure (HCC)    Cor pulmonale (chronic) (HCC)    GARNETT (dyspnea on exertion)    Chronic bronchitis (HCC)    Smoker    Bilateral carotid bruits    Bilateral lower extremity edema    Angina at rest [facility])    Chronic respiratory failure (HCC)       There are no discontinued medications. Modified Medications    No medications on file       No orders of the defined types were placed in this encounter. Assessment/Plan:    1. Essential hypertension, benign - stable     2.  Acute on chronic diastolic (congestive) heart failure ([facility] [address]   compensated    3. Cor pulmonale (chronic)     4. Shortness of breath  No worse- on O2 3L. Recently stopped smoking    5. LE pulse weak- PVR    6. Carotid Bruits- CUS - stable      Counseling:  Heart Healthy Lifestyle, Stop Smoking, Low Salt Diet, Take Precautions to Prevent Falls, Regular Exercise and Walk Daily    Return in about 3 months (around 9/3/2021).       Electronically signed by Ruddy Agustin MD on 6/3/2021 at 2:10 PM

## 2021-06-07 ENCOUNTER — OFFICE VISIT (OUTPATIENT)
Dept: PULMONOLOGY | Age: 66
End: 2021-06-07
Payer: MEDICARE

## 2021-06-07 VITALS
HEART RATE: 88 BPM | BODY MASS INDEX: 32.78 KG/M2 | DIASTOLIC BLOOD PRESSURE: 88 MMHG | TEMPERATURE: 98.1 F | OXYGEN SATURATION: 94 % | WEIGHT: 229 LBS | HEIGHT: 70 IN | SYSTOLIC BLOOD PRESSURE: 132 MMHG

## 2021-06-07 DIAGNOSIS — Z72.0 TOBACCO ABUSE: ICD-10-CM

## 2021-06-07 DIAGNOSIS — R60.0 BILATERAL LEG EDEMA: ICD-10-CM

## 2021-06-07 DIAGNOSIS — E66.9 OBESITY (BMI 30-39.9): ICD-10-CM

## 2021-06-07 DIAGNOSIS — J96.10 CHRONIC RESPIRATORY FAILURE, UNSPECIFIED WHETHER WITH HYPOXIA OR HYPERCAPNIA (HCC): Primary | ICD-10-CM

## 2021-06-07 DIAGNOSIS — J44.9 CHRONIC OBSTRUCTIVE PULMONARY DISEASE, UNSPECIFIED COPD TYPE (HCC): ICD-10-CM

## 2021-06-07 DIAGNOSIS — G47.33 OSA (OBSTRUCTIVE SLEEP APNEA): ICD-10-CM

## 2021-06-07 PROCEDURE — 1123F ACP DISCUSS/DSCN MKR DOCD: CPT | Performed by: INTERNAL MEDICINE

## 2021-06-07 PROCEDURE — 3023F SPIROM DOC REV: CPT | Performed by: INTERNAL MEDICINE

## 2021-06-07 PROCEDURE — G8417 CALC BMI ABV UP PARAM F/U: HCPCS | Performed by: INTERNAL MEDICINE

## 2021-06-07 PROCEDURE — G8926 SPIRO NO PERF OR DOC: HCPCS | Performed by: INTERNAL MEDICINE

## 2021-06-07 PROCEDURE — G8427 DOCREV CUR MEDS BY ELIG CLIN: HCPCS | Performed by: INTERNAL MEDICINE

## 2021-06-07 PROCEDURE — 4040F PNEUMOC VAC/ADMIN/RCVD: CPT | Performed by: INTERNAL MEDICINE

## 2021-06-07 PROCEDURE — 3017F COLORECTAL CA SCREEN DOC REV: CPT | Performed by: INTERNAL MEDICINE

## 2021-06-07 PROCEDURE — 4004F PT TOBACCO SCREEN RCVD TLK: CPT | Performed by: INTERNAL MEDICINE

## 2021-06-07 PROCEDURE — 99214 OFFICE O/P EST MOD 30 MIN: CPT | Performed by: INTERNAL MEDICINE

## 2021-06-07 RX ORDER — FUROSEMIDE 40 MG/1
60 TABLET ORAL DAILY
Qty: 135 TABLET | Refills: 2 | Status: SHIPPED | OUTPATIENT
Start: 2021-06-07 | End: 2021-06-07 | Stop reason: SDUPTHER

## 2021-06-07 RX ORDER — FUROSEMIDE 40 MG/1
60 TABLET ORAL DAILY
Qty: 135 TABLET | Refills: 2 | Status: SHIPPED | OUTPATIENT
Start: 2021-06-07 | End: 2022-02-24 | Stop reason: SDUPTHER

## 2021-06-07 ASSESSMENT — ENCOUNTER SYMPTOMS
SHORTNESS OF BREATH: 1
WHEEZING: 1
CHEST TIGHTNESS: 0
COUGH: 1
DIARRHEA: 0
NAUSEA: 0
VOMITING: 0
RHINORRHEA: 0
VOICE CHANGE: 0
EYE ITCHING: 0
ABDOMINAL PAIN: 0
SORE THROAT: 0

## 2021-06-07 NOTE — PROGRESS NOTES
Subjective:     Dolores Eduardo is a 74 y.o. male who complains today of:     Chief Complaint   Patient presents with    COPD     3 month f/u       HPI  He is using 3 lit 24 hour day. He has NIV/trilogy  Trying to use but could not use it   He is talking with [employer]. C/o shortness of breath  with exertion. C/o Wheezing on and off . C/o Cough with  Yellowish Sputum, thick, he is taking mucinex  . No Hemoptysis. No Chest tightness. No Chest pain with radiation  or pleuritic pain. c/o leg edema. No orthopnea. No Fever or chills. No Rhinorrhea and postnasal drip. He is using bronchodilator with nebulizer with albuterol and Atrovent. He quit smoking in December as per patient . Allergies:  Patient has no known allergies. Past Medical History:   Diagnosis Date    [facility]     COPD (chronic obstructive pulmonary disease) ([facility]     Hypertension     Lung disease     Osteoarthritis     hands worst    Sleep apnea      Past Surgical History:   Procedure Laterality Date    CHOLECYSTECTOMY      CORONARY ANGIOPLASTY WITH STENT PLACEMENT  12/14/2020    DIAGNOSTIC CARDIAC CATH LAB PROCEDURE  12/14/2020    PTCA  12/14/2020    ROTATOR CUFF REPAIR      right     Family History   Problem Relation Age of Onset    Heart Disease Mother     Other Father      Social History     Socioeconomic History    Marital status: Single     Spouse name: Not on file    Number of children: Not on file    Years of education: Not on file    Highest education level: Not on file   Occupational History    Not on file   Tobacco Use    Smoking status: Current Every Day Smoker     Packs/day: 2.00     Years: 41.00     Pack years: 82.00     Types: Cigarettes    Smokeless tobacco: Never Used   Substance and Sexual Activity    Alcohol use:  Yes     Alcohol/week: 0.0 standard drinks     Comment: 12 pk per week    Drug use: No    Sexual activity: Not on file   Other Topics Concern    Not on file   Social History Narrative    Not on file     Social Determinants of Health     Financial Resource Strain:     Difficulty of Paying Living Expenses:    Food Insecurity:     Worried About Running Out of Food in the Last Year:     [address] in the Last Year:    Transportation Needs:     Lack of Transportation (Medical):  Lack of Transportation (Non-Medical):    Physical Activity:     Days of Exercise per Week:     Minutes of Exercise per Session:    Stress:     Feeling of Stress :    Social Connections:     Frequency of Communication with Friends and Family:     Frequency of Social Gatherings with Friends and Family:     Attends [employer] Services:     Active Member of Clubs or Organizations:     Attends Club or Organization Meetings:     Marital Status:    Intimate Partner Violence:     Fear of Current or Ex-Partner:     Emotionally Abused:     Physically Abused:     Sexually Abused:          Review of Systems   Constitutional: Negative for chills, diaphoresis, fatigue and fever. HENT: Negative for congestion, mouth sores, nosebleeds, postnasal drip, rhinorrhea, sneezing, sore throat and voice change. Eyes: Negative for itching and visual disturbance. Respiratory: Positive for cough, shortness of breath and wheezing. Negative for chest tightness. Cardiovascular: Positive for leg swelling. Negative for chest pain and palpitations. Gastrointestinal: Negative for abdominal pain, diarrhea, nausea and vomiting. Genitourinary: Negative for difficulty urinating and hematuria. Musculoskeletal: Negative for arthralgias, joint swelling and myalgias. Skin: Negative for rash. Allergic/Immunologic: Negative for environmental allergies. Neurological: Negative for dizziness, tremors, weakness and headaches.    Psychiatric/Behavioral: Negative for behavioral problems and sleep disturbance.         :     Vitals:    06/07/21 1038   BP: 132/88   Pulse: 88   Temp: 98.1 °F (36.7 °C) SpO2: 94%   Weight: 229 lb (103.9 kg)   Height: 5' 10\" (1.778 m)     Wt Readings from Last 3 Encounters:   06/07/21 229 lb (103.9 kg)   06/03/21 229 lb (103.9 kg)   03/03/21 234 lb (106.1 kg)         Physical Exam  Constitutional:       Appearance: He is well-developed. HENT:      Head: Normocephalic and atraumatic. Nose: Nose normal.   Eyes:      Conjunctiva/sclera: Conjunctivae normal.      Pupils: Pupils are equal, round, and reactive to light. Neck:      Thyroid: No thyromegaly. Vascular: No JVD. Trachea: No tracheal deviation. Cardiovascular:      Rate and Rhythm: Normal rate and regular rhythm. Heart sounds: No murmur heard. No friction rub. No gallop. Pulmonary:      Effort: Pulmonary effort is normal. No respiratory distress. Breath sounds: Normal breath sounds. No wheezing or rales. Chest:      Chest wall: No tenderness. Abdominal:      General: There is no distension. Musculoskeletal:         General: Normal range of motion. Lymphadenopathy:      Cervical: No cervical adenopathy. Skin:     General: Skin is warm and dry. Findings: No rash. Neurological:      Mental Status: He is alert and oriented to person, place, and time. Cranial Nerves: No cranial nerve deficit. Psychiatric:         Behavior: Behavior normal.         Current Outpatient Medications   Medication Sig Dispense Refill    furosemide (LASIX) 40 MG tablet Take 1.5 tablets by mouth daily 135 tablet 2    ipratropium-albuterol (DUONEB) 0.5-2.5 (3) MG/3ML SOLN nebulizer solution USE 1 VIAL PER NEBULIZER EVERY 4 HOURS 360 mL 0    Handicap Placard MISC by Does not apply route The patient requires a disability parking placard due to difficulties ambulating long distances.        Good 4/19/2021-4/19/2026 1 each 0    ticagrelor (BRILINTA) 60 MG TABS tablet Take 1.5 tablets by mouth 2 times daily 28 tablet 0    atorvastatin (LIPITOR) 20 MG tablet Take 1 tablet by mouth nightly 90 tablet 2  potassium chloride (KLOR-CON M) 10 MEQ extended release tablet Take 1 tablet by mouth 2 times daily (with meals) 60 tablet 5    Handicap Placard MISC by Does not apply route The patient requires a disability parking placard done due to difficulties ambulating long distances. Good 3/3/2021--3/3/2026 1 each 1    lisinopril (PRINIVIL;ZESTRIL) 10 MG tablet Take 1 tablet by mouth every evening 90 tablet 2    metoprolol tartrate (LOPRESSOR) 25 MG tablet Take 1 tablet by mouth 2 times daily 180 tablet 3    Elastic Bandages & Supports (MEDICAL COMPRESSION SOCKS) MISC Disp knee high compression socks 20-30 mmHg, on daily off nightly 2 each 1    nitroGLYCERIN (NITROSTAT) 0.4 MG SL tablet up to max of 3 total doses. If no relief after 1 dose, call 911. 25 tablet 3    albuterol (PROVENTIL) (2.5 MG/3ML) 0.083% nebulizer solution USE 1 VIAL PER NEBULIZER EVERY 6 HOURS AS NEEDED FOR WHEEZING 360 mL 0    Respiratory Therapy Supplies DREW New CPAP mask and supplies (Patient taking differently: daily New CPAP mask and supplies) 1 Device 0    Elastic Bandages & Supports (MEDICAL COMPRESSION STOCKINGS) MISC 2 each by Does not apply route daily On in QAM and off QHS. B/L Knee high, 20-30mmHg 1 each 1    Respiratory Therapy Supplies DREW Heated tubing , full face mask (Patient taking differently: 1 Device daily Heated tubing , full face mask) 1 Device 0    Respiratory Therapy Supplies DREW New CPAP Full face  mask and supplies. Dispense heated tubing and adjust humidity in CPAP due to c/o dry mouth. (Patient taking differently: 1 Device daily New CPAP Full face  mask and supplies.  Dispense heated tubing and adjust humidity in CPAP due to c/o dry mouth.) 1 Device 0    sildenafil (VIAGRA) 100 MG tablet Take 1 tablet by mouth as needed for Erectile Dysfunction LOT [id number] EXP 04/2017 2 tablet 5    guaiFENesin (MUCINEX) 600 MG extended release tablet Take 1 tablet by mouth 2 times daily 10 tablet 0    aspirin EC 81 MG EC tablet Take 1 tablet by mouth daily 30 tablet 3    OXYGEN POC    3 lit via NC     Dx copd 1 Units 0    albuterol sulfate HFA (PROAIR HFA) 108 (90 Base) MCG/ACT inhaler Inhale 2 puffs into the lungs every 6 hours as needed for Wheezing 3 Inhaler 3    CPAP Machine MISC by Does not apply route New CPAP with 7 cm with 3 lit O2 bled (Patient taking differently: 1 Device by Does not apply route New CPAP with 7 cm with 3 lit O2 bled) 1 each 0    Compression Bandages KIT LLE: knee high: 20-30mmhg 2 kit 1     No current facility-administered medications for this visit. Results for orders placed during the hospital encounter of 10/26/18    XR CHEST STANDARD (2 VW)    Narrative  EXAMINATION: XR CHEST (2 VW)    CLINICAL HISTORY: CHRONIC OBSTRUCTIVE PULMONARY DISEASE    COMPARISONS: None available. FINDINGS: Osseous structures intact. Cardiopericardial silhouette normal. Pulmonary vasculature normal. Lungs clear    Impression  NO ACUTE CARDIOPULMONARY DISEASE  ]  Results for orders placed during the hospital encounter of 07/27/19    XR CHEST PORTABLE    Narrative  EXAMINATION: XR CHEST PORTABLE    CLINICAL HISTORY:  Legs swelling, shortness of breath    COMPARISONS: None available. FINDINGS: There is moderate cardiomegaly. Pulmonary vascularity is prominent. There are coarsened interstitial markings consistent with fibrosis or interstitial edema. There is blunting of the left costophrenic angle which could represent scarring or  trace effusion. There are atherosclerotic changes of the thoracic aorta. There are degenerative changes in the spine and right shoulder. Impression  CARDIOMEGALY WITH PULMONARY VASCULAR CONGESTION. COARSE INTERSTITIAL MARKINGS WHICH COULD REPRESENT FIBROSIS OR INTERSTITIAL EDEMA. SMALL LEFT PLEURAL EFFUSION VERSUS PLEURAL SCARRING. Assessment/Plan:     1. Chronic respiratory failure, unspecified whether with hypoxia or hypercapnia (HCC)  He is using 3 lit 24 hour day.  He has NIV/trilogy  Trying to use but could not use it. He think it needs to be adjusted. He is talking with [facility]. 2. Chronic obstructive pulmonary disease, unspecified COPD type ([address]  C/o shortness of breath  with exertion. C/o Wheezing on and off . C/o Cough with  Yellowish Sputum, thick, he is taking mucinex . C/o leg edema. He is using bronchodilator with nebulizer with albuterol and Atrovent. Continue bronchodilator therapy and oxygen as before  - XR CHEST STANDARD (2 VW); Future    3. Tobacco abuse  He said he quit smoking in December 2020. Advised to travel from smoking risks related to smoking explained    4. JONNY (obstructive sleep apnea)  He is on noninvasive ventilator/trilogy for sleep apnea and respiratory failure    5. Obesity (BMI 30-39. 9)  He is advised try to lose weight. obesity related risk explained to the patient ,  Current weight:  229 lb (103.9 kg) Lbs. BMI:  Body mass index is 32.86 kg/m². Suggested weight control approaches, including dietary changes , exercise, behavioral modification. 6. Bilateral leg edema  He is on diuretic therapy and following with cardiology      Return in about 3 months (around 9/7/2021) for COPD, chronic respiratory failure.       Julieth Gonzales MD

## 2021-06-09 ENCOUNTER — TELEPHONE (OUTPATIENT)
Dept: PULMONOLOGY | Age: 66
End: 2021-06-09

## 2021-06-09 NOTE — TELEPHONE ENCOUNTER
Patient states he discussed getting a portable oxygen machine at his last visit because he can not take his oxygen tanks on the plane. Patient states [address] in [place] is requesting the order and chart notes for this faxed to them. He did not have the fax number.

## 2021-06-10 DIAGNOSIS — J44.9 CHRONIC OBSTRUCTIVE PULMONARY DISEASE, UNSPECIFIED COPD TYPE (HCC): Primary | ICD-10-CM

## 2021-06-10 NOTE — TELEPHONE ENCOUNTER
Patient states he had already talked to [address] and they had not received the orders/chart notes. He states it is to replace the machine he uses at night, but he can not take the oxygen tanks on the plane. He states that if he can't get one right away, he would like to rent one so he can schedule his trip.

## 2021-06-11 ENCOUNTER — OFFICE VISIT (OUTPATIENT)
Dept: INTERNAL MEDICINE | Age: 66
End: 2021-06-11
Payer: MEDICARE

## 2021-06-11 VITALS
WEIGHT: 232 LBS | DIASTOLIC BLOOD PRESSURE: 84 MMHG | SYSTOLIC BLOOD PRESSURE: 130 MMHG | TEMPERATURE: 97.7 F | HEIGHT: 70 IN | BODY MASS INDEX: 33.21 KG/M2 | OXYGEN SATURATION: 88 % | HEART RATE: 72 BPM

## 2021-06-11 DIAGNOSIS — R79.9 ABNORMAL FINDING OF BLOOD CHEMISTRY, UNSPECIFIED: ICD-10-CM

## 2021-06-11 DIAGNOSIS — Z13.1 SCREENING FOR DIABETES MELLITUS (DM): ICD-10-CM

## 2021-06-11 DIAGNOSIS — Z00.00 ROUTINE GENERAL MEDICAL EXAMINATION AT A HEALTH CARE FACILITY: Primary | ICD-10-CM

## 2021-06-11 DIAGNOSIS — Z00.00 ROUTINE GENERAL MEDICAL EXAMINATION AT A HEALTH CARE FACILITY: ICD-10-CM

## 2021-06-11 DIAGNOSIS — I20.8 ANGINA AT REST (HCC): ICD-10-CM

## 2021-06-11 LAB
ALBUMIN SERPL-MCNC: 4.3 G/DL (ref 3.5–4.6)
ALP BLD-CCNC: 101 U/L (ref 35–104)
ALT SERPL-CCNC: <5 U/L (ref 0–41)
ANION GAP SERPL CALCULATED.3IONS-SCNC: 11 MEQ/L (ref 9–15)
AST SERPL-CCNC: 11 U/L (ref 0–40)
BILIRUB SERPL-MCNC: 0.9 MG/DL (ref 0.2–0.7)
BUN BLDV-MCNC: 14 MG/DL (ref 8–23)
CALCIUM SERPL-MCNC: 9.9 MG/DL (ref 8.5–9.9)
CHLORIDE BLD-SCNC: 95 MEQ/L (ref 95–107)
CHOLESTEROL, FASTING: 120 MG/DL (ref 0–199)
CO2: 30 MEQ/L (ref 20–31)
CREAT SERPL-MCNC: 0.95 MG/DL (ref 0.7–1.2)
GFR AFRICAN AMERICAN: >60
GFR NON-AFRICAN AMERICAN: >60
GLOBULIN: 3.1 G/DL (ref 2.3–3.5)
GLUCOSE FASTING: 95 MG/DL (ref 70–99)
HBA1C MFR BLD: 5.5 % (ref 4.8–5.9)
HDLC SERPL-MCNC: 41 MG/DL (ref 40–59)
LDL CHOLESTEROL CALCULATED: 63 MG/DL (ref 0–129)
POTASSIUM SERPL-SCNC: 3.8 MEQ/L (ref 3.4–4.9)
SODIUM BLD-SCNC: 136 MEQ/L (ref 135–144)
TOTAL PROTEIN: 7.4 G/DL (ref 6.3–8)
TRIGLYCERIDE, FASTING: 80 MG/DL (ref 0–150)

## 2021-06-11 PROCEDURE — 1123F ACP DISCUSS/DSCN MKR DOCD: CPT | Performed by: FAMILY MEDICINE

## 2021-06-11 PROCEDURE — G0439 PPPS, SUBSEQ VISIT: HCPCS | Performed by: FAMILY MEDICINE

## 2021-06-11 PROCEDURE — 4040F PNEUMOC VAC/ADMIN/RCVD: CPT | Performed by: FAMILY MEDICINE

## 2021-06-11 PROCEDURE — 3017F COLORECTAL CA SCREEN DOC REV: CPT | Performed by: FAMILY MEDICINE

## 2021-06-11 SDOH — ECONOMIC STABILITY: FOOD INSECURITY: WITHIN THE PAST 12 MONTHS, YOU WORRIED THAT YOUR FOOD WOULD RUN OUT BEFORE YOU GOT MONEY TO BUY MORE.: NEVER TRUE

## 2021-06-11 SDOH — ECONOMIC STABILITY: FOOD INSECURITY: WITHIN THE PAST 12 MONTHS, THE FOOD YOU BOUGHT JUST DIDN'T LAST AND YOU DIDN'T HAVE MONEY TO GET MORE.: NEVER TRUE

## 2021-06-11 ASSESSMENT — LIFESTYLE VARIABLES
HOW OFTEN DURING THE LAST YEAR HAVE YOU FOUND THAT YOU WERE NOT ABLE TO STOP DRINKING ONCE YOU HAD STARTED: 0
HOW OFTEN DURING THE LAST YEAR HAVE YOU NEEDED AN ALCOHOLIC DRINK FIRST THING IN THE MORNING TO GET YOURSELF GOING AFTER A NIGHT OF HEAVY DRINKING: 0
HOW OFTEN DO YOU HAVE SIX OR MORE DRINKS ON ONE OCCASION: 0
HAS A RELATIVE, FRIEND, DOCTOR, OR ANOTHER HEALTH PROFESSIONAL EXPRESSED CONCERN ABOUT YOUR DRINKING OR SUGGESTED YOU CUT DOWN: 0
HOW OFTEN DURING THE LAST YEAR HAVE YOU FAILED TO DO WHAT WAS NORMALLY EXPECTED FROM YOU BECAUSE OF DRINKING: 0
AUDIT TOTAL SCORE: 1
HAVE YOU OR SOMEONE ELSE BEEN INJURED AS A RESULT OF YOUR DRINKING: 0
AUDIT-C TOTAL SCORE: 1
HOW OFTEN DURING THE LAST YEAR HAVE YOU BEEN UNABLE TO REMEMBER WHAT HAPPENED THE NIGHT BEFORE BECAUSE YOU HAD BEEN DRINKING: 0
HOW OFTEN DURING THE LAST YEAR HAVE YOU HAD A FEELING OF GUILT OR REMORSE AFTER DRINKING: 0
HOW OFTEN DO YOU HAVE A DRINK CONTAINING ALCOHOL: 1
HOW MANY STANDARD DRINKS CONTAINING ALCOHOL DO YOU HAVE ON A TYPICAL DAY: 0

## 2021-06-11 ASSESSMENT — PATIENT HEALTH QUESTIONNAIRE - PHQ9
SUM OF ALL RESPONSES TO PHQ QUESTIONS 1-9: 0
1. LITTLE INTEREST OR PLEASURE IN DOING THINGS: 0
2. FEELING DOWN, DEPRESSED OR HOPELESS: 0
SUM OF ALL RESPONSES TO PHQ9 QUESTIONS 1 & 2: 0
SUM OF ALL RESPONSES TO PHQ QUESTIONS 1-9: 0
SUM OF ALL RESPONSES TO PHQ QUESTIONS 1-9: 0

## 2021-06-11 ASSESSMENT — SOCIAL DETERMINANTS OF HEALTH (SDOH): HOW HARD IS IT FOR YOU TO PAY FOR THE VERY BASICS LIKE FOOD, HOUSING, MEDICAL CARE, AND HEATING?: NOT HARD AT ALL

## 2021-06-11 NOTE — PROGRESS NOTES
Medicare Annual Wellness Visit  Name: Emma Quinones Date: 2021   MRN: [id number] Sex: Male   Age: 74 y.o. Ethnicity: Non-/Non    : 1955 Race: Segundo Fischer is here for Medicare AWV    Screenings for behavioral, psychosocial and functional/safety risks, and cognitive dysfunction are all negative except as indicated below. These results, as well as other patient data from the [address] [facility], [place], operated by [facility] form, are documented in Flowsheets linked to this Encounter. No Known Allergies    Prior to Visit Medications    Medication Sig Taking? Authorizing Provider   Respiratory Therapy Supplies MISC POC 3 on 3L on exertion Yes Nicolas Arreguin MD   furosemide (LASIX) 40 MG tablet Take 1.5 tablets by mouth daily Yes Dalila Bartlett MD   ipratropium-albuterol (DUONEB) 0.5-2.5 (3) MG/3ML SOLN nebulizer solution USE 1 VIAL PER NEBULIZER EVERY 4 HOURS Yes Nicolas Arreguin MD   Handicap Placard MISC by Does not apply route The patient requires a disability parking placard due to difficulties ambulating long distances. Good 2021-2026 Yes Dalila Bartlett MD   ticagrelor (BRILINTA) 60 MG TABS tablet Take 1.5 tablets by mouth 2 times daily Yes Dalila Bartlett MD   atorvastatin (LIPITOR) 20 MG tablet Take 1 tablet by mouth nightly Yes Dalila Bartlett MD   potassium chloride (KLOR-CON M) 10 MEQ extended release tablet Take 1 tablet by mouth 2 times daily (with meals) Yes Dalila Bartlett MD   Handicap Placard MISC by Does not apply route The patient requires a disability parking placard done due to difficulties ambulating long distances.          Good 3/3/2021--3/3/2026 Yes Dalila Bartlett MD   lisinopril (PRINIVIL;ZESTRIL) 10 MG tablet Take 1 tablet by mouth every evening Yes Dalila Bartlett MD   metoprolol tartrate (LOPRESSOR) 25 MG tablet Take 1 tablet by mouth 2 times daily Yes Dalila Bartlett MD   Elastic Bandages & Supports (MEDICAL COMPRESSION SOCKS) MISC Disp knee high compression socks 20-30 mmHg, on daily off nightly Yes Lorrie Reynaga MD   nitroGLYCERIN (NITROSTAT) 0.4 MG SL tablet up to max of 3 total doses. If no relief after 1 dose, call 911. Yes Lorrie Reynaga MD   albuterol (PROVENTIL) (2.5 MG/3ML) 0.083% nebulizer solution USE 1 VIAL PER NEBULIZER EVERY 6 HOURS AS NEEDED FOR WHEEZING Yes Natali Arceo MD   Respiratory Therapy Supplies DREW New CPAP mask and supplies  Patient taking differently: daily New CPAP mask and supplies Yes Natali Arceo MD   Elastic Bandages & Supports (MEDICAL COMPRESSION STOCKINGS) MISC 2 each by Does not apply route daily On in QAM and off QHS. B/L Knee high, 20-30mmHg Yes Lorrie Reynaga MD   Respiratory Therapy Supplies DREW Heated tubing , full face mask  Patient taking differently: 1 Device daily Heated tubing , full face mask Yes Natali Arceo MD   Respiratory Therapy Supplies DREW New CPAP Full face  mask and supplies. Dispense heated tubing and adjust humidity in CPAP due to c/o dry mouth. Patient taking differently: 1 Device daily New CPAP Full face  mask and supplies. Dispense heated tubing and adjust humidity in CPAP due to c/o dry mouth.  Yes Natali Arceo MD   sildenafil (VIAGRA) 100 MG tablet Take 1 tablet by mouth as needed for Erectile Dysfunction LOT [id number] EXP 04/2017 Yes Lorrie Reynaga MD   guaiFENesin (MUCINEX) 600 MG extended release tablet Take 1 tablet by mouth 2 times daily Yes Boo Bentley MD   aspirin EC 81 MG EC tablet Take 1 tablet by mouth daily Yes Loulou Encarnacion MD   OXYGEN POC    3 lit via NC     Dx copd Yes Natali Arceo MD   albuterol sulfate HFA (PROAIR HFA) 108 (90 Base) MCG/ACT inhaler Inhale 2 puffs into the lungs every 6 hours as needed for Wheezing Yes Nisa Dewitt MD   CPAP Machine MISC by Does not apply route New CPAP with 7 cm with 3 lit O2 bled  Patient taking differently: 1 Device by Does not apply route New CPAP with 7 cm with 3 lit O2 bled Yes Natali Arceo MD   Compression Bandages KIT LLE: knee high: 20-30mmhg Yes Axel Mcgowan MD       Past Medical History:   Diagnosis Date    CHF (congestive heart failure) (HCC)     COPD (chronic obstructive pulmonary disease) (HCC)     Hypertension     Lung disease     Osteoarthritis     hands worst    Sleep apnea        Past Surgical History:   Procedure Laterality Date    CHOLECYSTECTOMY      CORONARY ANGIOPLASTY WITH STENT PLACEMENT  12/14/2020    DIAGNOSTIC CARDIAC CATH LAB PROCEDURE  12/14/2020    PTCA  12/14/2020    ROTATOR CUFF REPAIR      right       Family History   Problem Relation Age of Onset    Heart Disease Mother     Other Father        CareTeam (Including outside providers/suppliers regularly involved in providing care):   Patient Care Team:  Axel Mcgowan MD as PCP - General (Family Medicine)  Axel Mcgowan MD as PCP - [facility] Empaneled Provider  Jewels Shelton MD as Cardiologist (Cardiology)    Wt Readings from Last 3 Encounters:   06/11/21 232 lb (105.2 kg)   06/07/21 229 lb (103.9 kg)   06/03/21 229 lb (103.9 kg)     Vitals:    06/11/21 0825   BP: 130/84   Pulse: 72   Temp: 97.7 °F (36.5 °C)   SpO2: (!) 88%   Weight: 232 lb (105.2 kg)   Height: 5' 10\" (1.778 m)     Body mass index is 33.29 kg/m². Based upon direct observation of the patient, evaluation of cognition reveals recent and remote memory intact. Patient's complete Health Risk Assessment and screening values have been reviewed and are found in Flowsheets. The following problems were reviewed today and where indicated follow up appointments were made and/or referrals ordered. Positive Risk Factor Screenings with Interventions:          General Health and ACP:  General  In general, how would you say your health is?: Good  In the past 7 days, have you experienced any of the following?  New or Increased Pain, New or Increased Fatigue, Loneliness, Social Isolation, Stress or Anger?: None of These  Do you get the social and emotional support that you need?: Yes  Do you have a Living Will?: (!) No  Advance Directives     Power of  Living Will ACP-Advance Directive ACP-Power of     Not on File Not on File Not on File Not on File      General Health Risk Interventions:  · No Living Will: Patient declines ACP discussion/assistance    Health Habits/Nutrition:  Health Habits/Nutrition  Do you exercise for at least 20 minutes 2-3 times per week?: (!) No  Have you lost any weight without trying in the past 3 months?: (!) Yes  Do you eat only one meal per day?: No  Have you seen the dentist within the past year?: N/A - wear dentures  Body mass index: (!) 33.28  Health Habits/Nutrition Interventions:  · Inadequate physical activity:  limited by the COPD    Hearing/Vision:  No exam data present  Hearing/Vision  Do you or your family notice any trouble with your hearing that hasn't been managed with hearing aids?: No  Do you have difficulty driving, watching TV, or doing any of your daily activities because of your eyesight?: No  Have you had an eye exam within the past year?: (!) No  Hearing/Vision Interventions:  · Vision concerns:  patient encouraged to make appointment with his/her eye specialist      Personalized Preventive Plan   Current Health Maintenance Status  Immunization History   Administered Date(s) Administered    Pneumococcal Conjugate 13-valent ([id number]) 05/02/2019    Pneumococcal Polysaccharide ([id number]) 04/24/2017        Health Maintenance   Topic Date Due    COVID-19 Vaccine (1) Never done    DTaP/Tdap/Td vaccine (1 - Tdap) Never done    Shingles Vaccine (1 of 2) Never done    Colon cancer screen colonoscopy  Never done    Low dose CT lung screening  07/27/2020    Lipid screen  06/08/2021    Annual Wellness Visit (AWV)  08/06/2021 (Originally 7/30/2019)    Flu vaccine (Season Ended) 09/01/2021    Potassium monitoring  12/15/2021    Creatinine monitoring  12/15/2021    Pneumococcal 65+ years Vaccine (2 of 2 - PPSV23) 04/24/2022    AAA screen Completed    Hepatitis C screen  Completed    Hepatitis A vaccine  Aged Out    Hepatitis B vaccine  Aged Out    Hib vaccine  Aged Out    Meningococcal (ACWY) vaccine  Aged Out     Recommendations for [employer] Due: see orders and patient instructions/AVS.  . Recommended screening schedule for the next 5-10 years is provided to the patient in written form: see Patient Instructions/AVS.    There are no diagnoses linked to this encounter.

## 2021-07-13 ENCOUNTER — TELEPHONE (OUTPATIENT)
Dept: INTERNAL MEDICINE | Age: 66
End: 2021-07-13

## 2021-08-12 DIAGNOSIS — J44.9 CHRONIC OBSTRUCTIVE PULMONARY DISEASE, UNSPECIFIED COPD TYPE (HCC): ICD-10-CM

## 2021-08-13 RX ORDER — IPRATROPIUM BROMIDE AND ALBUTEROL SULFATE 2.5; .5 MG/3ML; MG/3ML
SOLUTION RESPIRATORY (INHALATION)
Qty: 360 ML | Refills: 0 | Status: SHIPPED | OUTPATIENT
Start: 2021-08-13 | End: 2021-09-09 | Stop reason: SDUPTHER

## 2021-09-02 ENCOUNTER — HOSPITAL ENCOUNTER (OUTPATIENT)
Dept: GENERAL RADIOLOGY | Age: 66
Discharge: HOME | End: 2021-09-04
Payer: MEDICARE

## 2021-09-02 ENCOUNTER — HOSPITAL ENCOUNTER (OUTPATIENT)
Age: 66
Discharge: HOME | End: 2021-09-04
Payer: MEDICARE

## 2021-09-02 DIAGNOSIS — J44.9 CHRONIC OBSTRUCTIVE PULMONARY DISEASE, UNSPECIFIED COPD TYPE (HCC): ICD-10-CM

## 2021-09-02 PROCEDURE — 71046 X-RAY EXAM CHEST 2 VIEWS: CPT

## 2021-09-03 ENCOUNTER — OFFICE VISIT (OUTPATIENT)
Dept: PULMONOLOGY | Age: 66
End: 2021-09-03
Payer: MEDICARE

## 2021-09-03 ENCOUNTER — OFFICE VISIT (OUTPATIENT)
Dept: CARDIOLOGY CLINIC | Age: 66
End: 2021-09-03
Payer: MEDICARE

## 2021-09-03 VITALS
HEIGHT: 70 IN | DIASTOLIC BLOOD PRESSURE: 81 MMHG | BODY MASS INDEX: 35.5 KG/M2 | WEIGHT: 248 LBS | SYSTOLIC BLOOD PRESSURE: 150 MMHG | RESPIRATION RATE: 18 BRPM | OXYGEN SATURATION: 93 % | HEART RATE: 88 BPM

## 2021-09-03 VITALS
OXYGEN SATURATION: 90 % | HEART RATE: 75 BPM | SYSTOLIC BLOOD PRESSURE: 154 MMHG | DIASTOLIC BLOOD PRESSURE: 84 MMHG | HEIGHT: 70 IN | BODY MASS INDEX: 35.22 KG/M2 | WEIGHT: 246 LBS

## 2021-09-03 DIAGNOSIS — M19.041 PRIMARY OSTEOARTHRITIS OF BOTH HANDS: ICD-10-CM

## 2021-09-03 DIAGNOSIS — G47.33 OSA (OBSTRUCTIVE SLEEP APNEA): ICD-10-CM

## 2021-09-03 DIAGNOSIS — R09.89 BILATERAL CAROTID BRUITS: ICD-10-CM

## 2021-09-03 DIAGNOSIS — R60.0 BILATERAL LOWER EXTREMITY EDEMA: ICD-10-CM

## 2021-09-03 DIAGNOSIS — M19.042 PRIMARY OSTEOARTHRITIS OF BOTH HANDS: ICD-10-CM

## 2021-09-03 DIAGNOSIS — R06.09 DOE (DYSPNEA ON EXERTION): ICD-10-CM

## 2021-09-03 DIAGNOSIS — I25.10 CORONARY ARTERY DISEASE INVOLVING NATIVE CORONARY ARTERY OF NATIVE HEART WITHOUT ANGINA PECTORIS: ICD-10-CM

## 2021-09-03 DIAGNOSIS — I27.81 COR PULMONALE (CHRONIC) (HCC): ICD-10-CM

## 2021-09-03 DIAGNOSIS — I10 ESSENTIAL HYPERTENSION, BENIGN: ICD-10-CM

## 2021-09-03 DIAGNOSIS — J44.9 CHRONIC OBSTRUCTIVE PULMONARY DISEASE, UNSPECIFIED COPD TYPE (HCC): ICD-10-CM

## 2021-09-03 DIAGNOSIS — J96.10 CHRONIC RESPIRATORY FAILURE, UNSPECIFIED WHETHER WITH HYPOXIA OR HYPERCAPNIA (HCC): Primary | ICD-10-CM

## 2021-09-03 DIAGNOSIS — I50.33 ACUTE ON CHRONIC DIASTOLIC (CONGESTIVE) HEART FAILURE (HCC): Primary | ICD-10-CM

## 2021-09-03 PROCEDURE — 3023F SPIROM DOC REV: CPT | Performed by: INTERNAL MEDICINE

## 2021-09-03 PROCEDURE — 99214 OFFICE O/P EST MOD 30 MIN: CPT | Performed by: INTERNAL MEDICINE

## 2021-09-03 PROCEDURE — 3017F COLORECTAL CA SCREEN DOC REV: CPT | Performed by: INTERNAL MEDICINE

## 2021-09-03 PROCEDURE — G8427 DOCREV CUR MEDS BY ELIG CLIN: HCPCS | Performed by: INTERNAL MEDICINE

## 2021-09-03 PROCEDURE — G8417 CALC BMI ABV UP PARAM F/U: HCPCS | Performed by: INTERNAL MEDICINE

## 2021-09-03 PROCEDURE — 1036F TOBACCO NON-USER: CPT | Performed by: INTERNAL MEDICINE

## 2021-09-03 PROCEDURE — 1123F ACP DISCUSS/DSCN MKR DOCD: CPT | Performed by: INTERNAL MEDICINE

## 2021-09-03 PROCEDURE — 4040F PNEUMOC VAC/ADMIN/RCVD: CPT | Performed by: INTERNAL MEDICINE

## 2021-09-03 PROCEDURE — G8926 SPIRO NO PERF OR DOC: HCPCS | Performed by: INTERNAL MEDICINE

## 2021-09-03 ASSESSMENT — ENCOUNTER SYMPTOMS
WHEEZING: 0
WHEEZING: 1
VOMITING: 0
CHEST TIGHTNESS: 0
VOICE CHANGE: 0
SHORTNESS OF BREATH: 1
COUGH: 1
NAUSEA: 0
SORE THROAT: 0
COUGH: 0
EYES NEGATIVE: 1
ABDOMINAL PAIN: 0
NAUSEA: 0
RHINORRHEA: 0
GASTROINTESTINAL NEGATIVE: 1
DIARRHEA: 0
CHEST TIGHTNESS: 0
STRIDOR: 0
SHORTNESS OF BREATH: 1
BLOOD IN STOOL: 0
EYE ITCHING: 0

## 2021-09-03 NOTE — PROGRESS NOTES
Subjective:     Yolanda Pagan is a 74 y.o. male who complains today of:     Chief Complaint   Patient presents with    3 Month Follow-Up    COPD     Was on Symbicort, was too much    Shortness of Breath     was unsure about the rescue inhaler    Cough     productive cough with beige phlegm, does take mucinex. Feels like he does not bring it up as much    Sleep Apnea     not on cpap, but respirator through [facility]. needs supplies, set up. HPI  He is using 3 lit 24 hour day. He has NIV/trilogy    C/o shortness of breath  with exertion and sometimes at rest..   C/o Wheezing. C/o Cough with Yellowish Sputum, thick, he is taking mucinex  . No Hemoptysis. No Chest tightness. No Chest pain with radiation  or pleuritic pain. C/o leg edema. No orthopnea. No Fever or chills. No Rhinorrhea and postnasal drip. He is using bronchodilator with nebulizer with albuterol and Atrovent. patient can not afford  Symbicort. He want to try other sample. Allergies:  Patient has no known allergies.   Past Medical History:   Diagnosis Date    CHF (congestive heart failure) (HCC)     COPD (chronic obstructive pulmonary disease) (HCC)     Hypertension     Lung disease     Osteoarthritis     hands worst    Sleep apnea      Past Surgical History:   Procedure Laterality Date    CHOLECYSTECTOMY      CORONARY ANGIOPLASTY WITH STENT PLACEMENT  12/14/2020    DIAGNOSTIC CARDIAC CATH LAB PROCEDURE  12/14/2020    PTCA  12/14/2020    ROTATOR CUFF REPAIR      right     Family History   Problem Relation Age of Onset    Heart Disease Mother     Other Father      Social History     Socioeconomic History    Marital status: Single     Spouse name: Not on file    Number of children: Not on file    Years of education: Not on file    Highest education level: Not on file   Occupational History    Not on file   Tobacco Use    Smoking status: Former Smoker     Packs/day: 2.00     Years: 49.00     Pack years: 98.00 Types: Cigarettes     Start date: 1971     Quit date: 12/15/2020     Years since quittin.7    Smokeless tobacco: Never Used   Substance and Sexual Activity    Alcohol use: Yes     Alcohol/week: 0.0 standard drinks     Comment: 12 pk per week    Drug use: No    Sexual activity: Not on file   Other Topics Concern    Not on file   Social History Narrative    Not on file     Social Determinants of Health     Financial Resource Strain: Low Risk     Difficulty of Paying Living Expenses: Not hard at all   Food Insecurity: No Food Insecurity    Worried About [address] [employer] in the Last Year: Never true    [facility] [employer] in the Last Year: Never true   Transportation Needs:     Lack of Transportation (Medical):  Lack of Transportation (Non-Medical):    Physical Activity:     Days of Exercise per Week:     Minutes of Exercise per Session:    Stress:     Feeling of Stress :    Social Connections:     Frequency of Communication with Friends and Family:     Frequency of Social Gatherings with Friends and Family:     Attends Jewish Services:     Active Member of Clubs or Organizations:     Attends Club or Organization Meetings:     Marital Status:    Intimate Partner Violence:     Fear of Current or Ex-Partner:     Emotionally Abused:     Physically Abused:     Sexually Abused:          Review of Systems   Constitutional: Negative for chills, diaphoresis, fatigue and fever. HENT: Negative for congestion, mouth sores, nosebleeds, postnasal drip, rhinorrhea, sneezing, sore throat and voice change. Eyes: Negative for itching and visual disturbance. Respiratory: Positive for cough, shortness of breath and wheezing. Negative for chest tightness. Cardiovascular: Negative. Negative for chest pain, palpitations and leg swelling. Gastrointestinal: Negative for abdominal pain, diarrhea, nausea and vomiting. Genitourinary: Negative for difficulty urinating and hematuria. Musculoskeletal: Negative for arthralgias, joint swelling and myalgias. Skin: Negative for rash. Allergic/Immunologic: Negative for environmental allergies. Neurological: Negative for dizziness, tremors, weakness and headaches. Psychiatric/Behavioral: Negative for behavioral problems and sleep disturbance.         :     Vitals:    09/03/21 1024 09/03/21 1057   BP: (!) 158/80 (!) 154/84   Site: Left Upper Arm Left Upper Arm   Position: Sitting Sitting   Cuff Size: Medium Adult Medium Adult   Pulse: 89 75   SpO2: (!) 80% 90%   Weight: 246 lb (111.6 kg)    Height: 5' 10\" (1.778 m)      Wt Readings from Last 3 Encounters:   09/03/21 246 lb (111.6 kg)   06/11/21 232 lb (105.2 kg)   06/07/21 229 lb (103.9 kg)         Physical Exam  Constitutional:       Appearance: He is well-developed. He is obese. HENT:      Head: Normocephalic and atraumatic. Nose: Nose normal.   Eyes:      Conjunctiva/sclera: Conjunctivae normal.      Pupils: Pupils are equal, round, and reactive to light. Neck:      Thyroid: No thyromegaly. Vascular: No JVD. Trachea: No tracheal deviation. Cardiovascular:      Rate and Rhythm: Normal rate and regular rhythm. Heart sounds: No murmur heard. No friction rub. No gallop. Pulmonary:      Effort: Pulmonary effort is normal. No respiratory distress. Breath sounds: Wheezing present. No rales. Comments: diminished Breath sound bilaterally. Chest:      Chest wall: No tenderness. Abdominal:      General: There is no distension. Musculoskeletal:         General: Normal range of motion. Right lower leg: Edema (2+) present. Left lower leg: Edema (2+) present. Lymphadenopathy:      Cervical: No cervical adenopathy. Skin:     General: Skin is warm and dry. Findings: No rash. Neurological:      Mental Status: He is alert and oriented to person, place, and time. Cranial Nerves: No cranial nerve deficit.    Psychiatric:         Behavior: Behavior normal.         Current Outpatient Medications   Medication Sig Dispense Refill    ipratropium-albuterol (DUONEB) 0.5-2.5 (3) MG/3ML SOLN nebulizer solution USE 1 VIAL PER NEBULIZER EVERY 4 HOURS 360 mL 0    Respiratory Therapy Supplies MISC POC 3 on 3L on exertion 1 each 0    furosemide (LASIX) 40 MG tablet Take 1.5 tablets by mouth daily 135 tablet 2    Handicap Placard MISC by Does not apply route The patient requires a disability parking placard due to difficulties ambulating long distances. Good 4/19/2021-4/19/2026 1 each 0    ticagrelor (BRILINTA) 60 MG TABS tablet Take 1.5 tablets by mouth 2 times daily 28 tablet 0    atorvastatin (LIPITOR) 20 MG tablet Take 1 tablet by mouth nightly 90 tablet 2    potassium chloride (KLOR-CON M) 10 MEQ extended release tablet Take 1 tablet by mouth 2 times daily (with meals) 60 tablet 5    Handicap Placard MISC by Does not apply route The patient requires a disability parking placard done due to difficulties ambulating long distances. Good 3/3/2021--3/3/2026 1 each 1    lisinopril (PRINIVIL;ZESTRIL) 10 MG tablet Take 1 tablet by mouth every evening 90 tablet 2    metoprolol tartrate (LOPRESSOR) 25 MG tablet Take 1 tablet by mouth 2 times daily 180 tablet 3    Elastic Bandages & Supports (MEDICAL COMPRESSION SOCKS) MISC Disp knee high compression socks 20-30 mmHg, on daily off nightly 2 each 1    nitroGLYCERIN (NITROSTAT) 0.4 MG SL tablet up to max of 3 total doses. If no relief after 1 dose, call 911. 25 tablet 3    albuterol (PROVENTIL) (2.5 MG/3ML) 0.083% nebulizer solution USE 1 VIAL PER NEBULIZER EVERY 6 HOURS AS NEEDED FOR WHEEZING 360 mL 0    Respiratory Therapy Supplies DREW New CPAP mask and supplies (Patient taking differently: daily New CPAP mask and supplies) 1 Device 0    Elastic Bandages & Supports (MEDICAL COMPRESSION STOCKINGS) MISC 2 each by Does not apply route daily On in QAM and off QHS.   B/L Knee high, 20-30mmHg 1 VW)    CLINICAL HISTORY: CHRONIC OBSTRUCTIVE PULMONARY DISEASE    COMPARISONS: None available. FINDINGS: Osseous structures intact. Cardiopericardial silhouette normal. Pulmonary vasculature normal. Lungs clear    Impression  NO ACUTE CARDIOPULMONARY DISEASE  ]  Results for orders placed during the hospital encounter of 07/27/19    XR CHEST PORTABLE    Narrative  EXAMINATION: XR CHEST PORTABLE    CLINICAL HISTORY:  Legs swelling, shortness of breath    COMPARISONS: None available. FINDINGS: There is moderate cardiomegaly. Pulmonary vascularity is prominent. There are coarsened interstitial markings consistent with fibrosis or interstitial edema. There is blunting of the left costophrenic angle which could represent scarring or  trace effusion. There are atherosclerotic changes of the thoracic aorta. There are degenerative changes in the spine and right shoulder. Impression  CARDIOMEGALY WITH PULMONARY VASCULAR CONGESTION. COARSE INTERSTITIAL MARKINGS WHICH COULD REPRESENT FIBROSIS OR INTERSTITIAL EDEMA. SMALL LEFT PLEURAL EFFUSION VERSUS PLEURAL SCARRING. Assessment/Plan:     1. Chronic respiratory failure, unspecified whether with hypoxia or hypercapnia (HCC)  He is using 3 lit 24 hour day. He has NIV/trilogy . Continue oxygen and noninvasive ventilator as before    2. Chronic obstructive pulmonary disease, unspecified COPD type ([address]  C/o shortness of breath  with exertion and sometimes at rest. C/o Wheezing. C/o Cough with Yellowish Sputum, thick, he is taking mucinex C/o leg edema. He is using bronchodilator with nebulizer with albuterol and Atrovent. He can not afford  Symbicort. He want to try other sample. He will be started on Trelegy Ellipta 1 puff daily. Sample given    3. GARNETT (dyspnea on exertion)  He having  Chronic shortness of breath which is chronic and unchanged likely due to COPD, continue  Bronchodilator, O2 as before. keep  Spo2 90% or above.     4. JONNY (obstructive sleep

## 2021-09-03 NOTE — PROGRESS NOTES
Subsequent Progress Note  Patient: Nancy Glass  YOB: 1955  MRN: [id number]    Chief Complaint: hf LE edema copd garnett   Chief Complaint   Patient presents with    3 Month Follow-Up    Congestive Heart Failure    Coronary Artery Disease    Hypertension       CV Data:  7/2019 echo EF 60  9/2019 spect negative   9/2019 CUS- mild   9/2019 Abd US negative AAA  12/14/2020 RCA SHIRIN EF 60  1/21 PVR negative  1/21 CUS mild     Subjective/HPI: no edema anymore on diuretics. No cp +garnett chronic 3L o2      1/6/2020 still on 3L O2 SUBJECTIVE: till struggles to stop smoke. No cp. No falls no bleed. Takes meds. Walks. 5/19/2020 TELEHEALTH EVALUATION -- Audio/Visual (During COADU-05 public health emergency)    Doing well no cp still has sob. Still trying to stop smoke. Uses 3L O2    6/22/2020 TELEHEALTH EVALUATION -- Audio/Visual (During YQTRI-15 public health emergency)    Called in 10 days ago c/o LE Edema. We advised low salt, walk and compression. Edema is now completely resolved. He feels much better. He admits he took lots of salty foods few weeks ago. No cp    7/27/2020  Leg edema was better but now came back again. He recently ran out of couple of his meds to include ACEI and BB.     8/10/2020 leg edema much improved with low salt and Fluid restrict. Now has 1-2+. No cp chronic SOB on O2.     9/11/2020 still has GARNETT. Uses 3L O2. Sate are 80s but feels comfortable at rest.  No CP no bleed no falls. 12/3/2020 no cp . GARNETT is worse. Weak and tired. No bleed. No falls. 12/121/220 feels ok. No cp no sob no falls no bleed. Takes meds. Trying to quit cigs    3/3/21 still SOB using 3L O2. Low stamina no cp no falls no bleed no edema takes meds. Not ilene active. 6/3/21 no cp still uses 3L O2. No falls no bleed. 9/3/21 chronic sob 3.5 L O2. No cp no falls no bleed.  Takes meds    prior 2.5 PPD - stopped 12/2020   Retired -[employer]    EKG:    Past Medical History:   Diagnosis Date    CHF Organizations:     Attends Club or Organization Meetings:     Marital Status:    Intimate Partner Violence:     Fear of Current or Ex-Partner:     Emotionally Abused:     Physically Abused:     Sexually Abused:        No Known Allergies    Current Outpatient Medications   Medication Sig Dispense Refill    ipratropium-albuterol (DUONEB) 0.5-2.5 (3) MG/3ML SOLN nebulizer solution USE 1 VIAL PER NEBULIZER EVERY 4 HOURS 360 mL 0    Respiratory Therapy Supplies MISC POC 3 on 3L on exertion 1 each 0    furosemide (LASIX) 40 MG tablet Take 1.5 tablets by mouth daily 135 tablet 2    ticagrelor (BRILINTA) 60 MG TABS tablet Take 1.5 tablets by mouth 2 times daily 28 tablet 0    atorvastatin (LIPITOR) 20 MG tablet Take 1 tablet by mouth nightly 90 tablet 2    potassium chloride (KLOR-CON M) 10 MEQ extended release tablet Take 1 tablet by mouth 2 times daily (with meals) 60 tablet 5    lisinopril (PRINIVIL;ZESTRIL) 10 MG tablet Take 1 tablet by mouth every evening 90 tablet 2    metoprolol tartrate (LOPRESSOR) 25 MG tablet Take 1 tablet by mouth 2 times daily 180 tablet 3    nitroGLYCERIN (NITROSTAT) 0.4 MG SL tablet up to max of 3 total doses.  If no relief after 1 dose, call 911. 25 tablet 3    albuterol (PROVENTIL) (2.5 MG/3ML) 0.083% nebulizer solution USE 1 VIAL PER NEBULIZER EVERY 6 HOURS AS NEEDED FOR WHEEZING 360 mL 0    sildenafil (VIAGRA) 100 MG tablet Take 1 tablet by mouth as needed for Erectile Dysfunction LOT [id number] EXP 04/2017 2 tablet 5    guaiFENesin (MUCINEX) 600 MG extended release tablet Take 1 tablet by mouth 2 times daily 10 tablet 0    aspirin EC 81 MG EC tablet Take 1 tablet by mouth daily 30 tablet 3    OXYGEN POC    3 lit via NC     Dx copd 1 Units 0    albuterol sulfate HFA (PROAIR HFA) 108 (90 Base) MCG/ACT inhaler Inhale 2 puffs into the lungs every 6 hours as needed for Wheezing 3 Inhaler 3    Handicap Placard MISC by Does not apply route The patient requires a disability Hematological: Negative. Psychiatric/Behavioral: Negative. Physical Examination:    BP (!) 150/81   Pulse 88   Resp 18   Ht 5' 10\" (1.778 m)   Wt 248 lb (112.5 kg)   SpO2 93% Comment: 3L O2  BMI 35.58 kg/m²    Physical Exam   Constitutional: He appears healthy. No distress. HENT:   Normal cephalic and Atraumatic   Eyes: Pupils are equal, round, and reactive to light. Neck: Thyroid normal. No JVD present. No neck adenopathy. No thyromegaly present. Cardiovascular: Normal rate and regular rhythm. Exam reveals decreased pulses. Murmur heard. Pulses:       Carotid pulses are on the right side with bruit and on the left side with bruit. Pulmonary/Chest: Effort normal and breath sounds normal. He has no wheezes. He has no rales. He exhibits no tenderness. Abdominal: Soft. Bowel sounds are normal. There is no abdominal tenderness. Musculoskeletal:         General: Edema (Trace ) present. No tenderness. Normal range of motion. Cervical back: Normal range of motion and neck supple. Neurological: He is alert and oriented to person, place, and time. Skin: Skin is warm. No cyanosis. Nails show no clubbing.        LABS:  CBC:   Lab Results   Component Value Date    WBC 9.0 12/15/2020    RBC 5.53 12/15/2020    RBC 5.51 05/29/2012    HGB 17.0 12/15/2020    HCT 51.7 12/15/2020    MCV 93.4 12/15/2020    MCH 30.8 12/15/2020    MCHC 32.9 12/15/2020    RDW 14.4 12/15/2020     12/15/2020    MPV 10.1 10/16/2014     Lipids:  Lab Results   Component Value Date    CHOL 129 07/28/2019    CHOL 140 04/24/2017    CHOL 135 04/21/2016     Lab Results   Component Value Date    TRIG 105 07/28/2019    TRIG 112 04/24/2017    TRIG 69 04/21/2016     Lab Results   Component Value Date    HDL 41 06/11/2021    HDL 45 06/08/2020    HDL 43 07/28/2019     Lab Results   Component Value Date    LDLCALC 63 06/11/2021    LDLCALC 78 06/08/2020    LDLCALC 65 07/28/2019     No results found for: LABVLDL, VLDL  Lab Results   Component Value Date    CHOLHDLRATIO 3.0 05/29/2012     CMP:    Lab Results   Component Value Date     06/11/2021    K 3.8 06/11/2021    CL 95 06/11/2021    CO2 30 06/11/2021    BUN 14 06/11/2021    CREATININE 0.95 06/11/2021    GFRAA >60.0 06/11/2021    LABGLOM >60.0 06/11/2021    GLUCOSE 95 12/15/2020    GLUCOSE 91 05/29/2012    PROT 7.4 06/11/2021    LABALBU 4.3 06/11/2021    LABALBU 4.6 05/29/2012    CALCIUM 9.9 06/11/2021    BILITOT 0.9 06/11/2021    ALKPHOS 101 06/11/2021    AST 11 06/11/2021    ALT <5 06/11/2021     BMP:    Lab Results   Component Value Date     06/11/2021    K 3.8 06/11/2021    CL 95 06/11/2021    CO2 30 06/11/2021    BUN 14 06/11/2021    LABALBU 4.3 06/11/2021    LABALBU 4.6 05/29/2012    CREATININE 0.95 06/11/2021    CALCIUM 9.9 06/11/2021    GFRAA >60.0 06/11/2021    LABGLOM >60.0 06/11/2021    GLUCOSE 95 12/15/2020    GLUCOSE 91 05/29/2012     Magnesium:    Lab Results   Component Value Date    MG 2.1 07/28/2019     TSH:  Lab Results   Component Value Date    TSH 0.813 07/28/2019       Patient Active Problem List   Diagnosis    Essential hypertension, benign    Chronic obstructive pulmonary disease (HCC)    Osteoarthritis    Tobacco abuse    Psychophysiological insomnia    JONNY (obstructive sleep apnea)    Hypoxia    Obesity (BMI 30-39. 9)    Acute on chronic diastolic (congestive) heart failure (HCC)    Cor pulmonale (chronic) (HCC)    GARNETT (dyspnea on exertion)    Chronic bronchitis (HCC)    Smoker    Bilateral carotid bruits    Bilateral lower extremity edema    Angina at rest [facility])    Chronic respiratory failure (HCC)       There are no discontinued medications. Modified Medications    No medications on file       No orders of the defined types were placed in this encounter. Assessment/Plan:    1. Essential hypertension, benign - stable. continue meds. Low salt diet.      2. Acute on chronic diastolic (congestive) heart failure (HCC)   mild decompensated. - continue diuretics. counseled on low salt and fluid restriction. 3. Cor pulmonale (chronic) - chronic    4. Shortness of breath  No worse- on O2 3.5 L. Recently stopped smoking. Continue to stay off smoking. 6. Carotid Bruits- CUS - stable. Will continue surveillance     Counseling:  Heart Healthy Lifestyle, Stop Smoking, Low Salt Diet, Take Precautions to Prevent Falls, Regular Exercise and Walk Daily    Return in about 3 months (around 12/3/2021).       Electronically signed by Saulo Oneal MD on 9/3/2021 at 12:50 PM

## 2021-09-09 DIAGNOSIS — J44.9 CHRONIC OBSTRUCTIVE PULMONARY DISEASE, UNSPECIFIED COPD TYPE (HCC): Primary | ICD-10-CM

## 2021-09-09 RX ORDER — IPRATROPIUM BROMIDE AND ALBUTEROL SULFATE 2.5; .5 MG/3ML; MG/3ML
1 SOLUTION RESPIRATORY (INHALATION) EVERY 4 HOURS
Qty: 360 ML | Refills: 0 | Status: SHIPPED | OUTPATIENT
Start: 2021-09-09 | End: 2021-11-09

## 2021-11-08 DIAGNOSIS — J44.9 CHRONIC OBSTRUCTIVE PULMONARY DISEASE, UNSPECIFIED COPD TYPE (HCC): ICD-10-CM

## 2021-11-09 RX ORDER — IPRATROPIUM BROMIDE AND ALBUTEROL SULFATE 2.5; .5 MG/3ML; MG/3ML
SOLUTION RESPIRATORY (INHALATION)
Qty: 360 ML | Refills: 0 | Status: SHIPPED | OUTPATIENT
Start: 2021-11-09 | End: 2021-12-06

## 2021-12-06 DIAGNOSIS — J44.9 CHRONIC OBSTRUCTIVE PULMONARY DISEASE, UNSPECIFIED COPD TYPE (HCC): ICD-10-CM

## 2021-12-06 RX ORDER — IPRATROPIUM BROMIDE AND ALBUTEROL SULFATE 2.5; .5 MG/3ML; MG/3ML
SOLUTION RESPIRATORY (INHALATION)
Qty: 360 ML | Refills: 0 | Status: SHIPPED | OUTPATIENT
Start: 2021-12-06 | End: 2022-01-17

## 2022-01-07 ENCOUNTER — OFFICE VISIT (OUTPATIENT)
Dept: CARDIOLOGY CLINIC | Age: 67
End: 2022-01-07
Payer: MEDICARE

## 2022-01-07 ENCOUNTER — OFFICE VISIT (OUTPATIENT)
Dept: PULMONOLOGY | Age: 67
End: 2022-01-07
Payer: MEDICARE

## 2022-01-07 VITALS
HEIGHT: 70 IN | BODY MASS INDEX: 35.79 KG/M2 | WEIGHT: 250 LBS | DIASTOLIC BLOOD PRESSURE: 78 MMHG | HEART RATE: 82 BPM | SYSTOLIC BLOOD PRESSURE: 122 MMHG | RESPIRATION RATE: 18 BRPM

## 2022-01-07 VITALS
TEMPERATURE: 98.2 F | BODY MASS INDEX: 35.79 KG/M2 | WEIGHT: 250 LBS | HEIGHT: 70 IN | OXYGEN SATURATION: 96 % | DIASTOLIC BLOOD PRESSURE: 61 MMHG | HEART RATE: 81 BPM | SYSTOLIC BLOOD PRESSURE: 111 MMHG

## 2022-01-07 DIAGNOSIS — I27.81 COR PULMONALE (CHRONIC) (HCC): ICD-10-CM

## 2022-01-07 DIAGNOSIS — I10 ESSENTIAL HYPERTENSION: ICD-10-CM

## 2022-01-07 DIAGNOSIS — J44.9 CHRONIC OBSTRUCTIVE PULMONARY DISEASE, UNSPECIFIED COPD TYPE (HCC): ICD-10-CM

## 2022-01-07 DIAGNOSIS — I10 ESSENTIAL HYPERTENSION, BENIGN: ICD-10-CM

## 2022-01-07 DIAGNOSIS — I20.8 ANGINA AT REST (HCC): ICD-10-CM

## 2022-01-07 DIAGNOSIS — G47.33 OSA (OBSTRUCTIVE SLEEP APNEA): ICD-10-CM

## 2022-01-07 DIAGNOSIS — I25.10 CORONARY ARTERY DISEASE INVOLVING NATIVE CORONARY ARTERY OF NATIVE HEART WITHOUT ANGINA PECTORIS: ICD-10-CM

## 2022-01-07 DIAGNOSIS — J42 CHRONIC BRONCHITIS, UNSPECIFIED CHRONIC BRONCHITIS TYPE (HCC): ICD-10-CM

## 2022-01-07 DIAGNOSIS — R60.0 BILATERAL LOWER EXTREMITY EDEMA: ICD-10-CM

## 2022-01-07 DIAGNOSIS — R09.02 HYPOXIA: ICD-10-CM

## 2022-01-07 DIAGNOSIS — R09.89 BILATERAL CAROTID BRUITS: ICD-10-CM

## 2022-01-07 DIAGNOSIS — J96.10 CHRONIC RESPIRATORY FAILURE, UNSPECIFIED WHETHER WITH HYPOXIA OR HYPERCAPNIA (HCC): ICD-10-CM

## 2022-01-07 DIAGNOSIS — R06.09 DOE (DYSPNEA ON EXERTION): ICD-10-CM

## 2022-01-07 DIAGNOSIS — E66.01 SEVERE OBESITY (BMI 35.0-35.9 WITH COMORBIDITY) (HCC): ICD-10-CM

## 2022-01-07 DIAGNOSIS — J44.9 CHRONIC OBSTRUCTIVE PULMONARY DISEASE, UNSPECIFIED COPD TYPE (HCC): Primary | ICD-10-CM

## 2022-01-07 DIAGNOSIS — I50.33 ACUTE ON CHRONIC DIASTOLIC (CONGESTIVE) HEART FAILURE (HCC): Primary | ICD-10-CM

## 2022-01-07 PROCEDURE — 99215 OFFICE O/P EST HI 40 MIN: CPT | Performed by: INTERNAL MEDICINE

## 2022-01-07 PROCEDURE — G8417 CALC BMI ABV UP PARAM F/U: HCPCS | Performed by: INTERNAL MEDICINE

## 2022-01-07 PROCEDURE — G8484 FLU IMMUNIZE NO ADMIN: HCPCS | Performed by: INTERNAL MEDICINE

## 2022-01-07 PROCEDURE — G8427 DOCREV CUR MEDS BY ELIG CLIN: HCPCS | Performed by: INTERNAL MEDICINE

## 2022-01-07 PROCEDURE — 99214 OFFICE O/P EST MOD 30 MIN: CPT | Performed by: INTERNAL MEDICINE

## 2022-01-07 PROCEDURE — 3023F SPIROM DOC REV: CPT | Performed by: INTERNAL MEDICINE

## 2022-01-07 PROCEDURE — 3017F COLORECTAL CA SCREEN DOC REV: CPT | Performed by: INTERNAL MEDICINE

## 2022-01-07 PROCEDURE — 1123F ACP DISCUSS/DSCN MKR DOCD: CPT | Performed by: INTERNAL MEDICINE

## 2022-01-07 PROCEDURE — 4040F PNEUMOC VAC/ADMIN/RCVD: CPT | Performed by: INTERNAL MEDICINE

## 2022-01-07 PROCEDURE — 1036F TOBACCO NON-USER: CPT | Performed by: INTERNAL MEDICINE

## 2022-01-07 PROCEDURE — 93000 ELECTROCARDIOGRAM COMPLETE: CPT | Performed by: INTERNAL MEDICINE

## 2022-01-07 RX ORDER — METOLAZONE 2.5 MG/1
2.5 TABLET ORAL DAILY
Qty: 30 TABLET | Refills: 3 | Status: SHIPPED | OUTPATIENT
Start: 2022-01-07 | End: 2022-02-24 | Stop reason: SDUPTHER

## 2022-01-07 RX ORDER — ALBUTEROL SULFATE 90 UG/1
2 AEROSOL, METERED RESPIRATORY (INHALATION) EVERY 6 HOURS PRN
Qty: 1 EACH | Refills: 5 | Status: SHIPPED | OUTPATIENT
Start: 2022-01-07

## 2022-01-07 ASSESSMENT — ENCOUNTER SYMPTOMS
RHINORRHEA: 0
COUGH: 1
GASTROINTESTINAL NEGATIVE: 1
SHORTNESS OF BREATH: 1
SHORTNESS OF BREATH: 1
EYE ITCHING: 0
VOICE CHANGE: 0
WHEEZING: 1
CHEST TIGHTNESS: 0
VOMITING: 0
SORE THROAT: 0
EYES NEGATIVE: 1
STRIDOR: 0
COUGH: 0
NAUSEA: 0
NAUSEA: 0
WHEEZING: 0
BLOOD IN STOOL: 0
CHEST TIGHTNESS: 0
DIARRHEA: 0
ABDOMINAL PAIN: 0

## 2022-01-07 NOTE — PROGRESS NOTES
Subsequent Progress Note  Patient: Sammi Jenkins  YOB: 1955  MRN: [id number]    Chief Complaint: hf LE edema copd garnett   Chief Complaint   Patient presents with    Follow-up     4 month     Congestive Heart Failure       CV Data:  7/2019 echo EF 60  9/2019 spect negative   9/2019 CUS- mild   9/2019 Abd US negative AAA  12/14/2020 RCA SHIRIN EF 60  1/21 PVR negative  1/21 CUS mild     Subjective/HPI: no edema anymore on diuretics. No cp +garnett chronic 3L o2      1/6/2020 still on 3L O2 SUBJECTIVE: till struggles to stop smoke. No cp. No falls no bleed. Takes meds. Walks. 5/19/2020 TELEHEALTH EVALUATION -- Audio/Visual (During LXZVB-35 public health emergency)    Doing well no cp still has sob. Still trying to stop smoke. Uses 3L O2    6/22/2020 TELEHEALTH EVALUATION -- Audio/Visual (During QISUJ-85 public health emergency)    Called in 10 days ago c/o LE Edema. We advised low salt, walk and compression. Edema is now completely resolved. He feels much better. He admits he took lots of salty foods few weeks ago. No cp    7/27/2020  Leg edema was better but now came back again. He recently ran out of couple of his meds to include ACEI and BB.     8/10/2020 leg edema much improved with low salt and Fluid restrict. Now has 1-2+. No cp chronic SOB on O2.     9/11/2020 still has GARNETT. Uses 3L O2. Sate are 80s but feels comfortable at rest.  No CP no bleed no falls. 12/3/2020 no cp . GARNETT is worse. Weak and tired. No bleed. No falls. 12/121/220 feels ok. No cp no sob no falls no bleed. Takes meds. Trying to quit cigs    3/3/21 still SOB using 3L O2. Low stamina no cp no falls no bleed no edema takes meds. Not ilene active. 6/3/21 no cp still uses 3L O2. No falls no bleed. 9/3/21 chronic sob 3.5 L O2. No cp no falls no bleed. Takes meds    1/7/22 on 3L no cp but still SOB. LE edema little worse.     prior 2.5 PPD - stopped 12/2020   Retired -[employer]    EKG: SR 86    Past Medical History: Friends and Family: Not on file    Attends Orthodox Services: Not on file    Active Member of Clubs or Organizations: Not on file    Attends Club or Organization Meetings: Not on file    Marital Status: Not on file   Intimate Partner Violence:     Fear of Current or Ex-Partner: Not on file    Emotionally Abused: Not on file    Physically Abused: Not on file    Sexually Abused: Not on file   Housing Stability:     Unable to Pay for Housing in the Last Year: Not on file    Number of [place] in the Last Year: Not on file    Unstable Housing in the Last Year: Not on file       No Known Allergies    Current Outpatient Medications   Medication Sig Dispense Refill    albuterol sulfate HFA (PROAIR HFA) 108 (90 Base) MCG/ACT inhaler Inhale 2 puffs into the lungs every 6 hours as needed for Wheezing 1 each 5    metOLazone (ZAROXOLYN) 2.5 MG tablet Take 1 tablet by mouth daily 30 tablet 3    ipratropium-albuterol (DUONEB) 0.5-2.5 (3) MG/3ML SOLN nebulizer solution USE 1 VIAL PER NEBULIZER EVERY 4 HOURS 360 mL 0    Respiratory Therapy Supplies MISC POC 3 on 3L on exertion 1 each 0    furosemide (LASIX) 40 MG tablet Take 1.5 tablets by mouth daily 135 tablet 2    Handicap Placard MISC by Does not apply route The patient requires a disability parking placard due to difficulties ambulating long distances. Good 4/19/2021-4/19/2026 1 each 0    ticagrelor (BRILINTA) 60 MG TABS tablet Take 1.5 tablets by mouth 2 times daily 28 tablet 0    atorvastatin (LIPITOR) 20 MG tablet Take 1 tablet by mouth nightly 90 tablet 2    potassium chloride (KLOR-CON M) 10 MEQ extended release tablet Take 1 tablet by mouth 2 times daily (with meals) 60 tablet 5    Handicap Placard MISC by Does not apply route The patient requires a disability parking placard done due to difficulties ambulating long distances.          Good 3/3/2021--3/3/2026 1 each 1    lisinopril (PRINIVIL;ZESTRIL) 10 MG tablet Take 1 tablet by mouth every evening 90 tablet 2    metoprolol tartrate (LOPRESSOR) 25 MG tablet Take 1 tablet by mouth 2 times daily 180 tablet 3    Elastic Bandages & Supports (MEDICAL COMPRESSION SOCKS) MISC Disp knee high compression socks 20-30 mmHg, on daily off nightly 2 each 1    nitroGLYCERIN (NITROSTAT) 0.4 MG SL tablet up to max of 3 total doses. If no relief after 1 dose, call 911. 25 tablet 3    albuterol (PROVENTIL) (2.5 MG/3ML) 0.083% nebulizer solution USE 1 VIAL PER NEBULIZER EVERY 6 HOURS AS NEEDED FOR WHEEZING 360 mL 0    Elastic Bandages & Supports (MEDICAL COMPRESSION STOCKINGS) MISC 2 each by Does not apply route daily On in QAM and off QHS. B/L Knee high, 20-30mmHg 1 each 1    Respiratory Therapy Supplies DREW New CPAP Full face  mask and supplies. Dispense heated tubing and adjust humidity in CPAP due to c/o dry mouth. 1 Device 0    sildenafil (VIAGRA) 100 MG tablet Take 1 tablet by mouth as needed for Erectile Dysfunction LOT [id number] EXP 04/2017 2 tablet 5    guaiFENesin (MUCINEX) 600 MG extended release tablet Take 1 tablet by mouth 2 times daily 10 tablet 0    aspirin EC 81 MG EC tablet Take 1 tablet by mouth daily 30 tablet 3    OXYGEN POC    3 lit via NC     Dx copd 1 Units 0    CPAP Machine MISC by Does not apply route New CPAP with 7 cm with 3 lit O2 bled 1 each 0    Compression Bandages KIT LLE: knee high: 20-30mmhg 2 kit 1     No current facility-administered medications for this visit. Review of Systems:   Review of Systems   Constitutional: Negative. Negative for diaphoresis and fatigue. HENT: Negative. Eyes: Negative. Respiratory: Positive for shortness of breath. Negative for cough, chest tightness, wheezing and stridor. Cardiovascular: Positive for leg swelling. Negative for chest pain and palpitations. Gastrointestinal: Negative. Negative for blood in stool and nausea. Genitourinary: Negative. Musculoskeletal: Negative. Skin: Negative.     Neurological: Negative. Negative for dizziness, syncope, weakness and light-headedness. Hematological: Negative. Psychiatric/Behavioral: Negative. Physical Examination:    /78 (Site: Left Lower Arm, Position: Sitting, Cuff Size: Small Adult)   Pulse 82   Resp 18   Ht 5' 10\" (1.778 m)   Wt 250 lb (113.4 kg) Comment: W/C  BMI 35.87 kg/m²    Physical Exam   Constitutional: He appears healthy. No distress. HENT:   Normal cephalic and Atraumatic   Eyes: Pupils are equal, round, and reactive to light. Neck: Thyroid normal. No JVD present. No neck adenopathy. No thyromegaly present. Cardiovascular: Normal rate and regular rhythm. Exam reveals decreased pulses. Murmur heard. Pulses:       Carotid pulses are on the right side with bruit and on the left side with bruit. Pulmonary/Chest: Effort normal and breath sounds normal. He has no wheezes. He has no rales. He exhibits no tenderness. Abdominal: Soft. Bowel sounds are normal. There is no abdominal tenderness. Musculoskeletal:         General: Edema (Trace ) present. No tenderness. Normal range of motion. Cervical back: Normal range of motion and neck supple. Neurological: He is alert and oriented to person, place, and time. Skin: Skin is warm. No cyanosis. Nails show no clubbing.        LABS:  CBC:   Lab Results   Component Value Date    WBC 9.0 12/15/2020    RBC 5.53 12/15/2020    RBC 5.51 05/29/2012    HGB 17.0 12/15/2020    HCT 51.7 12/15/2020    MCV 93.4 12/15/2020    MCH 30.8 12/15/2020    MCHC 32.9 12/15/2020    RDW 14.4 12/15/2020     12/15/2020    MPV 10.1 10/16/2014     Lipids:  Lab Results   Component Value Date    CHOL 129 07/28/2019    CHOL 140 04/24/2017    CHOL 135 04/21/2016     Lab Results   Component Value Date    TRIG 105 07/28/2019    TRIG 112 04/24/2017    TRIG 69 04/21/2016     Lab Results   Component Value Date    HDL 41 06/11/2021    HDL 45 06/08/2020    HDL 43 07/28/2019     Lab Results   Component Value Date LDLCALC 63 06/11/2021    LDLCALC 78 06/08/2020    LDLCALC 65 07/28/2019     No results found for: LABVLDL, VLDL  Lab Results   Component Value Date    CHOLHDLRATIO 3.0 05/29/2012     CMP:    Lab Results   Component Value Date     06/11/2021    K 3.8 06/11/2021    CL 95 06/11/2021    CO2 30 06/11/2021    BUN 14 06/11/2021    CREATININE 0.95 06/11/2021    GFRAA >60.0 06/11/2021    LABGLOM >60.0 06/11/2021    GLUCOSE 95 12/15/2020    GLUCOSE 91 05/29/2012    PROT 7.4 06/11/2021    LABALBU 4.3 06/11/2021    LABALBU 4.6 05/29/2012    CALCIUM 9.9 06/11/2021    BILITOT 0.9 06/11/2021    ALKPHOS 101 06/11/2021    AST 11 06/11/2021    ALT <5 06/11/2021     BMP:    Lab Results   Component Value Date     06/11/2021    K 3.8 06/11/2021    CL 95 06/11/2021    CO2 30 06/11/2021    BUN 14 06/11/2021    LABALBU 4.3 06/11/2021    LABALBU 4.6 05/29/2012    CREATININE 0.95 06/11/2021    CALCIUM 9.9 06/11/2021    GFRAA >60.0 06/11/2021    LABGLOM >60.0 06/11/2021    GLUCOSE 95 12/15/2020    GLUCOSE 91 05/29/2012     Magnesium:    Lab Results   Component Value Date    MG 2.1 07/28/2019     TSH:  Lab Results   Component Value Date    TSH 0.813 07/28/2019       Patient Active Problem List   Diagnosis    Essential hypertension, benign    Chronic obstructive pulmonary disease (HCC)    Osteoarthritis    Tobacco abuse    Psychophysiological insomnia    JONNY (obstructive sleep apnea)    Hypoxia    Obesity (BMI 30-39. 9)    Acute on chronic diastolic (congestive) heart failure (HCC)    Cor pulmonale (chronic) (HCC)    GARNETT (dyspnea on exertion)    Chronic bronchitis (HCC)    Smoker    Bilateral carotid bruits    Bilateral lower extremity edema    Angina at rest [facility])    Chronic respiratory failure ([facility])       There are no discontinued medications.     Modified Medications    No medications on file       Orders Placed This Encounter   Medications    metOLazone (ZAROXOLYN) 2.5 MG tablet     Sig: Take 1 tablet by mouth

## 2022-01-07 NOTE — PROGRESS NOTES
Subjective:     Maryellen Gosselin is a 74 y.o. male who complains today of:     Chief Complaint   Patient presents with    COPD     4 month f/u       HPI  He is using  3 lit 24 hour day. Dary Dumont took away NIV/trilogy. C/o shortness of breath  with exertion and sometimes at rest.  C/o Wheezing. C/o Cough with thick , he is taking mucinex. C/o shortness of breath with exertion. Occasional Wheezing. No Hemoptysis. No Chest tightness. No Chest pain with radiation  or pleuritic pain. No  leg edema. No orthopnea. No Fever or chills. No Rhinorrhea and postnasal drip. Allergies:  Patient has no known allergies. Past Medical History:   Diagnosis Date    [facility]congestive heart failure) ([facility])     COPD (chronic obstructive pulmonary disease) ([facility])     Hypertension     Lung disease     Osteoarthritis     hands worst    Sleep apnea      Past Surgical History:   Procedure Laterality Date    CHOLECYSTECTOMY      CORONARY ANGIOPLASTY WITH STENT PLACEMENT  2020    DIAGNOSTIC CARDIAC CATH LAB PROCEDURE  2020    PTCA  2020    ROTATOR CUFF REPAIR      right     Family History   Problem Relation Age of Onset    Heart Disease Mother     Other Father      Social History     Socioeconomic History    Marital status: Single     Spouse name: Not on file    Number of children: Not on file    Years of education: Not on file    Highest education level: Not on file   Occupational History    Not on file   Tobacco Use    Smoking status: Former Smoker     Packs/day: 2.00     Years: 49.00     Pack years: 98.00     Types: Cigarettes     Start date: 1971     Quit date: 12/15/2020     Years since quittin.0    Smokeless tobacco: Never Used   Substance and Sexual Activity    Alcohol use:  Yes     Alcohol/week: 0.0 standard drinks     Comment: 12 pk per week    Drug use: No    Sexual activity: Not on file   Other Topics Concern    Not on file   Social History Narrative    Not on file Social Determinants of Health     Financial Resource Strain: Low Risk     Difficulty of Paying Living Expenses: Not hard at all   Food Insecurity: No Food Insecurity    Worried About Running Out of Food in the Last Year: Never true    Adriana of Food in the Last Year: Never true   Transportation Needs:     Lack of Transportation (Medical): Not on file    Lack of Transportation (Non-Medical): Not on file   Physical Activity:     Days of Exercise per Week: Not on file    Minutes of Exercise per Session: Not on file   Stress:     Feeling of Stress : Not on file   Social Connections:     Frequency of Communication with Friends and Family: Not on file    Frequency of Social Gatherings with Friends and Family: Not on file    Attends Confucianist Services: Not on file    Active Member of [address] [place] [address] or Organizations: Not on file    Attends Club or Organization Meetings: Not on file    Marital Status: Not on file   Intimate Partner Violence:     Fear of Current or Ex-Partner: Not on file    Emotionally Abused: Not on file    Physically Abused: Not on file    Sexually Abused: Not on file   Housing Stability:     Unable to Pay for Housing in the Last Year: Not on file    Number of [place] in the Last Year: Not on file    Unstable Housing in the Last Year: Not on file         Review of Systems   Constitutional: Negative for chills, diaphoresis, fatigue and fever. HENT: Negative for congestion, mouth sores, nosebleeds, postnasal drip, rhinorrhea, sneezing, sore throat and voice change. Eyes: Negative for itching and visual disturbance. Respiratory: Positive for cough, shortness of breath and wheezing. Negative for chest tightness. Cardiovascular: Negative. Negative for chest pain, palpitations and leg swelling. Gastrointestinal: Negative for abdominal pain, diarrhea, nausea and vomiting. Genitourinary: Negative for difficulty urinating and hematuria.    Musculoskeletal: Negative for arthralgias, joint swelling and myalgias. Skin: Negative for rash. Allergic/Immunologic: Negative for environmental allergies. Neurological: Negative for dizziness, tremors, weakness and headaches. Psychiatric/Behavioral: Negative for behavioral problems and sleep disturbance.         :     Vitals:    01/07/22 1224   BP: 111/61   Pulse: 81   Temp: 98.2 °F (36.8 °C)   SpO2: 96%   Weight: 250 lb (113.4 kg)   Height: 5' 10\" (1.778 m)     Wt Readings from Last 3 Encounters:   01/07/22 250 lb (113.4 kg)   09/03/21 248 lb (112.5 kg)   09/03/21 246 lb (111.6 kg)         Physical Exam  Constitutional:       Appearance: He is well-developed. He is obese. HENT:      Head: Normocephalic and atraumatic. Nose: Nose normal.   Eyes:      Conjunctiva/sclera: Conjunctivae normal.      Pupils: Pupils are equal, round, and reactive to light. Neck:      Thyroid: No thyromegaly. Vascular: No JVD. Trachea: No tracheal deviation. Cardiovascular:      Rate and Rhythm: Normal rate and regular rhythm. Heart sounds: No murmur heard. No friction rub. No gallop. Pulmonary:      Effort: Pulmonary effort is normal. No respiratory distress. Breath sounds: Normal breath sounds. No wheezing or rales. Comments: diminished Breath sound bilaterally. Chest:      Chest wall: No tenderness. Abdominal:      General: There is no distension. Musculoskeletal:         General: Normal range of motion. Lymphadenopathy:      Cervical: No cervical adenopathy. Skin:     General: Skin is warm and dry. Findings: No rash. Neurological:      Mental Status: He is alert and oriented to person, place, and time. Cranial Nerves: No cranial nerve deficit.    Psychiatric:         Behavior: Behavior normal.         Current Outpatient Medications   Medication Sig Dispense Refill    albuterol sulfate HFA (PROAIR HFA) 108 (90 Base) MCG/ACT inhaler Inhale 2 puffs into the lungs every 6 hours as needed for Wheezing 1 each 5    ipratropium-albuterol (DUONEB) 0.5-2.5 (3) MG/3ML SOLN nebulizer solution USE 1 VIAL PER NEBULIZER EVERY 4 HOURS 360 mL 0    Respiratory Therapy Supplies MIS POC 3 on 3L on exertion 1 each 0    furosemide (LASIX) 40 MG tablet Take 1.5 tablets by mouth daily 135 tablet 2    Handicap Placard MISC by Does not apply route The patient requires a disability parking placard due to difficulties ambulating long distances. Good 4/19/2021-4/19/2026 1 each 0    ticagrelor (BRILINTA) 60 MG TABS tablet Take 1.5 tablets by mouth 2 times daily 28 tablet 0    atorvastatin (LIPITOR) 20 MG tablet Take 1 tablet by mouth nightly 90 tablet 2    potassium chloride (KLOR-CON M) 10 MEQ extended release tablet Take 1 tablet by mouth 2 times daily (with meals) 60 tablet 5    Handicap Placard MISC by Does not apply route The patient requires a disability parking placard done due to difficulties ambulating long distances. Good 3/3/2021--3/3/2026 1 each 1    lisinopril (PRINIVIL;ZESTRIL) 10 MG tablet Take 1 tablet by mouth every evening 90 tablet 2    metoprolol tartrate (LOPRESSOR) 25 MG tablet Take 1 tablet by mouth 2 times daily 180 tablet 3    Elastic Bandages & Supports (MEDICAL COMPRESSION SOCKS) MISC Disp knee high compression socks 20-30 mmHg, on daily off nightly 2 each 1    nitroGLYCERIN (NITROSTAT) 0.4 MG SL tablet up to max of 3 total doses. If no relief after 1 dose, call 911. 25 tablet 3    albuterol (PROVENTIL) (2.5 MG/3ML) 0.083% nebulizer solution USE 1 VIAL PER NEBULIZER EVERY 6 HOURS AS NEEDED FOR WHEEZING 360 mL 0    Elastic Bandages & Supports (MEDICAL COMPRESSION STOCKINGS) MISC 2 each by Does not apply route daily On in QAM and off QHS. B/L Knee high, 20-30mmHg 1 each 1    Respiratory Therapy Supplies DREW New CPAP Full face  mask and supplies. Dispense heated tubing and adjust humidity in CPAP due to c/o dry mouth.  1 Device 0    sildenafil (VIAGRA) 100 MG tablet Take 1 tablet by mouth as needed for Erectile Dysfunction LOT [id number] EXP 04/2017 2 tablet 5    guaiFENesin (MUCINEX) 600 MG extended release tablet Take 1 tablet by mouth 2 times daily 10 tablet 0    aspirin EC 81 MG EC tablet Take 1 tablet by mouth daily 30 tablet 3    OXYGEN POC    3 lit via NC     Dx copd 1 Units 0    Compression Bandages KIT LLE: knee high: 20-30mmhg 2 kit 1    CPAP Machine MISC by Does not apply route New CPAP with 7 cm with 3 lit O2 bled (Patient not taking: Reported on 9/3/2021) 1 each 0     No current facility-administered medications for this visit. Results for orders placed during the hospital encounter of 09/02/21    XR CHEST (2 VW)    Narrative  EXAMINATION: XR CHEST (2 VW)    CLINICAL HISTORY: COPD    COMPARISONS: CT CHEST, JULY 27, 2019, CHEST RADIOGRAPH, SAME DATE    FINDINGS: Osseous structures intact. Cardiopericardial silhouette normal. Pulmonary vasculature normal. Mild blunting costophrenic angles. Ill-defined area increased opacity right lung base. Impression  PLEURAL THICKENING VERSUS SMALL BILATERAL PLEURAL EFFUSIONS. RIGHT LOWER LUNG SUBSEGMENTAL ATELECTASIS/PNEUMONIA. Results for orders placed during the hospital encounter of 10/26/18    XR CHEST STANDARD (2 VW)    Narrative  EXAMINATION: XR CHEST (2 VW)    CLINICAL HISTORY: CHRONIC OBSTRUCTIVE PULMONARY DISEASE    COMPARISONS: None available. FINDINGS: Osseous structures intact. Cardiopericardial silhouette normal. Pulmonary vasculature normal. Lungs clear    Impression  NO ACUTE CARDIOPULMONARY DISEASE  ]  Results for orders placed during the hospital encounter of 07/27/19    XR CHEST PORTABLE    Narrative  EXAMINATION: XR CHEST PORTABLE    CLINICAL HISTORY:  Legs swelling, shortness of breath    COMPARISONS: None available. FINDINGS: There is moderate cardiomegaly. Pulmonary vascularity is prominent. There are coarsened interstitial markings consistent with fibrosis or interstitial edema.  There is blunting of the left costophrenic angle which could represent scarring or  trace effusion. There are atherosclerotic changes of the thoracic aorta. There are degenerative changes in the spine and right shoulder. Impression  CARDIOMEGALY WITH PULMONARY VASCULAR CONGESTION. COARSE INTERSTITIAL MARKINGS WHICH COULD REPRESENT FIBROSIS OR INTERSTITIAL EDEMA. SMALL LEFT PLEURAL EFFUSION VERSUS PLEURAL SCARRING. Assessment/Plan:     1. Chronic obstructive pulmonary disease, unspecified COPD type ([facility]  He is using  3 lit 24 hour day. Karlos Felder took away NIV/trilogy. C/o shortness of breath  with exertion and sometimes at rest.C/o Wheezing. C/o Cough with thick , he is taking mucinex. C/o shortness of breath with exertion. Occasional Wheezing.     - albuterol sulfate HFA (PROAIR HFA) 108 (90 Base) MCG/ACT inhaler; Inhale 2 puffs into the lungs every 6 hours as needed for Wheezing  Dispense: 1 each; Refill: 5    2. Chronic respiratory failure, unspecified whether with hypoxia or hypercapnia (HCC)  He is on 3 L O2 via nasal cannula. He had trilogy but he said they took it away. Will discuss with other DME company to see if he can get noninvasive ventilator. 3. JONNY (obstructive sleep apnea)  He has underlying sleep apnea he had a noninvasive ventilator before but he never got used to it and he said it was taken away by Karlos Felder. Want to change different company. We will call medical services    4. Severe obesity (BMI 35.0-35.9 with comorbidity) ([facility]  He is advised try to lose weight. obesity related risk explained to the patient ,  Current weight:  250 lb (113.4 kg) Lbs. BMI:  Body mass index is 35.87 kg/m². Suggested weight control approaches, including dietary changes , exercise, behavioral modification. Return in about 4 months (around 5/7/2022) for jonny, COPD, chronic respiratory failure.       Bert Rivera MD

## 2022-01-10 RX ORDER — POTASSIUM CHLORIDE 750 MG/1
20 TABLET, EXTENDED RELEASE ORAL 2 TIMES DAILY WITH MEALS
Qty: 56 TABLET | Refills: 0 | Status: SHIPPED | OUTPATIENT
Start: 2022-01-10 | End: 2022-01-21 | Stop reason: SDUPTHER

## 2022-01-15 DIAGNOSIS — J44.9 CHRONIC OBSTRUCTIVE PULMONARY DISEASE, UNSPECIFIED COPD TYPE (HCC): ICD-10-CM

## 2022-01-17 RX ORDER — IPRATROPIUM BROMIDE AND ALBUTEROL SULFATE 2.5; .5 MG/3ML; MG/3ML
SOLUTION RESPIRATORY (INHALATION)
Qty: 360 ML | Refills: 0 | Status: SHIPPED | OUTPATIENT
Start: 2022-01-17 | End: 2022-04-19 | Stop reason: SDUPTHER

## 2022-01-19 ENCOUNTER — HOSPITAL ENCOUNTER (OUTPATIENT)
Dept: LAB | Age: 67
Discharge: HOME | End: 2022-01-19
Payer: MEDICARE

## 2022-01-19 DIAGNOSIS — I50.33 ACUTE ON CHRONIC DIASTOLIC (CONGESTIVE) HEART FAILURE (HCC): ICD-10-CM

## 2022-01-19 LAB
ANION GAP SERPL CALCULATED.3IONS-SCNC: 12 MEQ/L (ref 9–15)
BUN BLDV-MCNC: 26 MG/DL (ref 8–23)
CALCIUM SERPL-MCNC: 9.8 MG/DL (ref 8.5–9.9)
CHLORIDE BLD-SCNC: 91 MEQ/L (ref 95–107)
CO2: 32 MEQ/L (ref 20–31)
CREAT SERPL-MCNC: 1.17 MG/DL (ref 0.7–1.2)
GFR AFRICAN AMERICAN: >60
GFR NON-AFRICAN AMERICAN: >60
GLUCOSE BLD-MCNC: 119 MG/DL (ref 70–99)
HCT VFR BLD CALC: 51.5 % (ref 42–52)
HEMOGLOBIN: 16.7 G/DL (ref 14–18)
MCH RBC QN AUTO: 28.3 PG (ref 27–31.3)
MCHC RBC AUTO-ENTMCNC: 32.5 % (ref 33–37)
MCV RBC AUTO: 87.2 FL (ref 80–100)
PDW BLD-RTO: 17.1 % (ref 11.5–14.5)
PLATELET # BLD: 342 K/UL (ref 130–400)
POTASSIUM SERPL-SCNC: 4.8 MEQ/L (ref 3.4–4.9)
RBC # BLD: 5.9 M/UL (ref 4.7–6.1)
SODIUM BLD-SCNC: 135 MEQ/L (ref 135–144)
WBC # BLD: 9.8 K/UL (ref 4.8–10.8)

## 2022-01-19 PROCEDURE — 80048 BASIC METABOLIC PNL TOTAL CA: CPT

## 2022-01-19 PROCEDURE — 85027 COMPLETE CBC AUTOMATED: CPT

## 2022-01-19 PROCEDURE — 36415 COLL VENOUS BLD VENIPUNCTURE: CPT

## 2022-01-21 ENCOUNTER — OFFICE VISIT (OUTPATIENT)
Dept: CARDIOLOGY CLINIC | Age: 67
End: 2022-01-21
Payer: MEDICARE

## 2022-01-21 VITALS
HEIGHT: 70 IN | HEART RATE: 53 BPM | BODY MASS INDEX: 34.65 KG/M2 | RESPIRATION RATE: 18 BRPM | DIASTOLIC BLOOD PRESSURE: 78 MMHG | OXYGEN SATURATION: 92 % | WEIGHT: 242 LBS | SYSTOLIC BLOOD PRESSURE: 126 MMHG

## 2022-01-21 DIAGNOSIS — R60.0 BILATERAL LOWER EXTREMITY EDEMA: ICD-10-CM

## 2022-01-21 DIAGNOSIS — R06.09 DOE (DYSPNEA ON EXERTION): ICD-10-CM

## 2022-01-21 DIAGNOSIS — J44.9 CHRONIC OBSTRUCTIVE PULMONARY DISEASE, UNSPECIFIED COPD TYPE (HCC): ICD-10-CM

## 2022-01-21 DIAGNOSIS — J42 CHRONIC BRONCHITIS, UNSPECIFIED CHRONIC BRONCHITIS TYPE (HCC): ICD-10-CM

## 2022-01-21 DIAGNOSIS — I27.81 COR PULMONALE (CHRONIC) (HCC): ICD-10-CM

## 2022-01-21 DIAGNOSIS — I10 ESSENTIAL HYPERTENSION: ICD-10-CM

## 2022-01-21 DIAGNOSIS — I25.10 CORONARY ARTERY DISEASE INVOLVING NATIVE CORONARY ARTERY OF NATIVE HEART WITHOUT ANGINA PECTORIS: ICD-10-CM

## 2022-01-21 DIAGNOSIS — I10 ESSENTIAL HYPERTENSION, BENIGN: ICD-10-CM

## 2022-01-21 DIAGNOSIS — I50.33 ACUTE ON CHRONIC DIASTOLIC (CONGESTIVE) HEART FAILURE (HCC): Primary | ICD-10-CM

## 2022-01-21 DIAGNOSIS — R09.89 BILATERAL CAROTID BRUITS: ICD-10-CM

## 2022-01-21 DIAGNOSIS — I20.8 ANGINA AT REST (HCC): ICD-10-CM

## 2022-01-21 PROCEDURE — 99214 OFFICE O/P EST MOD 30 MIN: CPT | Performed by: INTERNAL MEDICINE

## 2022-01-21 PROCEDURE — 4040F PNEUMOC VAC/ADMIN/RCVD: CPT | Performed by: INTERNAL MEDICINE

## 2022-01-21 PROCEDURE — 3023F SPIROM DOC REV: CPT | Performed by: INTERNAL MEDICINE

## 2022-01-21 PROCEDURE — 3017F COLORECTAL CA SCREEN DOC REV: CPT | Performed by: INTERNAL MEDICINE

## 2022-01-21 PROCEDURE — 1123F ACP DISCUSS/DSCN MKR DOCD: CPT | Performed by: INTERNAL MEDICINE

## 2022-01-21 PROCEDURE — 1036F TOBACCO NON-USER: CPT | Performed by: INTERNAL MEDICINE

## 2022-01-21 PROCEDURE — G8427 DOCREV CUR MEDS BY ELIG CLIN: HCPCS | Performed by: INTERNAL MEDICINE

## 2022-01-21 PROCEDURE — G8417 CALC BMI ABV UP PARAM F/U: HCPCS | Performed by: INTERNAL MEDICINE

## 2022-01-21 PROCEDURE — G8484 FLU IMMUNIZE NO ADMIN: HCPCS | Performed by: INTERNAL MEDICINE

## 2022-01-21 RX ORDER — POTASSIUM CHLORIDE 750 MG/1
10 TABLET, EXTENDED RELEASE ORAL 2 TIMES DAILY
Qty: 56 TABLET | Refills: 3
Start: 2022-01-21 | End: 2022-02-24 | Stop reason: SDUPTHER

## 2022-01-21 ASSESSMENT — ENCOUNTER SYMPTOMS
WHEEZING: 0
EYES NEGATIVE: 1
SHORTNESS OF BREATH: 1
STRIDOR: 0
BLOOD IN STOOL: 0
CHEST TIGHTNESS: 0
NAUSEA: 0
GASTROINTESTINAL NEGATIVE: 1
COUGH: 0

## 2022-01-21 NOTE — PROGRESS NOTES
Subsequent Progress Note  Patient: Sid Gallagher  YOB: 1955  MRN: [id number]    Chief Complaint: hf LE edema copd garnett   Chief Complaint   Patient presents with    2 Week Follow-Up     w labs    Congestive Heart Failure       CV Data:  7/2019 echo EF 60  9/2019 spect negative   9/2019 CUS- mild   9/2019 Abd US negative AAA  12/14/2020 RCA SHIRIN EF 60  1/21 PVR negative  1/21 CUS mild     Subjective/HPI: no edema anymore on diuretics. No cp +garnett chronic 3L o2      1/6/2020 still on 3L O2 SUBJECTIVE: till struggles to stop smoke. No cp. No falls no bleed. Takes meds. Walks. 5/19/2020 TELEHEALTH EVALUATION -- Audio/Visual (During RVNIP-23 public health emergency)    Doing well no cp still has sob. Still trying to stop smoke. Uses 3L O2    6/22/2020 TELEHEALTH EVALUATION -- Audio/Visual (During KUWST-86 public health emergency)    Called in 10 days ago c/o LE Edema. We advised low salt, walk and compression. Edema is now completely resolved. He feels much better. He admits he took lots of salty foods few weeks ago. No cp    7/27/2020  Leg edema was better but now came back again. He recently ran out of couple of his meds to include ACEI and BB.     8/10/2020 leg edema much improved with low salt and Fluid restrict. Now has 1-2+. No cp chronic SOB on O2.     9/11/2020 still has GARNETT. Uses 3L O2. Sate are 80s but feels comfortable at rest.  No CP no bleed no falls. 12/3/2020 no cp . GARNETT is worse. Weak and tired. No bleed. No falls. 12/121/220 feels ok. No cp no sob no falls no bleed. Takes meds. Trying to quit cigs    3/3/21 still SOB using 3L O2. Low stamina no cp no falls no bleed no edema takes meds. Not ilene active. 6/3/21 no cp still uses 3L O2. No falls no bleed. 9/3/21 chronic sob 3.5 L O2. No cp no falls no bleed. Takes meds    1/7/22 on 3L no cp but still SOB. LE edema little worse.    1/21/22 lost 8Lbs in water. Feels little bettr.  Skin is very dry    prior 2.5 PPD - stopped 2020   Retired -[employer]    EKG: SR 80    Past Medical History:   Diagnosis Date    [facility]congestive heart failure) ([facility]     COPD (chronic obstructive pulmonary disease) ([address]     Hypertension     Lung disease     Osteoarthritis     hands worst    Sleep apnea        Past Surgical History:   Procedure Laterality Date    CHOLECYSTECTOMY      CORONARY ANGIOPLASTY WITH STENT PLACEMENT  2020    DIAGNOSTIC CARDIAC CATH LAB PROCEDURE  2020    PTCA  2020    ROTATOR CUFF REPAIR      right       Family History   Problem Relation Age of Onset    Heart Disease Mother     Other Father        Social History     Socioeconomic History    Marital status: Single     Spouse name: None    Number of children: None    Years of education: None    Highest education level: None   Occupational History    None   Tobacco Use    Smoking status: Former Smoker     Packs/day: 2.00     Years: 49.00     Pack years: 98.00     Types: Cigarettes     Start date: 1971     Quit date: 12/15/2020     Years since quittin.1    Smokeless tobacco: Never Used   Substance and Sexual Activity    Alcohol use: Yes     Alcohol/week: 0.0 standard drinks     Comment: 12 pk per week    Drug use: No    Sexual activity: None   Other Topics Concern    None   Social History Narrative    None     Social Determinants of Health     Financial Resource Strain: Low Risk     Difficulty of Paying Living Expenses: Not hard at all   Food Insecurity: No Food Insecurity    Worried About Running Out of Food in the Last Year: Never true    Adriana of Food in the Last Year: Never true   Transportation Needs:     Lack of Transportation (Medical): Not on file    Lack of Transportation (Non-Medical):  Not on file   Physical Activity:     Days of Exercise per Week: Not on file    Minutes of Exercise per Session: Not on file   Stress:     Feeling of Stress : Not on file   Social Connections:     Frequency of Communication with Friends and Family: Not on file    Frequency of Social Gatherings with Friends and Family: Not on file    Attends Sabianism Services: Not on file    Active Member of Clubs or Organizations: Not on file    Attends Club or Organization Meetings: Not on file    Marital Status: Not on file   Intimate Partner Violence:     Fear of Current or Ex-Partner: Not on file    Emotionally Abused: Not on file    Physically Abused: Not on file    Sexually Abused: Not on file   Housing Stability:     Unable to Pay for Housing in the Last Year: Not on file    Number of [place] in the Last Year: Not on file    Unstable Housing in the Last Year: Not on file       No Known Allergies    Current Outpatient Medications   Medication Sig Dispense Refill    potassium chloride (KLOR-CON M) 10 MEQ extended release tablet Take 1 tablet by mouth 2 times daily For two weeks while on the additional metolazone 2.5mg 56 tablet 3    ipratropium-albuterol (DUONEB) 0.5-2.5 (3) MG/3ML SOLN nebulizer solution USE 1 VIAL PER NEBULIZER EVERY 4 HOURS 360 mL 0    albuterol sulfate HFA (PROAIR HFA) 108 (90 Base) MCG/ACT inhaler Inhale 2 puffs into the lungs every 6 hours as needed for Wheezing 1 each 5    metOLazone (ZAROXOLYN) 2.5 MG tablet Take 1 tablet by mouth daily 30 tablet 3    Respiratory Therapy Supplies MISC POC 3 on 3L on exertion 1 each 0    furosemide (LASIX) 40 MG tablet Take 1.5 tablets by mouth daily 135 tablet 2    Handicap Placard MISC by Does not apply route The patient requires a disability parking placard due to difficulties ambulating long distances. Good 4/19/2021-4/19/2026 1 each 0    ticagrelor (BRILINTA) 60 MG TABS tablet Take 1.5 tablets by mouth 2 times daily 28 tablet 0    atorvastatin (LIPITOR) 20 MG tablet Take 1 tablet by mouth nightly 90 tablet 2    Handicap Placard MISC by Does not apply route The patient requires a disability parking placard done due to difficulties ambulating long distances. Good 3/3/2021--3/3/2026 1 each 1    lisinopril (PRINIVIL;ZESTRIL) 10 MG tablet Take 1 tablet by mouth every evening 90 tablet 2    metoprolol tartrate (LOPRESSOR) 25 MG tablet Take 1 tablet by mouth 2 times daily 180 tablet 3    Elastic Bandages & Supports (MEDICAL COMPRESSION SOCKS) MISC Disp knee high compression socks 20-30 mmHg, on daily off nightly 2 each 1    nitroGLYCERIN (NITROSTAT) 0.4 MG SL tablet up to max of 3 total doses. If no relief after 1 dose, call 911. 25 tablet 3    albuterol (PROVENTIL) (2.5 MG/3ML) 0.083% nebulizer solution USE 1 VIAL PER NEBULIZER EVERY 6 HOURS AS NEEDED FOR WHEEZING 360 mL 0    Elastic Bandages & Supports (MEDICAL COMPRESSION STOCKINGS) MISC 2 each by Does not apply route daily On in QAM and off QHS. B/L Knee high, 20-30mmHg 1 each 1    Respiratory Therapy Supplies DREW New CPAP Full face  mask and supplies. Dispense heated tubing and adjust humidity in CPAP due to c/o dry mouth. 1 Device 0    sildenafil (VIAGRA) 100 MG tablet Take 1 tablet by mouth as needed for Erectile Dysfunction LOT [id number] EXP 04/2017 2 tablet 5    guaiFENesin (MUCINEX) 600 MG extended release tablet Take 1 tablet by mouth 2 times daily 10 tablet 0    aspirin EC 81 MG EC tablet Take 1 tablet by mouth daily 30 tablet 3    OXYGEN POC    3 lit via NC     Dx copd 1 Units 0    CPAP Machine MISC by Does not apply route New CPAP with 7 cm with 3 lit O2 bled 1 each 0    Compression Bandages KIT LLE: knee high: 20-30mmhg 2 kit 1     No current facility-administered medications for this visit. Review of Systems:   Review of Systems   Constitutional: Negative. Negative for diaphoresis and fatigue. HENT: Negative. Eyes: Negative. Respiratory: Positive for shortness of breath. Negative for cough, chest tightness, wheezing and stridor. Cardiovascular: Positive for leg swelling. Negative for chest pain and palpitations. Gastrointestinal: Negative.   Negative for blood in stool and nausea. Genitourinary: Negative. Musculoskeletal: Negative. Skin: Negative. Neurological: Negative. Negative for dizziness, syncope, weakness and light-headedness. Hematological: Negative. Psychiatric/Behavioral: Negative. Physical Examination:    /78 (Site: Left Upper Arm, Position: Sitting, Cuff Size: Medium Adult)   Pulse 53   Resp 18   Ht 5' 10\" (1.778 m)   Wt 242 lb (109.8 kg)   SpO2 92%   BMI 34.72 kg/m²    Physical Exam   Constitutional: He appears healthy. No distress. HENT:   Normal cephalic and Atraumatic   Eyes: Pupils are equal, round, and reactive to light. Neck: Thyroid normal. No JVD present. No neck adenopathy. No thyromegaly present. Cardiovascular: Normal rate and regular rhythm. Exam reveals decreased pulses. Murmur heard. Pulses:       Carotid pulses are on the right side with bruit and on the left side with bruit. Pulmonary/Chest: Effort normal and breath sounds normal. He has no wheezes. He has no rales. He exhibits no tenderness. Abdominal: Soft. Bowel sounds are normal. There is no abdominal tenderness. Musculoskeletal:         General: Edema (Trace ) present. No tenderness. Normal range of motion. Cervical back: Normal range of motion and neck supple. Neurological: He is alert and oriented to person, place, and time. Skin: Skin is warm. No cyanosis. Nails show no clubbing.        LABS:  CBC:   Lab Results   Component Value Date    WBC 9.8 01/19/2022    RBC 5.90 01/19/2022    RBC 5.51 05/29/2012    HGB 16.7 01/19/2022    HCT 51.5 01/19/2022    MCV 87.2 01/19/2022    MCH 28.3 01/19/2022    MCHC 32.5 01/19/2022    RDW 17.1 01/19/2022     01/19/2022    MPV 10.1 10/16/2014     Lipids:  Lab Results   Component Value Date    CHOL 129 07/28/2019    CHOL 140 04/24/2017    CHOL 135 04/21/2016     Lab Results   Component Value Date    TRIG 105 07/28/2019    TRIG 112 04/24/2017    TRIG 69 04/21/2016     Lab Results   Component Value Date    HDL 41 06/11/2021    HDL 45 06/08/2020    HDL 43 07/28/2019     Lab Results   Component Value Date    LDLCALC 63 06/11/2021    LDLCALC 78 06/08/2020    LDLCALC 65 07/28/2019     No results found for: LABVLDL, VLDL  Lab Results   Component Value Date    CHOLHDLRATIO 3.0 05/29/2012     CMP:    Lab Results   Component Value Date     01/19/2022    K 4.8 01/19/2022    CL 91 01/19/2022    CO2 32 01/19/2022    BUN 26 01/19/2022    CREATININE 1.17 01/19/2022    GFRAA >60.0 01/19/2022    LABGLOM >60.0 01/19/2022    GLUCOSE 119 01/19/2022    GLUCOSE 91 05/29/2012    PROT 7.4 06/11/2021    LABALBU 4.3 06/11/2021    LABALBU 4.6 05/29/2012    CALCIUM 9.8 01/19/2022    BILITOT 0.9 06/11/2021    ALKPHOS 101 06/11/2021    AST 11 06/11/2021    ALT <5 06/11/2021     BMP:    Lab Results   Component Value Date     01/19/2022    K 4.8 01/19/2022    CL 91 01/19/2022    CO2 32 01/19/2022    BUN 26 01/19/2022    LABALBU 4.3 06/11/2021    LABALBU 4.6 05/29/2012    CREATININE 1.17 01/19/2022    CALCIUM 9.8 01/19/2022    GFRAA >60.0 01/19/2022    LABGLOM >60.0 01/19/2022    GLUCOSE 119 01/19/2022    GLUCOSE 91 05/29/2012     Magnesium:    Lab Results   Component Value Date    MG 2.1 07/28/2019     TSH:  Lab Results   Component Value Date    TSH 0.813 07/28/2019       Patient Active Problem List   Diagnosis    Essential hypertension, benign    Chronic obstructive pulmonary disease (HCC)    Osteoarthritis    Tobacco abuse    Psychophysiological insomnia    JONNY (obstructive sleep apnea)    Hypoxia    Obesity (BMI 30-39. 9)    Acute on chronic diastolic (congestive) heart failure (HCC)    Cor pulmonale (chronic) (HCC)    GARNETT (dyspnea on exertion)    Chronic bronchitis (HCC)    Smoker    Bilateral carotid bruits    Bilateral lower extremity edema    Angina at rest [facility])    Chronic respiratory failure (HCC)       Medications Discontinued During This Encounter   Medication Reason    potassium chloride (KLOR-CON M) 10 MEQ extended release tablet REORDER       Modified Medications    Modified Medication Previous Medication    POTASSIUM CHLORIDE (KLOR-CON M) 10 MEQ EXTENDED RELEASE TABLET potassium chloride (KLOR-CON M) 10 MEQ extended release tablet       Take 1 tablet by mouth 2 times daily For two weeks while on the additional metolazone 2.5mg    Take 2 tablets by mouth 2 times daily (with meals) For two weeks while on the additional metolazone 2.5mg       Orders Placed This Encounter   Medications    potassium chloride (KLOR-CON M) 10 MEQ extended release tablet     Sig: Take 1 tablet by mouth 2 times daily For two weeks while on the additional metolazone 2.5mg     Dispense:  56 tablet     Refill:  3       Assessment/Plan:    1. Essential hypertension, benign - stable. continue meds. Low salt diet. 2. Acute on chronic diastolic (congestive) heart failure (HCC)  decompensated. - continue diuretics. counseled on low salt and fluid restriction. Add Metolazone 2.5 qd. Double KCL while doing this. RTO 2 weeks w labs. If no better will consider cath. -- Labs reviewed. K elevated. Lower K to 10 bid. Continue Metolazone qd and Lasix. Labd prior to    3. Cor pulmonale (chronic) - chronic    4. Shortness of breath  No worse- on O2 3.5 L. Recently stopped smoking. Continue to stay off smoking. 6. Carotid Bruits- CUS - stable. Will continue surveillance     7. Dry skin- see Dermatology. Counseling:  Heart Healthy Lifestyle, Stop Smoking, Low Salt Diet, Take Precautions to Prevent Falls, Regular Exercise and Walk Daily    Return in about 1 month (around 2/21/2022).       Electronically signed by Micki Oneil MD on 1/21/2022 at 12:48 PM

## 2022-01-24 ENCOUNTER — TELEPHONE (OUTPATIENT)
Dept: FAMILY MEDICINE CLINIC | Age: 67
End: 2022-01-24

## 2022-01-24 NOTE — TELEPHONE ENCOUNTER
----- [facility] sent at 1/19/2022  8:27 AM EST -----  Subject: Message to Provider    QUESTIONS  Information for Provider? Laura Castillo inquiring on lab and hrs and   fasting for labs ordered (if required) Please call patient ASAP  ---------------------------------------------------------------------------  --------------  [address]  What is the best way for the office to contact you? OK to leave message on   voicemail  Preferred Call Back Phone Number? [phone]  ---------------------------------------------------------------------------  --------------  SCRIPT ANSWERS  Relationship to Patient?  Self

## 2022-02-23 ENCOUNTER — HOSPITAL ENCOUNTER (OUTPATIENT)
Dept: LAB | Age: 67
Discharge: HOME | End: 2022-02-23
Payer: MEDICARE

## 2022-02-23 DIAGNOSIS — I10 ESSENTIAL HYPERTENSION: ICD-10-CM

## 2022-02-23 LAB
ANION GAP SERPL CALCULATED.3IONS-SCNC: 6 MEQ/L (ref 9–15)
BUN BLDV-MCNC: 33 MG/DL (ref 8–23)
CALCIUM SERPL-MCNC: 9.9 MG/DL (ref 8.5–9.9)
CHLORIDE BLD-SCNC: 95 MEQ/L (ref 95–107)
CO2: 35 MEQ/L (ref 20–31)
CREAT SERPL-MCNC: 1.27 MG/DL (ref 0.7–1.2)
GFR AFRICAN AMERICAN: >60
GFR NON-AFRICAN AMERICAN: 56.3
GLUCOSE BLD-MCNC: 82 MG/DL (ref 70–99)
POTASSIUM SERPL-SCNC: 4.4 MEQ/L (ref 3.4–4.9)
SODIUM BLD-SCNC: 136 MEQ/L (ref 135–144)

## 2022-02-23 PROCEDURE — 36415 COLL VENOUS BLD VENIPUNCTURE: CPT

## 2022-02-23 PROCEDURE — 80048 BASIC METABOLIC PNL TOTAL CA: CPT

## 2022-02-24 ENCOUNTER — OFFICE VISIT (OUTPATIENT)
Dept: CARDIOLOGY CLINIC | Age: 67
End: 2022-02-24
Payer: MEDICARE

## 2022-02-24 VITALS
OXYGEN SATURATION: 97 % | BODY MASS INDEX: 34.72 KG/M2 | DIASTOLIC BLOOD PRESSURE: 78 MMHG | SYSTOLIC BLOOD PRESSURE: 122 MMHG | HEIGHT: 70 IN | HEART RATE: 68 BPM | RESPIRATION RATE: 17 BRPM

## 2022-02-24 DIAGNOSIS — I27.81 COR PULMONALE (CHRONIC) (HCC): ICD-10-CM

## 2022-02-24 DIAGNOSIS — J44.9 CHRONIC OBSTRUCTIVE PULMONARY DISEASE, UNSPECIFIED COPD TYPE (HCC): ICD-10-CM

## 2022-02-24 DIAGNOSIS — R06.09 DOE (DYSPNEA ON EXERTION): ICD-10-CM

## 2022-02-24 DIAGNOSIS — J42 CHRONIC BRONCHITIS, UNSPECIFIED CHRONIC BRONCHITIS TYPE (HCC): ICD-10-CM

## 2022-02-24 DIAGNOSIS — R09.89 BILATERAL CAROTID BRUITS: ICD-10-CM

## 2022-02-24 DIAGNOSIS — I25.10 CORONARY ARTERY DISEASE INVOLVING NATIVE CORONARY ARTERY OF NATIVE HEART WITHOUT ANGINA PECTORIS: ICD-10-CM

## 2022-02-24 DIAGNOSIS — R60.0 BILATERAL LOWER EXTREMITY EDEMA: ICD-10-CM

## 2022-02-24 DIAGNOSIS — I10 ESSENTIAL HYPERTENSION: ICD-10-CM

## 2022-02-24 DIAGNOSIS — I20.8 ANGINA AT REST (HCC): ICD-10-CM

## 2022-02-24 DIAGNOSIS — I10 ESSENTIAL HYPERTENSION, BENIGN: ICD-10-CM

## 2022-02-24 DIAGNOSIS — I50.33 ACUTE ON CHRONIC DIASTOLIC (CONGESTIVE) HEART FAILURE (HCC): Primary | ICD-10-CM

## 2022-02-24 PROCEDURE — 4040F PNEUMOC VAC/ADMIN/RCVD: CPT | Performed by: INTERNAL MEDICINE

## 2022-02-24 PROCEDURE — 99214 OFFICE O/P EST MOD 30 MIN: CPT | Performed by: INTERNAL MEDICINE

## 2022-02-24 PROCEDURE — 3017F COLORECTAL CA SCREEN DOC REV: CPT | Performed by: INTERNAL MEDICINE

## 2022-02-24 PROCEDURE — 3023F SPIROM DOC REV: CPT | Performed by: INTERNAL MEDICINE

## 2022-02-24 PROCEDURE — G8484 FLU IMMUNIZE NO ADMIN: HCPCS | Performed by: INTERNAL MEDICINE

## 2022-02-24 PROCEDURE — G8427 DOCREV CUR MEDS BY ELIG CLIN: HCPCS | Performed by: INTERNAL MEDICINE

## 2022-02-24 PROCEDURE — 1036F TOBACCO NON-USER: CPT | Performed by: INTERNAL MEDICINE

## 2022-02-24 PROCEDURE — G8417 CALC BMI ABV UP PARAM F/U: HCPCS | Performed by: INTERNAL MEDICINE

## 2022-02-24 PROCEDURE — 1123F ACP DISCUSS/DSCN MKR DOCD: CPT | Performed by: INTERNAL MEDICINE

## 2022-02-24 RX ORDER — FUROSEMIDE 40 MG/1
60 TABLET ORAL DAILY
Qty: 135 TABLET | Refills: 3 | Status: SHIPPED | OUTPATIENT
Start: 2022-02-24

## 2022-02-24 RX ORDER — METOLAZONE 2.5 MG/1
2.5 TABLET ORAL DAILY
Qty: 30 TABLET | Refills: 3 | Status: SHIPPED | OUTPATIENT
Start: 2022-02-24 | End: 2022-05-20

## 2022-02-24 RX ORDER — POTASSIUM CHLORIDE 750 MG/1
10 TABLET, EXTENDED RELEASE ORAL 2 TIMES DAILY
Qty: 180 TABLET | Refills: 3 | Status: SHIPPED | OUTPATIENT
Start: 2022-02-24

## 2022-02-24 RX ORDER — LISINOPRIL 10 MG/1
10 TABLET ORAL EVERY EVENING
Qty: 90 TABLET | Refills: 3 | Status: SHIPPED | OUTPATIENT
Start: 2022-02-24

## 2022-02-24 RX ORDER — ATORVASTATIN CALCIUM 20 MG/1
20 TABLET, FILM COATED ORAL NIGHTLY
Qty: 90 TABLET | Refills: 2 | Status: SHIPPED | OUTPATIENT
Start: 2022-02-24

## 2022-02-24 ASSESSMENT — ENCOUNTER SYMPTOMS
GASTROINTESTINAL NEGATIVE: 1
NAUSEA: 0
EYES NEGATIVE: 1
COUGH: 0
CHEST TIGHTNESS: 0
SHORTNESS OF BREATH: 1
WHEEZING: 0
BLOOD IN STOOL: 0
STRIDOR: 0

## 2022-02-24 NOTE — PROGRESS NOTES
Subsequent Progress Note  Patient: Bib Varner  YOB: 1955  MRN: [id number]    Chief Complaint: hf LE edema copd garnett   Chief Complaint   Patient presents with    Follow-up    Congestive Heart Failure       CV Data:  7/2019 echo EF 60  9/2019 spect negative   9/2019 CUS- mild   9/2019 Abd US negative AAA  12/14/2020 RCA SHIRIN EF 60  1/21 PVR negative  1/21 CUS mild     Subjective/HPI: no edema anymore on diuretics. No cp +garnett chronic 3L o2      1/6/2020 still on 3L O2 SUBJECTIVE: till struggles to stop smoke. No cp. No falls no bleed. Takes meds. Walks. 5/19/2020 TELEHEALTH EVALUATION -- Audio/Visual (During KFNRB-10 public health emergency)    Doing well no cp still has sob. Still trying to stop smoke. Uses 3L O2    6/22/2020 TELEHEALTH EVALUATION -- Audio/Visual (During SBPPB-32 public health emergency)    Called in 10 days ago c/o LE Edema. We advised low salt, walk and compression. Edema is now completely resolved. He feels much better. He admits he took lots of salty foods few weeks ago. No cp    7/27/2020  Leg edema was better but now came back again. He recently ran out of couple of his meds to include ACEI and BB.     8/10/2020 leg edema much improved with low salt and Fluid restrict. Now has 1-2+. No cp chronic SOB on O2.     9/11/2020 still has GARNETT. Uses 3L O2. Sate are 80s but feels comfortable at rest.  No CP no bleed no falls. 12/3/2020 no cp . GARNETT is worse. Weak and tired. No bleed. No falls. 12/121/220 feels ok. No cp no sob no falls no bleed. Takes meds. Trying to quit cigs    3/3/21 still SOB using 3L O2. Low stamina no cp no falls no bleed no edema takes meds. Not ilene active. 6/3/21 no cp still uses 3L O2. No falls no bleed. 9/3/21 chronic sob 3.5 L O2. No cp no falls no bleed. Takes meds    1/7/22 on 3L no cp but still SOB. LE edema little worse.    1/21/22 lost 8Lbs in water. Feels little bettr. Skin is very dry    2/24/22 no cp no worse sob no falls no bleed. Takes meds. 3L O2.    prior 2.5 PPD - stopped 2020   Retired -[employer]    EKG: SR 86    Past Medical History:   Diagnosis Date    [facility]     COPD [facility]chronic obstructive pulmonary disease) [facility]     Hypertension     Lung disease     Osteoarthritis     hands worst    Sleep apnea        Past Surgical History:   Procedure Laterality Date    CHOLECYSTECTOMY      CORONARY ANGIOPLASTY WITH STENT PLACEMENT  2020    DIAGNOSTIC CARDIAC CATH LAB PROCEDURE  2020    PTCA  2020    ROTATOR CUFF REPAIR      right       Family History   Problem Relation Age of Onset    Heart Disease Mother     Other Father        Social History     Socioeconomic History    Marital status: Single     Spouse name: Not on file    Number of children: Not on file    Years of education: Not on file    Highest education level: Not on file   Occupational History    Not on file   Tobacco Use    Smoking status: Former Smoker     Packs/day: 2.00     Years: 49.00     Pack years: 98.00     Types: Cigarettes     Start date: 1971     Quit date: 12/15/2020     Years since quittin.1    Smokeless tobacco: Never Used   Substance and Sexual Activity    Alcohol use: Yes     Alcohol/week: 0.0 standard drinks     Comment: 12 pk per week    Drug use: No    Sexual activity: Not on file   Other Topics Concern    Not on file   Social History Narrative    Not on file     Social Determinants of Health     Financial Resource Strain: Low Risk     Difficulty of Paying Living Expenses: Not hard at all   Food Insecurity: No Food Insecurity    Worried About [address] in the Last Year: Never true    [address]  in the Last Year: Never true   Transportation Needs:     Lack of Transportation (Medical): Not on file    Lack of Transportation (Non-Medical):  Not on file   Physical Activity:     Days of Exercise per Week: Not on file    Minutes of Exercise per Session: Not on file   Stress:  Feeling of Stress : Not on file   Social Connections:     Frequency of Communication with Friends and Family: Not on file    Frequency of Social Gatherings with Friends and Family: Not on file    Attends Rastafari Services: Not on file    Active Member of Clubs or Organizations: Not on file    Attends Club or Organization Meetings: Not on file    Marital Status: Not on file   Intimate Partner Violence:     Fear of Current or Ex-Partner: Not on file    Emotionally Abused: Not on file    Physically Abused: Not on file    Sexually Abused: Not on file   Housing Stability:     Unable to Pay for Housing in the Last Year: Not on file    Number of [place] in the Last Year: Not on file    Unstable Housing in the Last Year: Not on file       No Known Allergies    Current Outpatient Medications   Medication Sig Dispense Refill    atorvastatin (LIPITOR) 20 MG tablet Take 1 tablet by mouth nightly 90 tablet 2    furosemide (LASIX) 40 MG tablet Take 1.5 tablets by mouth daily 135 tablet 3    lisinopril (PRINIVIL;ZESTRIL) 10 MG tablet Take 1 tablet by mouth every evening 90 tablet 3    metOLazone (ZAROXOLYN) 2.5 MG tablet Take 1 tablet by mouth daily 30 tablet 3    metoprolol tartrate (LOPRESSOR) 25 MG tablet Take 1 tablet by mouth 2 times daily 180 tablet 3    potassium chloride (KLOR-CON M) 10 MEQ extended release tablet Take 1 tablet by mouth 2 times daily For two weeks while on the additional metolazone 2.5mg 180 tablet 3    ipratropium-albuterol (DUONEB) 0.5-2.5 (3) MG/3ML SOLN nebulizer solution USE 1 VIAL PER NEBULIZER EVERY 4 HOURS 360 mL 0    albuterol sulfate HFA (PROAIR HFA) 108 (90 Base) MCG/ACT inhaler Inhale 2 puffs into the lungs every 6 hours as needed for Wheezing 1 each 5    Respiratory Therapy Supplies MISC POC 3 on 3L on exertion 1 each 0    Handicap Placard MISC by Does not apply route The patient requires a disability parking placard due to difficulties ambulating long distances. Good 4/19/2021-4/19/2026 1 each 0    Handicap Placard MISC by Does not apply route The patient requires a disability parking placard done due to difficulties ambulating long distances. Good 3/3/2021--3/3/2026 1 each 1    Elastic Bandages & Supports (MEDICAL COMPRESSION SOCKS) MISC Disp knee high compression socks 20-30 mmHg, on daily off nightly 2 each 1    nitroGLYCERIN (NITROSTAT) 0.4 MG SL tablet up to max of 3 total doses. If no relief after 1 dose, call 911. 25 tablet 3    albuterol (PROVENTIL) (2.5 MG/3ML) 0.083% nebulizer solution USE 1 VIAL PER NEBULIZER EVERY 6 HOURS AS NEEDED FOR WHEEZING 360 mL 0    Elastic Bandages & Supports (MEDICAL COMPRESSION STOCKINGS) MISC 2 each by Does not apply route daily On in QAM and off QHS. B/L Knee high, 20-30mmHg 1 each 1    Respiratory Therapy Supplies DREW New CPAP Full face  mask and supplies. Dispense heated tubing and adjust humidity in CPAP due to c/o dry mouth. 1 Device 0    sildenafil (VIAGRA) 100 MG tablet Take 1 tablet by mouth as needed for Erectile Dysfunction LOT [id number] EXP 04/2017 2 tablet 5    guaiFENesin (MUCINEX) 600 MG extended release tablet Take 1 tablet by mouth 2 times daily 10 tablet 0    aspirin EC 81 MG EC tablet Take 1 tablet by mouth daily 30 tablet 3    OXYGEN POC    3 lit via NC     Dx copd 1 Units 0    CPAP Machine MISC by Does not apply route New CPAP with 7 cm with 3 lit O2 bled 1 each 0    Compression Bandages KIT LLE: knee high: 20-30mmhg 2 kit 1     No current facility-administered medications for this visit. Review of Systems:   Review of Systems   Constitutional: Negative. Negative for diaphoresis and fatigue. HENT: Negative. Eyes: Negative. Respiratory: Positive for shortness of breath. Negative for cough, chest tightness, wheezing and stridor. Cardiovascular: Positive for leg swelling. Negative for chest pain and palpitations. Gastrointestinal: Negative.   Negative for blood in stool and nausea. Genitourinary: Negative. Musculoskeletal: Negative. Skin: Negative. Neurological: Negative. Negative for dizziness, syncope, weakness and light-headedness. Hematological: Negative. Psychiatric/Behavioral: Negative. Physical Examination:    /78 (Site: Left Upper Arm, Position: Sitting, Cuff Size: Small Adult)   Pulse 68   Resp 17   Ht 5' 10\" (1.778 m)   SpO2 97% Comment: OXYGEN 3L  BMI 34.72 kg/m²    Physical Exam   Constitutional: He appears healthy. No distress. HENT:   Normal cephalic and Atraumatic   Eyes: Pupils are equal, round, and reactive to light. Neck: Thyroid normal. No JVD present. No neck adenopathy. No thyromegaly present. Cardiovascular: Normal rate and regular rhythm. Exam reveals decreased pulses. Murmur heard. Pulses:       Carotid pulses are on the right side with bruit and on the left side with bruit. Pulmonary/Chest: Effort normal and breath sounds normal. He has no wheezes. He has no rales. He exhibits no tenderness. Abdominal: Soft. Bowel sounds are normal. There is no abdominal tenderness. Musculoskeletal:         General: Edema (Trace ) present. No tenderness. Normal range of motion. Cervical back: Normal range of motion and neck supple. Neurological: He is alert and oriented to person, place, and time. Skin: Skin is warm. No cyanosis. Nails show no clubbing.        LABS:  CBC:   Lab Results   Component Value Date    WBC 9.8 01/19/2022    RBC 5.90 01/19/2022    RBC 5.51 05/29/2012    HGB 16.7 01/19/2022    HCT 51.5 01/19/2022    MCV 87.2 01/19/2022    MCH 28.3 01/19/2022    MCHC 32.5 01/19/2022    RDW 17.1 01/19/2022     01/19/2022    MPV 10.1 10/16/2014     Lipids:  Lab Results   Component Value Date    CHOL 129 07/28/2019    CHOL 140 04/24/2017    CHOL 135 04/21/2016     Lab Results   Component Value Date    TRIG 105 07/28/2019    TRIG 112 04/24/2017    TRIG 69 04/21/2016     Lab Results   Component Value Date    HDL 41 06/11/2021    HDL 45 06/08/2020    HDL 43 07/28/2019     Lab Results   Component Value Date    LDLCALC 63 06/11/2021    LDLCALC 78 06/08/2020    LDLCALC 65 07/28/2019     No results found for: LABVLDL, VLDL  Lab Results   Component Value Date    CHOLHDLRATIO 3.0 05/29/2012     CMP:    Lab Results   Component Value Date     02/23/2022    K 4.4 02/23/2022    CL 95 02/23/2022    CO2 35 02/23/2022    BUN 33 02/23/2022    CREATININE 1.27 02/23/2022    GFRAA >60.0 02/23/2022    LABGLOM 56.3 02/23/2022    GLUCOSE 82 02/23/2022    GLUCOSE 91 05/29/2012    PROT 7.4 06/11/2021    LABALBU 4.3 06/11/2021    LABALBU 4.6 05/29/2012    CALCIUM 9.9 02/23/2022    BILITOT 0.9 06/11/2021    ALKPHOS 101 06/11/2021    AST 11 06/11/2021    ALT <5 06/11/2021     BMP:    Lab Results   Component Value Date     02/23/2022    K 4.4 02/23/2022    CL 95 02/23/2022    CO2 35 02/23/2022    BUN 33 02/23/2022    LABALBU 4.3 06/11/2021    LABALBU 4.6 05/29/2012    CREATININE 1.27 02/23/2022    CALCIUM 9.9 02/23/2022    GFRAA >60.0 02/23/2022    LABGLOM 56.3 02/23/2022    GLUCOSE 82 02/23/2022    GLUCOSE 91 05/29/2012     Magnesium:    Lab Results   Component Value Date    MG 2.1 07/28/2019     TSH:  Lab Results   Component Value Date    TSH 0.813 07/28/2019       Patient Active Problem List   Diagnosis    Essential hypertension, benign    Chronic obstructive pulmonary disease (HCC)    Osteoarthritis    Tobacco abuse    Psychophysiological insomnia    JONNY (obstructive sleep apnea)    Hypoxia    Obesity (BMI 30-39. 9)    Acute on chronic diastolic (congestive) heart failure (HCC)    Cor pulmonale (chronic) (HCC)    GARNETT (dyspnea on exertion)    Chronic bronchitis (HCC)    Smoker    Bilateral carotid bruits    Bilateral lower extremity edema    [facility] at [facility]    Chronic respiratory failure (HCC)       Medications Discontinued During This Encounter   Medication Reason    ticagrelor (BRILINTA) 60 MG TABS tablet     lisinopril (PRINIVIL;ZESTRIL) 10 MG tablet REORDER    metoprolol tartrate (LOPRESSOR) 25 MG tablet REORDER    atorvastatin (LIPITOR) 20 MG tablet REORDER    furosemide (LASIX) 40 MG tablet REORDER    metOLazone (ZAROXOLYN) 2.5 MG tablet REORDER    potassium chloride (KLOR-CON M) 10 MEQ extended release tablet REORDER       Modified Medications    Modified Medication Previous Medication    ATORVASTATIN (LIPITOR) 20 MG TABLET atorvastatin (LIPITOR) 20 MG tablet       Take 1 tablet by mouth nightly    Take 1 tablet by mouth nightly    FUROSEMIDE (LASIX) 40 MG TABLET furosemide (LASIX) 40 MG tablet       Take 1.5 tablets by mouth daily    Take 1.5 tablets by mouth daily    LISINOPRIL (PRINIVIL;ZESTRIL) 10 MG TABLET lisinopril (PRINIVIL;ZESTRIL) 10 MG tablet       Take 1 tablet by mouth every evening    Take 1 tablet by mouth every evening    METOLAZONE (ZAROXOLYN) 2.5 MG TABLET metOLazone (ZAROXOLYN) 2.5 MG tablet       Take 1 tablet by mouth daily    Take 1 tablet by mouth daily    METOPROLOL TARTRATE (LOPRESSOR) 25 MG TABLET metoprolol tartrate (LOPRESSOR) 25 MG tablet       Take 1 tablet by mouth 2 times daily    Take 1 tablet by mouth 2 times daily    POTASSIUM CHLORIDE (KLOR-CON M) 10 MEQ EXTENDED RELEASE TABLET potassium chloride (KLOR-CON M) 10 MEQ extended release tablet       Take 1 tablet by mouth 2 times daily For two weeks while on the additional metolazone 2.5mg    Take 1 tablet by mouth 2 times daily For two weeks while on the additional metolazone 2.5mg       Orders Placed This Encounter   Medications    atorvastatin (LIPITOR) 20 MG tablet     Sig: Take 1 tablet by mouth nightly     Dispense:  90 tablet     Refill:  2    furosemide (LASIX) 40 MG tablet     Sig: Take 1.5 tablets by mouth daily     Dispense:  135 tablet     Refill:  3    lisinopril (PRINIVIL;ZESTRIL) 10 MG tablet     Sig: Take 1 tablet by mouth every evening     Dispense:  90 tablet     Refill:  3    metOLazone (ZAROXOLYN) 2.5 MG tablet     Sig: Take 1 tablet by mouth daily     Dispense:  30 tablet     Refill:  3    metoprolol tartrate (LOPRESSOR) 25 MG tablet     Sig: Take 1 tablet by mouth 2 times daily     Dispense:  180 tablet     Refill:  3    potassium chloride (KLOR-CON M) 10 MEQ extended release tablet     Sig: Take 1 tablet by mouth 2 times daily For two weeks while on the additional metolazone 2.5mg     Dispense:  180 tablet     Refill:  3       Assessment/Plan:    1. Essential hypertension, benign - stable. continue meds. Low salt diet. 2. Acute on chronic diastolic (congestive) heart failure (HCC)  decompensated. - continue diuretics. counseled on low salt and fluid restriction. Add Metolazone 2.5 qd. Double KCL while doing this. RTO 2 weeks w labs. If no better will consider cath. -- Labs reviewed. K elevated. Lower K to 10 bid. Continue Metolazone qd and Lasix. Labs reviewed. Continue same. 3. Cor pulmonale (chronic) - chronic    4. Shortness of breath  No worse- on O2 3.5 L. Recently stopped smoking. Continue to stay off smoking. 6. Carotid Bruits- CUS - stable. Will continue surveillance     7. Dry skin- see Dermatology. Counseling:  Heart Healthy Lifestyle, Stop Smoking, Low Salt Diet, Take Precautions to Prevent Falls, Regular Exercise and Walk Daily    Return in about 3 months (around 5/24/2022).       Electronically signed by Gumaro Hathaway MD on 2/24/2022 at 4:34 PM

## 2022-04-18 DIAGNOSIS — J44.9 CHRONIC OBSTRUCTIVE PULMONARY DISEASE, UNSPECIFIED COPD TYPE (HCC): Primary | ICD-10-CM

## 2022-04-18 NOTE — TELEPHONE ENCOUNTER
Rx requested:  Requested Prescriptions     Pending Prescriptions Disp Refills    ipratropium-albuterol (DUONEB) 0.5-2.5 (3) MG/3ML SOLN nebulizer solution [Pharmacy Med Name: IPRAT-ALBUT 0.5-3(2.5) MG/3 ML] 360 mL 0     Sig: USE 1 VIAL PER NEBULIZER EVERY 4 HOURS       Last Office Visit:   1/7/2022      Next Visit Date:  Future Appointments   Date Time Provider Efrem Arguellesisti   5/9/2022  1:15 PM Nilam Mccoy, [id number] [place] [address]   5/9/2022  2:00 PM Sharon Davison MD [facility]

## 2022-04-18 NOTE — TELEPHONE ENCOUNTER
Please approve or deny this request.    Rx requested:  Requested Prescriptions     Pending Prescriptions Disp Refills    ipratropium-albuterol (DUONEB) 0.5-2.5 (3) MG/3ML SOLN nebulizer solution [Pharmacy Med Name: IPRAT-ALBUT 0.5-3(2.5) MG/3 ML] 360 mL 0     Sig: USE 1 VIAL PER NEBULIZER EVERY 4 HOURS         Last Office Visit:   1/7/2022      Next Visit Date:  Future Appointments   Date Time Provider Efrem Arguellesisti   5/9/2022  1:15 PM Aniket Leggett MD [address] [place] [address]   5/9/2022  2:00 PM Bib Cooper MD [facility]

## 2022-04-19 DIAGNOSIS — J44.9 CHRONIC OBSTRUCTIVE PULMONARY DISEASE, UNSPECIFIED COPD TYPE (HCC): Primary | ICD-10-CM

## 2022-04-19 RX ORDER — IPRATROPIUM BROMIDE AND ALBUTEROL SULFATE 2.5; .5 MG/3ML; MG/3ML
SOLUTION RESPIRATORY (INHALATION)
Qty: 360 ML | Refills: 0 | Status: SHIPPED | OUTPATIENT
Start: 2022-04-19 | End: 2022-05-24

## 2022-04-19 RX ORDER — IPRATROPIUM BROMIDE AND ALBUTEROL SULFATE 2.5; .5 MG/3ML; MG/3ML
SOLUTION RESPIRATORY (INHALATION)
Qty: 360 ML | Refills: 0 | OUTPATIENT
Start: 2022-04-19

## 2022-04-19 NOTE — TELEPHONE ENCOUNTER
Rx requested:  Requested Prescriptions     Pending Prescriptions Disp Refills    ipratropium-albuterol (DUONEB) 0.5-2.5 (3) MG/3ML SOLN nebulizer solution 360 mL 0       Last Office Visit:   1/7/2022      Next Visit Date:  Future Appointments   Date Time Provider Efrem Janice   5/9/2022  1:15 PM Tim Sinclair, [address] [address]   5/9/2022  2:00 PM Severino Kay MD [address] [place] [address]

## 2022-05-09 ENCOUNTER — OFFICE VISIT (OUTPATIENT)
Dept: PULMONOLOGY | Age: 67
End: 2022-05-09
Payer: MEDICARE

## 2022-05-09 ENCOUNTER — OFFICE VISIT (OUTPATIENT)
Dept: CARDIOLOGY CLINIC | Age: 67
End: 2022-05-09
Payer: MEDICARE

## 2022-05-09 VITALS
HEIGHT: 70 IN | HEART RATE: 89 BPM | BODY MASS INDEX: 35.36 KG/M2 | OXYGEN SATURATION: 90 % | TEMPERATURE: 97.8 F | SYSTOLIC BLOOD PRESSURE: 103 MMHG | DIASTOLIC BLOOD PRESSURE: 62 MMHG | WEIGHT: 247 LBS

## 2022-05-09 VITALS
OXYGEN SATURATION: 92 % | BODY MASS INDEX: 35.22 KG/M2 | HEART RATE: 78 BPM | RESPIRATION RATE: 18 BRPM | HEIGHT: 70 IN | WEIGHT: 246 LBS | SYSTOLIC BLOOD PRESSURE: 124 MMHG | DIASTOLIC BLOOD PRESSURE: 72 MMHG

## 2022-05-09 DIAGNOSIS — J96.11 CHRONIC RESPIRATORY FAILURE WITH HYPOXIA AND HYPERCAPNIA (HCC): ICD-10-CM

## 2022-05-09 DIAGNOSIS — R06.09 DOE (DYSPNEA ON EXERTION): ICD-10-CM

## 2022-05-09 DIAGNOSIS — I27.81 COR PULMONALE (CHRONIC) (HCC): ICD-10-CM

## 2022-05-09 DIAGNOSIS — L85.3 DRY SKIN: Primary | ICD-10-CM

## 2022-05-09 DIAGNOSIS — J42 CHRONIC BRONCHITIS, UNSPECIFIED CHRONIC BRONCHITIS TYPE (HCC): ICD-10-CM

## 2022-05-09 DIAGNOSIS — E66.9 OBESITY (BMI 30-39.9): ICD-10-CM

## 2022-05-09 DIAGNOSIS — I10 ESSENTIAL HYPERTENSION, BENIGN: ICD-10-CM

## 2022-05-09 DIAGNOSIS — J96.12 CHRONIC RESPIRATORY FAILURE WITH HYPOXIA AND HYPERCAPNIA (HCC): ICD-10-CM

## 2022-05-09 DIAGNOSIS — I50.33 ACUTE ON CHRONIC DIASTOLIC (CONGESTIVE) HEART FAILURE (HCC): Primary | ICD-10-CM

## 2022-05-09 DIAGNOSIS — J44.9 CHRONIC OBSTRUCTIVE PULMONARY DISEASE, UNSPECIFIED COPD TYPE (HCC): ICD-10-CM

## 2022-05-09 DIAGNOSIS — I25.10 CORONARY ARTERY DISEASE INVOLVING NATIVE CORONARY ARTERY OF NATIVE HEART WITHOUT ANGINA PECTORIS: ICD-10-CM

## 2022-05-09 DIAGNOSIS — G47.33 OSA (OBSTRUCTIVE SLEEP APNEA): ICD-10-CM

## 2022-05-09 DIAGNOSIS — E78.5 DYSLIPIDEMIA: ICD-10-CM

## 2022-05-09 DIAGNOSIS — J44.9 CHRONIC OBSTRUCTIVE PULMONARY DISEASE, UNSPECIFIED COPD TYPE (HCC): Primary | ICD-10-CM

## 2022-05-09 DIAGNOSIS — R09.89 BILATERAL CAROTID BRUITS: ICD-10-CM

## 2022-05-09 PROCEDURE — 1036F TOBACCO NON-USER: CPT | Performed by: INTERNAL MEDICINE

## 2022-05-09 PROCEDURE — 99214 OFFICE O/P EST MOD 30 MIN: CPT | Performed by: INTERNAL MEDICINE

## 2022-05-09 PROCEDURE — 1123F ACP DISCUSS/DSCN MKR DOCD: CPT | Performed by: INTERNAL MEDICINE

## 2022-05-09 PROCEDURE — 3017F COLORECTAL CA SCREEN DOC REV: CPT | Performed by: INTERNAL MEDICINE

## 2022-05-09 PROCEDURE — 4040F PNEUMOC VAC/ADMIN/RCVD: CPT | Performed by: INTERNAL MEDICINE

## 2022-05-09 PROCEDURE — G8427 DOCREV CUR MEDS BY ELIG CLIN: HCPCS | Performed by: INTERNAL MEDICINE

## 2022-05-09 PROCEDURE — 3023F SPIROM DOC REV: CPT | Performed by: INTERNAL MEDICINE

## 2022-05-09 PROCEDURE — G8417 CALC BMI ABV UP PARAM F/U: HCPCS | Performed by: INTERNAL MEDICINE

## 2022-05-09 PROCEDURE — G8428 CUR MEDS NOT DOCUMENT: HCPCS | Performed by: INTERNAL MEDICINE

## 2022-05-09 ASSESSMENT — ENCOUNTER SYMPTOMS
SHORTNESS OF BREATH: 0
RHINORRHEA: 0
COUGH: 0
COUGH: 0
VOICE CHANGE: 0
VOMITING: 0
SORE THROAT: 0
EYE ITCHING: 0
DIARRHEA: 0
EYES NEGATIVE: 1
CHEST TIGHTNESS: 0
ABDOMINAL PAIN: 0
STRIDOR: 0
NAUSEA: 0
WHEEZING: 0
NAUSEA: 0
SHORTNESS OF BREATH: 1
CHEST TIGHTNESS: 0
WHEEZING: 0
GASTROINTESTINAL NEGATIVE: 1
BLOOD IN STOOL: 0

## 2022-05-09 NOTE — PROGRESS NOTES
Subsequent Progress Note  Patient: Kosta Rick  YOB: 1955  MRN: [id number]    Chief Complaint: hf LE edema copd garnett   Chief Complaint   Patient presents with    3 Month Follow-Up    Congestive Heart Failure    Coronary Artery Disease    Hypertension       CV Data:  7/2019 echo EF 60  9/2019 spect negative   9/2019 CUS- mild   9/2019 Abd US negative AAA  12/14/2020 RCA SHIRIN EF 60  1/21 PVR negative  1/21 CUS mild     Subjective/HPI: no edema anymore on diuretics. No cp +garnett chronic 3L o2      1/6/2020 still on 3L O2 SUBJECTIVE: till struggles to stop smoke. No cp. No falls no bleed. Takes meds. Walks. 5/19/2020 TELEHEALTH EVALUATION -- Audio/Visual (During NXKBC-14 public health emergency)    Doing well no cp still has sob. Still trying to stop smoke. Uses 3L O2    6/22/2020 TELEHEALTH EVALUATION -- Audio/Visual (During GASSJ-66 public health emergency)    Called in 10 days ago c/o LE Edema. We advised low salt, walk and compression. Edema is now completely resolved. He feels much better. He admits he took lots of salty foods few weeks ago. No cp    7/27/2020  Leg edema was better but now came back again. He recently ran out of couple of his meds to include ACEI and BB.     8/10/2020 leg edema much improved with low salt and Fluid restrict. Now has 1-2+. No cp chronic SOB on O2.     9/11/2020 still has GARNETT. Uses 3L O2. Sate are 80s but feels comfortable at rest.  No CP no bleed no falls. 12/3/2020 no cp . GARNETT is worse. Weak and tired. No bleed. No falls. 12/121/220 feels ok. No cp no sob no falls no bleed. Takes meds. Trying to quit cigs    3/3/21 still SOB using 3L O2. Low stamina no cp no falls no bleed no edema takes meds. Not ilene active. 6/3/21 no cp still uses 3L O2. No falls no bleed. 9/3/21 chronic sob 3.5 L O2. No cp no falls no bleed. Takes meds    1/7/22 on 3L no cp but still SOB. LE edema little worse.    1/21/22 lost 8Lbs in water. Feels little bettr.  Skin is very dry    22 no cp no worse sob no falls no bleed. Takes meds. 3L O2.    22 doing well on 3L Os. No cp. No new issues. No bleed no falls     prior 2.5 PPD - stopped 2020   Retired -[employer]    EKG: SR 86    Past Medical History:   Diagnosis Date    [facility]     COPD (chronic obstructive pulmonary disease) [facility]     Hypertension     Lung disease     Osteoarthritis     hands worst    Sleep apnea        Past Surgical History:   Procedure Laterality Date    CHOLECYSTECTOMY      CORONARY ANGIOPLASTY WITH STENT PLACEMENT  2020    DIAGNOSTIC CARDIAC CATH LAB PROCEDURE  2020    PTCA  2020    ROTATOR CUFF REPAIR      right       Family History   Problem Relation Age of Onset    Heart Disease Mother     Other Father        Social History     Socioeconomic History    Marital status: Single     Spouse name: None    Number of children: None    Years of education: None    Highest education level: None   Occupational History    None   Tobacco Use    Smoking status: Former Smoker     Packs/day: 2.00     Years: 49.00     Pack years: 98.00     Types: Cigarettes     Start date: 1971     Quit date: 12/15/2020     Years since quittin.3    Smokeless tobacco: Never Used   Substance and Sexual Activity    Alcohol use: Yes     Alcohol/week: 0.0 standard drinks     Comment: 12 pk per week    Drug use: No    Sexual activity: None   Other Topics Concern    None   Social History Narrative    None     Social Determinants of Health     Financial Resource Strain: Low Risk     Difficulty of Paying Living Expenses: Not hard at all   Food Insecurity: No Food Insecurity    Worried About Running Out of Food in the Last Year: Never true    Adriana of Food in the Last Year: Never true   Transportation Needs:     Lack of Transportation (Medical): Not on file    Lack of Transportation (Non-Medical):  Not on file   Physical Activity:     Days of Exercise per Week: Not on file    Minutes of Exercise per Session: Not on file   Stress:     Feeling of Stress : Not on file   Social Connections:     Frequency of Communication with Friends and Family: Not on file    Frequency of Social Gatherings with Friends and Family: Not on file    Attends Druze Services: Not on file    Active Member of [address] [place] [address] or Organizations: Not on file    Attends Club or Organization Meetings: Not on file    Marital Status: Not on file   Intimate Partner Violence:     Fear of Current or Ex-Partner: Not on file    Emotionally Abused: Not on file    Physically Abused: Not on file    Sexually Abused: Not on file   Housing Stability:     Unable to Pay for Housing in the Last Year: Not on file    Number of [place] in the Last Year: Not on file    Unstable Housing in the Last Year: Not on file       No Known Allergies    Current Outpatient Medications   Medication Sig Dispense Refill    ipratropium-albuterol (DUONEB) 0.5-2.5 (3) MG/3ML SOLN nebulizer solution USE 1 VIAL PER NEBULIZER EVERY 4 HOURS 360 mL 0    atorvastatin (LIPITOR) 20 MG tablet Take 1 tablet by mouth nightly 90 tablet 2    furosemide (LASIX) 40 MG tablet Take 1.5 tablets by mouth daily 135 tablet 3    lisinopril (PRINIVIL;ZESTRIL) 10 MG tablet Take 1 tablet by mouth every evening 90 tablet 3    metOLazone (ZAROXOLYN) 2.5 MG tablet Take 1 tablet by mouth daily 30 tablet 3    metoprolol tartrate (LOPRESSOR) 25 MG tablet Take 1 tablet by mouth 2 times daily 180 tablet 3    potassium chloride (KLOR-CON M) 10 MEQ extended release tablet Take 1 tablet by mouth 2 times daily For two weeks while on the additional metolazone 2.5mg 180 tablet 3    albuterol sulfate HFA (PROAIR HFA) 108 (90 Base) MCG/ACT inhaler Inhale 2 puffs into the lungs every 6 hours as needed for Wheezing 1 each 5    Respiratory Therapy Supplies MISC POC 3 on 3L on exertion 1 each 0    Handicap Placard MISC by Does not apply route The patient requires a disability parking placard due to difficulties ambulating long distances. Good 4/19/2021-4/19/2026 1 each 0    Handicap Placard MISC by Does not apply route The patient requires a disability parking placard done due to difficulties ambulating long distances. Good 3/3/2021--3/3/2026 1 each 1    Elastic Bandages & Supports (MEDICAL COMPRESSION SOCKS) MISC Disp knee high compression socks 20-30 mmHg, on daily off nightly 2 each 1    nitroGLYCERIN (NITROSTAT) 0.4 MG SL tablet up to max of 3 total doses. If no relief after 1 dose, call 911. 25 tablet 3    albuterol (PROVENTIL) (2.5 MG/3ML) 0.083% nebulizer solution USE 1 VIAL PER NEBULIZER EVERY 6 HOURS AS NEEDED FOR WHEEZING 360 mL 0    Elastic Bandages & Supports (MEDICAL COMPRESSION STOCKINGS) MISC 2 each by Does not apply route daily On in QAM and off QHS. B/L Knee high, 20-30mmHg 1 each 1    Respiratory Therapy Supplies DREW New CPAP Full face  mask and supplies. Dispense heated tubing and adjust humidity in CPAP due to c/o dry mouth. 1 Device 0    sildenafil (VIAGRA) 100 MG tablet Take 1 tablet by mouth as needed for Erectile Dysfunction LOT [id number] EXP 04/2017 2 tablet 5    guaiFENesin (MUCINEX) 600 MG extended release tablet Take 1 tablet by mouth 2 times daily 10 tablet 0    aspirin EC 81 MG EC tablet Take 1 tablet by mouth daily 30 tablet 3    OXYGEN POC    3 lit via NC     Dx copd 1 Units 0    CPAP Machine MISC by Does not apply route New CPAP with 7 cm with 3 lit O2 bled 1 each 0    Compression Bandages KIT LLE: knee high: 20-30mmhg 2 kit 1     No current facility-administered medications for this visit. Review of Systems:   Review of Systems   Constitutional: Negative. Negative for diaphoresis and fatigue. HENT: Negative. Eyes: Negative. Respiratory: Positive for shortness of breath. Negative for cough, chest tightness, wheezing and stridor. Cardiovascular: Positive for leg swelling.  Negative for chest pain and palpitations. Gastrointestinal: Negative. Negative for blood in stool and nausea. Genitourinary: Negative. Musculoskeletal: Negative. Skin: Negative. Neurological: Negative. Negative for dizziness, syncope, weakness and light-headedness. Hematological: Negative. Psychiatric/Behavioral: Negative. Physical Examination:    /72 (Site: Left Upper Arm, Position: Sitting, Cuff Size: Large Adult)   Pulse 78   Resp 18   Ht 5' 10\" (1.778 m)   Wt 246 lb (111.6 kg)   SpO2 92% Comment: 3L O2  BMI 35.30 kg/m²    Physical Exam   Constitutional: He appears healthy. No distress. HENT:   Normal cephalic and Atraumatic   Eyes: Pupils are equal, round, and reactive to light. Neck: Thyroid normal. No JVD present. No neck adenopathy. No thyromegaly present. Cardiovascular: Normal rate and regular rhythm. Exam reveals decreased pulses. Murmur heard. Pulses:       Carotid pulses are on the right side with bruit and on the left side with bruit. Pulmonary/Chest: Effort normal and breath sounds normal. He has no wheezes. He has no rales. He exhibits no tenderness. Abdominal: Soft. Bowel sounds are normal. There is no abdominal tenderness. Musculoskeletal:         General: Edema (Trace ) present. No tenderness. Normal range of motion. Cervical back: Normal range of motion and neck supple. Neurological: He is alert and oriented to person, place, and time. Skin: Skin is warm. No cyanosis. Nails show no clubbing.        LABS:  CBC:   Lab Results   Component Value Date    WBC 9.8 01/19/2022    RBC 5.90 01/19/2022    RBC 5.51 05/29/2012    HGB 16.7 01/19/2022    HCT 51.5 01/19/2022    MCV 87.2 01/19/2022    MCH 28.3 01/19/2022    MCHC 32.5 01/19/2022    RDW 17.1 01/19/2022     01/19/2022    MPV 10.1 10/16/2014     Lipids:  Lab Results   Component Value Date    CHOL 129 07/28/2019    CHOL 140 04/24/2017    CHOL 135 04/21/2016     Lab Results   Component Value Date    TRIG 105 07/28/2019    TRIG 112 04/24/2017    TRIG 69 04/21/2016     Lab Results   Component Value Date    HDL 41 06/11/2021    HDL 45 06/08/2020    HDL 43 07/28/2019     Lab Results   Component Value Date    LDLCALC 63 06/11/2021    LDLCALC 78 06/08/2020    LDLCALC 65 07/28/2019     No results found for: LABVLDL, VLDL  Lab Results   Component Value Date    CHOLHDLRATIO 3.0 05/29/2012     CMP:    Lab Results   Component Value Date     02/23/2022    K 4.4 02/23/2022    CL 95 02/23/2022    CO2 35 02/23/2022    BUN 33 02/23/2022    CREATININE 1.27 02/23/2022    GFRAA >60.0 02/23/2022    LABGLOM 56.3 02/23/2022    GLUCOSE 82 02/23/2022    GLUCOSE 91 05/29/2012    PROT 7.4 06/11/2021    LABALBU 4.3 06/11/2021    LABALBU 4.6 05/29/2012    CALCIUM 9.9 02/23/2022    BILITOT 0.9 06/11/2021    ALKPHOS 101 06/11/2021    AST 11 06/11/2021    ALT <5 06/11/2021     BMP:    Lab Results   Component Value Date     02/23/2022    K 4.4 02/23/2022    CL 95 02/23/2022    CO2 35 02/23/2022    BUN 33 02/23/2022    LABALBU 4.3 06/11/2021    LABALBU 4.6 05/29/2012    CREATININE 1.27 02/23/2022    CALCIUM 9.9 02/23/2022    GFRAA >60.0 02/23/2022    LABGLOM 56.3 02/23/2022    GLUCOSE 82 02/23/2022    GLUCOSE 91 05/29/2012     Magnesium:    Lab Results   Component Value Date    MG 2.1 07/28/2019     TSH:  Lab Results   Component Value Date    TSH 0.813 07/28/2019       Patient Active Problem List   Diagnosis    Essential hypertension, benign    Chronic obstructive pulmonary disease (HCC)    Osteoarthritis    Tobacco abuse    Psychophysiological insomnia    JONNY (obstructive sleep apnea)    Hypoxia    Obesity (BMI 30-39. 9)    Acute on chronic diastolic (congestive) heart failure (HCC)    Cor pulmonale (chronic) (HCC)    GARNETT (dyspnea on exertion)    Chronic bronchitis (HCC)    Smoker    Bilateral carotid bruits    Bilateral lower extremity edema    Angina at rest [facility])    Chronic respiratory failure ([facility])       There are no discontinued medications. Modified Medications    No medications on file       No orders of the defined types were placed in this encounter. Assessment/Plan:    1. Essential hypertension, benign - stable. continue meds. Low salt diet. 2. Acute on chronic diastolic (congestive) heart failure (HCC)  compensated. - continue diuretics and K supplement. 3. Cor pulmonale (chronic) - chronic    4. Shortness of breath  No worse- on O2 3 L. Recently stopped smoking. Continue to stay off smoking. 6. Carotid Bruits- CUS - stable. Will continue surveillance     7. Dry skin- see Dermatology. Fasting Labs   Counseling:  Heart Healthy Lifestyle, Stop Smoking, Low Salt Diet, Take Precautions to Prevent Falls, Regular Exercise and Walk Daily    Return in about 3 months (around 8/9/2022).       Electronically signed by Judy Quezada MD on 5/9/2022 at 1:16 PM

## 2022-05-09 NOTE — PROGRESS NOTES
Subjective:     Kosta Rick is a 69 y.o. male who complains today of:     Chief Complaint   Patient presents with    Follow-up     4 follow up        HPI  He is using  3 lit 24 hour day. NIV/trilogy at night time for about 4 hours. C/o shortness of breath with exertion and sometimes at rest.C/o Cough with thick mucus, he is taking mucinex. Off and on  Wheezing. No Hemoptysis. No Chest tightness. No Chest pain with radiation  or pleuritic pain. No  leg edema. No orthopnea. No Fever or chills. No Rhinorrhea and postnasal drip. He is on neb with duoneb QID, albuterol HFA prn. Allergies:  Patient has no known allergies. Past Medical History:   Diagnosis Date    [facility]     COPD (chronic obstructive pulmonary disease) ([facility])     Hypertension     Lung disease     Osteoarthritis     hands worst    Sleep apnea      Past Surgical History:   Procedure Laterality Date    CHOLECYSTECTOMY      CORONARY ANGIOPLASTY WITH STENT PLACEMENT  2020    DIAGNOSTIC CARDIAC CATH LAB PROCEDURE  2020    PTCA  2020    ROTATOR CUFF REPAIR      right     Family History   Problem Relation Age of Onset    Heart Disease Mother     Other Father      Social History     Socioeconomic History    Marital status: Single     Spouse name: Not on file    Number of children: Not on file    Years of education: Not on file    Highest education level: Not on file   Occupational History    Not on file   Tobacco Use    Smoking status: Former Smoker     Packs/day: 2.00     Years: 49.00     Pack years: 98.00     Types: Cigarettes     Start date: 1971     Quit date: 12/15/2020     Years since quittin.3    Smokeless tobacco: Never Used   Substance and Sexual Activity    Alcohol use:  Yes     Alcohol/week: 0.0 standard drinks     Comment: 12 pk per week    Drug use: No    Sexual activity: Not on file   Other Topics Concern    Not on file   Social History Narrative  Not on file     Social Determinants of Health     Financial Resource Strain: Low Risk     Difficulty of Paying Living Expenses: Not hard at all   Food Insecurity: No Food Insecurity    Worried About Running Out of Food in the Last Year: Never true    [address] in the Last Year: Never true   Transportation Needs:     Lack of Transportation (Medical): Not on file    Lack of Transportation (Non-Medical): Not on file   Physical Activity:     Days of Exercise per Week: Not on file    Minutes of Exercise per Session: Not on file   Stress:     Feeling of Stress : Not on file   Social Connections:     Frequency of Communication with Friends and Family: Not on file    Frequency of Social Gatherings with Friends and Family: Not on file    Attends Yarsani Services: Not on file    Active Member of [address] [place] [address] or Organizations: Not on file    Attends Club or Organization Meetings: Not on file    Marital Status: Not on file   Intimate Partner Violence:     Fear of Current or Ex-Partner: Not on file    Emotionally Abused: Not on file    Physically Abused: Not on file    Sexually Abused: Not on file   Housing Stability:     Unable to Pay for Housing in the Last Year: Not on file    Number of [place] in the Last Year: Not on file    Unstable Housing in the Last Year: Not on file         Review of Systems   Constitutional: Negative for chills, diaphoresis, fatigue and fever. HENT: Negative for congestion, mouth sores, nosebleeds, postnasal drip, rhinorrhea, sneezing, sore throat and voice change. Eyes: Negative for itching and visual disturbance. Respiratory: Negative for cough, chest tightness, shortness of breath and wheezing. Cardiovascular: Negative. Negative for chest pain, palpitations and leg swelling. Gastrointestinal: Negative for abdominal pain, diarrhea, nausea and vomiting. Genitourinary: Negative for difficulty urinating and hematuria.    Musculoskeletal: Negative for arthralgias, joint swelling and myalgias. Skin: Negative for rash. Allergic/Immunologic: Negative for environmental allergies. Neurological: Negative for dizziness, tremors, weakness and headaches. Psychiatric/Behavioral: Negative for behavioral problems and sleep disturbance.         :     Vitals:    05/09/22 1343   BP: 103/62   Site: Left Upper Arm   Position: Sitting   Cuff Size: Large Adult   Pulse: 89   Temp: 97.8 °F (36.6 °C)   TempSrc: Infrared   SpO2: 90%   Weight: 247 lb (112 kg)   Height: 5' 10\" (1.778 m)     Wt Readings from Last 3 Encounters:   05/09/22 247 lb (112 kg)   05/09/22 246 lb (111.6 kg)   01/21/22 242 lb (109.8 kg)         Physical Exam  Constitutional:       Appearance: He is well-developed. HENT:      Head: Normocephalic and atraumatic. Nose: Nose normal.   Eyes:      Conjunctiva/sclera: Conjunctivae normal.      Pupils: Pupils are equal, round, and reactive to light. Neck:      Thyroid: No thyromegaly. Vascular: No JVD. Trachea: No tracheal deviation. Cardiovascular:      Rate and Rhythm: Normal rate and regular rhythm. Heart sounds: No murmur heard. No friction rub. No gallop. Pulmonary:      Effort: Pulmonary effort is normal. No respiratory distress. Breath sounds: Normal breath sounds. No wheezing or rales. Comments: diminished Breath sound bilaterally. Chest:      Chest wall: No tenderness. Abdominal:      General: There is no distension. Musculoskeletal:         General: Normal range of motion. Lymphadenopathy:      Cervical: No cervical adenopathy. Skin:     General: Skin is warm and dry. Findings: No rash. Neurological:      Mental Status: He is alert and oriented to person, place, and time. Cranial Nerves: No cranial nerve deficit.    Psychiatric:         Behavior: Behavior normal.         Current Outpatient Medications   Medication Sig Dispense Refill    ipratropium-albuterol (DUONEB) 0.5-2.5 (3) MG/3ML SOLN nebulizer solution USE 1 VIAL PER NEBULIZER EVERY 4 HOURS 360 mL 0    atorvastatin (LIPITOR) 20 MG tablet Take 1 tablet by mouth nightly 90 tablet 2    furosemide (LASIX) 40 MG tablet Take 1.5 tablets by mouth daily 135 tablet 3    lisinopril (PRINIVIL;ZESTRIL) 10 MG tablet Take 1 tablet by mouth every evening 90 tablet 3    metOLazone (ZAROXOLYN) 2.5 MG tablet Take 1 tablet by mouth daily 30 tablet 3    metoprolol tartrate (LOPRESSOR) 25 MG tablet Take 1 tablet by mouth 2 times daily 180 tablet 3    potassium chloride (KLOR-CON M) 10 MEQ extended release tablet Take 1 tablet by mouth 2 times daily For two weeks while on the additional metolazone 2.5mg 180 tablet 3    albuterol sulfate HFA (PROAIR HFA) 108 (90 Base) MCG/ACT inhaler Inhale 2 puffs into the lungs every 6 hours as needed for Wheezing 1 each 5    Respiratory Therapy Supplies MISC POC 3 on 3L on exertion 1 each 0    Handicap Placard MISC by Does not apply route The patient requires a disability parking placard due to difficulties ambulating long distances. Good 4/19/2021-4/19/2026 1 each 0    Handicap Placard MISC by Does not apply route The patient requires a disability parking placard done due to difficulties ambulating long distances. Good 3/3/2021--3/3/2026 1 each 1    Elastic Bandages & Supports (MEDICAL COMPRESSION SOCKS) MISC Disp knee high compression socks 20-30 mmHg, on daily off nightly 2 each 1    nitroGLYCERIN (NITROSTAT) 0.4 MG SL tablet up to max of 3 total doses. If no relief after 1 dose, call 911. 25 tablet 3    albuterol (PROVENTIL) (2.5 MG/3ML) 0.083% nebulizer solution USE 1 VIAL PER NEBULIZER EVERY 6 HOURS AS NEEDED FOR WHEEZING 360 mL 0    Elastic Bandages & Supports (MEDICAL COMPRESSION STOCKINGS) MISC 2 each by Does not apply route daily On in QAM and off QHS. B/L Knee high, 20-30mmHg 1 each 1    Respiratory Therapy Supplies DREW New CPAP Full face  mask and supplies.  Dispense heated tubing and adjust humidity in CPAP due to c/o dry mouth. 1 Device 0    guaiFENesin (MUCINEX) 600 MG extended release tablet Take 1 tablet by mouth 2 times daily 10 tablet 0    aspirin EC 81 MG EC tablet Take 1 tablet by mouth daily 30 tablet 3    OXYGEN POC    3 lit via NC     Dx copd 1 Units 0    Compression Bandages KIT LLE: knee high: 20-30mmhg 2 kit 1    sildenafil (VIAGRA) 100 MG tablet Take 1 tablet by mouth as needed for Erectile Dysfunction LOT [id number] EXP 04/2017 (Patient not taking: Reported on 5/9/2022) 2 tablet 5    CPAP Machine MISC by Does not apply route New CPAP with 7 cm with 3 lit O2 bled 1 each 0     No current facility-administered medications for this visit. Results for orders placed during the hospital encounter of 09/02/21    XR CHEST (2 VW)    Narrative  EXAMINATION: XR CHEST (2 VW)    CLINICAL HISTORY: COPD    COMPARISONS: CT CHEST, JULY 27, 2019, CHEST RADIOGRAPH, SAME DATE    FINDINGS: Osseous structures intact. Cardiopericardial silhouette normal. Pulmonary vasculature normal. Mild blunting costophrenic angles. Ill-defined area increased opacity right lung base. Impression  PLEURAL THICKENING VERSUS SMALL BILATERAL PLEURAL EFFUSIONS. RIGHT LOWER LUNG SUBSEGMENTAL ATELECTASIS/PNEUMONIA. Results for orders placed during the hospital encounter of 10/26/18    XR CHEST STANDARD (2 VW)    Narrative  EXAMINATION: XR CHEST (2 VW)    CLINICAL HISTORY: CHRONIC OBSTRUCTIVE PULMONARY DISEASE    COMPARISONS: None available. FINDINGS: Osseous structures intact. Cardiopericardial silhouette normal. Pulmonary vasculature normal. Lungs clear    Impression  NO ACUTE CARDIOPULMONARY DISEASE  ]  Results for orders placed during the hospital encounter of 07/27/19    XR CHEST PORTABLE    Narrative  EXAMINATION: XR CHEST PORTABLE    CLINICAL HISTORY:  Legs swelling, shortness of breath    COMPARISONS: None available. FINDINGS: There is moderate cardiomegaly. Pulmonary vascularity is prominent. There are coarsened interstitial markings consistent with fibrosis or interstitial edema. There is blunting of the left costophrenic angle which could represent scarring or  trace effusion. There are atherosclerotic changes of the thoracic aorta. There are degenerative changes in the spine and right shoulder. Impression  CARDIOMEGALY WITH PULMONARY VASCULAR CONGESTION. COARSE INTERSTITIAL MARKINGS WHICH COULD REPRESENT FIBROSIS OR INTERSTITIAL EDEMA. SMALL LEFT PLEURAL EFFUSION VERSUS PLEURAL SCARRING. Assessment/Plan:     1. Chronic obstructive pulmonary disease, unspecified COPD type ([address]  He is on neb with duoneb QID, albuterol HFA prn.  C/o shortness of breath with exertion and sometimes at rest.C/o Cough with thick mucus, he is taking mucinex. Off and on  Wheezing. continue bronchodilator therapy as before. 2. Chronic respiratory failure with hypoxia and hypercapnia (HCC)  He is on 3 lit O2 via NC 24 hour a day. he is on NIV at night     3. JONNY (obstructive sleep apnea)  He is using trilogy/NIV    4. Obesity (BMI 30-39. 9)  He is advised try to lose weight. obesity related risk explained to the patient ,  Current weight:  247 lb (112 kg) Lbs. BMI:  Body mass index is 35.44 kg/m². Suggested weight control approaches, including dietary changes , exercise, behavioral modification. Return in about 14 weeks (around 8/15/2022) for jonny, COPD, chronic respiratory failure.       Sharon Davison MD

## 2022-05-20 DIAGNOSIS — I50.33 ACUTE ON CHRONIC DIASTOLIC (CONGESTIVE) HEART FAILURE (HCC): Primary | ICD-10-CM

## 2022-05-20 RX ORDER — METOLAZONE 2.5 MG/1
2.5 TABLET ORAL DAILY
Qty: 90 TABLET | Refills: 3 | Status: SHIPPED | OUTPATIENT
Start: 2022-05-20 | End: 2022-08-15 | Stop reason: SDUPTHER

## 2022-05-20 NOTE — TELEPHONE ENCOUNTER
Please approve or deny this refill request. The order is pended. Thank you.     LOV 5/9/2022    Next Visit Date:  Future Appointments   Date Time Provider Efrem Camacho   8/15/2022  1:00 PM Arnold Grijalva, [address] [facility],4[address]   8/15/2022  2:15 PM Carolyn Christianson MD [facility]

## 2022-05-24 DIAGNOSIS — J44.9 CHRONIC OBSTRUCTIVE PULMONARY DISEASE, UNSPECIFIED COPD TYPE (HCC): Primary | ICD-10-CM

## 2022-05-24 RX ORDER — IPRATROPIUM BROMIDE AND ALBUTEROL SULFATE 2.5; .5 MG/3ML; MG/3ML
SOLUTION RESPIRATORY (INHALATION)
Qty: 360 ML | Refills: 0 | Status: SHIPPED | OUTPATIENT
Start: 2022-05-24 | End: 2022-07-19

## 2022-05-24 NOTE — TELEPHONE ENCOUNTER
Rx requested:  Requested Prescriptions     Pending Prescriptions Disp Refills    ipratropium-albuterol (DUONEB) 0.5-2.5 (3) MG/3ML SOLN nebulizer solution [Pharmacy Med Name: IPRAT-ALBUT 0.5-3(2.5) MG/3 ML] 360 mL 0     Sig: USE 1 VIAL PER NEBULIZER EVERY 4 HOURS       Last Office Visit:   5/9/2022      Next Visit Date:  Future Appointments   Date Time Provider Efrem Camacho   8/15/2022  1:00 PM Jolynn Hill, [address] [facility],4[address]   8/15/2022  2:15 PM Javier Chaney MD [facility]

## 2022-06-07 ENCOUNTER — TELEPHONE (OUTPATIENT)
Dept: GASTROENTEROLOGY | Age: 67
End: 2022-06-07

## 2022-06-07 DIAGNOSIS — Z12.11 COLON CANCER SCREENING: Primary | ICD-10-CM

## 2022-06-07 NOTE — TELEPHONE ENCOUNTER
Spoke to patient, agreeable to colonoscopy. Patient scheduled 9/29/22 at 9:30 am. Miralax Prep mailed to patient. Referral pended to PCP. Info faxed to Phong Gifford.

## 2022-07-18 DIAGNOSIS — J44.9 CHRONIC OBSTRUCTIVE PULMONARY DISEASE, UNSPECIFIED COPD TYPE (HCC): ICD-10-CM

## 2022-07-19 RX ORDER — IPRATROPIUM BROMIDE AND ALBUTEROL SULFATE 2.5; .5 MG/3ML; MG/3ML
SOLUTION RESPIRATORY (INHALATION)
Qty: 360 ML | Refills: 2 | Status: SHIPPED | OUTPATIENT
Start: 2022-07-19

## 2022-07-19 NOTE — TELEPHONE ENCOUNTER
Rx requested:  Requested Prescriptions     Pending Prescriptions Disp Refills    ipratropium-albuterol (DUONEB) 0.5-2.5 (3) MG/3ML SOLN nebulizer solution [Pharmacy Med Name: IPRAT-ALBUT 0.5-3(2.5) MG/3 ML] 360 mL 0     Sig: USE 1 VIAL PER NEBULIZER EVERY 4 HOURS       Last Office Visit:   5/9/2022      Next Visit Date:  Future Appointments   Date Time Provider Efrem Camacho   8/15/2022  1:00 PM Liv Owen, [address] [facility],4[address]   8/15/2022  2:15 PM Mireille Roberto MD [facility]

## 2022-08-11 ENCOUNTER — TELEPHONE (OUTPATIENT)
Dept: CARDIOLOGY CLINIC | Age: 67
End: 2022-08-11

## 2022-08-15 ENCOUNTER — OFFICE VISIT (OUTPATIENT)
Dept: PULMONOLOGY | Age: 67
End: 2022-08-15
Payer: MEDICARE

## 2022-08-15 ENCOUNTER — OFFICE VISIT (OUTPATIENT)
Dept: CARDIOLOGY CLINIC | Age: 67
End: 2022-08-15
Payer: MEDICARE

## 2022-08-15 VITALS
DIASTOLIC BLOOD PRESSURE: 72 MMHG | BODY MASS INDEX: 35.22 KG/M2 | TEMPERATURE: 97.3 F | HEIGHT: 70 IN | WEIGHT: 246 LBS | HEART RATE: 61 BPM | SYSTOLIC BLOOD PRESSURE: 110 MMHG | OXYGEN SATURATION: 97 %

## 2022-08-15 VITALS
DIASTOLIC BLOOD PRESSURE: 70 MMHG | HEART RATE: 87 BPM | WEIGHT: 247 LBS | BODY MASS INDEX: 35.44 KG/M2 | SYSTOLIC BLOOD PRESSURE: 110 MMHG

## 2022-08-15 DIAGNOSIS — G47.33 OSA (OBSTRUCTIVE SLEEP APNEA): ICD-10-CM

## 2022-08-15 DIAGNOSIS — I50.33 ACUTE ON CHRONIC DIASTOLIC (CONGESTIVE) HEART FAILURE (HCC): Primary | ICD-10-CM

## 2022-08-15 DIAGNOSIS — I10 ESSENTIAL HYPERTENSION, BENIGN: ICD-10-CM

## 2022-08-15 DIAGNOSIS — J44.9 CHRONIC OBSTRUCTIVE PULMONARY DISEASE, UNSPECIFIED COPD TYPE (HCC): Primary | ICD-10-CM

## 2022-08-15 DIAGNOSIS — R06.09 DOE (DYSPNEA ON EXERTION): ICD-10-CM

## 2022-08-15 DIAGNOSIS — J44.9 CHRONIC OBSTRUCTIVE PULMONARY DISEASE, UNSPECIFIED COPD TYPE (HCC): ICD-10-CM

## 2022-08-15 DIAGNOSIS — J96.12 CHRONIC RESPIRATORY FAILURE WITH HYPOXIA AND HYPERCAPNIA (HCC): ICD-10-CM

## 2022-08-15 DIAGNOSIS — J42 CHRONIC BRONCHITIS, UNSPECIFIED CHRONIC BRONCHITIS TYPE (HCC): ICD-10-CM

## 2022-08-15 DIAGNOSIS — I27.81 COR PULMONALE (CHRONIC) (HCC): ICD-10-CM

## 2022-08-15 DIAGNOSIS — R60.0 BILATERAL LOWER EXTREMITY EDEMA: ICD-10-CM

## 2022-08-15 DIAGNOSIS — R09.89 BILATERAL CAROTID BRUITS: ICD-10-CM

## 2022-08-15 DIAGNOSIS — J96.11 CHRONIC RESPIRATORY FAILURE WITH HYPOXIA AND HYPERCAPNIA (HCC): ICD-10-CM

## 2022-08-15 DIAGNOSIS — E78.5 DYSLIPIDEMIA: ICD-10-CM

## 2022-08-15 DIAGNOSIS — E66.9 OBESITY (BMI 30-39.9): ICD-10-CM

## 2022-08-15 DIAGNOSIS — I25.10 CORONARY ARTERY DISEASE INVOLVING NATIVE CORONARY ARTERY OF NATIVE HEART WITHOUT ANGINA PECTORIS: ICD-10-CM

## 2022-08-15 LAB
ANION GAP SERPL CALCULATED.3IONS-SCNC: 9 MEQ/L (ref 9–15)
BUN BLDV-MCNC: 22 MG/DL (ref 8–23)
CALCIUM SERPL-MCNC: 9.5 MG/DL (ref 8.5–9.9)
CHLORIDE BLD-SCNC: 99 MEQ/L (ref 95–107)
CHOLESTEROL, FASTING: 116 MG/DL (ref 0–199)
CO2: 31 MEQ/L (ref 20–31)
CREAT SERPL-MCNC: 0.97 MG/DL (ref 0.7–1.2)
GFR AFRICAN AMERICAN: >60
GFR NON-AFRICAN AMERICAN: >60
GLUCOSE BLD-MCNC: 83 MG/DL (ref 70–99)
HCT VFR BLD CALC: 46.3 % (ref 42–52)
HDLC SERPL-MCNC: 50 MG/DL (ref 40–59)
HEMOGLOBIN: 15.3 G/DL (ref 14–18)
LDL CHOLESTEROL CALCULATED: 48 MG/DL (ref 0–129)
MAGNESIUM: 1.9 MG/DL (ref 1.7–2.4)
MCH RBC QN AUTO: 29.7 PG (ref 27–31.3)
MCHC RBC AUTO-ENTMCNC: 33.1 % (ref 33–37)
MCV RBC AUTO: 89.7 FL (ref 80–100)
PDW BLD-RTO: 16.4 % (ref 11.5–14.5)
PLATELET # BLD: 235 K/UL (ref 130–400)
POTASSIUM SERPL-SCNC: 4.6 MEQ/L (ref 3.4–4.9)
RBC # BLD: 5.17 M/UL (ref 4.7–6.1)
SODIUM BLD-SCNC: 139 MEQ/L (ref 135–144)
TRIGLYCERIDE, FASTING: 89 MG/DL (ref 0–150)
TSH SERPL DL<=0.05 MIU/L-ACNC: 1.07 UIU/ML (ref 0.44–3.86)
WBC # BLD: 7.8 K/UL (ref 4.8–10.8)

## 2022-08-15 PROCEDURE — 99214 OFFICE O/P EST MOD 30 MIN: CPT | Performed by: INTERNAL MEDICINE

## 2022-08-15 PROCEDURE — 1123F ACP DISCUSS/DSCN MKR DOCD: CPT | Performed by: INTERNAL MEDICINE

## 2022-08-15 PROCEDURE — G8417 CALC BMI ABV UP PARAM F/U: HCPCS | Performed by: INTERNAL MEDICINE

## 2022-08-15 PROCEDURE — 3023F SPIROM DOC REV: CPT | Performed by: INTERNAL MEDICINE

## 2022-08-15 PROCEDURE — 1036F TOBACCO NON-USER: CPT | Performed by: INTERNAL MEDICINE

## 2022-08-15 PROCEDURE — G8427 DOCREV CUR MEDS BY ELIG CLIN: HCPCS | Performed by: INTERNAL MEDICINE

## 2022-08-15 PROCEDURE — 3017F COLORECTAL CA SCREEN DOC REV: CPT | Performed by: INTERNAL MEDICINE

## 2022-08-15 RX ORDER — METOLAZONE 2.5 MG/1
2.5 TABLET ORAL DAILY
Qty: 90 TABLET | Refills: 3 | Status: SHIPPED | OUTPATIENT
Start: 2022-08-15

## 2022-08-15 RX ORDER — TRIAMCINOLONE ACETONIDE 1 MG/G
CREAM TOPICAL
COMMUNITY
Start: 2022-08-04

## 2022-08-15 ASSESSMENT — ENCOUNTER SYMPTOMS
GASTROINTESTINAL NEGATIVE: 1
CHEST TIGHTNESS: 0
WHEEZING: 1
BLOOD IN STOOL: 0
VOICE CHANGE: 0
NAUSEA: 0
SHORTNESS OF BREATH: 1
NAUSEA: 0
DIARRHEA: 0
ABDOMINAL PAIN: 0
COUGH: 0
RHINORRHEA: 0
COUGH: 1
EYE ITCHING: 0
VOMITING: 0
EYES NEGATIVE: 1
WHEEZING: 0
SORE THROAT: 0
STRIDOR: 0
CHEST TIGHTNESS: 0
SHORTNESS OF BREATH: 1

## 2022-08-15 NOTE — PROGRESS NOTES
Subjective:     Immanuel Freeman is a 69 y.o. male who complains today of:     Chief Complaint   Patient presents with    Follow-up     COPD       HPI  He is using  3 lit 24 hour day. NIV/trilogy at night time  but he was out of his home, he is at his apartment and not using much at this time. He is on neb with duoneb QID, albuterol HFA prn.    C/o shortness of breath with exertion and sometimes at rest.  C/o Cough with thick mucus, he is taking mucinex. Occasional  Wheezing. No Chest tightness. No Chest pain with radiation  or pleuritic pain. No  leg edema. No orthopnea. No Fever or chills. No Rhinorrhea and postnasal drip. Allergies:  Patient has no known allergies. Past Medical History:   Diagnosis Date    CHF (congestive heart failure) ([facility])     COPD (chronic obstructive pulmonary disease) ([facility])     Hypertension     Lung disease     Osteoarthritis     hands worst    Sleep apnea      Past Surgical History:   Procedure Laterality Date    CHOLECYSTECTOMY      CORONARY ANGIOPLASTY WITH STENT PLACEMENT  2020    DIAGNOSTIC CARDIAC CATH LAB PROCEDURE  2020    PTCA  2020    ROTATOR CUFF REPAIR      right     Family History   Problem Relation Age of Onset    Heart Disease Mother     Other Father      Social History     Socioeconomic History    Marital status: Single     Spouse name: Not on file    Number of children: Not on file    Years of education: Not on file    Highest education level: Not on file   Occupational History    Not on file   Tobacco Use    Smoking status: Former     Packs/day: 2.00     Years: 49.00     Pack years: 98.00     Types: Cigarettes     Start date: 1971     Quit date: 12/15/2020     Years since quittin.6     Passive exposure: Past    Smokeless tobacco: Never   Vaping Use    Vaping Use: Never used   Substance and Sexual Activity    Alcohol use: Yes     Comment: occ.  12 pack    Drug use: No    Sexual activity: Yes   Other Topics Concern    Not on file Social History Narrative    Not on file     Social Determinants of Health     Financial Resource Strain: Not on file   Food Insecurity: Not on file   Transportation Needs: Not on file   Physical Activity: Not on file   Stress: Not on file   Social Connections: Not on file   Intimate Partner Violence: Not on file   Housing Stability: Not on file         Review of Systems   Constitutional:  Negative for chills, diaphoresis, fatigue and fever. HENT:  Negative for congestion, mouth sores, nosebleeds, postnasal drip, rhinorrhea, sneezing, sore throat and voice change. Eyes:  Negative for itching and visual disturbance. Respiratory:  Positive for cough, shortness of breath and wheezing. Negative for chest tightness. Cardiovascular: Negative. Negative for chest pain, palpitations and leg swelling. Gastrointestinal:  Negative for abdominal pain, diarrhea, nausea and vomiting. Genitourinary:  Negative for difficulty urinating and hematuria. Musculoskeletal:  Negative for arthralgias, joint swelling and myalgias. Skin:  Negative for rash. Allergic/Immunologic: Negative for environmental allergies. Neurological:  Negative for dizziness, tremors, weakness and headaches. Psychiatric/Behavioral:  Negative for behavioral problems and sleep disturbance.        :     Vitals:    08/15/22 1313   BP: 110/72   Site: Right Upper Arm   Position: Sitting   Cuff Size: Medium Adult   Pulse: 61   Temp: 97.3 °F (36.3 °C)   TempSrc: Temporal   SpO2: 97%   Weight: 246 lb (111.6 kg)   Height: 5' 10\" (1.778 m)     Wt Readings from Last 3 Encounters:   08/15/22 246 lb (111.6 kg)   05/09/22 247 lb (112 kg)   05/09/22 246 lb (111.6 kg)         Physical Exam  Constitutional:       Appearance: He is well-developed. He is obese. HENT:      Head: Normocephalic and atraumatic. Nose: Nose normal.   Eyes:      Conjunctiva/sclera: Conjunctivae normal.      Pupils: Pupils are equal, round, and reactive to light.    Neck: Thyroid: No thyromegaly. Vascular: No JVD. Trachea: No tracheal deviation. Cardiovascular:      Rate and Rhythm: Normal rate and regular rhythm. Heart sounds: No murmur heard. No friction rub. No gallop. Pulmonary:      Effort: Pulmonary effort is normal. No respiratory distress. Breath sounds: Normal breath sounds. No wheezing or rales. Comments: diminished Breath sound bilaterally. Chest:      Chest wall: No tenderness. Abdominal:      General: There is no distension. Musculoskeletal:         General: Normal range of motion. Lymphadenopathy:      Cervical: No cervical adenopathy. Skin:     General: Skin is warm and dry. Findings: No rash. Neurological:      Mental Status: He is alert and oriented to person, place, and time. Cranial Nerves: No cranial nerve deficit.    Psychiatric:         Behavior: Behavior normal.       Current Outpatient Medications   Medication Sig Dispense Refill    triamcinolone (KENALOG) 0.1 % cream APPLY TO LEGS AND FEET EVERY MORNING.      ipratropium-albuterol (DUONEB) 0.5-2.5 (3) MG/3ML SOLN nebulizer solution USE 1 VIAL PER NEBULIZER EVERY 4 HOURS 360 mL 2    atorvastatin (LIPITOR) 20 MG tablet Take 1 tablet by mouth nightly 90 tablet 2    furosemide (LASIX) 40 MG tablet Take 1.5 tablets by mouth daily 135 tablet 3    lisinopril (PRINIVIL;ZESTRIL) 10 MG tablet Take 1 tablet by mouth every evening 90 tablet 3    metoprolol tartrate (LOPRESSOR) 25 MG tablet Take 1 tablet by mouth 2 times daily 180 tablet 3    potassium chloride (KLOR-CON M) 10 MEQ extended release tablet Take 1 tablet by mouth 2 times daily For two weeks while on the additional metolazone 2.5mg 180 tablet 3    albuterol sulfate HFA (PROAIR HFA) 108 (90 Base) MCG/ACT inhaler Inhale 2 puffs into the lungs every 6 hours as needed for Wheezing 1 each 5    Respiratory Therapy Supplies MISC POC 3 on 3L on exertion 1 each 0    Handicap Placard MISC by Does not apply route The patient requires a disability parking placard due to difficulties ambulating long distances. Good 4/19/2021-4/19/2026 1 each 0    Handicap Placard MISC by Does not apply route The patient requires a disability parking placard done due to difficulties ambulating long distances. Good 3/3/2021--3/3/2026 1 each 1    Elastic Bandages & Supports (MEDICAL COMPRESSION SOCKS) MISC Disp knee high compression socks 20-30 mmHg, on daily off nightly 2 each 1    albuterol (PROVENTIL) (2.5 MG/3ML) 0.083% nebulizer solution USE 1 VIAL PER NEBULIZER EVERY 6 HOURS AS NEEDED FOR WHEEZING 360 mL 0    Elastic Bandages & Supports (MEDICAL COMPRESSION STOCKINGS) MISC 2 each by Does not apply route daily On in QAM and off QHS. B/L Knee high, 20-30mmHg 1 each 1    Respiratory Therapy Supplies DREW New CPAP Full face  mask and supplies. Dispense heated tubing and adjust humidity in CPAP due to c/o dry mouth. 1 Device 0    guaiFENesin (MUCINEX) 600 MG extended release tablet Take 1 tablet by mouth 2 times daily 10 tablet 0    aspirin EC 81 MG EC tablet Take 1 tablet by mouth daily 30 tablet 3    OXYGEN POC    3 lit via NC     Dx copd 1 Units 0    CPAP Machine MISC by Does not apply route New CPAP with 7 cm with 3 lit O2 bled 1 each 0    Compression Bandages KIT LLE: knee high: 20-30mmhg 2 kit 1    metOLazone (ZAROXOLYN) 2.5 MG tablet TAKE 1 TABLET BY MOUTH DAILY (Patient not taking: Reported on 8/15/2022) 90 tablet 3    nitroGLYCERIN (NITROSTAT) 0.4 MG SL tablet up to max of 3 total doses. If no relief after 1 dose, call 911. (Patient not taking: Reported on 8/15/2022) 25 tablet 3    sildenafil (VIAGRA) 100 MG tablet Take 1 tablet by mouth as needed for Erectile Dysfunction LOT [id number] EXP 04/2017 (Patient not taking: No sig reported) 2 tablet 5     No current facility-administered medications for this visit.        Results for orders placed during the hospital encounter of 09/02/21    XR CHEST (2 VW)    Narrative  EXAMINATION: XR CHEST (2 VW)    CLINICAL HISTORY: COPD    COMPARISONS: CT CHEST, JULY 27, 2019, CHEST RADIOGRAPH, SAME DATE    FINDINGS: Osseous structures intact. Cardiopericardial silhouette normal. Pulmonary vasculature normal. Mild blunting costophrenic angles. Ill-defined area increased opacity right lung base. Impression  PLEURAL THICKENING VERSUS SMALL BILATERAL PLEURAL EFFUSIONS. RIGHT LOWER LUNG SUBSEGMENTAL ATELECTASIS/PNEUMONIA. Results for orders placed during the hospital encounter of 10/26/18    XR CHEST STANDARD (2 VW)    Narrative  EXAMINATION: XR CHEST (2 VW)    CLINICAL HISTORY: CHRONIC OBSTRUCTIVE PULMONARY DISEASE    COMPARISONS: None available. FINDINGS: Osseous structures intact. Cardiopericardial silhouette normal. Pulmonary vasculature normal. Lungs clear    Impression  NO ACUTE CARDIOPULMONARY DISEASE  ]  Results for orders placed during the hospital encounter of 07/27/19    XR CHEST PORTABLE    Narrative  EXAMINATION: XR CHEST PORTABLE    CLINICAL HISTORY:  Legs swelling, shortness of breath    COMPARISONS: None available. FINDINGS: There is moderate cardiomegaly. Pulmonary vascularity is prominent. There are coarsened interstitial markings consistent with fibrosis or interstitial edema. There is blunting of the left costophrenic angle which could represent scarring or  trace effusion. There are atherosclerotic changes of the thoracic aorta. There are degenerative changes in the spine and right shoulder. Impression  CARDIOMEGALY WITH PULMONARY VASCULAR CONGESTION. COARSE INTERSTITIAL MARKINGS WHICH COULD REPRESENT FIBROSIS OR INTERSTITIAL EDEMA. SMALL LEFT PLEURAL EFFUSION VERSUS PLEURAL SCARRING. Assessment/Plan:     1. Chronic obstructive pulmonary disease, unspecified COPD type ([address]  He is using  3 lit 24 hour day. NIV/trilogy at night time  but he was out of his home, he is at his apartment and not using much at this time.  He is on neb with duoneb QID, albuterol HFA prn.  C/o shortness of breath with exertion and sometimes at rest.C/o Cough with thick mucus, he is taking mucinex. Occasional  Wheezing. CXR requested    2. Chronic respiratory failure with hypoxia and hypercapnia (HCC)  He is on 3 lit O2 via NC 24 hour a day. he is on NIV at night     3. JONNY (obstructive sleep apnea)  He is using trilogy/NIV    4. Obesity (BMI 30-39. 9)  He is advised try to lose weight. obesity related risk explained to the patient ,  Current weight:  246 lb (111.6 kg) Lbs. BMI:  Body mass index is 35.3 kg/m². Suggested weight control approaches, including dietary changes , exercise, behavioral modification. Return in about 4 months (around 12/15/2022) for COPD, chronic respiratory failure.       Lizbeth Lima MD

## 2022-08-15 NOTE — PROGRESS NOTES
Subsequent Progress Note  Patient: Kim Urias  YOB: 1955  MRN: [id number]    Chief Complaint: hf LE edema copd garnett   Chief Complaint   Patient presents with    Hypertension    Congestive Heart Failure    3 Month Follow-Up       CV Data:  7/2019 echo EF 60  9/2019 spect negative   9/2019 CUS- mild   9/2019 Abd US negative AAA  12/14/2020 RCA SHIRIN EF 60  1/21 PVR negative  1/21 CUS mild     Subjective/HPI: no edema anymore on diuretics. No cp +garnett chronic 3L o2      1/6/2020 still on 3L O2 SUBJECTIVE: till struggles to stop smoke. No cp. No falls no bleed. Takes meds. Walks. 5/19/2020 TELEHEALTH EVALUATION -- Audio/Visual (During DXOXX-16 public health emergency)    Doing well no cp still has sob. Still trying to stop smoke. Uses 3L O2    6/22/2020 TELEHEALTH EVALUATION -- Audio/Visual (During MYINR-42 public health emergency)    Called in 10 days ago c/o LE Edema. We advised low salt, walk and compression. Edema is now completely resolved. He feels much better. He admits he took lots of salty foods few weeks ago. No cp    7/27/2020  Leg edema was better but now came back again. He recently ran out of couple of his meds to include ACEI and BB.     8/10/2020 leg edema much improved with low salt and Fluid restrict. Now has 1-2+. No cp chronic SOB on O2.     9/11/2020 still has GARNETT. Uses 3L O2. Sate are 80s but feels comfortable at rest.  No CP no bleed no falls. 12/3/2020 no cp . GARNETT is worse. Weak and tired. No bleed. No falls. 12/121/220 feels ok. No cp no sob no falls no bleed. Takes meds. Trying to quit cigs    3/3/21 still SOB using 3L O2. Low stamina no cp no falls no bleed no edema takes meds. Not ilene active. 6/3/21 no cp still uses 3L O2. No falls no bleed. 9/3/21 chronic sob 3.5 L O2. No cp no falls no bleed. Takes meds    1/7/22 on 3L no cp but still SOB. LE edema little worse.    1/21/22 lost 8Lbs in water. Feels little bettr.  Skin is very dry    2/24/22 no cp no worse sob no falls no bleed. Takes meds. 3L O2.    22 doing well on 3L Os. No cp. No new issues. No bleed no falls     8/15/22 doing well  still on 3L O2. Chronic GARNETT no cp no falls no bleed. prior 2.5 PPD - stopped 2020   Retired -[employer]  Lives alone    EKG: SR 86    Past Medical History:   Diagnosis Date    CHF (congestive heart failure) ([facility])     COPD (chronic obstructive pulmonary disease) ([facility])     Hypertension     Lung disease     Osteoarthritis     hands worst    Sleep apnea        Past Surgical History:   Procedure Laterality Date    CHOLECYSTECTOMY      CORONARY ANGIOPLASTY WITH STENT PLACEMENT  2020    DIAGNOSTIC CARDIAC CATH LAB PROCEDURE  2020    PTCA  2020    ROTATOR CUFF REPAIR      right       Family History   Problem Relation Age of Onset    Heart Disease Mother     Other Father        Social History     Socioeconomic History    Marital status: Single   Tobacco Use    Smoking status: Former     Packs/day: 2.00     Years: 49.00     Pack years: 98.00     Types: Cigarettes     Start date: 1971     Quit date: 12/15/2020     Years since quittin.6     Passive exposure: Past    Smokeless tobacco: Never   Vaping Use    Vaping Use: Never used   Substance and Sexual Activity    Alcohol use: Yes     Comment: occ.  12 pack    Drug use: No    Sexual activity: Yes       No Known Allergies    Current Outpatient Medications   Medication Sig Dispense Refill    triamcinolone (KENALOG) 0.1 % cream APPLY TO LEGS AND FEET EVERY MORNING.      ipratropium-albuterol (DUONEB) 0.5-2.5 (3) MG/3ML SOLN nebulizer solution USE 1 VIAL PER NEBULIZER EVERY 4 HOURS 360 mL 2    atorvastatin (LIPITOR) 20 MG tablet Take 1 tablet by mouth nightly 90 tablet 2    furosemide (LASIX) 40 MG tablet Take 1.5 tablets by mouth daily 135 tablet 3    lisinopril (PRINIVIL;ZESTRIL) 10 MG tablet Take 1 tablet by mouth every evening 90 tablet 3    metoprolol tartrate (LOPRESSOR) 25 MG tablet Take 1 tablet by mouth 2 times daily 180 tablet 3    potassium chloride (KLOR-CON M) 10 MEQ extended release tablet Take 1 tablet by mouth 2 times daily For two weeks while on the additional metolazone 2.5mg 180 tablet 3    albuterol sulfate HFA (PROAIR HFA) 108 (90 Base) MCG/ACT inhaler Inhale 2 puffs into the lungs every 6 hours as needed for Wheezing 1 each 5    Respiratory Therapy Supplies MISC POC 3 on 3L on exertion 1 each 0    Handicap Placard MISC by Does not apply route The patient requires a disability parking placard due to difficulties ambulating long distances. Good 4/19/2021-4/19/2026 1 each 0    Handicap Placard MISC by Does not apply route The patient requires a disability parking placard done due to difficulties ambulating long distances. Good 3/3/2021--3/3/2026 1 each 1    Elastic Bandages & Supports (MEDICAL COMPRESSION SOCKS) MISC Disp knee high compression socks 20-30 mmHg, on daily off nightly 2 each 1    nitroGLYCERIN (NITROSTAT) 0.4 MG SL tablet up to max of 3 total doses. If no relief after 1 dose, call 911. 25 tablet 3    albuterol (PROVENTIL) (2.5 MG/3ML) 0.083% nebulizer solution USE 1 VIAL PER NEBULIZER EVERY 6 HOURS AS NEEDED FOR WHEEZING 360 mL 0    Elastic Bandages & Supports (MEDICAL COMPRESSION STOCKINGS) MISC 2 each by Does not apply route daily On in QAM and off QHS. B/L Knee high, 20-30mmHg 1 each 1    Respiratory Therapy Supplies DREW New CPAP Full face  mask and supplies. Dispense heated tubing and adjust humidity in CPAP due to c/o dry mouth.  1 Device 0    guaiFENesin (MUCINEX) 600 MG extended release tablet Take 1 tablet by mouth 2 times daily 10 tablet 0    aspirin EC 81 MG EC tablet Take 1 tablet by mouth daily 30 tablet 3    OXYGEN POC    3 lit via NC     Dx copd 1 Units 0    CPAP Machine MISC by Does not apply route New CPAP with 7 cm with 3 lit O2 bled 1 each 0    Compression Bandages KIT LLE: knee high: 20-30mmhg 2 kit 1    metOLazone (ZAROXOLYN) 2.5 MG tablet TAKE 1 TABLET BY MOUTH DAILY (Patient not taking: No sig reported) 90 tablet 3     No current facility-administered medications for this visit. Review of Systems:   Review of Systems   Constitutional: Negative. Negative for diaphoresis and fatigue. HENT: Negative. Eyes: Negative. Respiratory:  Positive for shortness of breath. Negative for cough, chest tightness, wheezing and stridor. Cardiovascular:  Positive for leg swelling. Negative for chest pain and palpitations. Gastrointestinal: Negative. Negative for blood in stool and nausea. Genitourinary: Negative. Musculoskeletal: Negative. Skin: Negative. Neurological: Negative. Negative for dizziness, syncope, weakness and light-headedness. Hematological: Negative. Psychiatric/Behavioral: Negative. Physical Examination:    /70 (Site: Right Upper Arm, Position: Sitting, Cuff Size: Small Adult)   Pulse 87   Wt 247 lb (112 kg)   BMI 35.44 kg/m²    Physical Exam   Constitutional: He appears healthy. No distress. HENT:   Normal cephalic and Atraumatic   Eyes: Pupils are equal, round, and reactive to light. Neck: Thyroid normal. No JVD present. No neck adenopathy. No thyromegaly present. Cardiovascular: Normal rate and regular rhythm. Exam reveals decreased pulses. Murmur heard. Pulses:       Carotid pulses are  on the right side with bruit and  on the left side with bruit. Pulmonary/Chest: Effort normal and breath sounds normal. He has no wheezes. He has no rales. He exhibits no tenderness. Abdominal: Soft. Bowel sounds are normal. There is no abdominal tenderness. Musculoskeletal:         General: Edema (Trace ) present. No tenderness. Normal range of motion. Cervical back: Normal range of motion and neck supple. Neurological: He is alert and oriented to person, place, and time. Skin: Skin is warm. No cyanosis. Nails show no clubbing.      LABS:  CBC:   Lab Results   Component Value Date/Time    WBC 7.8 08/15/2022 12:01 PM    RBC 5.17 08/15/2022 12:01 PM    RBC 5.51 05/29/2012 08:28 PM    HGB 15.3 08/15/2022 12:01 PM    HCT 46.3 08/15/2022 12:01 PM    MCV 89.7 08/15/2022 12:01 PM    MCH 29.7 08/15/2022 12:01 PM    MCHC 33.1 08/15/2022 12:01 PM    RDW 16.4 08/15/2022 12:01 PM     08/15/2022 12:01 PM    MPV 10.1 10/16/2014 05:40 PM     Lipids:  Lab Results   Component Value Date    CHOL 129 07/28/2019    CHOL 140 04/24/2017    CHOL 135 04/21/2016     Lab Results   Component Value Date    TRIG 105 07/28/2019    TRIG 112 04/24/2017    TRIG 69 04/21/2016     Lab Results   Component Value Date    HDL 50 08/15/2022    HDL 41 06/11/2021    HDL 45 06/08/2020     Lab Results   Component Value Date    LDLCALC 48 08/15/2022    LDLCALC 63 06/11/2021    LDLCALC 78 06/08/2020     No results found for: LABVLDL, VLDL  Lab Results   Component Value Date    CHOLHDLRATIO 3.0 05/29/2012     CMP:    Lab Results   Component Value Date/Time     08/15/2022 12:01 PM    K 4.6 08/15/2022 12:01 PM    CL 99 08/15/2022 12:01 PM    CO2 31 08/15/2022 12:01 PM    BUN 22 08/15/2022 12:01 PM    CREATININE 0.97 08/15/2022 12:01 PM    GFRAA >60.0 08/15/2022 12:01 PM    LABGLOM >60.0 08/15/2022 12:01 PM    GLUCOSE 83 08/15/2022 12:01 PM    GLUCOSE 91 05/29/2012 08:28 PM    PROT 7.4 06/11/2021 09:07 AM    LABALBU 4.3 06/11/2021 09:07 AM    LABALBU 4.6 05/29/2012 08:28 PM    CALCIUM 9.5 08/15/2022 12:01 PM    BILITOT 0.9 06/11/2021 09:07 AM    ALKPHOS 101 06/11/2021 09:07 AM    AST 11 06/11/2021 09:07 AM    ALT <5 06/11/2021 09:07 AM     BMP:    Lab Results   Component Value Date/Time     08/15/2022 12:01 PM    K 4.6 08/15/2022 12:01 PM    CL 99 08/15/2022 12:01 PM    CO2 31 08/15/2022 12:01 PM    BUN 22 08/15/2022 12:01 PM    LABALBU 4.3 06/11/2021 09:07 AM    LABALBU 4.6 05/29/2012 08:28 PM    CREATININE 0.97 08/15/2022 12:01 PM    CALCIUM 9.5 08/15/2022 12:01 PM    GFRAA >60.0 08/15/2022 12:01 PM    LABGLOM >60.0 08/15/2022 12:01 PM    GLUCOSE 83 08/15/2022 12:01 PM    GLUCOSE 91 05/29/2012 08:28 PM     Magnesium:    Lab Results   Component Value Date/Time    MG 1.9 08/15/2022 12:01 PM     TSH:  Lab Results   Component Value Date    TSH 1.070 08/15/2022       Patient Active Problem List   Diagnosis    Essential hypertension, benign    Chronic obstructive pulmonary disease (HCC)    Osteoarthritis    Tobacco abuse    Psychophysiological insomnia    JONNY (obstructive sleep apnea)    Hypoxia    Obesity (BMI 30-39. 9)    Acute on chronic diastolic (congestive) heart failure (HCC)    Cor pulmonale (chronic) (HCC)    GARNETT (dyspnea on exertion)    Chronic bronchitis (HCC)    Smoker    Bilateral carotid bruits    Bilateral lower extremity edema    Angina at rest [facility])    Chronic respiratory failure ([facility])       Medications Discontinued During This Encounter   Medication Reason    sildenafil (VIAGRA) 100 MG tablet LIST CLEANUP       Modified Medications    No medications on file       No orders of the defined types were placed in this encounter. Assessment/Plan:    1. Essential hypertension, benign - stable. continue meds. Low salt diet. 2. Acute on chronic diastolic (congestive) heart failure (HCC)  compensated. - continue diuretics and K supplement. Labs reviewed. 3. Cor pulmonale (chronic) - chronic    4. Shortness of breath  No worse- on O2 3 L. Recently stopped smoking. Continue to stay off smoking. 6. Carotid Bruits- CUS - stable. Will continue surveillance     7. Dry skin- see Dermatology. 8. HPL- Statin low fat diet. Labs reviewed w pt- excellent. Fasting Labs   Counseling:  Heart Healthy Lifestyle, Stop Smoking, Low Salt Diet, Take Precautions to Prevent Falls, Regular Exercise and Walk Daily    No follow-ups on file.       Electronically signed by Jumana Thomas MD on 8/15/2022 at 2:15 PM

## 2022-12-03 ENCOUNTER — HOSPITAL ENCOUNTER (OUTPATIENT)
Age: 67
Discharge: HOME | End: 2022-12-03
Payer: MEDICARE

## 2022-12-03 ENCOUNTER — HOSPITAL ENCOUNTER (OUTPATIENT)
Dept: GENERAL RADIOLOGY | Age: 67
End: 2022-12-03
Payer: MEDICARE

## 2022-12-03 DIAGNOSIS — J96.11 CHRONIC RESPIRATORY FAILURE WITH HYPOXIA AND HYPERCAPNIA (HCC): ICD-10-CM

## 2022-12-03 DIAGNOSIS — J96.12 CHRONIC RESPIRATORY FAILURE WITH HYPOXIA AND HYPERCAPNIA (HCC): ICD-10-CM

## 2022-12-03 PROCEDURE — 71046 X-RAY EXAM CHEST 2 VIEWS: CPT

## 2022-12-06 ENCOUNTER — OFFICE VISIT (OUTPATIENT)
Dept: CARDIOLOGY CLINIC | Age: 67
End: 2022-12-06
Payer: MEDICARE

## 2022-12-06 ENCOUNTER — OFFICE VISIT (OUTPATIENT)
Dept: PULMONOLOGY | Age: 67
End: 2022-12-06
Payer: MEDICARE

## 2022-12-06 VITALS
WEIGHT: 250 LBS | HEART RATE: 77 BPM | OXYGEN SATURATION: 92 % | DIASTOLIC BLOOD PRESSURE: 78 MMHG | RESPIRATION RATE: 16 BRPM | BODY MASS INDEX: 35.79 KG/M2 | SYSTOLIC BLOOD PRESSURE: 132 MMHG | HEIGHT: 70 IN

## 2022-12-06 VITALS
OXYGEN SATURATION: 85 % | DIASTOLIC BLOOD PRESSURE: 84 MMHG | HEART RATE: 75 BPM | BODY MASS INDEX: 35.87 KG/M2 | WEIGHT: 250 LBS | SYSTOLIC BLOOD PRESSURE: 138 MMHG

## 2022-12-06 DIAGNOSIS — J96.11 CHRONIC RESPIRATORY FAILURE WITH HYPOXIA AND HYPERCAPNIA (HCC): ICD-10-CM

## 2022-12-06 DIAGNOSIS — I50.33 ACUTE ON CHRONIC DIASTOLIC (CONGESTIVE) HEART FAILURE (HCC): ICD-10-CM

## 2022-12-06 DIAGNOSIS — E66.9 OBESITY (BMI 30-39.9): ICD-10-CM

## 2022-12-06 DIAGNOSIS — I25.10 CORONARY ARTERY DISEASE INVOLVING NATIVE CORONARY ARTERY OF NATIVE HEART WITHOUT ANGINA PECTORIS: ICD-10-CM

## 2022-12-06 DIAGNOSIS — R09.89 BILATERAL CAROTID BRUITS: ICD-10-CM

## 2022-12-06 DIAGNOSIS — G47.33 OSA (OBSTRUCTIVE SLEEP APNEA): ICD-10-CM

## 2022-12-06 DIAGNOSIS — J42 CHRONIC BRONCHITIS, UNSPECIFIED CHRONIC BRONCHITIS TYPE (HCC): ICD-10-CM

## 2022-12-06 DIAGNOSIS — J44.9 CHRONIC OBSTRUCTIVE PULMONARY DISEASE, UNSPECIFIED COPD TYPE (HCC): ICD-10-CM

## 2022-12-06 DIAGNOSIS — I10 ESSENTIAL HYPERTENSION, BENIGN: ICD-10-CM

## 2022-12-06 DIAGNOSIS — J96.12 CHRONIC RESPIRATORY FAILURE WITH HYPOXIA AND HYPERCAPNIA (HCC): ICD-10-CM

## 2022-12-06 DIAGNOSIS — J84.10 PULMONARY INTERSTITIAL FIBROSIS (HCC): ICD-10-CM

## 2022-12-06 DIAGNOSIS — E78.5 DYSLIPIDEMIA: Primary | ICD-10-CM

## 2022-12-06 DIAGNOSIS — I27.81 COR PULMONALE (CHRONIC) (HCC): ICD-10-CM

## 2022-12-06 DIAGNOSIS — J98.4 LUNG DENSITY ON X-RAY: Primary | ICD-10-CM

## 2022-12-06 DIAGNOSIS — R06.09 DOE (DYSPNEA ON EXERTION): ICD-10-CM

## 2022-12-06 PROCEDURE — G8417 CALC BMI ABV UP PARAM F/U: HCPCS | Performed by: INTERNAL MEDICINE

## 2022-12-06 PROCEDURE — 3017F COLORECTAL CA SCREEN DOC REV: CPT | Performed by: INTERNAL MEDICINE

## 2022-12-06 PROCEDURE — 3078F DIAST BP <80 MM HG: CPT | Performed by: INTERNAL MEDICINE

## 2022-12-06 PROCEDURE — G8427 DOCREV CUR MEDS BY ELIG CLIN: HCPCS | Performed by: INTERNAL MEDICINE

## 2022-12-06 PROCEDURE — 3074F SYST BP LT 130 MM HG: CPT | Performed by: INTERNAL MEDICINE

## 2022-12-06 PROCEDURE — 3023F SPIROM DOC REV: CPT | Performed by: INTERNAL MEDICINE

## 2022-12-06 PROCEDURE — G8484 FLU IMMUNIZE NO ADMIN: HCPCS | Performed by: INTERNAL MEDICINE

## 2022-12-06 PROCEDURE — 1036F TOBACCO NON-USER: CPT | Performed by: INTERNAL MEDICINE

## 2022-12-06 PROCEDURE — 99214 OFFICE O/P EST MOD 30 MIN: CPT | Performed by: INTERNAL MEDICINE

## 2022-12-06 PROCEDURE — 1123F ACP DISCUSS/DSCN MKR DOCD: CPT | Performed by: INTERNAL MEDICINE

## 2022-12-06 RX ORDER — ALBUTEROL SULFATE 90 UG/1
2 AEROSOL, METERED RESPIRATORY (INHALATION) EVERY 6 HOURS PRN
Qty: 1 EACH | Refills: 5 | Status: SHIPPED | OUTPATIENT
Start: 2022-12-06

## 2022-12-06 RX ORDER — METOLAZONE 2.5 MG/1
2.5 TABLET ORAL EVERY OTHER DAY
Qty: 90 TABLET | Refills: 3 | Status: SHIPPED | OUTPATIENT
Start: 2022-12-06

## 2022-12-06 RX ORDER — AZITHROMYCIN 250 MG/1
250 TABLET, FILM COATED ORAL SEE ADMIN INSTRUCTIONS
Qty: 6 TABLET | Refills: 1 | Status: SHIPPED | OUTPATIENT
Start: 2022-12-06 | End: 2022-12-11

## 2022-12-06 ASSESSMENT — ENCOUNTER SYMPTOMS
EYE ITCHING: 0
COUGH: 1
GASTROINTESTINAL NEGATIVE: 1
NAUSEA: 0
NAUSEA: 0
COUGH: 0
SHORTNESS OF BREATH: 1
EYES NEGATIVE: 1
ABDOMINAL PAIN: 0
DIARRHEA: 0
SHORTNESS OF BREATH: 1
STRIDOR: 0
CHEST TIGHTNESS: 0
BLOOD IN STOOL: 0
WHEEZING: 0
SORE THROAT: 0
VOMITING: 0
RHINORRHEA: 0
VOICE CHANGE: 0
CHEST TIGHTNESS: 0
WHEEZING: 1

## 2022-12-06 NOTE — PROGRESS NOTES
Subsequent Progress Note  Patient: Mikaela Calloway  YOB: 1955  MRN: [id number]    Chief Complaint: hf LE edema copd garnett   Chief Complaint   Patient presents with    Follow-up     4 month     Congestive Heart Failure       CV Data:  7/2019 echo EF 60  9/2019 spect negative   9/2019 CUS- mild   9/2019 Abd US negative AAA  12/14/2020 RCA SHIRIN EF 60  1/21 PVR negative  1/21 CUS mild     Subjective/HPI: no edema anymore on diuretics. No cp +garnett chronic 3L o2      1/6/2020 still on 3L O2 SUBJECTIVE: till struggles to stop smoke. No cp. No falls no bleed. Takes meds. Walks. 5/19/2020 TELEHEALTH EVALUATION -- Audio/Visual (During [id number]-[id number] public health emergency)    Doing well no cp still has sob. Still trying to stop smoke. Uses 3L O2    6/22/2020 TELEHEALTH EVALUATION -- Audio/Visual (During JOPJS-41 public health emergency)    Called in 10 days ago c/o LE Edema. We advised low salt, walk and compression. Edema is now completely resolved. He feels much better. He admits he took lots of salty foods few weeks ago. No cp    7/27/2020  Leg edema was better but now came back again. He recently ran out of couple of his meds to include ACEI and BB.     8/10/2020 leg edema much improved with low salt and Fluid restrict. Now has 1-2+. No cp chronic SOB on O2.     9/11/2020 still has GARNETT. Uses 3L O2. Sate are 80s but feels comfortable at rest.  No CP no bleed no falls. 12/3/2020 no cp . GARNETT is worse. Weak and tired. No bleed. No falls. 12/121/220 feels ok. No cp no sob no falls no bleed. Takes meds. Trying to quit cigs    3/3/21 still SOB using 3L O2. Low stamina no cp no falls no bleed no edema takes meds. Not ilene active. 6/3/21 no cp still uses 3L O2. No falls no bleed. 9/3/21 chronic sob 3.5 L O2. No cp no falls no bleed. Takes meds    1/7/22 on 3L no cp but still SOB. LE edema little worse.    1/21/22 lost 8Lbs in water. Feels little bettr.  Skin is very dry    2/24/22 no cp no worse sob no falls no bleed. Takes meds. 3L O2.    22 doing well on 3L Os. No cp. No new issues. No bleed no falls     8/15/22 doing well  still on 3L O2. Chronic GARNETT no cp no falls no bleed. 22 doing well on 3L O2. Walks little no falls no bleed no cp no palps. He takes lasix 60 qd and Metolazone 2.5 QOD. prior 2.5 PPD - stopped 2020   Retired -[employer]  Lives alone    EKG: SR 86    Past Medical History:   Diagnosis Date    CHF (congestive heart failure) ([facility])     COPD (chronic obstructive pulmonary disease) ([facility])     Hypertension     Lung disease     Osteoarthritis     hands worst    Sleep apnea        Past Surgical History:   Procedure Laterality Date    CHOLECYSTECTOMY      CORONARY ANGIOPLASTY WITH STENT PLACEMENT  2020    DIAGNOSTIC CARDIAC CATH LAB PROCEDURE  2020    PTCA  2020    ROTATOR CUFF REPAIR      right       Family History   Problem Relation Age of Onset    Heart Disease Mother     Other Father        Social History     Socioeconomic History    Marital status: Single   Tobacco Use    Smoking status: Former     Packs/day: 2.00     Years: 49.00     Pack years: 98.00     Types: Cigarettes     Start date: 1971     Quit date: 12/15/2020     Years since quittin.9     Passive exposure: Past    Smokeless tobacco: Never   Vaping Use    Vaping Use: Never used   Substance and Sexual Activity    Alcohol use: Yes     Comment: occ.  12 pack    Drug use: No    Sexual activity: Yes       No Known Allergies    Current Outpatient Medications   Medication Sig Dispense Refill    azithromycin (ZITHROMAX) 250 MG tablet Take 1 tablet by mouth See Admin Instructions for 5 days 500mg on day 1 followed by 250mg on days 2 - 5 6 tablet 1    albuterol sulfate HFA (PROAIR HFA) 108 (90 Base) MCG/ACT inhaler Inhale 2 puffs into the lungs every 6 hours as needed for Wheezing 1 each 5    metOLazone (ZAROXOLYN) 2.5 MG tablet Take 1 tablet by mouth every other day 90 tablet 3    triamcinolone (KENALOG) 0.1 % cream APPLY TO LEGS AND FEET EVERY MORNING.      ipratropium-albuterol (DUONEB) 0.5-2.5 (3) MG/3ML SOLN nebulizer solution USE 1 VIAL PER NEBULIZER EVERY 4 HOURS 360 mL 2    atorvastatin (LIPITOR) 20 MG tablet Take 1 tablet by mouth nightly 90 tablet 2    furosemide (LASIX) 40 MG tablet Take 1.5 tablets by mouth daily 135 tablet 3    lisinopril (PRINIVIL;ZESTRIL) 10 MG tablet Take 1 tablet by mouth every evening 90 tablet 3    metoprolol tartrate (LOPRESSOR) 25 MG tablet Take 1 tablet by mouth 2 times daily 180 tablet 3    potassium chloride (KLOR-CON M) 10 MEQ extended release tablet Take 1 tablet by mouth 2 times daily For two weeks while on the additional metolazone 2.5mg 180 tablet 3    Respiratory Therapy Supplies MISC POC 3 on 3L on exertion 1 each 0    Handicap Placard MISC by Does not apply route The patient requires a disability parking placard due to difficulties ambulating long distances. Good 4/19/2021-4/19/2026 1 each 0    Handicap Placard MISC by Does not apply route The patient requires a disability parking placard done due to difficulties ambulating long distances. Good 3/3/2021--3/3/2026 1 each 1    Elastic Bandages & Supports (MEDICAL COMPRESSION SOCKS) MISC Disp knee high compression socks 20-30 mmHg, on daily off nightly 2 each 1    nitroGLYCERIN (NITROSTAT) 0.4 MG SL tablet up to max of 3 total doses. If no relief after 1 dose, call 911. 25 tablet 3    albuterol (PROVENTIL) (2.5 MG/3ML) 0.083% nebulizer solution USE 1 VIAL PER NEBULIZER EVERY 6 HOURS AS NEEDED FOR WHEEZING 360 mL 0    Elastic Bandages & Supports (MEDICAL COMPRESSION STOCKINGS) MISC 2 each by Does not apply route daily On in QAM and off QHS. B/L Knee high, 20-30mmHg 1 each 1    Respiratory Therapy Supplies DREW New CPAP Full face  mask and supplies. Dispense heated tubing and adjust humidity in CPAP due to c/o dry mouth.  1 Device 0    guaiFENesin (MUCINEX) 600 MG extended release tablet Take 1 tablet by mouth 2 times daily 10 tablet 0    aspirin EC 81 MG EC tablet Take 1 tablet by mouth daily 30 tablet 3    OXYGEN POC    3 lit via NC     Dx copd 1 Units 0    CPAP Machine MISC by Does not apply route New CPAP with 7 cm with 3 lit O2 bled 1 each 0    Compression Bandages KIT LLE: knee high: 20-30mmhg 2 kit 1     No current facility-administered medications for this visit. Review of Systems:   Review of Systems   Constitutional: Negative. Negative for diaphoresis and fatigue. HENT: Negative. Eyes: Negative. Respiratory:  Positive for shortness of breath. Negative for cough, chest tightness, wheezing and stridor. Cardiovascular:  Positive for leg swelling. Negative for chest pain and palpitations. Gastrointestinal: Negative. Negative for blood in stool and nausea. Genitourinary: Negative. Musculoskeletal: Negative. Skin: Negative. Neurological: Negative. Negative for dizziness, syncope, weakness and light-headedness. Hematological: Negative. Psychiatric/Behavioral: Negative. Physical Examination:    /78 (Site: Right Upper Arm, Position: Sitting, Cuff Size: Large Adult)   Pulse 77   Resp 16   Ht 5' 10\" (1.778 m)   Wt 250 lb (113.4 kg)   SpO2 92% Comment: 3L of O2  BMI 35.87 kg/m²    Physical Exam   Constitutional: He appears healthy. No distress. HENT:   Normal cephalic and Atraumatic   Eyes: Pupils are equal, round, and reactive to light. Neck: Thyroid normal. No JVD present. No neck adenopathy. No thyromegaly present. Cardiovascular: Normal rate and regular rhythm. Exam reveals decreased pulses. Murmur heard. Pulses:       Carotid pulses are  on the right side with bruit and  on the left side with bruit. Pulmonary/Chest: Effort normal. He has no rales. He has scattered wheezes. He exhibits no tenderness. Fine bibasilar crackles. Abdominal: Soft. Bowel sounds are normal. There is no abdominal tenderness.    Musculoskeletal:         General: Edema (Trace ) present. No tenderness. Normal range of motion. Cervical back: Normal range of motion and neck supple. Neurological: He is alert and oriented to person, place, and time. Skin: Skin is warm. No cyanosis. Nails show no clubbing.      LABS:  CBC:   Lab Results   Component Value Date/Time    WBC 7.8 08/15/2022 12:01 PM    RBC 5.17 08/15/2022 12:01 PM    RBC 5.51 05/29/2012 08:28 PM    HGB 15.3 08/15/2022 12:01 PM    HCT 46.3 08/15/2022 12:01 PM    MCV 89.7 08/15/2022 12:01 PM    MCH 29.7 08/15/2022 12:01 PM    MCHC 33.1 08/15/2022 12:01 PM    RDW 16.4 08/15/2022 12:01 PM     08/15/2022 12:01 PM    MPV 10.1 10/16/2014 05:40 PM     Lipids:  Lab Results   Component Value Date    CHOL 129 07/28/2019    CHOL 140 04/24/2017    CHOL 135 04/21/2016     Lab Results   Component Value Date    TRIG 105 07/28/2019    TRIG 112 04/24/2017    TRIG 69 04/21/2016     Lab Results   Component Value Date    HDL 50 08/15/2022    HDL 41 06/11/2021    HDL 45 06/08/2020     Lab Results   Component Value Date    LDLCALC 48 08/15/2022    LDLCALC 63 06/11/2021    LDLCALC 78 06/08/2020     No results found for: LABVLDL, VLDL  Lab Results   Component Value Date    CHOLHDLRATIO 3.0 05/29/2012     CMP:    Lab Results   Component Value Date/Time     08/15/2022 12:01 PM    K 4.6 08/15/2022 12:01 PM    CL 99 08/15/2022 12:01 PM    CO2 31 08/15/2022 12:01 PM    BUN 22 08/15/2022 12:01 PM    CREATININE 0.97 08/15/2022 12:01 PM    GFRAA >60.0 08/15/2022 12:01 PM    LABGLOM >60.0 08/15/2022 12:01 PM    GLUCOSE 83 08/15/2022 12:01 PM    GLUCOSE 91 05/29/2012 08:28 PM    PROT 7.4 06/11/2021 09:07 AM    LABALBU 4.3 06/11/2021 09:07 AM    LABALBU 4.6 05/29/2012 08:28 PM    CALCIUM 9.5 08/15/2022 12:01 PM    BILITOT 0.9 06/11/2021 09:07 AM    ALKPHOS 101 06/11/2021 09:07 AM    AST 11 06/11/2021 09:07 AM    ALT <5 06/11/2021 09:07 AM     BMP:    Lab Results   Component Value Date/Time     08/15/2022 12:01 PM    K 4.6 08/15/2022 12:01 PM    CL 99 08/15/2022 12:01 PM    CO2 31 08/15/2022 12:01 PM    BUN 22 08/15/2022 12:01 PM    LABALBU 4.3 06/11/2021 09:07 AM    LABALBU 4.6 05/29/2012 08:28 PM    CREATININE 0.97 08/15/2022 12:01 PM    CALCIUM 9.5 08/15/2022 12:01 PM    GFRAA >60.0 08/15/2022 12:01 PM    LABGLOM >60.0 08/15/2022 12:01 PM    GLUCOSE 83 08/15/2022 12:01 PM    GLUCOSE 91 05/29/2012 08:28 PM     Magnesium:    Lab Results   Component Value Date/Time    MG 1.9 08/15/2022 12:01 PM     TSH:  Lab Results   Component Value Date    TSH 1.070 08/15/2022       Patient Active Problem List   Diagnosis    Essential hypertension, benign    Chronic obstructive pulmonary disease (HCC)    Osteoarthritis    Tobacco abuse    Psychophysiological insomnia    JONNY (obstructive sleep apnea)    Hypoxia    Obesity (BMI 30-39. 9)    Acute on chronic diastolic (congestive) heart failure (HCC)    Cor pulmonale (chronic) (HCC)    GARNETT (dyspnea on exertion)    Chronic bronchitis (HCC)    Smoker    Bilateral carotid bruits    Bilateral lower extremity edema    Angina at rest [facility])    Chronic respiratory failure ([facility])    Lung density on x-ray    Pulmonary interstitial fibrosis ([facility])       Medications Discontinued During This Encounter   Medication Reason    metOLazone (ZAROXOLYN) 2.5 MG tablet REORDER       Modified Medications    Modified Medication Previous Medication    METOLAZONE (ZAROXOLYN) 2.5 MG TABLET metOLazone (ZAROXOLYN) 2.5 MG tablet       Take 1 tablet by mouth every other day    Take 1 tablet by mouth in the morning. Orders Placed This Encounter   Medications    metOLazone (ZAROXOLYN) 2.5 MG tablet     Sig: Take 1 tablet by mouth every other day     Dispense:  90 tablet     Refill:  3     **Patient requests 90 days supply**       Assessment/Plan:    1. Essential hypertension, benign - stable. continue meds. Low salt diet. 2. Acute on chronic diastolic (congestive) heart failure (HCC)  compensated. - continue diuretics and K supplement.    Labs reviewed. 3. Cor pulmonale (chronic) - chronic    4. Shortness of breath  No worse- on O2 3 L. Recently stopped smoking. Continue to stay off smoking. 6. Carotid Bruits- CUS - stable. Will continue surveillance     7. Dry skin- see Dermatology. 8. HPL- Statin low fat diet. Labs reviewed w pt- excellent. Fasting Labs   Counseling:  Heart Healthy Lifestyle, Stop Smoking, Low Salt Diet, Take Precautions to Prevent Falls, Regular Exercise and Walk Daily    Return in about 4 months (around 4/6/2023).       Electronically signed by Stewart Seals MD on 12/6/2022 at 2:17 PM

## 2022-12-06 NOTE — PROGRESS NOTES
Subjective:     Velma Navarro is a 69 y.o. male who complains today of:     Chief Complaint   Patient presents with    Follow-up     4m f/u for COPD    COPD       HPI  He is using  3 lit 24 hour day. NIV/trilogy at night time    He is on neb with duoneb QID, albuterol HFA prn. he is taking mucinex. C/o chest congestion. C/o shortness of breath with exertion and sometimes at rest.  C/o Cough with thick dark mucus. Occasional  Wheezing. No Chest tightness. No Chest pain with radiation  or pleuritic pain. No  leg edema. No orthopnea. No Fever or chills. No Rhinorrhea and postnasal drip. CXR 12/3/22  COPD with development of interstitial fibrosis  Vague opacity seen within the right lower lobe posteriorly. Dedicated CT  of the chest is recommended to exclude underlying pathology. Allergies:  Patient has no known allergies.   Past Medical History:   Diagnosis Date    CHF (congestive heart failure) (HCC)     COPD (chronic obstructive pulmonary disease) (HCC)     Hypertension     Lung disease     Osteoarthritis     hands worst    Sleep apnea      Past Surgical History:   Procedure Laterality Date    CHOLECYSTECTOMY      CORONARY ANGIOPLASTY WITH STENT PLACEMENT  2020    DIAGNOSTIC CARDIAC CATH LAB PROCEDURE  2020    PTCA  2020    ROTATOR CUFF REPAIR      right     Family History   Problem Relation Age of Onset    Heart Disease Mother     Other Father      Social History     Socioeconomic History    Marital status: Single     Spouse name: Not on file    Number of children: Not on file    Years of education: Not on file    Highest education level: Not on file   Occupational History    Not on file   Tobacco Use    Smoking status: Former     Packs/day: 2.00     Years: 49.00     Pack years: 98.00     Types: Cigarettes     Start date: 1971     Quit date: 12/15/2020     Years since quittin.9     Passive exposure: Past    Smokeless tobacco: Never   Vaping Use    Vaping Use: Never used   Substance and Sexual Activity    Alcohol use: Yes     Comment: occ. 12 pack    Drug use: No    Sexual activity: Yes   Other Topics Concern    Not on file   Social History Narrative    Not on file     Social Determinants of Health     Financial Resource Strain: Not on file   Food Insecurity: Not on file   Transportation Needs: Not on file   Physical Activity: Not on file   Stress: Not on file   Social Connections: Not on file   Intimate Partner Violence: Not on file   Housing Stability: Not on file         Review of Systems   Constitutional:  Negative for chills, diaphoresis, fatigue and fever. HENT:  Negative for congestion, mouth sores, nosebleeds, postnasal drip, rhinorrhea, sneezing, sore throat and voice change. Eyes:  Negative for itching and visual disturbance. Respiratory:  Positive for cough, shortness of breath and wheezing. Negative for chest tightness. Cardiovascular: Negative. Negative for chest pain, palpitations and leg swelling. Gastrointestinal:  Negative for abdominal pain, diarrhea, nausea and vomiting. Genitourinary:  Negative for difficulty urinating and hematuria. Musculoskeletal:  Negative for arthralgias, joint swelling and myalgias. Skin:  Negative for rash. Allergic/Immunologic: Negative for environmental allergies. Neurological:  Negative for dizziness, tremors, weakness and headaches. Psychiatric/Behavioral:  Negative for behavioral problems and sleep disturbance.        :     Vitals:    12/06/22 1300 12/06/22 1303   BP: 138/84    Site: Right Upper Arm    Position: Sitting    Cuff Size: Large Adult    Pulse: 77 75   SpO2: (!) 83% (!) 85%   Weight: 250 lb (113.4 kg)      Wt Readings from Last 3 Encounters:   12/06/22 250 lb (113.4 kg)   08/15/22 247 lb (112 kg)   08/15/22 246 lb (111.6 kg)         Physical Exam  Constitutional:       Appearance: He is well-developed. HENT:      Head: Normocephalic and atraumatic.       Nose: Nose normal.   Eyes: Conjunctiva/sclera: Conjunctivae normal.      Pupils: Pupils are equal, round, and reactive to light. Neck:      Thyroid: No thyromegaly. Vascular: No JVD. Trachea: No tracheal deviation. Cardiovascular:      Rate and Rhythm: Normal rate and regular rhythm. Heart sounds: No murmur heard. No friction rub. No gallop. Pulmonary:      Effort: Pulmonary effort is normal. No respiratory distress. Breath sounds: Wheezing and rales present. Chest:      Chest wall: No tenderness. Abdominal:      General: There is no distension. Musculoskeletal:         General: Normal range of motion. Lymphadenopathy:      Cervical: No cervical adenopathy. Skin:     General: Skin is warm and dry. Findings: No rash. Neurological:      Mental Status: He is alert and oriented to person, place, and time. Cranial Nerves: No cranial nerve deficit. Psychiatric:         Behavior: Behavior normal.       Current Outpatient Medications   Medication Sig Dispense Refill    azithromycin (ZITHROMAX) 250 MG tablet Take 1 tablet by mouth See Admin Instructions for 5 days 500mg on day 1 followed by 250mg on days 2 - 5 6 tablet 1    triamcinolone (KENALOG) 0.1 % cream APPLY TO LEGS AND FEET EVERY MORNING.      metOLazone (ZAROXOLYN) 2.5 MG tablet Take 1 tablet by mouth in the morning.  90 tablet 3    ipratropium-albuterol (DUONEB) 0.5-2.5 (3) MG/3ML SOLN nebulizer solution USE 1 VIAL PER NEBULIZER EVERY 4 HOURS 360 mL 2    atorvastatin (LIPITOR) 20 MG tablet Take 1 tablet by mouth nightly 90 tablet 2    furosemide (LASIX) 40 MG tablet Take 1.5 tablets by mouth daily 135 tablet 3    lisinopril (PRINIVIL;ZESTRIL) 10 MG tablet Take 1 tablet by mouth every evening 90 tablet 3    metoprolol tartrate (LOPRESSOR) 25 MG tablet Take 1 tablet by mouth 2 times daily 180 tablet 3    potassium chloride (KLOR-CON M) 10 MEQ extended release tablet Take 1 tablet by mouth 2 times daily For two weeks while on the additional metolazone 2.5mg 180 tablet 3    albuterol sulfate HFA (PROAIR HFA) 108 (90 Base) MCG/ACT inhaler Inhale 2 puffs into the lungs every 6 hours as needed for Wheezing 1 each 5    Respiratory Therapy Supplies MISC POC 3 on 3L on exertion 1 each 0    Handicap Placard MISC by Does not apply route The patient requires a disability parking placard due to difficulties ambulating long distances. Good 4/19/2021-4/19/2026 1 each 0    Handicap Placard MISC by Does not apply route The patient requires a disability parking placard done due to difficulties ambulating long distances. Good 3/3/2021--3/3/2026 1 each 1    Elastic Bandages & Supports (MEDICAL COMPRESSION SOCKS) MISC Disp knee high compression socks 20-30 mmHg, on daily off nightly 2 each 1    nitroGLYCERIN (NITROSTAT) 0.4 MG SL tablet up to max of 3 total doses. If no relief after 1 dose, call 911. 25 tablet 3    albuterol (PROVENTIL) (2.5 MG/3ML) 0.083% nebulizer solution USE 1 VIAL PER NEBULIZER EVERY 6 HOURS AS NEEDED FOR WHEEZING 360 mL 0    Elastic Bandages & Supports (MEDICAL COMPRESSION STOCKINGS) MISC 2 each by Does not apply route daily On in QAM and off QHS. B/L Knee high, 20-30mmHg 1 each 1    Respiratory Therapy Supplies DREW New CPAP Full face  mask and supplies. Dispense heated tubing and adjust humidity in CPAP due to c/o dry mouth. 1 Device 0    guaiFENesin (MUCINEX) 600 MG extended release tablet Take 1 tablet by mouth 2 times daily 10 tablet 0    aspirin EC 81 MG EC tablet Take 1 tablet by mouth daily 30 tablet 3    OXYGEN POC    3 lit via NC     Dx copd 1 Units 0    CPAP Machine MISC by Does not apply route New CPAP with 7 cm with 3 lit O2 bled 1 each 0    Compression Bandages KIT LLE: knee high: 20-30mmhg 2 kit 1     No current facility-administered medications for this visit.        Results for orders placed during the hospital encounter of 12/03/22    XR CHEST (2 VW)    Narrative  EXAMINATION:  TWO XRAY VIEWS OF THE CHEST    12/3/2022 12:08 pm    COMPARISON:  None. HISTORY:  ORDERING SYSTEM PROVIDED HISTORY: Chronic respiratory failure with hypoxia  and hypercapnia (HCC), Chronic respiratory failure with hypoxia and  hypercapnia (HCC)  TECHNOLOGIST PROVIDED HISTORY:  What reading provider will be dictating this exam?->CRC    FINDINGS:  There is hyperinflation of the lungs and flattening of diaphragms consistent  with COPD. There are findings of mild interstitial fibrosis. Within the  right lower lobe there is a vague opacity seen posteriorly and laterally. Further evaluation with CT of the chest is recommended given the underlying  it emphysematous changes and interstitial fibrosis. Impression  1. COPD with development of interstitial fibrosis  2. Vague opacity seen within the right lower lobe posteriorly. Dedicated CT  of the chest is recommended to exclude underlying pathology. Results for orders placed during the hospital encounter of 09/02/21    XR CHEST (2 VW)    Narrative  EXAMINATION: XR CHEST (2 VW)    CLINICAL HISTORY: COPD    COMPARISONS: CT CHEST, JULY 27, 2019, CHEST RADIOGRAPH, SAME DATE    FINDINGS: Osseous structures intact. Cardiopericardial silhouette normal. Pulmonary vasculature normal. Mild blunting costophrenic angles. Ill-defined area increased opacity right lung base. Impression  PLEURAL THICKENING VERSUS SMALL BILATERAL PLEURAL EFFUSIONS. RIGHT LOWER LUNG SUBSEGMENTAL ATELECTASIS/PNEUMONIA. Results for orders placed during the hospital encounter of 10/26/18    XR CHEST STANDARD (2 VW)    Narrative  EXAMINATION: XR CHEST (2 VW)    CLINICAL HISTORY: CHRONIC OBSTRUCTIVE PULMONARY DISEASE    COMPARISONS: None available. FINDINGS: Osseous structures intact.  Cardiopericardial silhouette normal. Pulmonary vasculature normal. Lungs clear    Impression  NO ACUTE CARDIOPULMONARY DISEASE  ]  Results for orders placed during the hospital encounter of 07/27/19    XR CHEST PORTABLE    Narrative  EXAMINATION: XR CHEST PORTABLE    CLINICAL HISTORY:  Legs swelling, shortness of breath    COMPARISONS: None available. FINDINGS: There is moderate cardiomegaly. Pulmonary vascularity is prominent. There are coarsened interstitial markings consistent with fibrosis or interstitial edema. There is blunting of the left costophrenic angle which could represent scarring or  trace effusion. There are atherosclerotic changes of the thoracic aorta. There are degenerative changes in the spine and right shoulder. Impression  CARDIOMEGALY WITH PULMONARY VASCULAR CONGESTION. COARSE INTERSTITIAL MARKINGS WHICH COULD REPRESENT FIBROSIS OR INTERSTITIAL EDEMA. SMALL LEFT PLEURAL EFFUSION VERSUS PLEURAL SCARRING. Assessment/Plan:     1. Lung density on x-ray  He had chest x-ray shows vague density right lower lobe posteriorly will request CT chest for further evaluation. CXR 12/3/22 COPD with development of interstitial fibrosis  Vague opacity seen within the right lower lobe posteriorly. Dedicated CT  of the chest is recommended to exclude underlying pathology. - CT CHEST WO CONTRAST; Future    2. Chronic obstructive pulmonary disease, unspecified COPD type ([facility] [address]  He is on neb with duoneb QID, albuterol HFA prn. he is taking mucinex. C/o chest congestion. C/o shortness of breath with exertion and sometimes at rest.C/o Cough with thick dark mucus. Occasional  Wheezing.    - azithromycin (ZITHROMAX) 250 MG tablet; Take 1 tablet by mouth See Admin Instructions for 5 days 500mg on day 1 followed by 250mg on days 2 - 5  Dispense: 6 tablet; Refill: 1    3. Chronic respiratory failure with hypoxia and hypercapnia (HCC)  He is using  3 lit 24 hour day. NIV/trilogy at night time. Continue same    4. JONNY (obstructive sleep apnea)  He is on noninvasive ventilator during sleep. 5. Obesity (BMI 30-39. 9)  He is advised try to lose weight.  obesity related risk explained to the patient ,  Current weight: 250 lb (113.4 kg) Lbs. BMI:  Body mass index is 35.87 kg/m². Suggested weight control approaches, including dietary changes , exercise, behavioral modification. Return in about 4 weeks (around 1/3/2023) for COPD, Ct chest f/u.       Bren Ragland MD

## 2022-12-15 DIAGNOSIS — J44.9 CHRONIC OBSTRUCTIVE PULMONARY DISEASE, UNSPECIFIED COPD TYPE (HCC): ICD-10-CM

## 2022-12-15 RX ORDER — IPRATROPIUM BROMIDE AND ALBUTEROL SULFATE 2.5; .5 MG/3ML; MG/3ML
SOLUTION RESPIRATORY (INHALATION)
Qty: 360 ML | Refills: 3 | Status: SHIPPED | OUTPATIENT
Start: 2022-12-15

## 2022-12-15 NOTE — TELEPHONE ENCOUNTER
Please approve or deny this request.    Rx requested:  Requested Prescriptions     Pending Prescriptions Disp Refills    ipratropium-albuterol (DUONEB) 0.5-2.5 (3) MG/3ML SOLN nebulizer solution [Pharmacy Med Name: IPRAT-ALBUT 0.5-3(2.5) MG/3 ML] 360 mL 3     Sig: USE 1 VIAL PER NEBULIZER EVERY 4 HOURS         Last Office Visit:   2022      Next Visit Date:  Future Appointments   Date Time Provider Efrem Camacho   2022  2:00 PM ALIYAH CT ROOM 1 [facility] RAD   2023 11:00 AM Henri Hernandez  [facility]   2023  2:00 PM Mary Ruiz  [facility]

## 2022-12-20 ENCOUNTER — TELEPHONE (OUTPATIENT)
Dept: PULMONOLOGY | Age: 67
End: 2022-12-20

## 2022-12-20 ENCOUNTER — HOSPITAL ENCOUNTER (OUTPATIENT)
Dept: CT IMAGING | Age: 67
Discharge: HOME | End: 2022-12-22
Payer: MEDICARE

## 2022-12-20 DIAGNOSIS — J98.4 LUNG DENSITY ON X-RAY: ICD-10-CM

## 2022-12-20 PROCEDURE — G1010 CDSM STANSON: HCPCS

## 2022-12-27 ENCOUNTER — OFFICE VISIT (OUTPATIENT)
Dept: FAMILY MEDICINE CLINIC | Age: 67
End: 2022-12-27

## 2022-12-27 ENCOUNTER — HOSPITAL ENCOUNTER (OUTPATIENT)
Age: 67
Setting detail: SPECIMEN
Discharge: HOME | End: 2022-12-27
Payer: MEDICARE

## 2022-12-27 VITALS
BODY MASS INDEX: 37.18 KG/M2 | DIASTOLIC BLOOD PRESSURE: 72 MMHG | HEIGHT: 69 IN | SYSTOLIC BLOOD PRESSURE: 136 MMHG | TEMPERATURE: 97.1 F | HEART RATE: 84 BPM | WEIGHT: 251 LBS

## 2022-12-27 DIAGNOSIS — Z12.11 COLON CANCER SCREENING: ICD-10-CM

## 2022-12-27 DIAGNOSIS — Z12.5 PROSTATE CANCER SCREENING: ICD-10-CM

## 2022-12-27 DIAGNOSIS — J44.9 CHRONIC OBSTRUCTIVE PULMONARY DISEASE, UNSPECIFIED COPD TYPE (HCC): ICD-10-CM

## 2022-12-27 DIAGNOSIS — Z00.00 MEDICARE ANNUAL WELLNESS VISIT, SUBSEQUENT: ICD-10-CM

## 2022-12-27 DIAGNOSIS — Z00.00 ENCOUNTER FOR ANNUAL WELLNESS EXAM IN MEDICARE PATIENT: Primary | ICD-10-CM

## 2022-12-27 PROBLEM — F17.200 SMOKER: Status: RESOLVED | Noted: 2020-01-06 | Resolved: 2022-12-27

## 2022-12-27 PROBLEM — J96.10 CHRONIC RESPIRATORY FAILURE (HCC): Status: RESOLVED | Noted: 2021-03-03 | Resolved: 2022-12-27

## 2022-12-27 PROBLEM — J42 CHRONIC BRONCHITIS (HCC): Status: RESOLVED | Noted: 2019-08-15 | Resolved: 2022-12-27

## 2022-12-27 LAB — PROSTATE SPECIFIC ANTIGEN: 0.79 NG/ML (ref 0–4)

## 2022-12-27 PROCEDURE — 84153 ASSAY OF PSA TOTAL: CPT

## 2022-12-27 SDOH — ECONOMIC STABILITY: FOOD INSECURITY: WITHIN THE PAST 12 MONTHS, THE FOOD YOU BOUGHT JUST DIDN'T LAST AND YOU DIDN'T HAVE MONEY TO GET MORE.: NEVER TRUE

## 2022-12-27 SDOH — ECONOMIC STABILITY: FOOD INSECURITY: WITHIN THE PAST 12 MONTHS, YOU WORRIED THAT YOUR FOOD WOULD RUN OUT BEFORE YOU GOT MONEY TO BUY MORE.: NEVER TRUE

## 2022-12-27 ASSESSMENT — PATIENT HEALTH QUESTIONNAIRE - PHQ9: DEPRESSION UNABLE TO ASSESS: PT REFUSES

## 2022-12-27 ASSESSMENT — SOCIAL DETERMINANTS OF HEALTH (SDOH): HOW HARD IS IT FOR YOU TO PAY FOR THE VERY BASICS LIKE FOOD, HOUSING, MEDICAL CARE, AND HEATING?: NOT HARD AT ALL

## 2022-12-27 ASSESSMENT — LIFESTYLE VARIABLES
HOW OFTEN DO YOU HAVE A DRINK CONTAINING ALCOHOL: MONTHLY OR LESS
HOW MANY STANDARD DRINKS CONTAINING ALCOHOL DO YOU HAVE ON A TYPICAL DAY: 1 OR 2

## 2022-12-27 NOTE — PATIENT INSTRUCTIONS
the feelings that lead to angry outbursts. Anger and hostility may be a symptom of unhappy feelings or depression about your job, your relationship, or other aspects of your personal life. Avoid situations that lead to angry outbursts. If standing in line bothers you, do errands at less busy times. Express anger in a healthy way. You might:  Go for a short walk or jog. Draw, paint, or listen to music to release the anger. Write in a daily journal.  Use \"I\" statements, not \"you\" statements, to discuss your anger. Say \"I don't feel valued when my needs are not being met\" instead of \"You make me mad when you are so inconsiderate. \"  Take care of yourself. Exercise regularly. Eat a variety of healthy foods. Don't skip meals. Try to get 8 hours of sleep each night. Limit your use of alcohol, and don't use drugs. Practice yoga, meditation, or petra chi to relax. Explore other resources that may be available through your job or your community. Contact your human resources department at work. You might be able to get services through an employee assistance program.  Contact your local hospital, mental health facility, or health department. Ask what types of programs or support groups are available in your area. Do not keep guns in your home. If you must have guns in your home, unload them and lock them up. Lock ammunition in a separate place. Keep guns away from children. Where can you find help? If anger or stress starts to harm your work or personal relationships, you might seek help. You can learn ways to manage your feelings and actions. Talk to someone you trust, or find a counselor. There are groups in your area that can connect you with people to talk to. Behavioral Health Treatment Services . This service from the national Substance Abuse and [address] can help you find local counselors. Search online at [facility]. samhsa.gov or call 4-438-450-HELP (1620), or [employer] 5-517-587-632-524-9740. Parents Anonymous. Self-help groups that serve parents under stress, as well as children who have been abused, are available throughout the [place], [place] ([facility]), and [facility]. To find a group in your area, search online or in your phone book under Parents Anonymous or call (566) 122-6365. Where can you learn more? Go to http://www.stafford.com/ and enter Z357 to learn more about \"Learning About Managing Anger. \"  Current as of: February 9, 2022               Content Version: 13.5  © 2006-2022 [employer]. Care instructions adapted under license by [facility] ([facility]). If you have questions about a medical condition or this instruction, always ask your healthcare professional. [address] any warranty or liability for your use of this information. Learning About Being Active as an Older Adult  Why is being active important as you get older? Being active is one of the best things you can do for your health. And it's never too late to start. Being active--or getting active, if you aren't already--has definite benefits. It can:  Give you more energy,  Keep your mind sharp. Improve balance to reduce your risk of falls. Help you manage chronic illness with fewer medicines. No matter how old you are, how fit you are, or what health problems you have, there is a form of activity that will work for you. And the more physical activity you can do, the better your overall health will be. What kinds of activity can help you stay healthy? Being more active will make your daily activities easier. Physical activity includes planned exercise and things you do in daily life. There are four types of activity:  Aerobic. Doing aerobic activity makes your heart and lungs strong. Includes walking, dancing, and gardening. Aim for at least 2½ hours spread throughout the week. It improves your energy and can help you sleep better. Muscle-strengthening.   This type of activity can help maintain muscle and strengthen bones. Includes climbing stairs, using resistance bands, and lifting or carrying heavy loads. Aim for at least twice a week. It can help protect the knees and other joints. Stretching. Stretching gives you better range of motion in joints and muscles. Includes upper arm stretches, calf stretches, and gentle yoga. Aim for at least twice a week, preferably after your muscles are warmed up from other activities. It can help you function better in daily life. Balancing. This helps you stay coordinated and have good posture. Includes heel-to-toe walking, petra chi, and certain types of yoga. Aim for at least 3 days a week. It can reduce your risk of falling. Even if you have a hard time meeting the recommendations, it's better to be more active than less active. All activity done in each category counts toward your weekly total. You'd be surprised how daily things like carrying groceries, keeping up with grandchildren, and taking the stairs can add up. What keeps you from being active? If you've had a hard time being more active, you're not alone. Maybe you remember being able to do more. Or maybe you've never thought of yourself as being active. It's frustrating when you can't do the things you want. Being more active can help. What's holding you back? Getting started. Have a goal, but break it into easy tasks. Small steps build into big accomplishments. Staying motivated. If you feel like skipping your activity, remember your goal. Maybe you want to move better and stay independent. Every activity gets you one step closer. Not feeling your best.  Start with 5 minutes of an activity you enjoy. Prove to yourself you can do it. As you get comfortable, increase your time. You may not be where you want to be. But you're in the process of getting there. Everyone starts somewhere. How can you find safe ways to stay active?   Talk with your doctor about any physical challenges you're facing. Make a plan with your doctor if you have a health problem or aren't sure how to get started with activity. If you're already active, ask your doctor if there is anything you should change to stay safe as your body and health change. If you tend to feel dizzy after you take medicine, avoid activity at that time. Try being active before you take your medicine. This will reduce your risk of falls. If you plan to be active at home, make sure to clear your space before you get started. Remove things like TV cords, coffee tables, and throw rugs. It's safest to have plenty of space to move freely. The key to getting more active is to take it slow and steady. Try to improve only a little bit at a time. Pick just one area to improve on at first. And if an activity hurts, stop and talk to your doctor. Where can you learn more? Go to http://www.stafford.com/ and enter P600 to learn more about \"Learning About Being Active as an Older Adult. \"  Current as of: October 10, 2022               Content Version: 13.5  © 2965-1497 Healthwise, Incorporated. Care instructions adapted under license by [facility] ([facility]). If you have questions about a medical condition or this instruction, always ask your healthcare professional. [address] any warranty or liability for your use of this information. Learning About Vision Tests  What are vision tests? The four most common vision tests are visual acuity tests, refraction, visual field tests, and color vision tests. Visual acuity (sharpness) tests  These tests are used: To see if you need glasses or contact lenses. To monitor an eye problem. To check an eye injury. Visual acuity tests are done as part of routine exams. You may also have this test when you get your 's license or apply for some types of jobs. Visual field tests  These tests are used:   To check for vision loss in any area of your range of vision. To screen for certain eye diseases. To look for nerve damage after a stroke, head injury, or other problem that could reduce blood flow to the brain. Refraction and color tests  A refraction test is done to find the right prescription for glasses and contact lenses. A color vision test is done to check for color blindness. Color vision is often tested as part of a routine exam. You may also have this test when you apply for a job where recognizing different colors is important, such as , electronics, or the [employer]. How are vision tests done? Visual acuity test   You cover one eye at a time. You read aloud from a wall chart across the room. You read aloud from a small card that you hold in your hand. Refraction   You look into a special device. The device puts lenses of different strengths in front of each eye to see how strong your glasses or contact lenses need to be. Visual field tests   Your doctor may have you look through special machines. Or your doctor may simply have you stare straight ahead while they move a finger into and out of your field of vision. Color vision test   You look at pieces of printed test patterns in various colors. You say what number or symbol you see. Your doctor may have you trace the number or symbol using a pointer. How do these tests feel? There is very little chance of having a problem from this test. If dilating drops are used for a vision test, they may make the eyes sting and cause a medicine taste in the mouth. Follow-up care is a key part of your treatment and safety. Be sure to make and go to all appointments, and call your doctor if you are having problems. It's also a good idea to know your test results and keep a list of the medicines you take. Where can you learn more? Go to http://www.stafford.com/ and enter G551 to learn more about \"Learning About Vision Tests. \"  Current as of: October 12, 1604               HTZPHVG Version: 13.5  © 3811-6605 Healthwise, [employer] Care instructions adapted under license by [facility] ([facility]). If you have questions about a medical condition or this instruction, always ask your healthcare professional. [address] any warranty or liability for your use of this information. Advance Directives: Care Instructions  Overview  An advance directive is a legal way to state your wishes at the end of your life. It tells your family and your doctor what to do if you can't say what you want. There are two main types of advance directives. You can change them any time your wishes change. Living will. This form tells your family and your doctor your wishes about life support and other treatment. The form is also called a declaration. Medical power of . This form lets you name a person to make treatment decisions for you when you can't speak for yourself. This person is called a health care agent (health care proxy, health care surrogate). The form is also called a durable power of  for health care. If you do not have an advance directive, decisions about your medical care may be made by a family member, or by a doctor or a  who doesn't know you. It may help to think of an advance directive as a gift to the people who care for you. If you have one, they won't have to make tough decisions by themselves. For more information, including forms for your state, see the [address] [facility] website (www.caringinfo.org/planning/advance-directives/). Follow-up care is a key part of your treatment and safety. Be sure to make and go to all appointments, and call your doctor if you are having problems. It's also a good idea to know your test results and keep a list of the medicines you take. What should you include in an advance directive? Many states have a unique advance directive form.  (It may ask you to address specific issues.) Or you might use a universal form that's approved by many states. If your form doesn't tell you what to address, it may be hard to know what to include in your advance directive. Use the questions below to help you get started. Who do you want to make decisions about your medical care if you are not able to? What life-support measures do you want if you have a serious illness that gets worse over time or can't be cured? What are you most afraid of that might happen? (Maybe you're afraid of having pain, losing your independence, or being kept alive by machines.)  Where would you prefer to die? (Your home? A hospital? A nursing home?)  Do you want to donate your organs when you die? Do you want certain Latter-day practices performed before you die? When should you call for help? Be sure to contact your doctor if you have any questions. Where can you learn more? Go to http://www.stafford.com/ and enter R264 to learn more about \"Advance Directives: Care Instructions. \"  Current as of: June 16, 2022               Content Version: 13.5  © 3125-8274 Healthwise, Incorporated. Care instructions adapted under license by [facility] ([facility]). If you have questions about a medical condition or this instruction, always ask your healthcare professional. [address] any warranty or liability for your use of this information. Personalized Preventive Plan for Jame Bermudez - 12/27/2022  Medicare offers a range of preventive health benefits. Some of the tests and screenings are paid in full while other may be subject to a deductible, co-insurance, and/or copay. Some of these benefits include a comprehensive review of your medical history including lifestyle, illnesses that may run in your family, and various assessments and screenings as appropriate.     After reviewing your medical record and screening and assessments performed today your provider may have ordered immunizations, labs, imaging, and/or referrals for you. A list of these orders (if applicable) as well as your Preventive Care list are included within your After Visit Summary for your review. Other Preventive Recommendations:    A preventive eye exam performed by an eye specialist is recommended every 1-2 years to screen for glaucoma; cataracts, macular degeneration, and other eye disorders. A preventive dental visit is recommended every 6 months. Try to get at least 150 minutes of exercise per week or 10,000 steps per day on a pedometer . Order or download the FREE \"Exercise & Physical Activity: Your Everyday Guide\" from [employer]. Call 7-571.830.9300 or search [employer] online. You need 6007-4643 mg of calcium and 2177-8966 IU of vitamin D per day. It is possible to meet your calcium requirement with diet alone, but a vitamin D supplement is usually necessary to meet this goal.  When exposed to the sun, use a sunscreen that protects against both UVA and UVB radiation with an SPF of 30 or greater. Reapply every 2 to 3 hours or after sweating, drying off with a towel, or swimming. Always wear a seat belt when traveling in a car. Always wear a helmet when riding a bicycle or motorcycle.

## 2022-12-27 NOTE — PROGRESS NOTES
Medicare Annual Wellness Visit    Robbi Gottron is here for Medicare AWV    Assessment & Plan   Encounter for annual wellness exam in Medicare patient  Medicare annual wellness visit, subsequent      Recommendations for Preventive Services Due: see orders and patient instructions/AVS.  Recommended screening schedule for the next 5-10 years is provided to the patient in written form: see Patient Instructions/AVS.     Return for Medicare Annual Wellness Visit in 1 year. Subjective       Patient's complete Health Risk Assessment and screening values have been reviewed and are found in Flowsheets. The following problems were reviewed today and where indicated follow up appointments were made and/or referrals ordered. Positive Risk Factor Screenings with Interventions:        Depression:  Depression Unable to Assess: Pt refuses    Interpretation:   1-4 = minimal  5-9 = mild  10-14 = moderate  15-19 = moderately severe  20-27 = severe          General HRA Questions:  Select all that apply: (!) Anger    Anger Interventions:  See AVS for additional education material  See A/P for plan and any pertinent orders       Weight and Activity:  Physical Activity: Inactive    Days of Exercise per Week: 0 days    Minutes of Exercise per Session: 0 min     On average, how many days per week do you engage in moderate to strenuous exercise (like a brisk walk)?: 0 days  Have you lost any weight without trying in the past 3 months?: No  Body mass index: (!) 37.61      Inactivity Interventions:  See AVS for additional education material  See A/P for plan and any pertinent orders  Obesity Interventions:  See AVS for additional education material  See A/P for plan and any pertinent orders            Vision Screen:  Do you have difficulty driving, watching TV, or doing any of your daily activities because of your eyesight?: No  Have you had an eye exam within the past year?: (!) No  No results found.     Interventions:   See AVS for additional education material  See A/P for any pertinent orders    Safety:  Do you have either shower bars, grab bars, non-slip mats or non-slip surfaces in your shower or bathtub?: (!) No  Do you always fasten your seatbelt when you are in a car?: (!) No  Interventions:  See AVS for additional education material  See A/P for plan and any pertinent orders     Advanced Directives:  Do you have a Living Will?: (!) No    Intervention:         Lung Cancer Screening:  Guidelines regarding LDCT screening for lung cancer reviewed. Patient declined screening. Objective   Vitals:    12/27/22 1318   BP: 136/72   Pulse: 84   Temp: 97.1 °F (36.2 °C)   TempSrc: Infrared   Weight: 251 lb (113.9 kg)   Height: 5' 8.5\" (1.74 m)      Body mass index is 37.61 kg/m². No Known Allergies  Prior to Visit Medications    Medication Sig Taking? Authorizing Provider   ipratropium-albuterol (DUONEB) 0.5-2.5 (3) MG/3ML SOLN nebulizer solution USE 1 VIAL PER NEBULIZER EVERY 4 HOURS Yes Ashley Matias MD   albuterol sulfate HFA (PROAIR HFA) 108 (90 Base) MCG/ACT inhaler Inhale 2 puffs into the lungs every 6 hours as needed for Wheezing Yes Ashley Matias MD   metOLazone (ZAROXOLYN) 2.5 MG tablet Take 1 tablet by mouth every other day Yes Sara Lira MD   triamcinolone (KENALOG) 0.1 % cream APPLY TO LEGS AND FEET EVERY MORNING.  Yes Historical Provider, MD   atorvastatin (LIPITOR) 20 MG tablet Take 1 tablet by mouth nightly Yes Sara Lira MD   furosemide (LASIX) 40 MG tablet Take 1.5 tablets by mouth daily Yes Sara Lira MD   lisinopril (PRINIVIL;ZESTRIL) 10 MG tablet Take 1 tablet by mouth every evening Yes Sara Lira MD   metoprolol tartrate (LOPRESSOR) 25 MG tablet Take 1 tablet by mouth 2 times daily Yes Sara Lira MD   potassium chloride (KLOR-CON M) 10 MEQ extended release tablet Take 1 tablet by mouth 2 times daily For two weeks while on the additional metolazone 2.5mg Yes Sara Lira MD Respiratory Therapy Supplies MISC POC 3 on 3L on exertion Yes MD Jenise Rosario MISC by Does not apply route The patient requires a disability parking placard due to difficulties ambulating long distances. Good 4/19/2021-4/19/2026 Yes MD Jenise Celaya MISC by Does not apply route The patient requires a disability parking placard done due to difficulties ambulating long distances. Good 3/3/2021--3/3/2026 Yes Irene Red MD   Elastic Bandages & Supports (MEDICAL COMPRESSION SOCKS) MISC Disp knee high compression socks 20-30 mmHg, on daily off nightly Yes Irene Red MD   nitroGLYCERIN (NITROSTAT) 0.4 MG SL tablet up to max of 3 total doses. If no relief after 1 dose, call 911. Yes Irene Red MD   albuterol (PROVENTIL) (2.5 MG/3ML) 0.083% nebulizer solution USE 1 VIAL PER NEBULIZER EVERY 6 HOURS AS NEEDED FOR WHEEZING Yes Molly James MD   Elastic Bandages & Supports ([address]) MISC 2 each by Does not apply route daily On in QAM and off QHS. B/L Knee high, 20-30mmHg Yes Irene Red MD   Respiratory Therapy Supplies DREW New CPAP Full face  mask and supplies. Dispense heated tubing and adjust humidity in CPAP due to c/o dry mouth.  Yes Molly James MD   guaiFENesin (MUCINEX) 600 MG extended release tablet Take 1 tablet by mouth 2 times daily Yes Boo Roque MD   aspirin EC 81 MG EC tablet Take 1 tablet by mouth daily Yes Ken Humphrey MD   OXYGEN POC    3 lit via NC     Dx copd Yes Molly James MD   CPAP Machine MISC by Does not apply route New CPAP with 7 cm with 3 lit O2 bled Yes Molly James MD   Compression Bandages KIT LLE: knee high: 20-30mmhg Yes Lexi Zheng MD       [facility] (Including outside providers/suppliers regularly involved in providing care):   Patient Care Team:  Lexi Zheng MD as PCP - General (Family Medicine)  Lexi Zheng MD as PCP - [facility] Empaneled Provider  Irene Red MD as Cardiologist (Cardiology)     Reviewed and updated this visit:  Tobacco  Allergies  Meds  Med Hx  Surg Hx  Soc Hx  Fam Hx           Interested in a power wheelchair for long distances like at the [employer] or [employer].  Will connect with [facility] for power wheelchair eval.    He is interested in medical marijuana

## 2023-01-04 ENCOUNTER — TELEMEDICINE (OUTPATIENT)
Dept: PULMONOLOGY | Age: 68
End: 2023-01-04
Payer: MEDICARE

## 2023-01-04 DIAGNOSIS — G47.33 OSA (OBSTRUCTIVE SLEEP APNEA): ICD-10-CM

## 2023-01-04 DIAGNOSIS — J96.12 CHRONIC RESPIRATORY FAILURE WITH HYPOXIA AND HYPERCAPNIA (HCC): ICD-10-CM

## 2023-01-04 DIAGNOSIS — J84.10 PULMONARY INTERSTITIAL FIBROSIS (HCC): ICD-10-CM

## 2023-01-04 DIAGNOSIS — J44.9 CHRONIC OBSTRUCTIVE PULMONARY DISEASE, UNSPECIFIED COPD TYPE (HCC): Primary | ICD-10-CM

## 2023-01-04 DIAGNOSIS — E66.9 OBESITY (BMI 30-39.9): ICD-10-CM

## 2023-01-04 DIAGNOSIS — J98.4 LUNG DENSITY ON X-RAY: ICD-10-CM

## 2023-01-04 DIAGNOSIS — J96.11 CHRONIC RESPIRATORY FAILURE WITH HYPOXIA AND HYPERCAPNIA (HCC): ICD-10-CM

## 2023-01-04 PROCEDURE — 99443 PR PHYS/QHP TELEPHONE EVALUATION 21-30 MIN: CPT | Performed by: INTERNAL MEDICINE

## 2023-01-04 RX ORDER — PREDNISONE 10 MG/1
TABLET ORAL
Qty: 40 TABLET | Refills: 0 | Status: SHIPPED | OUTPATIENT
Start: 2023-01-04

## 2023-01-04 ASSESSMENT — ENCOUNTER SYMPTOMS
NAUSEA: 0
ABDOMINAL PAIN: 0
CHEST TIGHTNESS: 0
VOICE CHANGE: 0
EYE ITCHING: 0
WHEEZING: 1
DIARRHEA: 0
SORE THROAT: 0
RHINORRHEA: 0
VOMITING: 0
COUGH: 1
SHORTNESS OF BREATH: 1

## 2023-01-04 NOTE — PROGRESS NOTES
Jame Bermudez (:  1955) is a Established patient, here for evaluation of the following:    Assessment & Plan   Below is the assessment and plan developed based on review of pertinent history, physical exam, labs, studies, and medications. 1. Chronic obstructive pulmonary disease, unspecified COPD type ([address]  -     predniSONE (DELTASONE) 10 MG tablet; 4 tablets daily for 4 days, 3 tablets daily for 4 days, 2 tablets daily for 4 days, then 1 tablet daily for 4 days, Disp-40 tablet, R-0Normal  2. Lung density on x-ray  3. Chronic respiratory failure with hypoxia and hypercapnia (HCC)  4. JONNY (obstructive sleep apnea)  5. Obesity (BMI 30-39.9)  6. Pulmonary interstitial fibrosis ([address]    He is  on 3 L O2 via nasal cannula 24 hours a day. He is using trilogy at nighttime. He has complaint of shortness of breath with exertion and chest congestion cough with thick clear mucus off-and-on wheezing . he had chest x-ray showing COPD with development of interstitial fibrosis and vital capacity and right lower lobe and recommend CT chest.  CT chest was done on 2022 shows COPD with emphysematous changes and central bronchiectasis wall and pulmonary fibrotic changes with honeycombing 5 mm right lower lobe nodule. Follow-up CT chest recommended in 6-month. At this time continue present treatment plan. Follow-up in 3 months. Return in about 3 months (around 2023) for jonny, COPD, chronic respiratory failure. Subjective     He is using  3 lit 24 hour day. NIV/trilogy at night time    He is on neb with duoneb QID, albuterol HFA prn. he is taking mucinex. C/o chest congestion. C/o shortness of breath with exertion and sometimes at rest.  C/o Cough with thick clear mucus. Off and on  Wheezing. No Chest tightness. No Chest pain with radiation  or pleuritic pain. No  leg edema. No Fever or chills. No Rhinorrhea and postnasal drip.       CXR 12/3/22  COPD with development of interstitial fibrosis , Vague opacity seen within the right lower lobe posteriorly. Dedicated CT  of the chest is recommended to exclude underlying pathology. 12/20/22  Chronic obstructive pulmonary disease with emphysematous changes and central  bronchiectasis. Superimposed or intermixed pulmonary fibrotic change with  honeycombing. 5 mm noncalcified right lower lobe pulmonary nodule. CT lung  rads category 3 with follow-up recommended in 6 months       Review of Systems   Constitutional:  Negative for chills, diaphoresis, fatigue and fever. HENT:  Negative for congestion, mouth sores, nosebleeds, postnasal drip, rhinorrhea, sneezing, sore throat and voice change. Eyes:  Negative for itching and visual disturbance. Respiratory:  Positive for cough, shortness of breath and wheezing. Negative for chest tightness. Cardiovascular: Negative. Negative for chest pain, palpitations and leg swelling. Gastrointestinal:  Negative for abdominal pain, diarrhea, nausea and vomiting. Genitourinary:  Negative for difficulty urinating and hematuria. Musculoskeletal:  Negative for arthralgias, joint swelling and myalgias. Skin:  Negative for rash. Allergic/Immunologic: Negative for environmental allergies. Neurological:  Negative for dizziness, tremors, weakness and headaches. Psychiatric/Behavioral:  Negative for behavioral problems and sleep disturbance. Objective   Patient-Reported Vitals  No data recorded     Physical Exam phone visit          On this date 1/4/2023 I have spent 23 minutes reviewing previous notes, test results and face to face (virtual) with the patient discussing the diagnosis and importance of compliance with the treatment plan as well as documenting on the day of the visit. Evelia Stern, was evaluated through a synchronous (real-time) audio-video encounter. The patient (or guardian if applicable) is aware that this is a billable service, which includes applicable co-pays.  This Virtual Visit was conducted with patient's (and/or legal guardian's) consent. The visit was conducted pursuant to the emergency declaration under the [address] and the [employer] Act. Patient identification was verified, and a caregiver was present when appropriate. The patient was located at Home: [address] [place] [address]. Provider was located at [place] [address]): [address] [place] [address].         --Ashley Matias MD

## 2023-01-30 RX ORDER — ATORVASTATIN CALCIUM 20 MG/1
TABLET, FILM COATED ORAL
Qty: 90 TABLET | Refills: 3 | Status: SHIPPED | OUTPATIENT
Start: 2023-01-30

## 2023-01-30 NOTE — TELEPHONE ENCOUNTER
Requesting medication refill. Please approve or deny this request.    Rx requested:  Requested Prescriptions     Pending Prescriptions Disp Refills    atorvastatin (LIPITOR) 20 MG tablet [Pharmacy Med Name: ATORVASTATIN 20MG TABLETS] 90 tablet 3     Sig: TAKE 1 TABLET BY MOUTH EVERY NIGHT         Last Office Visit:   12/6/2022      Next Visit Date:  Future Appointments   Date Time Provider Efrem Camacho   4/11/2023  2:00 PM Neo Mcdonald MD [facility]   4/12/2023 10:00 AM Isac Mena MD [address] [place] [address]   12/28/2023  1:00 PM Leigh Flores MD [facility]               Last refill 2/24/22. Please approve or deny.

## 2023-02-02 ENCOUNTER — TELEPHONE (OUTPATIENT)
Dept: PULMONOLOGY | Age: 68
End: 2023-02-02

## 2023-02-02 NOTE — TELEPHONE ENCOUNTER
PT CALLED IN TO THE OFFICE BECAUSE YOU GAVE HIM PREDNISONE AND IT SEEMED TO HELP HIM HE FINISHED IT ABOUT A WEEK AND HALF AGO AND HE IS DOING BETTER HE IS WONDERING IF YOU WANTED TO SEND HIM A SCRIPT SO HE HAS THEM IF HE NEEDS THEM.          PLEASE ADVISE

## 2023-02-03 RX ORDER — PREDNISONE 10 MG/1
TABLET ORAL
Qty: 40 TABLET | Refills: 0 | Status: SHIPPED | OUTPATIENT
Start: 2023-02-03

## 2023-04-08 ENCOUNTER — HOSPITAL ENCOUNTER (INPATIENT)
Age: 68
LOS: 1 days | Discharge: ANOTHER HOSPITAL | DRG: 189 | End: 2023-04-09
Attending: EMERGENCY MEDICINE | Admitting: INTERNAL MEDICINE
Payer: MEDICARE

## 2023-04-08 ENCOUNTER — APPOINTMENT (OUTPATIENT)
Dept: GENERAL RADIOLOGY | Age: 68
DRG: 189 | End: 2023-04-08
Payer: MEDICARE

## 2023-04-08 DIAGNOSIS — I50.9 CHRONIC CONGESTIVE HEART FAILURE, UNSPECIFIED HEART FAILURE TYPE (HCC): ICD-10-CM

## 2023-04-08 DIAGNOSIS — R77.8 ELEVATED TROPONIN: ICD-10-CM

## 2023-04-08 DIAGNOSIS — R06.09 DYSPNEA ON EXERTION: Primary | ICD-10-CM

## 2023-04-08 DIAGNOSIS — J44.1 COPD EXACERBATION (HCC): ICD-10-CM

## 2023-04-08 PROBLEM — J96.01 ACUTE HYPOXEMIC RESPIRATORY FAILURE (HCC): Status: ACTIVE | Noted: 2023-04-08

## 2023-04-08 LAB
ALBUMIN SERPL-MCNC: 3.4 G/DL (ref 3.5–4.6)
ALP SERPL-CCNC: 88 U/L (ref 35–104)
ALT SERPL-CCNC: <5 U/L (ref 0–41)
ANION GAP SERPL CALCULATED.3IONS-SCNC: 6 MEQ/L (ref 9–15)
AST SERPL-CCNC: 8 U/L (ref 0–40)
B PARAP IS1001 DNA NPH QL NAA+NON-PROBE: NOT DETECTED
B PERT.PT PRMT NPH QL NAA+NON-PROBE: NOT DETECTED
BASE EXCESS ARTERIAL: 10
BASOPHILS # BLD: 0.1 K/UL (ref 0–0.1)
BASOPHILS NFR BLD: 0.7 % (ref 0.2–1.2)
BILIRUB SERPL-MCNC: 0.8 MG/DL (ref 0.2–0.7)
BNP BLD-MCNC: 1652 PG/ML
BUN SERPL-MCNC: 8 MG/DL (ref 8–23)
C PNEUM DNA NPH QL NAA+NON-PROBE: NOT DETECTED
CALCIUM SERPL-MCNC: 9.1 MG/DL (ref 8.5–9.9)
CHLORIDE SERPL-SCNC: 99 MEQ/L (ref 95–107)
CO2 SERPL-SCNC: 36 MEQ/L (ref 20–31)
CREAT SERPL-MCNC: 0.76 MG/DL (ref 0.7–1.2)
D DIMER PPP FEU-MCNC: 0.46 MG/L FEU (ref 0–0.5)
EKG ATRIAL RATE: 91 BPM
EKG P AXIS: 53 DEGREES
EKG P-R INTERVAL: 192 MS
EKG Q-T INTERVAL: 354 MS
EKG QRS DURATION: 82 MS
EKG QTC CALCULATION (BAZETT): 435 MS
EKG R AXIS: 7 DEGREES
EKG T AXIS: 54 DEGREES
EKG VENTRICULAR RATE: 91 BPM
EOSINOPHIL # BLD: 0.3 K/UL (ref 0–0.5)
EOSINOPHIL NFR BLD: 3.2 % (ref 0.8–7)
ERYTHROCYTE [DISTWIDTH] IN BLOOD BY AUTOMATED COUNT: 15.1 % (ref 11.6–14.4)
FLUAV RNA NPH QL NAA+NON-PROBE: NOT DETECTED
FLUBV RNA NPH QL NAA+NON-PROBE: NOT DETECTED
GLOBULIN SER CALC-MCNC: 3.1 G/DL (ref 2.3–3.5)
GLUCOSE SERPL-MCNC: 121 MG/DL (ref 70–99)
HADV DNA NPH QL NAA+NON-PROBE: NOT DETECTED
HCO3 ARTERIAL: 35.8 MMOL/L (ref 21–29)
HCOV 229E RNA NPH QL NAA+NON-PROBE: NOT DETECTED
HCOV HKU1 RNA NPH QL NAA+NON-PROBE: NOT DETECTED
HCOV NL63 RNA NPH QL NAA+NON-PROBE: NOT DETECTED
HCOV OC43 RNA NPH QL NAA+NON-PROBE: NOT DETECTED
HCT VFR BLD AUTO: 49.3 % (ref 42–52)
HGB BLD-MCNC: 14.9 G/DL (ref 13.7–17.5)
HMPV RNA NPH QL NAA+NON-PROBE: NOT DETECTED
HPIV1 RNA NPH QL NAA+NON-PROBE: NOT DETECTED
HPIV2 RNA NPH QL NAA+NON-PROBE: NOT DETECTED
HPIV3 RNA NPH QL NAA+NON-PROBE: NOT DETECTED
HPIV4 RNA NPH QL NAA+NON-PROBE: NOT DETECTED
IMM GRANULOCYTES # BLD: 0 K/UL
IMM GRANULOCYTES NFR BLD: 0.2 %
INFLUENZA A BY PCR: NEGATIVE
INFLUENZA B BY PCR: NEGATIVE
LV EF: 65 %
LVEF MODALITY: NORMAL
LYMPHOCYTES # BLD: 0.8 K/UL (ref 1.3–3.6)
LYMPHOCYTES NFR BLD: 10.2 %
M PNEUMO DNA NPH QL NAA+NON-PROBE: NOT DETECTED
MCH RBC QN AUTO: 28.9 PG (ref 25.7–32.2)
MCHC RBC AUTO-ENTMCNC: 30.2 % (ref 32.3–36.5)
MCV RBC AUTO: 95.7 FL (ref 79–92.2)
MONOCYTES # BLD: 0.6 K/UL (ref 0.3–0.8)
MONOCYTES NFR BLD: 7.3 % (ref 5.3–12.2)
NEUTROPHILS # BLD: 6.3 K/UL (ref 1.8–5.4)
NEUTS SEG NFR BLD: 78.4 % (ref 34–67.9)
O2 SAT, ARTERIAL: 90 % (ref 93–100)
PCO2 ARTERIAL: 62 MM HG (ref 35–45)
PERFORMED ON: ABNORMAL
PH ARTERIAL: 7.37 (ref 7.35–7.45)
PLATELET # BLD AUTO: 223 K/UL (ref 163–337)
PO2 ARTERIAL: 62 MM HG (ref 75–108)
POC FIO2: 6
POC SAMPLE TYPE: ABNORMAL
POTASSIUM SERPL-SCNC: 3.8 MEQ/L (ref 3.4–4.9)
PROT SERPL-MCNC: 6.5 G/DL (ref 6.3–8)
RBC # BLD AUTO: 5.15 M/UL (ref 4.63–6.08)
RSV RNA NPH QL NAA+NON-PROBE: NOT DETECTED
RV+EV RNA NPH QL NAA+NON-PROBE: NOT DETECTED
SARS-COV-2 RDRP RESP QL NAA+PROBE: NOT DETECTED
SARS-COV-2 RNA NPH QL NAA+NON-PROBE: NOT DETECTED
SODIUM SERPL-SCNC: 141 MEQ/L (ref 135–144)
TCO2 ARTERIAL: 38 MMOL/L (ref 70–99)
TROPONIN T SERPL-MCNC: 0.35 NG/ML (ref 0–0.01)
TROPONIN T SERPL-MCNC: 0.37 NG/ML (ref 0–0.01)
WBC # BLD AUTO: 8.1 K/UL (ref 4.2–9)

## 2023-04-08 PROCEDURE — 83880 ASSAY OF NATRIURETIC PEPTIDE: CPT

## 2023-04-08 PROCEDURE — 84484 ASSAY OF TROPONIN QUANT: CPT

## 2023-04-08 PROCEDURE — 1210000000 HC MED SURG R&B

## 2023-04-08 PROCEDURE — 6370000000 HC RX 637 (ALT 250 FOR IP): Performed by: EMERGENCY MEDICINE

## 2023-04-08 PROCEDURE — 6360000002 HC RX W HCPCS: Performed by: INTERNAL MEDICINE

## 2023-04-08 PROCEDURE — 99285 EMERGENCY DEPT VISIT HI MDM: CPT

## 2023-04-08 PROCEDURE — 80053 COMPREHEN METABOLIC PANEL: CPT

## 2023-04-08 PROCEDURE — 36600 WITHDRAWAL OF ARTERIAL BLOOD: CPT

## 2023-04-08 PROCEDURE — 85025 COMPLETE CBC W/AUTO DIFF WBC: CPT

## 2023-04-08 PROCEDURE — 6370000000 HC RX 637 (ALT 250 FOR IP): Performed by: INTERNAL MEDICINE

## 2023-04-08 PROCEDURE — 82803 BLOOD GASES ANY COMBINATION: CPT

## 2023-04-08 PROCEDURE — 93005 ELECTROCARDIOGRAM TRACING: CPT

## 2023-04-08 PROCEDURE — 6360000002 HC RX W HCPCS: Performed by: EMERGENCY MEDICINE

## 2023-04-08 PROCEDURE — 2500000003 HC RX 250 WO HCPCS: Performed by: INTERNAL MEDICINE

## 2023-04-08 PROCEDURE — 36415 COLL VENOUS BLD VENIPUNCTURE: CPT

## 2023-04-08 PROCEDURE — 85379 FIBRIN DEGRADATION QUANT: CPT

## 2023-04-08 PROCEDURE — 0202U NFCT DS 22 TRGT SARS-COV-2: CPT

## 2023-04-08 PROCEDURE — 87502 INFLUENZA DNA AMP PROBE: CPT

## 2023-04-08 PROCEDURE — 96374 THER/PROPH/DIAG INJ IV PUSH: CPT

## 2023-04-08 PROCEDURE — 71045 X-RAY EXAM CHEST 1 VIEW: CPT

## 2023-04-08 PROCEDURE — 84145 PROCALCITONIN (PCT): CPT

## 2023-04-08 PROCEDURE — 93306 TTE W/DOPPLER COMPLETE: CPT

## 2023-04-08 PROCEDURE — 94640 AIRWAY INHALATION TREATMENT: CPT

## 2023-04-08 PROCEDURE — 87635 SARS-COV-2 COVID-19 AMP PRB: CPT

## 2023-04-08 PROCEDURE — 2580000003 HC RX 258: Performed by: INTERNAL MEDICINE

## 2023-04-08 RX ORDER — FUROSEMIDE 10 MG/ML
40 INJECTION INTRAMUSCULAR; INTRAVENOUS EVERY 8 HOURS
Status: COMPLETED | OUTPATIENT
Start: 2023-04-08 | End: 2023-04-09

## 2023-04-08 RX ORDER — GUAIFENESIN 600 MG/1
600 TABLET, EXTENDED RELEASE ORAL 2 TIMES DAILY
Status: DISCONTINUED | OUTPATIENT
Start: 2023-04-08 | End: 2023-04-09 | Stop reason: HOSPADM

## 2023-04-08 RX ORDER — CLOPIDOGREL BISULFATE 75 MG/1
150 TABLET ORAL ONCE
Status: COMPLETED | OUTPATIENT
Start: 2023-04-08 | End: 2023-04-08

## 2023-04-08 RX ORDER — FUROSEMIDE 10 MG/ML
40 INJECTION INTRAMUSCULAR; INTRAVENOUS ONCE
Status: COMPLETED | OUTPATIENT
Start: 2023-04-08 | End: 2023-04-08

## 2023-04-08 RX ORDER — IPRATROPIUM BROMIDE AND ALBUTEROL SULFATE 2.5; .5 MG/3ML; MG/3ML
1 SOLUTION RESPIRATORY (INHALATION) ONCE
Status: COMPLETED | OUTPATIENT
Start: 2023-04-08 | End: 2023-04-08

## 2023-04-08 RX ORDER — ALBUTEROL SULFATE 2.5 MG/3ML
2.5 SOLUTION RESPIRATORY (INHALATION) EVERY 4 HOURS PRN
Status: DISCONTINUED | OUTPATIENT
Start: 2023-04-08 | End: 2023-04-09 | Stop reason: HOSPADM

## 2023-04-08 RX ORDER — ENOXAPARIN SODIUM 100 MG/ML
30 INJECTION SUBCUTANEOUS 2 TIMES DAILY
Status: DISCONTINUED | OUTPATIENT
Start: 2023-04-08 | End: 2023-04-08 | Stop reason: SDUPTHER

## 2023-04-08 RX ORDER — ZOLPIDEM TARTRATE 5 MG/1
5 TABLET ORAL NIGHTLY
Status: DISCONTINUED | OUTPATIENT
Start: 2023-04-08 | End: 2023-04-09 | Stop reason: HOSPADM

## 2023-04-08 RX ORDER — SODIUM CHLORIDE 0.9 % (FLUSH) 0.9 %
10 SYRINGE (ML) INJECTION PRN
Status: DISCONTINUED | OUTPATIENT
Start: 2023-04-08 | End: 2023-04-09 | Stop reason: HOSPADM

## 2023-04-08 RX ORDER — ACETAMINOPHEN 325 MG/1
650 TABLET ORAL EVERY 6 HOURS PRN
Status: DISCONTINUED | OUTPATIENT
Start: 2023-04-08 | End: 2023-04-09 | Stop reason: HOSPADM

## 2023-04-08 RX ORDER — ASPIRIN 81 MG/1
324 TABLET, CHEWABLE ORAL ONCE
Status: COMPLETED | OUTPATIENT
Start: 2023-04-08 | End: 2023-04-08

## 2023-04-08 RX ORDER — ONDANSETRON 2 MG/ML
4 INJECTION INTRAMUSCULAR; INTRAVENOUS EVERY 6 HOURS PRN
Status: DISCONTINUED | OUTPATIENT
Start: 2023-04-08 | End: 2023-04-09 | Stop reason: HOSPADM

## 2023-04-08 RX ORDER — LISINOPRIL 10 MG/1
10 TABLET ORAL EVERY EVENING
Status: DISCONTINUED | OUTPATIENT
Start: 2023-04-08 | End: 2023-04-09 | Stop reason: HOSPADM

## 2023-04-08 RX ORDER — IPRATROPIUM BROMIDE AND ALBUTEROL SULFATE 2.5; .5 MG/3ML; MG/3ML
1 SOLUTION RESPIRATORY (INHALATION) 3 TIMES DAILY
Status: DISCONTINUED | OUTPATIENT
Start: 2023-04-09 | End: 2023-04-09 | Stop reason: HOSPADM

## 2023-04-08 RX ORDER — POLYETHYLENE GLYCOL 3350 17 G/17G
17 POWDER, FOR SOLUTION ORAL DAILY PRN
Status: DISCONTINUED | OUTPATIENT
Start: 2023-04-08 | End: 2023-04-09 | Stop reason: HOSPADM

## 2023-04-08 RX ORDER — DEXAMETHASONE SODIUM PHOSPHATE 10 MG/ML
10 INJECTION, SOLUTION INTRAMUSCULAR; INTRAVENOUS ONCE
Status: COMPLETED | OUTPATIENT
Start: 2023-04-08 | End: 2023-04-08

## 2023-04-08 RX ORDER — SODIUM CHLORIDE 0.9 % (FLUSH) 0.9 %
10 SYRINGE (ML) INJECTION EVERY 12 HOURS SCHEDULED
Status: DISCONTINUED | OUTPATIENT
Start: 2023-04-08 | End: 2023-04-09 | Stop reason: HOSPADM

## 2023-04-08 RX ORDER — PROMETHAZINE HYDROCHLORIDE 12.5 MG/1
12.5 TABLET ORAL EVERY 6 HOURS PRN
Status: DISCONTINUED | OUTPATIENT
Start: 2023-04-08 | End: 2023-04-09 | Stop reason: HOSPADM

## 2023-04-08 RX ORDER — SODIUM CHLORIDE 9 MG/ML
INJECTION, SOLUTION INTRAVENOUS PRN
Status: DISCONTINUED | OUTPATIENT
Start: 2023-04-08 | End: 2023-04-09 | Stop reason: HOSPADM

## 2023-04-08 RX ORDER — METHYLPREDNISOLONE SODIUM SUCCINATE 40 MG/ML
40 INJECTION, POWDER, LYOPHILIZED, FOR SOLUTION INTRAMUSCULAR; INTRAVENOUS EVERY 8 HOURS
Status: DISCONTINUED | OUTPATIENT
Start: 2023-04-08 | End: 2023-04-09 | Stop reason: HOSPADM

## 2023-04-08 RX ORDER — ATORVASTATIN CALCIUM 10 MG/1
20 TABLET, FILM COATED ORAL NIGHTLY
Status: DISCONTINUED | OUTPATIENT
Start: 2023-04-08 | End: 2023-04-09 | Stop reason: HOSPADM

## 2023-04-08 RX ORDER — ASPIRIN 81 MG/1
81 TABLET ORAL DAILY
Status: DISCONTINUED | OUTPATIENT
Start: 2023-04-08 | End: 2023-04-09 | Stop reason: HOSPADM

## 2023-04-08 RX ORDER — IPRATROPIUM BROMIDE AND ALBUTEROL SULFATE 2.5; .5 MG/3ML; MG/3ML
1 SOLUTION RESPIRATORY (INHALATION) 4 TIMES DAILY
Status: DISCONTINUED | OUTPATIENT
Start: 2023-04-08 | End: 2023-04-08

## 2023-04-08 RX ORDER — ENOXAPARIN SODIUM 150 MG/ML
1 INJECTION SUBCUTANEOUS 2 TIMES DAILY
Status: DISCONTINUED | OUTPATIENT
Start: 2023-04-09 | End: 2023-04-09 | Stop reason: HOSPADM

## 2023-04-08 RX ORDER — ACETAMINOPHEN 650 MG/1
650 SUPPOSITORY RECTAL EVERY 6 HOURS PRN
Status: DISCONTINUED | OUTPATIENT
Start: 2023-04-08 | End: 2023-04-09 | Stop reason: HOSPADM

## 2023-04-08 RX ORDER — ENOXAPARIN SODIUM 100 MG/ML
80 INJECTION SUBCUTANEOUS ONCE
Status: COMPLETED | OUTPATIENT
Start: 2023-04-08 | End: 2023-04-08

## 2023-04-08 RX ADMIN — NITROGLYCERIN 0.5 INCH: 20 OINTMENT TOPICAL at 21:53

## 2023-04-08 RX ADMIN — ATORVASTATIN CALCIUM 20 MG: 10 TABLET, FILM COATED ORAL at 21:52

## 2023-04-08 RX ADMIN — ASPIRIN 81 MG CHEWABLE TABLET 324 MG: 81 TABLET CHEWABLE at 11:24

## 2023-04-08 RX ADMIN — Medication 10 ML: at 22:03

## 2023-04-08 RX ADMIN — ENOXAPARIN SODIUM 30 MG: 100 INJECTION SUBCUTANEOUS at 21:53

## 2023-04-08 RX ADMIN — FUROSEMIDE 40 MG: 10 INJECTION, SOLUTION INTRAMUSCULAR; INTRAVENOUS at 11:24

## 2023-04-08 RX ADMIN — CEFTRIAXONE 1000 MG: 1 INJECTION, POWDER, FOR SOLUTION INTRAMUSCULAR; INTRAVENOUS at 13:56

## 2023-04-08 RX ADMIN — METHYLPREDNISOLONE SODIUM SUCCINATE 40 MG: 40 INJECTION, POWDER, FOR SOLUTION INTRAMUSCULAR; INTRAVENOUS at 15:50

## 2023-04-08 RX ADMIN — METOPROLOL TARTRATE 25 MG: 25 TABLET, FILM COATED ORAL at 21:52

## 2023-04-08 RX ADMIN — DOXYCYCLINE 100 MG: 100 INJECTION, POWDER, LYOPHILIZED, FOR SOLUTION INTRAVENOUS at 14:49

## 2023-04-08 RX ADMIN — NITROGLYCERIN 0.5 INCH: 20 OINTMENT TOPICAL at 13:51

## 2023-04-08 RX ADMIN — ENOXAPARIN SODIUM 80 MG: 100 INJECTION SUBCUTANEOUS at 22:48

## 2023-04-08 RX ADMIN — ASPIRIN 81 MG: 81 TABLET, COATED ORAL at 13:50

## 2023-04-08 RX ADMIN — GUAIFENESIN 600 MG: 600 TABLET, EXTENDED RELEASE ORAL at 13:50

## 2023-04-08 RX ADMIN — POTASSIUM BICARBONATE 25 MEQ: 977.5 TABLET, EFFERVESCENT ORAL at 21:53

## 2023-04-08 RX ADMIN — IPRATROPIUM BROMIDE AND ALBUTEROL SULFATE 1 AMPULE: 2.5; .5 SOLUTION RESPIRATORY (INHALATION) at 10:36

## 2023-04-08 RX ADMIN — METOPROLOL TARTRATE 25 MG: 25 TABLET, FILM COATED ORAL at 13:50

## 2023-04-08 RX ADMIN — CLOPIDOGREL BISULFATE 150 MG: 75 TABLET ORAL at 13:50

## 2023-04-08 RX ADMIN — LISINOPRIL 10 MG: 10 TABLET ORAL at 17:29

## 2023-04-08 RX ADMIN — NITROGLYCERIN 0.5 INCH: 20 OINTMENT TOPICAL at 11:24

## 2023-04-08 RX ADMIN — GUAIFENESIN 600 MG: 600 TABLET, EXTENDED RELEASE ORAL at 21:53

## 2023-04-08 RX ADMIN — DEXAMETHASONE SODIUM PHOSPHATE 10 MG: 10 INJECTION INTRAMUSCULAR; INTRAVENOUS at 10:34

## 2023-04-08 RX ADMIN — IPRATROPIUM BROMIDE AND ALBUTEROL SULFATE 1 AMPULE: 2.5; .5 SOLUTION RESPIRATORY (INHALATION) at 18:32

## 2023-04-08 RX ADMIN — FUROSEMIDE 40 MG: 10 INJECTION, SOLUTION INTRAMUSCULAR; INTRAVENOUS at 17:29

## 2023-04-08 RX ADMIN — ZOLPIDEM TARTRATE 5 MG: 5 TABLET ORAL at 21:52

## 2023-04-08 RX ADMIN — POTASSIUM BICARBONATE 25 MEQ: 977.5 TABLET, EFFERVESCENT ORAL at 13:50

## 2023-04-08 ASSESSMENT — ENCOUNTER SYMPTOMS
SINUS PAIN: 0
EYE REDNESS: 0
ABDOMINAL PAIN: 0
EYE DISCHARGE: 0
COLOR CHANGE: 0
BACK PAIN: 0
DIARRHEA: 0
VOMITING: 0
SORE THROAT: 0
COUGH: 1
SHORTNESS OF BREATH: 1
NAUSEA: 0
WHEEZING: 1

## 2023-04-08 ASSESSMENT — PAIN - FUNCTIONAL ASSESSMENT: PAIN_FUNCTIONAL_ASSESSMENT: NONE - DENIES PAIN

## 2023-04-09 ENCOUNTER — HOSPITAL ENCOUNTER (INPATIENT)
Age: 68
LOS: 4 days | Discharge: HOME | DRG: 280 | End: 2023-04-13
Attending: INTERNAL MEDICINE | Admitting: INTERNAL MEDICINE
Payer: MEDICARE

## 2023-04-09 ENCOUNTER — APPOINTMENT (OUTPATIENT)
Dept: GENERAL RADIOLOGY | Age: 68
DRG: 189 | End: 2023-04-09
Payer: MEDICARE

## 2023-04-09 ENCOUNTER — APPOINTMENT (OUTPATIENT)
Dept: CT IMAGING | Age: 68
DRG: 280 | End: 2023-04-09
Attending: INTERNAL MEDICINE
Payer: MEDICARE

## 2023-04-09 VITALS
OXYGEN SATURATION: 95 % | SYSTOLIC BLOOD PRESSURE: 138 MMHG | DIASTOLIC BLOOD PRESSURE: 89 MMHG | BODY MASS INDEX: 37.13 KG/M2 | WEIGHT: 245 LBS | RESPIRATION RATE: 20 BRPM | HEIGHT: 68 IN | TEMPERATURE: 97.9 F | HEART RATE: 72 BPM

## 2023-04-09 DIAGNOSIS — J44.9 CHRONIC OBSTRUCTIVE PULMONARY DISEASE, UNSPECIFIED COPD TYPE (HCC): ICD-10-CM

## 2023-04-09 DIAGNOSIS — F51.01 PRIMARY INSOMNIA: Primary | ICD-10-CM

## 2023-04-09 PROBLEM — J96.21 ACUTE ON CHRONIC RESPIRATORY FAILURE WITH HYPOXIA (HCC): Status: ACTIVE | Noted: 2023-04-09

## 2023-04-09 LAB
ANION GAP SERPL CALCULATED.3IONS-SCNC: 10 MEQ/L (ref 9–15)
BASOPHILS # BLD: 0 K/UL (ref 0–0.1)
BASOPHILS NFR BLD: 0 % (ref 0.2–1.2)
BUN SERPL-MCNC: 13 MG/DL (ref 8–23)
CALCIUM SERPL-MCNC: 9.3 MG/DL (ref 8.5–9.9)
CHLORIDE SERPL-SCNC: 94 MEQ/L (ref 95–107)
CO2 SERPL-SCNC: 36 MEQ/L (ref 20–31)
CREAT SERPL-MCNC: 0.96 MG/DL (ref 0.7–1.2)
EOSINOPHIL # BLD: 0 K/UL (ref 0–0.5)
EOSINOPHIL NFR BLD: 0.4 % (ref 0.8–7)
ERYTHROCYTE [DISTWIDTH] IN BLOOD BY AUTOMATED COUNT: 14.8 % (ref 11.6–14.4)
GLUCOSE SERPL-MCNC: 151 MG/DL (ref 70–99)
HCT VFR BLD AUTO: 47.3 % (ref 42–52)
HGB BLD-MCNC: 14.5 G/DL (ref 13.7–17.5)
IMM GRANULOCYTES # BLD: 0 K/UL
IMM GRANULOCYTES NFR BLD: 0.5 %
LYMPHOCYTES # BLD: 0.8 K/UL (ref 1.3–3.6)
LYMPHOCYTES NFR BLD: 10.3 %
MCH RBC QN AUTO: 28.6 PG (ref 25.7–32.2)
MCHC RBC AUTO-ENTMCNC: 30.7 % (ref 32.3–36.5)
MCV RBC AUTO: 93.3 FL (ref 79–92.2)
MONOCYTES # BLD: 0.1 K/UL (ref 0.3–0.8)
MONOCYTES NFR BLD: 1.6 % (ref 5.3–12.2)
NEUTROPHILS # BLD: 7 K/UL (ref 1.8–5.4)
NEUTS SEG NFR BLD: 87.2 % (ref 34–67.9)
PLATELET # BLD AUTO: 251 K/UL (ref 163–337)
POTASSIUM SERPL-SCNC: 4.2 MEQ/L (ref 3.4–4.9)
PROCALCITONIN SERPL IA-MCNC: 0.09 NG/ML (ref 0–0.15)
RBC # BLD AUTO: 5.07 M/UL (ref 4.63–6.08)
SODIUM SERPL-SCNC: 140 MEQ/L (ref 135–144)
TROPONIN T SERPL-MCNC: 0.22 NG/ML (ref 0–0.01)
TROPONIN T SERPL-MCNC: 0.25 NG/ML (ref 0–0.01)
TROPONIN T SERPL-MCNC: 0.25 NG/ML (ref 0–0.01)
TROPONIN T SERPL-MCNC: 0.29 NG/ML (ref 0–0.01)
WBC # BLD AUTO: 8 K/UL (ref 4.2–9)

## 2023-04-09 PROCEDURE — 84484 ASSAY OF TROPONIN QUANT: CPT

## 2023-04-09 PROCEDURE — 94660 CPAP INITIATION&MGMT: CPT

## 2023-04-09 PROCEDURE — 6370000000 HC RX 637 (ALT 250 FOR IP): Performed by: INTERNAL MEDICINE

## 2023-04-09 PROCEDURE — 94761 N-INVAS EAR/PLS OXIMETRY MLT: CPT

## 2023-04-09 PROCEDURE — 71275 CT ANGIOGRAPHY CHEST: CPT

## 2023-04-09 PROCEDURE — 6360000002 HC RX W HCPCS: Performed by: INTERNAL MEDICINE

## 2023-04-09 PROCEDURE — 2500000003 HC RX 250 WO HCPCS: Performed by: INTERNAL MEDICINE

## 2023-04-09 PROCEDURE — 2580000003 HC RX 258: Performed by: INTERNAL MEDICINE

## 2023-04-09 PROCEDURE — 94640 AIRWAY INHALATION TREATMENT: CPT

## 2023-04-09 PROCEDURE — 36415 COLL VENOUS BLD VENIPUNCTURE: CPT

## 2023-04-09 PROCEDURE — 85025 COMPLETE CBC W/AUTO DIFF WBC: CPT

## 2023-04-09 PROCEDURE — 71045 X-RAY EXAM CHEST 1 VIEW: CPT

## 2023-04-09 PROCEDURE — 80048 BASIC METABOLIC PNL TOTAL CA: CPT

## 2023-04-09 PROCEDURE — 1210000000 HC MED SURG R&B

## 2023-04-09 PROCEDURE — 2700000000 HC OXYGEN THERAPY PER DAY

## 2023-04-09 PROCEDURE — 99222 1ST HOSP IP/OBS MODERATE 55: CPT | Performed by: INTERNAL MEDICINE

## 2023-04-09 PROCEDURE — 6360000004 HC RX CONTRAST MEDICATION: Performed by: INTERNAL MEDICINE

## 2023-04-09 PROCEDURE — 94664 DEMO&/EVAL PT USE INHALER: CPT

## 2023-04-09 PROCEDURE — 6370000000 HC RX 637 (ALT 250 FOR IP)

## 2023-04-09 PROCEDURE — 99223 1ST HOSP IP/OBS HIGH 75: CPT | Performed by: INTERNAL MEDICINE

## 2023-04-09 PROCEDURE — 93005 ELECTROCARDIOGRAM TRACING: CPT

## 2023-04-09 RX ORDER — IPRATROPIUM BROMIDE AND ALBUTEROL SULFATE 2.5; .5 MG/3ML; MG/3ML
SOLUTION RESPIRATORY (INHALATION)
Status: COMPLETED
Start: 2023-04-09 | End: 2023-04-09

## 2023-04-09 RX ORDER — SODIUM CHLORIDE 9 MG/ML
INJECTION, SOLUTION INTRAVENOUS PRN
Status: CANCELLED | OUTPATIENT
Start: 2023-04-09

## 2023-04-09 RX ORDER — METHYLPREDNISOLONE SODIUM SUCCINATE 40 MG/ML
40 INJECTION, POWDER, LYOPHILIZED, FOR SOLUTION INTRAMUSCULAR; INTRAVENOUS EVERY 8 HOURS
Status: CANCELLED | OUTPATIENT
Start: 2023-04-09

## 2023-04-09 RX ORDER — BUDESONIDE 0.5 MG/2ML
0.5 INHALANT ORAL 2 TIMES DAILY
Status: DISCONTINUED | OUTPATIENT
Start: 2023-04-09 | End: 2023-04-13 | Stop reason: HOSPADM

## 2023-04-09 RX ORDER — ATORVASTATIN CALCIUM 10 MG/1
20 TABLET, FILM COATED ORAL NIGHTLY
Status: CANCELLED | OUTPATIENT
Start: 2023-04-09

## 2023-04-09 RX ORDER — POLYETHYLENE GLYCOL 3350 17 G/17G
17 POWDER, FOR SOLUTION ORAL DAILY PRN
Status: CANCELLED | OUTPATIENT
Start: 2023-04-09

## 2023-04-09 RX ORDER — METOLAZONE 2.5 MG/1
2.5 TABLET ORAL EVERY OTHER DAY
Status: DISCONTINUED | OUTPATIENT
Start: 2023-04-09 | End: 2023-04-13 | Stop reason: HOSPADM

## 2023-04-09 RX ORDER — ALBUTEROL SULFATE 2.5 MG/3ML
2.5 SOLUTION RESPIRATORY (INHALATION) EVERY 4 HOURS PRN
Status: CANCELLED | OUTPATIENT
Start: 2023-04-09

## 2023-04-09 RX ORDER — IPRATROPIUM BROMIDE AND ALBUTEROL SULFATE 2.5; .5 MG/3ML; MG/3ML
1 SOLUTION RESPIRATORY (INHALATION) 3 TIMES DAILY
Status: CANCELLED | OUTPATIENT
Start: 2023-04-09

## 2023-04-09 RX ORDER — ENOXAPARIN SODIUM 100 MG/ML
100 INJECTION SUBCUTANEOUS 2 TIMES DAILY
Status: DISCONTINUED | OUTPATIENT
Start: 2023-04-09 | End: 2023-04-10

## 2023-04-09 RX ORDER — LISINOPRIL 10 MG/1
10 TABLET ORAL EVERY EVENING
Status: DISCONTINUED | OUTPATIENT
Start: 2023-04-09 | End: 2023-04-13 | Stop reason: HOSPADM

## 2023-04-09 RX ORDER — ZOLPIDEM TARTRATE 5 MG/1
5 TABLET ORAL NIGHTLY PRN
Status: DISCONTINUED | OUTPATIENT
Start: 2023-04-09 | End: 2023-04-13 | Stop reason: HOSPADM

## 2023-04-09 RX ORDER — ATORVASTATIN CALCIUM 20 MG/1
20 TABLET, FILM COATED ORAL NIGHTLY
Status: DISCONTINUED | OUTPATIENT
Start: 2023-04-09 | End: 2023-04-13 | Stop reason: HOSPADM

## 2023-04-09 RX ORDER — ACETAMINOPHEN 650 MG/1
650 SUPPOSITORY RECTAL EVERY 6 HOURS PRN
Status: DISCONTINUED | OUTPATIENT
Start: 2023-04-09 | End: 2023-04-10 | Stop reason: ALTCHOICE

## 2023-04-09 RX ORDER — IPRATROPIUM BROMIDE AND ALBUTEROL SULFATE 2.5; .5 MG/3ML; MG/3ML
1 SOLUTION RESPIRATORY (INHALATION) 3 TIMES DAILY
Status: DISCONTINUED | OUTPATIENT
Start: 2023-04-09 | End: 2023-04-13 | Stop reason: HOSPADM

## 2023-04-09 RX ORDER — GUAIFENESIN 600 MG/1
600 TABLET, EXTENDED RELEASE ORAL 2 TIMES DAILY
Status: CANCELLED | OUTPATIENT
Start: 2023-04-09

## 2023-04-09 RX ORDER — POLYETHYLENE GLYCOL 3350 17 G/17G
17 POWDER, FOR SOLUTION ORAL DAILY PRN
Status: DISCONTINUED | OUTPATIENT
Start: 2023-04-09 | End: 2023-04-13 | Stop reason: HOSPADM

## 2023-04-09 RX ORDER — GUAIFENESIN 600 MG/1
600 TABLET, EXTENDED RELEASE ORAL 2 TIMES DAILY
Status: DISCONTINUED | OUTPATIENT
Start: 2023-04-09 | End: 2023-04-13 | Stop reason: HOSPADM

## 2023-04-09 RX ORDER — ACETAMINOPHEN 325 MG/1
650 TABLET ORAL EVERY 6 HOURS PRN
Status: CANCELLED | OUTPATIENT
Start: 2023-04-09

## 2023-04-09 RX ORDER — ENOXAPARIN SODIUM 100 MG/ML
100 INJECTION SUBCUTANEOUS 2 TIMES DAILY
Status: CANCELLED | OUTPATIENT
Start: 2023-04-09

## 2023-04-09 RX ORDER — ALBUTEROL SULFATE 2.5 MG/3ML
2.5 SOLUTION RESPIRATORY (INHALATION) EVERY 4 HOURS PRN
Status: DISCONTINUED | OUTPATIENT
Start: 2023-04-09 | End: 2023-04-13 | Stop reason: HOSPADM

## 2023-04-09 RX ORDER — LISINOPRIL 10 MG/1
10 TABLET ORAL EVERY EVENING
Status: CANCELLED | OUTPATIENT
Start: 2023-04-09

## 2023-04-09 RX ORDER — ACETAMINOPHEN 650 MG/1
650 SUPPOSITORY RECTAL EVERY 6 HOURS PRN
Status: CANCELLED | OUTPATIENT
Start: 2023-04-09

## 2023-04-09 RX ORDER — SODIUM CHLORIDE 9 MG/ML
INJECTION, SOLUTION INTRAVENOUS PRN
Status: DISCONTINUED | OUTPATIENT
Start: 2023-04-09 | End: 2023-04-13 | Stop reason: HOSPADM

## 2023-04-09 RX ORDER — ASPIRIN 81 MG/1
81 TABLET ORAL DAILY
Status: CANCELLED | OUTPATIENT
Start: 2023-04-10

## 2023-04-09 RX ORDER — ACETAMINOPHEN 325 MG/1
650 TABLET ORAL EVERY 6 HOURS PRN
Status: DISCONTINUED | OUTPATIENT
Start: 2023-04-09 | End: 2023-04-10 | Stop reason: ALTCHOICE

## 2023-04-09 RX ORDER — METHYLPREDNISOLONE SODIUM SUCCINATE 40 MG/ML
40 INJECTION, POWDER, LYOPHILIZED, FOR SOLUTION INTRAMUSCULAR; INTRAVENOUS EVERY 8 HOURS
Status: DISCONTINUED | OUTPATIENT
Start: 2023-04-09 | End: 2023-04-13

## 2023-04-09 RX ORDER — ASPIRIN 81 MG/1
81 TABLET ORAL DAILY
Status: DISCONTINUED | OUTPATIENT
Start: 2023-04-10 | End: 2023-04-13 | Stop reason: HOSPADM

## 2023-04-09 RX ADMIN — IPRATROPIUM BROMIDE AND ALBUTEROL SULFATE 1 AMPULE: 2.5; .5 SOLUTION RESPIRATORY (INHALATION) at 05:08

## 2023-04-09 RX ADMIN — ENOXAPARIN SODIUM 105 MG: 150 INJECTION SUBCUTANEOUS at 08:58

## 2023-04-09 RX ADMIN — ATORVASTATIN CALCIUM 20 MG: 20 TABLET, FILM COATED ORAL at 22:17

## 2023-04-09 RX ADMIN — IOPAMIDOL 75 ML: 755 INJECTION, SOLUTION INTRAVENOUS at 14:07

## 2023-04-09 RX ADMIN — ENOXAPARIN SODIUM 100 MG: 100 INJECTION SUBCUTANEOUS at 22:19

## 2023-04-09 RX ADMIN — GUAIFENESIN 600 MG: 600 TABLET, EXTENDED RELEASE ORAL at 08:59

## 2023-04-09 RX ADMIN — NITROGLYCERIN 0.5 INCH: 20 OINTMENT TOPICAL at 22:22

## 2023-04-09 RX ADMIN — Medication 10 ML: at 08:59

## 2023-04-09 RX ADMIN — FUROSEMIDE 40 MG: 10 INJECTION, SOLUTION INTRAMUSCULAR; INTRAVENOUS at 00:57

## 2023-04-09 RX ADMIN — ASPIRIN 81 MG: 81 TABLET, COATED ORAL at 08:59

## 2023-04-09 RX ADMIN — METOPROLOL TARTRATE 25 MG: 25 TABLET, FILM COATED ORAL at 08:59

## 2023-04-09 RX ADMIN — CEFTRIAXONE SODIUM 1000 MG: 1 INJECTION, POWDER, FOR SOLUTION INTRAMUSCULAR; INTRAVENOUS at 15:32

## 2023-04-09 RX ADMIN — METHYLPREDNISOLONE SODIUM SUCCINATE 40 MG: 40 INJECTION, POWDER, FOR SOLUTION INTRAMUSCULAR; INTRAVENOUS at 15:07

## 2023-04-09 RX ADMIN — DOXYCYCLINE 100 MG: 100 INJECTION, POWDER, LYOPHILIZED, FOR SOLUTION INTRAVENOUS at 15:33

## 2023-04-09 RX ADMIN — GUAIFENESIN 600 MG: 600 TABLET ORAL at 22:17

## 2023-04-09 RX ADMIN — ZOLPIDEM TARTRATE 5 MG: 5 TABLET ORAL at 22:17

## 2023-04-09 RX ADMIN — IPRATROPIUM BROMIDE AND ALBUTEROL SULFATE 1 AMPULE: .5; 2.5 SOLUTION RESPIRATORY (INHALATION) at 13:30

## 2023-04-09 RX ADMIN — DOXYCYCLINE 100 MG: 100 INJECTION, POWDER, LYOPHILIZED, FOR SOLUTION INTRAVENOUS at 01:10

## 2023-04-09 RX ADMIN — LISINOPRIL 10 MG: 10 TABLET ORAL at 17:19

## 2023-04-09 RX ADMIN — NITROGLYCERIN 0.5 INCH: 20 OINTMENT TOPICAL at 05:18

## 2023-04-09 RX ADMIN — IPRATROPIUM BROMIDE AND ALBUTEROL SULFATE 1 AMPULE: .5; 2.5 SOLUTION RESPIRATORY (INHALATION) at 19:48

## 2023-04-09 RX ADMIN — BUDESONIDE 500 MCG: 0.5 SUSPENSION RESPIRATORY (INHALATION) at 19:48

## 2023-04-09 RX ADMIN — METHYLPREDNISOLONE SODIUM SUCCINATE 40 MG: 40 INJECTION, POWDER, FOR SOLUTION INTRAMUSCULAR; INTRAVENOUS at 00:57

## 2023-04-09 RX ADMIN — METOLAZONE 2.5 MG: 2.5 TABLET ORAL at 15:07

## 2023-04-09 RX ADMIN — METHYLPREDNISOLONE SODIUM SUCCINATE 40 MG: 40 INJECTION, POWDER, FOR SOLUTION INTRAMUSCULAR; INTRAVENOUS at 08:59

## 2023-04-09 RX ADMIN — METOPROLOL TARTRATE 25 MG: 25 TABLET, FILM COATED ORAL at 22:17

## 2023-04-09 RX ADMIN — FUROSEMIDE 60 MG: 40 TABLET ORAL at 15:07

## 2023-04-09 RX ADMIN — NITROGLYCERIN 0.5 INCH: 20 OINTMENT TOPICAL at 15:37

## 2023-04-10 ENCOUNTER — APPOINTMENT (OUTPATIENT)
Dept: CARDIAC CATH/INVASIVE PROCEDURES | Age: 68
DRG: 280 | End: 2023-04-10
Attending: INTERNAL MEDICINE
Payer: MEDICARE

## 2023-04-10 LAB
ALBUMIN SERPL-MCNC: 3.5 G/DL (ref 3.5–4.6)
ALP SERPL-CCNC: 72 U/L (ref 35–104)
ALT SERPL-CCNC: <5 U/L (ref 0–41)
ANION GAP SERPL CALCULATED.3IONS-SCNC: 10 MEQ/L (ref 9–15)
ANION GAP SERPL CALCULATED.3IONS-SCNC: 10 MEQ/L (ref 9–15)
AST SERPL-CCNC: 8 U/L (ref 0–40)
BASOPHILS # BLD: 0 K/UL (ref 0–0.2)
BASOPHILS NFR BLD: 0 %
BILIRUB SERPL-MCNC: 0.5 MG/DL (ref 0.2–0.7)
BUN SERPL-MCNC: 22 MG/DL (ref 8–23)
BUN SERPL-MCNC: 22 MG/DL (ref 8–23)
CALCIUM SERPL-MCNC: 9.6 MG/DL (ref 8.5–9.9)
CALCIUM SERPL-MCNC: 9.7 MG/DL (ref 8.5–9.9)
CHLORIDE SERPL-SCNC: 91 MEQ/L (ref 95–107)
CHLORIDE SERPL-SCNC: 91 MEQ/L (ref 95–107)
CO2 SERPL-SCNC: 37 MEQ/L (ref 20–31)
CO2 SERPL-SCNC: 37 MEQ/L (ref 20–31)
CREAT SERPL-MCNC: 1.14 MG/DL (ref 0.7–1.2)
CREAT SERPL-MCNC: 1.17 MG/DL (ref 0.7–1.2)
EKG ATRIAL RATE: 83 BPM
EKG ATRIAL RATE: 88 BPM
EKG P AXIS: 39 DEGREES
EKG P AXIS: 50 DEGREES
EKG P-R INTERVAL: 170 MS
EKG P-R INTERVAL: 182 MS
EKG Q-T INTERVAL: 342 MS
EKG Q-T INTERVAL: 356 MS
EKG QRS DURATION: 82 MS
EKG QRS DURATION: 82 MS
EKG QTC CALCULATION (BAZETT): 401 MS
EKG QTC CALCULATION (BAZETT): 430 MS
EKG R AXIS: 16 DEGREES
EKG R AXIS: 5 DEGREES
EKG T AXIS: 52 DEGREES
EKG T AXIS: 58 DEGREES
EKG VENTRICULAR RATE: 83 BPM
EKG VENTRICULAR RATE: 88 BPM
EOSINOPHIL # BLD: 0 K/UL (ref 0–0.7)
EOSINOPHIL NFR BLD: 0 %
ERYTHROCYTE [DISTWIDTH] IN BLOOD BY AUTOMATED COUNT: 15.7 % (ref 11.5–14.5)
GLOBULIN SER CALC-MCNC: 2.7 G/DL (ref 2.3–3.5)
GLUCOSE SERPL-MCNC: 135 MG/DL (ref 70–99)
GLUCOSE SERPL-MCNC: 135 MG/DL (ref 70–99)
HCT VFR BLD AUTO: 43.5 % (ref 42–52)
HGB BLD-MCNC: 14.1 G/DL (ref 14–18)
LYMPHOCYTES # BLD: 0.6 K/UL (ref 1–4.8)
LYMPHOCYTES NFR BLD: 5.1 %
MCH RBC QN AUTO: 29.5 PG (ref 27–31.3)
MCHC RBC AUTO-ENTMCNC: 32.4 % (ref 33–37)
MCV RBC AUTO: 90.8 FL (ref 79–92.2)
MONOCYTES # BLD: 0.6 K/UL (ref 0.2–0.8)
MONOCYTES NFR BLD: 4.4 %
NEUTROPHILS # BLD: 11.3 K/UL (ref 1.4–6.5)
NEUTS SEG NFR BLD: 90.5 %
PLATELET # BLD AUTO: 252 K/UL (ref 130–400)
POTASSIUM SERPL-SCNC: 4.1 MEQ/L (ref 3.4–4.9)
POTASSIUM SERPL-SCNC: 4.2 MEQ/L (ref 3.4–4.9)
PROT SERPL-MCNC: 6.2 G/DL (ref 6.3–8)
RBC # BLD AUTO: 4.79 M/UL (ref 4.7–6.1)
SODIUM SERPL-SCNC: 138 MEQ/L (ref 135–144)
SODIUM SERPL-SCNC: 138 MEQ/L (ref 135–144)
WBC # BLD AUTO: 12.5 K/UL (ref 4.8–10.8)

## 2023-04-10 PROCEDURE — C1894 INTRO/SHEATH, NON-LASER: HCPCS

## 2023-04-10 PROCEDURE — 99233 SBSQ HOSP IP/OBS HIGH 50: CPT | Performed by: INTERNAL MEDICINE

## 2023-04-10 PROCEDURE — 2580000003 HC RX 258: Performed by: INTERNAL MEDICINE

## 2023-04-10 PROCEDURE — 6370000000 HC RX 637 (ALT 250 FOR IP): Performed by: INTERNAL MEDICINE

## 2023-04-10 PROCEDURE — 93010 ELECTROCARDIOGRAM REPORT: CPT | Performed by: INTERNAL MEDICINE

## 2023-04-10 PROCEDURE — 2500000003 HC RX 250 WO HCPCS

## 2023-04-10 PROCEDURE — 93458 L HRT ARTERY/VENTRICLE ANGIO: CPT

## 2023-04-10 PROCEDURE — 6360000004 HC RX CONTRAST MEDICATION: Performed by: INTERNAL MEDICINE

## 2023-04-10 PROCEDURE — 6360000002 HC RX W HCPCS: Performed by: INTERNAL MEDICINE

## 2023-04-10 PROCEDURE — 85025 COMPLETE CBC W/AUTO DIFF WBC: CPT

## 2023-04-10 PROCEDURE — 94760 N-INVAS EAR/PLS OXIMETRY 1: CPT

## 2023-04-10 PROCEDURE — 6360000002 HC RX W HCPCS

## 2023-04-10 PROCEDURE — 2500000003 HC RX 250 WO HCPCS: Performed by: INTERNAL MEDICINE

## 2023-04-10 PROCEDURE — C1769 GUIDE WIRE: HCPCS

## 2023-04-10 PROCEDURE — 94761 N-INVAS EAR/PLS OXIMETRY MLT: CPT

## 2023-04-10 PROCEDURE — 1210000000 HC MED SURG R&B

## 2023-04-10 PROCEDURE — 2709999900 HC NON-CHARGEABLE SUPPLY

## 2023-04-10 PROCEDURE — 80053 COMPREHEN METABOLIC PANEL: CPT

## 2023-04-10 PROCEDURE — B2111ZZ FLUOROSCOPY OF MULTIPLE CORONARY ARTERIES USING LOW OSMOLAR CONTRAST: ICD-10-PCS | Performed by: INTERNAL MEDICINE

## 2023-04-10 PROCEDURE — 94640 AIRWAY INHALATION TREATMENT: CPT

## 2023-04-10 PROCEDURE — 2700000000 HC OXYGEN THERAPY PER DAY

## 2023-04-10 PROCEDURE — 94660 CPAP INITIATION&MGMT: CPT

## 2023-04-10 PROCEDURE — 36415 COLL VENOUS BLD VENIPUNCTURE: CPT

## 2023-04-10 PROCEDURE — 4A023N7 MEASUREMENT OF CARDIAC SAMPLING AND PRESSURE, LEFT HEART, PERCUTANEOUS APPROACH: ICD-10-PCS | Performed by: INTERNAL MEDICINE

## 2023-04-10 RX ORDER — MIDAZOLAM HYDROCHLORIDE 1 MG/ML
2 INJECTION INTRAMUSCULAR; INTRAVENOUS
Status: ACTIVE | OUTPATIENT
Start: 2023-04-10 | End: 2023-04-11

## 2023-04-10 RX ORDER — MORPHINE SULFATE 2 MG/ML
2 INJECTION, SOLUTION INTRAMUSCULAR; INTRAVENOUS
Status: ACTIVE | OUTPATIENT
Start: 2023-04-10 | End: 2023-04-11

## 2023-04-10 RX ORDER — ONDANSETRON 2 MG/ML
4 INJECTION INTRAMUSCULAR; INTRAVENOUS EVERY 6 HOURS PRN
Status: DISCONTINUED | OUTPATIENT
Start: 2023-04-10 | End: 2023-04-13 | Stop reason: HOSPADM

## 2023-04-10 RX ORDER — SODIUM CHLORIDE 0.9 % (FLUSH) 0.9 %
5-40 SYRINGE (ML) INJECTION EVERY 12 HOURS SCHEDULED
Status: DISCONTINUED | OUTPATIENT
Start: 2023-04-10 | End: 2023-04-13 | Stop reason: HOSPADM

## 2023-04-10 RX ORDER — SODIUM CHLORIDE 9 MG/ML
INJECTION, SOLUTION INTRAVENOUS CONTINUOUS
Status: DISCONTINUED | OUTPATIENT
Start: 2023-04-10 | End: 2023-04-13 | Stop reason: HOSPADM

## 2023-04-10 RX ORDER — SODIUM CHLORIDE 9 MG/ML
INJECTION, SOLUTION INTRAVENOUS PRN
Status: DISCONTINUED | OUTPATIENT
Start: 2023-04-10 | End: 2023-04-13 | Stop reason: HOSPADM

## 2023-04-10 RX ORDER — ACETAMINOPHEN 325 MG/1
650 TABLET ORAL EVERY 4 HOURS PRN
Status: DISCONTINUED | OUTPATIENT
Start: 2023-04-10 | End: 2023-04-13 | Stop reason: HOSPADM

## 2023-04-10 RX ORDER — FENTANYL CITRATE 0.05 MG/ML
25 INJECTION, SOLUTION INTRAMUSCULAR; INTRAVENOUS
Status: ACTIVE | OUTPATIENT
Start: 2023-04-10 | End: 2023-04-11

## 2023-04-10 RX ORDER — SODIUM CHLORIDE 0.9 % (FLUSH) 0.9 %
5-40 SYRINGE (ML) INJECTION PRN
Status: DISCONTINUED | OUTPATIENT
Start: 2023-04-10 | End: 2023-04-13 | Stop reason: HOSPADM

## 2023-04-10 RX ORDER — ENOXAPARIN SODIUM 100 MG/ML
40 INJECTION SUBCUTANEOUS DAILY
Status: DISCONTINUED | OUTPATIENT
Start: 2023-04-10 | End: 2023-04-13 | Stop reason: HOSPADM

## 2023-04-10 RX ORDER — ENOXAPARIN SODIUM 150 MG/ML
1 INJECTION SUBCUTANEOUS 2 TIMES DAILY
Status: DISCONTINUED | OUTPATIENT
Start: 2023-04-10 | End: 2023-04-10

## 2023-04-10 RX ORDER — HYDRALAZINE HYDROCHLORIDE 20 MG/ML
10 INJECTION INTRAMUSCULAR; INTRAVENOUS EVERY 10 MIN PRN
Status: DISCONTINUED | OUTPATIENT
Start: 2023-04-10 | End: 2023-04-13 | Stop reason: HOSPADM

## 2023-04-10 RX ORDER — LABETALOL HYDROCHLORIDE 5 MG/ML
10 INJECTION, SOLUTION INTRAVENOUS EVERY 30 MIN PRN
Status: DISCONTINUED | OUTPATIENT
Start: 2023-04-10 | End: 2023-04-13 | Stop reason: HOSPADM

## 2023-04-10 RX ADMIN — ASPIRIN 81 MG: 81 TABLET, COATED ORAL at 09:13

## 2023-04-10 RX ADMIN — METOPROLOL TARTRATE 25 MG: 25 TABLET, FILM COATED ORAL at 22:57

## 2023-04-10 RX ADMIN — SODIUM CHLORIDE: 9 INJECTION, SOLUTION INTRAVENOUS at 14:19

## 2023-04-10 RX ADMIN — METHYLPREDNISOLONE SODIUM SUCCINATE 40 MG: 40 INJECTION, POWDER, FOR SOLUTION INTRAMUSCULAR; INTRAVENOUS at 18:22

## 2023-04-10 RX ADMIN — METHYLPREDNISOLONE SODIUM SUCCINATE 40 MG: 40 INJECTION, POWDER, FOR SOLUTION INTRAMUSCULAR; INTRAVENOUS at 00:39

## 2023-04-10 RX ADMIN — IOPAMIDOL 65 ML: 612 INJECTION, SOLUTION INTRAVENOUS at 09:59

## 2023-04-10 RX ADMIN — IPRATROPIUM BROMIDE AND ALBUTEROL SULFATE 1 AMPULE: .5; 2.5 SOLUTION RESPIRATORY (INHALATION) at 12:45

## 2023-04-10 RX ADMIN — DOXYCYCLINE 100 MG: 100 INJECTION, POWDER, LYOPHILIZED, FOR SOLUTION INTRAVENOUS at 00:45

## 2023-04-10 RX ADMIN — CEFTRIAXONE SODIUM 1000 MG: 1 INJECTION, POWDER, FOR SOLUTION INTRAMUSCULAR; INTRAVENOUS at 14:30

## 2023-04-10 RX ADMIN — ZOLPIDEM TARTRATE 5 MG: 5 TABLET ORAL at 22:57

## 2023-04-10 RX ADMIN — Medication 10 ML: at 21:23

## 2023-04-10 RX ADMIN — FUROSEMIDE 60 MG: 40 TABLET ORAL at 14:35

## 2023-04-10 RX ADMIN — ATORVASTATIN CALCIUM 20 MG: 20 TABLET, FILM COATED ORAL at 21:22

## 2023-04-10 RX ADMIN — IPRATROPIUM BROMIDE AND ALBUTEROL SULFATE 1 AMPULE: .5; 2.5 SOLUTION RESPIRATORY (INHALATION) at 19:10

## 2023-04-10 RX ADMIN — NITROGLYCERIN 0.5 INCH: 20 OINTMENT TOPICAL at 22:48

## 2023-04-10 RX ADMIN — NITROGLYCERIN 0.5 INCH: 20 OINTMENT TOPICAL at 05:19

## 2023-04-10 RX ADMIN — LISINOPRIL 10 MG: 10 TABLET ORAL at 18:22

## 2023-04-10 RX ADMIN — BUDESONIDE 500 MCG: 0.5 SUSPENSION RESPIRATORY (INHALATION) at 19:10

## 2023-04-10 RX ADMIN — BUDESONIDE 500 MCG: 0.5 SUSPENSION RESPIRATORY (INHALATION) at 07:18

## 2023-04-10 RX ADMIN — Medication 10 ML: at 14:43

## 2023-04-10 RX ADMIN — NITROGLYCERIN 0.5 INCH: 20 OINTMENT TOPICAL at 14:43

## 2023-04-10 RX ADMIN — GUAIFENESIN 600 MG: 600 TABLET ORAL at 14:37

## 2023-04-10 RX ADMIN — DOXYCYCLINE 100 MG: 100 INJECTION, POWDER, LYOPHILIZED, FOR SOLUTION INTRAVENOUS at 14:24

## 2023-04-10 RX ADMIN — GUAIFENESIN 600 MG: 600 TABLET ORAL at 21:22

## 2023-04-10 RX ADMIN — METOPROLOL TARTRATE 25 MG: 25 TABLET, FILM COATED ORAL at 14:39

## 2023-04-10 RX ADMIN — METHYLPREDNISOLONE SODIUM SUCCINATE 40 MG: 40 INJECTION, POWDER, FOR SOLUTION INTRAMUSCULAR; INTRAVENOUS at 22:57

## 2023-04-10 RX ADMIN — IPRATROPIUM BROMIDE AND ALBUTEROL SULFATE 1 AMPULE: .5; 2.5 SOLUTION RESPIRATORY (INHALATION) at 07:18

## 2023-04-10 RX ADMIN — ENOXAPARIN SODIUM 40 MG: 100 INJECTION SUBCUTANEOUS at 14:41

## 2023-04-10 NOTE — BRIEF OP NOTE
Section of Cardiology  Adult Brief Cardiac Cath Procedure Note        Procedure(s):  LHC, b/l coronary angio, LV gram    Pre-operative Diagnosis: NSTEMI troponins peaked at 0.3    H&P Status: Completed and reviewed. Post-operative Diagnosis: Stable CAD    Findings:  See full report  Right dominant system  Left main: Big size vessel mild disease  LAD: Moderate size vessel mild to moderate diffuse stenosis, tortuous vessel. Diagonal 1 branch 90% stenosis at the origin this vessel is small not amenable for PCI  Circumflex:  Tortuous vessel mild to moderate disease  RCA: Big size dominant vessel proximal to mid previously placed stent mild in-stent restenosis  LVEDP 5 mmHg  No aortic valve gradient on catheter pullback  No LV gram performed to preserve kidney function    Right radial access      Complications:  none    Recommendations:  Transfer patient back to holding area for post diagnostic cath management  Maximize medical therapy   Continue aspirin and high intensity statin indefinitely for secondary prevention of CAD  Continue beta-blocker and ACE inhibitor  Remove sheath once ACT below 170  Bedrest for 2 hours  IV hydration at 75 cc/h to complete 1 L   From cardiology standpoint patient can be discharged home  Follow-up with Dr. Gloria Pitts in 2 weeks    Primary Proceduralist:   Noble Daniel MD    Full procedure note to follow

## 2023-04-11 ENCOUNTER — TELEPHONE (OUTPATIENT)
Dept: PULMONOLOGY | Age: 68
End: 2023-04-11

## 2023-04-11 LAB
ALBUMIN SERPL-MCNC: 3.8 G/DL (ref 3.5–4.6)
ALP SERPL-CCNC: 71 U/L (ref 35–104)
ALT SERPL-CCNC: 16 U/L (ref 0–41)
ANION GAP SERPL CALCULATED.3IONS-SCNC: 8 MEQ/L (ref 9–15)
AST SERPL-CCNC: 22 U/L (ref 0–40)
BASOPHILS # BLD: 0 K/UL (ref 0–0.2)
BASOPHILS NFR BLD: 0.1 %
BILIRUB SERPL-MCNC: 0.7 MG/DL (ref 0.2–0.7)
BUN SERPL-MCNC: 24 MG/DL (ref 8–23)
CALCIUM SERPL-MCNC: 9.4 MG/DL (ref 8.5–9.9)
CHLORIDE SERPL-SCNC: 93 MEQ/L (ref 95–107)
CO2 SERPL-SCNC: 38 MEQ/L (ref 20–31)
CREAT SERPL-MCNC: 1 MG/DL (ref 0.7–1.2)
CRP SERPL HS-MCNC: 6.3 MG/L (ref 0–5)
EOSINOPHIL # BLD: 0 K/UL (ref 0–0.7)
EOSINOPHIL NFR BLD: 0 %
ERYTHROCYTE [DISTWIDTH] IN BLOOD BY AUTOMATED COUNT: 15.4 % (ref 11.5–14.5)
GLOBULIN SER CALC-MCNC: 2.6 G/DL (ref 2.3–3.5)
GLUCOSE SERPL-MCNC: 142 MG/DL (ref 70–99)
HCT VFR BLD AUTO: 44.4 % (ref 42–52)
HGB BLD-MCNC: 14.7 G/DL (ref 14–18)
LYMPHOCYTES # BLD: 0.4 K/UL (ref 1–4.8)
LYMPHOCYTES NFR BLD: 4.7 %
MAGNESIUM SERPL-MCNC: 1.9 MG/DL (ref 1.7–2.4)
MCH RBC QN AUTO: 29.7 PG (ref 27–31.3)
MCHC RBC AUTO-ENTMCNC: 33.1 % (ref 33–37)
MCV RBC AUTO: 89.8 FL (ref 79–92.2)
MONOCYTES # BLD: 0.2 K/UL (ref 0.2–0.8)
MONOCYTES NFR BLD: 2.4 %
NEUTROPHILS # BLD: 8.2 K/UL (ref 1.4–6.5)
NEUTS SEG NFR BLD: 92.8 %
PLATELET # BLD AUTO: 254 K/UL (ref 130–400)
POTASSIUM SERPL-SCNC: 3.9 MEQ/L (ref 3.4–4.9)
PROCALCITONIN SERPL IA-MCNC: 0.07 NG/ML (ref 0–0.15)
PROT SERPL-MCNC: 6.4 G/DL (ref 6.3–8)
RBC # BLD AUTO: 4.95 M/UL (ref 4.7–6.1)
SODIUM SERPL-SCNC: 139 MEQ/L (ref 135–144)
WBC # BLD AUTO: 8.9 K/UL (ref 4.8–10.8)

## 2023-04-11 PROCEDURE — 86140 C-REACTIVE PROTEIN: CPT

## 2023-04-11 PROCEDURE — 85025 COMPLETE CBC W/AUTO DIFF WBC: CPT

## 2023-04-11 PROCEDURE — 6370000000 HC RX 637 (ALT 250 FOR IP)

## 2023-04-11 PROCEDURE — 36415 COLL VENOUS BLD VENIPUNCTURE: CPT

## 2023-04-11 PROCEDURE — 6370000000 HC RX 637 (ALT 250 FOR IP): Performed by: INTERNAL MEDICINE

## 2023-04-11 PROCEDURE — 2580000003 HC RX 258: Performed by: INTERNAL MEDICINE

## 2023-04-11 PROCEDURE — 99233 SBSQ HOSP IP/OBS HIGH 50: CPT | Performed by: INTERNAL MEDICINE

## 2023-04-11 PROCEDURE — 6360000002 HC RX W HCPCS: Performed by: INTERNAL MEDICINE

## 2023-04-11 PROCEDURE — 99212 OFFICE O/P EST SF 10 MIN: CPT

## 2023-04-11 PROCEDURE — 99211 OFF/OP EST MAY X REQ PHY/QHP: CPT

## 2023-04-11 PROCEDURE — 99231 SBSQ HOSP IP/OBS SF/LOW 25: CPT | Performed by: PHYSICIAN ASSISTANT

## 2023-04-11 PROCEDURE — 94761 N-INVAS EAR/PLS OXIMETRY MLT: CPT

## 2023-04-11 PROCEDURE — 80053 COMPREHEN METABOLIC PANEL: CPT

## 2023-04-11 PROCEDURE — 94640 AIRWAY INHALATION TREATMENT: CPT

## 2023-04-11 PROCEDURE — 83735 ASSAY OF MAGNESIUM: CPT

## 2023-04-11 PROCEDURE — 1210000000 HC MED SURG R&B

## 2023-04-11 PROCEDURE — 84145 PROCALCITONIN (PCT): CPT

## 2023-04-11 PROCEDURE — 2500000003 HC RX 250 WO HCPCS: Performed by: INTERNAL MEDICINE

## 2023-04-11 PROCEDURE — 2700000000 HC OXYGEN THERAPY PER DAY

## 2023-04-11 RX ORDER — AMMONIUM LACTATE 12 G/100G
LOTION TOPICAL DAILY
Status: DISCONTINUED | OUTPATIENT
Start: 2023-04-11 | End: 2023-04-13 | Stop reason: HOSPADM

## 2023-04-11 RX ADMIN — ENOXAPARIN SODIUM 40 MG: 100 INJECTION SUBCUTANEOUS at 08:47

## 2023-04-11 RX ADMIN — IPRATROPIUM BROMIDE AND ALBUTEROL SULFATE 1 AMPULE: .5; 2.5 SOLUTION RESPIRATORY (INHALATION) at 07:03

## 2023-04-11 RX ADMIN — DOXYCYCLINE 100 MG: 100 INJECTION, POWDER, LYOPHILIZED, FOR SOLUTION INTRAVENOUS at 02:10

## 2023-04-11 RX ADMIN — FUROSEMIDE 60 MG: 40 TABLET ORAL at 08:46

## 2023-04-11 RX ADMIN — CEFTRIAXONE SODIUM 1000 MG: 1 INJECTION, POWDER, FOR SOLUTION INTRAMUSCULAR; INTRAVENOUS at 13:45

## 2023-04-11 RX ADMIN — IPRATROPIUM BROMIDE AND ALBUTEROL SULFATE 1 AMPULE: .5; 2.5 SOLUTION RESPIRATORY (INHALATION) at 19:54

## 2023-04-11 RX ADMIN — GUAIFENESIN 600 MG: 600 TABLET ORAL at 08:47

## 2023-04-11 RX ADMIN — DOXYCYCLINE 100 MG: 100 INJECTION, POWDER, LYOPHILIZED, FOR SOLUTION INTRAVENOUS at 13:45

## 2023-04-11 RX ADMIN — METHYLPREDNISOLONE SODIUM SUCCINATE 40 MG: 40 INJECTION, POWDER, FOR SOLUTION INTRAMUSCULAR; INTRAVENOUS at 08:47

## 2023-04-11 RX ADMIN — ASPIRIN 81 MG: 81 TABLET, COATED ORAL at 08:47

## 2023-04-11 RX ADMIN — LISINOPRIL 10 MG: 10 TABLET ORAL at 17:15

## 2023-04-11 RX ADMIN — METHYLPREDNISOLONE SODIUM SUCCINATE 40 MG: 40 INJECTION, POWDER, FOR SOLUTION INTRAMUSCULAR; INTRAVENOUS at 17:15

## 2023-04-11 RX ADMIN — Medication 10 ML: at 08:48

## 2023-04-11 RX ADMIN — ATORVASTATIN CALCIUM 20 MG: 20 TABLET, FILM COATED ORAL at 22:16

## 2023-04-11 RX ADMIN — GUAIFENESIN 600 MG: 600 TABLET ORAL at 22:15

## 2023-04-11 RX ADMIN — TIOTROPIUM BROMIDE AND OLODATEROL 2 PUFF: 3.124; 2.736 SPRAY, METERED RESPIRATORY (INHALATION) at 07:04

## 2023-04-11 RX ADMIN — METOPROLOL TARTRATE 25 MG: 25 TABLET, FILM COATED ORAL at 22:16

## 2023-04-11 RX ADMIN — METOLAZONE 2.5 MG: 2.5 TABLET ORAL at 08:47

## 2023-04-11 RX ADMIN — SODIUM CHLORIDE, PRESERVATIVE FREE 20 ML: 5 INJECTION INTRAVENOUS at 08:48

## 2023-04-11 RX ADMIN — Medication: at 11:00

## 2023-04-11 RX ADMIN — NITROGLYCERIN 0.5 INCH: 20 OINTMENT TOPICAL at 06:06

## 2023-04-11 RX ADMIN — IPRATROPIUM BROMIDE AND ALBUTEROL SULFATE 1 AMPULE: .5; 2.5 SOLUTION RESPIRATORY (INHALATION) at 14:14

## 2023-04-11 RX ADMIN — METOPROLOL TARTRATE 25 MG: 25 TABLET, FILM COATED ORAL at 08:47

## 2023-04-11 RX ADMIN — Medication 10 ML: at 22:16

## 2023-04-11 ASSESSMENT — ENCOUNTER SYMPTOMS
SHORTNESS OF BREATH: 1
COLOR CHANGE: 0
VOMITING: 0
NAUSEA: 0
ABDOMINAL PAIN: 0
CHEST TIGHTNESS: 0

## 2023-04-12 ENCOUNTER — APPOINTMENT (OUTPATIENT)
Dept: ULTRASOUND IMAGING | Age: 68
DRG: 280 | End: 2023-04-12
Attending: INTERNAL MEDICINE
Payer: MEDICARE

## 2023-04-12 LAB
ALBUMIN SERPL-MCNC: 3.6 G/DL (ref 3.5–4.6)
ALP SERPL-CCNC: 65 U/L (ref 35–104)
ALT SERPL-CCNC: 30 U/L (ref 0–41)
ANION GAP SERPL CALCULATED.3IONS-SCNC: 10 MEQ/L (ref 9–15)
AST SERPL-CCNC: 25 U/L (ref 0–40)
BASOPHILS # BLD: 0 K/UL (ref 0–0.2)
BASOPHILS NFR BLD: 0.1 %
BILIRUB SERPL-MCNC: 0.6 MG/DL (ref 0.2–0.7)
BUN SERPL-MCNC: 34 MG/DL (ref 8–23)
CALCIUM SERPL-MCNC: 9.1 MG/DL (ref 8.5–9.9)
CHLORIDE SERPL-SCNC: 91 MEQ/L (ref 95–107)
CO2 SERPL-SCNC: 38 MEQ/L (ref 20–31)
CREAT SERPL-MCNC: 1.11 MG/DL (ref 0.7–1.2)
EOSINOPHIL # BLD: 0 K/UL (ref 0–0.7)
EOSINOPHIL NFR BLD: 0 %
ERYTHROCYTE [DISTWIDTH] IN BLOOD BY AUTOMATED COUNT: 15.1 % (ref 11.5–14.5)
GLOBULIN SER CALC-MCNC: 2.4 G/DL (ref 2.3–3.5)
GLUCOSE SERPL-MCNC: 139 MG/DL (ref 70–99)
HCT VFR BLD AUTO: 45.1 % (ref 42–52)
HGB BLD-MCNC: 14.7 G/DL (ref 14–18)
LYMPHOCYTES # BLD: 0.3 K/UL (ref 1–4.8)
LYMPHOCYTES NFR BLD: 3.4 %
MAGNESIUM SERPL-MCNC: 2 MG/DL (ref 1.7–2.4)
MCH RBC QN AUTO: 29.1 PG (ref 27–31.3)
MCHC RBC AUTO-ENTMCNC: 32.6 % (ref 33–37)
MCV RBC AUTO: 89.1 FL (ref 79–92.2)
MONOCYTES # BLD: 0.4 K/UL (ref 0.2–0.8)
MONOCYTES NFR BLD: 4.2 %
NEUTROPHILS # BLD: 8.6 K/UL (ref 1.4–6.5)
NEUTS SEG NFR BLD: 92.3 %
PLATELET # BLD AUTO: 250 K/UL (ref 130–400)
POTASSIUM SERPL-SCNC: 3.8 MEQ/L (ref 3.4–4.9)
PROT SERPL-MCNC: 6 G/DL (ref 6.3–8)
RBC # BLD AUTO: 5.06 M/UL (ref 4.7–6.1)
SODIUM SERPL-SCNC: 139 MEQ/L (ref 135–144)
WBC # BLD AUTO: 9.3 K/UL (ref 4.8–10.8)

## 2023-04-12 PROCEDURE — 83735 ASSAY OF MAGNESIUM: CPT

## 2023-04-12 PROCEDURE — 6360000002 HC RX W HCPCS: Performed by: INTERNAL MEDICINE

## 2023-04-12 PROCEDURE — 99233 SBSQ HOSP IP/OBS HIGH 50: CPT | Performed by: INTERNAL MEDICINE

## 2023-04-12 PROCEDURE — 6370000000 HC RX 637 (ALT 250 FOR IP): Performed by: INTERNAL MEDICINE

## 2023-04-12 PROCEDURE — 94664 DEMO&/EVAL PT USE INHALER: CPT

## 2023-04-12 PROCEDURE — 99232 SBSQ HOSP IP/OBS MODERATE 35: CPT | Performed by: INTERNAL MEDICINE

## 2023-04-12 PROCEDURE — 94761 N-INVAS EAR/PLS OXIMETRY MLT: CPT

## 2023-04-12 PROCEDURE — 36415 COLL VENOUS BLD VENIPUNCTURE: CPT

## 2023-04-12 PROCEDURE — 80053 COMPREHEN METABOLIC PANEL: CPT

## 2023-04-12 PROCEDURE — 2500000003 HC RX 250 WO HCPCS: Performed by: INTERNAL MEDICINE

## 2023-04-12 PROCEDURE — 85025 COMPLETE CBC W/AUTO DIFF WBC: CPT

## 2023-04-12 PROCEDURE — 94660 CPAP INITIATION&MGMT: CPT

## 2023-04-12 PROCEDURE — 2580000003 HC RX 258: Performed by: INTERNAL MEDICINE

## 2023-04-12 PROCEDURE — 1210000000 HC MED SURG R&B

## 2023-04-12 PROCEDURE — 94640 AIRWAY INHALATION TREATMENT: CPT

## 2023-04-12 PROCEDURE — 2700000000 HC OXYGEN THERAPY PER DAY

## 2023-04-12 PROCEDURE — 93971 EXTREMITY STUDY: CPT

## 2023-04-12 RX ADMIN — BUDESONIDE 500 MCG: 0.5 SUSPENSION RESPIRATORY (INHALATION) at 19:46

## 2023-04-12 RX ADMIN — METHYLPREDNISOLONE SODIUM SUCCINATE 40 MG: 40 INJECTION, POWDER, FOR SOLUTION INTRAMUSCULAR; INTRAVENOUS at 00:25

## 2023-04-12 RX ADMIN — IPRATROPIUM BROMIDE AND ALBUTEROL SULFATE 1 AMPULE: .5; 2.5 SOLUTION RESPIRATORY (INHALATION) at 19:46

## 2023-04-12 RX ADMIN — ATORVASTATIN CALCIUM 20 MG: 20 TABLET, FILM COATED ORAL at 21:38

## 2023-04-12 RX ADMIN — IPRATROPIUM BROMIDE AND ALBUTEROL SULFATE 1 AMPULE: .5; 2.5 SOLUTION RESPIRATORY (INHALATION) at 07:44

## 2023-04-12 RX ADMIN — IPRATROPIUM BROMIDE AND ALBUTEROL SULFATE 1 AMPULE: .5; 2.5 SOLUTION RESPIRATORY (INHALATION) at 13:32

## 2023-04-12 RX ADMIN — DOXYCYCLINE 100 MG: 100 INJECTION, POWDER, LYOPHILIZED, FOR SOLUTION INTRAVENOUS at 00:33

## 2023-04-12 RX ADMIN — Medication 10 ML: at 10:09

## 2023-04-12 RX ADMIN — Medication: at 10:10

## 2023-04-12 RX ADMIN — METHYLPREDNISOLONE SODIUM SUCCINATE 40 MG: 40 INJECTION, POWDER, FOR SOLUTION INTRAMUSCULAR; INTRAVENOUS at 16:05

## 2023-04-12 RX ADMIN — FUROSEMIDE 60 MG: 40 TABLET ORAL at 10:07

## 2023-04-12 RX ADMIN — GUAIFENESIN 600 MG: 600 TABLET ORAL at 21:37

## 2023-04-12 RX ADMIN — ASPIRIN 81 MG: 81 TABLET, COATED ORAL at 10:07

## 2023-04-12 RX ADMIN — ENOXAPARIN SODIUM 40 MG: 100 INJECTION SUBCUTANEOUS at 10:08

## 2023-04-12 RX ADMIN — METOPROLOL TARTRATE 25 MG: 25 TABLET, FILM COATED ORAL at 21:37

## 2023-04-12 RX ADMIN — METOPROLOL TARTRATE 25 MG: 25 TABLET, FILM COATED ORAL at 10:07

## 2023-04-12 RX ADMIN — SODIUM CHLORIDE, PRESERVATIVE FREE 10 ML: 5 INJECTION INTRAVENOUS at 21:36

## 2023-04-12 RX ADMIN — GUAIFENESIN 600 MG: 600 TABLET ORAL at 10:07

## 2023-04-12 RX ADMIN — BUDESONIDE 500 MCG: 0.5 SUSPENSION RESPIRATORY (INHALATION) at 07:44

## 2023-04-12 RX ADMIN — METHYLPREDNISOLONE SODIUM SUCCINATE 40 MG: 40 INJECTION, POWDER, FOR SOLUTION INTRAMUSCULAR; INTRAVENOUS at 10:09

## 2023-04-12 RX ADMIN — TIOTROPIUM BROMIDE AND OLODATEROL 2 PUFF: 3.124; 2.736 SPRAY, METERED RESPIRATORY (INHALATION) at 07:47

## 2023-04-12 ASSESSMENT — ENCOUNTER SYMPTOMS
CHEST TIGHTNESS: 0
VOMITING: 0
NAUSEA: 0
ABDOMINAL PAIN: 0
SHORTNESS OF BREATH: 1
COLOR CHANGE: 0

## 2023-04-13 VITALS
RESPIRATION RATE: 20 BRPM | TEMPERATURE: 97.9 F | OXYGEN SATURATION: 94 % | HEART RATE: 82 BPM | BODY MASS INDEX: 37.28 KG/M2 | HEIGHT: 68 IN | DIASTOLIC BLOOD PRESSURE: 75 MMHG | SYSTOLIC BLOOD PRESSURE: 132 MMHG | WEIGHT: 246 LBS

## 2023-04-13 LAB
ALBUMIN SERPL-MCNC: 3.5 G/DL (ref 3.5–4.6)
ALP SERPL-CCNC: 61 U/L (ref 35–104)
ALT SERPL-CCNC: 28 U/L (ref 0–41)
ANION GAP SERPL CALCULATED.3IONS-SCNC: 10 MEQ/L (ref 9–15)
AST SERPL-CCNC: 15 U/L (ref 0–40)
BASOPHILS # BLD: 0 K/UL (ref 0–0.2)
BASOPHILS NFR BLD: 0 %
BILIRUB SERPL-MCNC: 0.6 MG/DL (ref 0.2–0.7)
BUN SERPL-MCNC: 40 MG/DL (ref 8–23)
CALCIUM SERPL-MCNC: 9.1 MG/DL (ref 8.5–9.9)
CHLORIDE SERPL-SCNC: 91 MEQ/L (ref 95–107)
CO2 SERPL-SCNC: 37 MEQ/L (ref 20–31)
CREAT SERPL-MCNC: 1.11 MG/DL (ref 0.7–1.2)
EOSINOPHIL # BLD: 0 K/UL (ref 0–0.7)
EOSINOPHIL NFR BLD: 0 %
ERYTHROCYTE [DISTWIDTH] IN BLOOD BY AUTOMATED COUNT: 15.1 % (ref 11.5–14.5)
GLOBULIN SER CALC-MCNC: 2.4 G/DL (ref 2.3–3.5)
GLUCOSE SERPL-MCNC: 201 MG/DL (ref 70–99)
HCT VFR BLD AUTO: 46.5 % (ref 42–52)
HGB BLD-MCNC: 15.2 G/DL (ref 14–18)
LYMPHOCYTES # BLD: 0.3 K/UL (ref 1–4.8)
LYMPHOCYTES NFR BLD: 3.2 %
MAGNESIUM SERPL-MCNC: 2.2 MG/DL (ref 1.7–2.4)
MCH RBC QN AUTO: 29.2 PG (ref 27–31.3)
MCHC RBC AUTO-ENTMCNC: 32.7 % (ref 33–37)
MCV RBC AUTO: 89.2 FL (ref 79–92.2)
MONOCYTES # BLD: 0.4 K/UL (ref 0.2–0.8)
MONOCYTES NFR BLD: 5.6 %
NEUTROPHILS # BLD: 7.3 K/UL (ref 1.4–6.5)
NEUTS SEG NFR BLD: 91.2 %
PLATELET # BLD AUTO: 228 K/UL (ref 130–400)
POTASSIUM SERPL-SCNC: 3.9 MEQ/L (ref 3.4–4.9)
PROT SERPL-MCNC: 5.9 G/DL (ref 6.3–8)
RBC # BLD AUTO: 5.21 M/UL (ref 4.7–6.1)
SODIUM SERPL-SCNC: 138 MEQ/L (ref 135–144)
WBC # BLD AUTO: 8 K/UL (ref 4.8–10.8)

## 2023-04-13 PROCEDURE — 93971 EXTREMITY STUDY: CPT | Performed by: INTERNAL MEDICINE

## 2023-04-13 PROCEDURE — 6360000002 HC RX W HCPCS: Performed by: INTERNAL MEDICINE

## 2023-04-13 PROCEDURE — 83735 ASSAY OF MAGNESIUM: CPT

## 2023-04-13 PROCEDURE — 80053 COMPREHEN METABOLIC PANEL: CPT

## 2023-04-13 PROCEDURE — 94640 AIRWAY INHALATION TREATMENT: CPT

## 2023-04-13 PROCEDURE — 85025 COMPLETE CBC W/AUTO DIFF WBC: CPT

## 2023-04-13 PROCEDURE — 2700000000 HC OXYGEN THERAPY PER DAY

## 2023-04-13 PROCEDURE — 99232 SBSQ HOSP IP/OBS MODERATE 35: CPT | Performed by: INTERNAL MEDICINE

## 2023-04-13 PROCEDURE — 36415 COLL VENOUS BLD VENIPUNCTURE: CPT

## 2023-04-13 PROCEDURE — 2580000003 HC RX 258: Performed by: INTERNAL MEDICINE

## 2023-04-13 PROCEDURE — 6370000000 HC RX 637 (ALT 250 FOR IP): Performed by: INTERNAL MEDICINE

## 2023-04-13 RX ORDER — PREDNISONE 10 MG/1
TABLET ORAL
Qty: 40 TABLET | Refills: 0 | Status: SHIPPED | OUTPATIENT
Start: 2023-04-13

## 2023-04-13 RX ORDER — ZOLPIDEM TARTRATE 5 MG/1
5 TABLET ORAL NIGHTLY PRN
Qty: 14 TABLET | Refills: 0 | Status: SHIPPED | OUTPATIENT
Start: 2023-04-13 | End: 2023-04-27

## 2023-04-13 RX ADMIN — METOPROLOL TARTRATE 25 MG: 25 TABLET, FILM COATED ORAL at 10:54

## 2023-04-13 RX ADMIN — Medication 10 ML: at 10:55

## 2023-04-13 RX ADMIN — GUAIFENESIN 600 MG: 600 TABLET ORAL at 10:54

## 2023-04-13 RX ADMIN — ENOXAPARIN SODIUM 40 MG: 100 INJECTION SUBCUTANEOUS at 10:55

## 2023-04-13 RX ADMIN — ASPIRIN 81 MG: 81 TABLET, COATED ORAL at 10:54

## 2023-04-13 RX ADMIN — IPRATROPIUM BROMIDE AND ALBUTEROL SULFATE 1 AMPULE: .5; 2.5 SOLUTION RESPIRATORY (INHALATION) at 07:30

## 2023-04-13 RX ADMIN — METHYLPREDNISOLONE SODIUM SUCCINATE 40 MG: 40 INJECTION, POWDER, FOR SOLUTION INTRAMUSCULAR; INTRAVENOUS at 00:34

## 2023-04-13 RX ADMIN — BUDESONIDE 500 MCG: 0.5 SUSPENSION RESPIRATORY (INHALATION) at 07:30

## 2023-04-13 RX ADMIN — Medication: at 10:56

## 2023-04-13 RX ADMIN — METHYLPREDNISOLONE SODIUM SUCCINATE 40 MG: 40 INJECTION, POWDER, FOR SOLUTION INTRAMUSCULAR; INTRAVENOUS at 10:54

## 2023-04-13 RX ADMIN — METOLAZONE 2.5 MG: 2.5 TABLET ORAL at 11:06

## 2023-04-13 RX ADMIN — TIOTROPIUM BROMIDE AND OLODATEROL 2 PUFF: 3.124; 2.736 SPRAY, METERED RESPIRATORY (INHALATION) at 07:30

## 2023-04-13 RX ADMIN — SODIUM CHLORIDE, PRESERVATIVE FREE 10 ML: 5 INJECTION INTRAVENOUS at 10:55

## 2023-04-13 RX ADMIN — FUROSEMIDE 60 MG: 40 TABLET ORAL at 10:54

## 2023-04-13 RX ADMIN — IPRATROPIUM BROMIDE AND ALBUTEROL SULFATE 1 AMPULE: .5; 2.5 SOLUTION RESPIRATORY (INHALATION) at 12:58

## 2023-04-13 ASSESSMENT — PAIN SCALES - GENERAL: PAINLEVEL_OUTOF10: 0

## 2023-04-13 ASSESSMENT — ENCOUNTER SYMPTOMS
ABDOMINAL PAIN: 0
SHORTNESS OF BREATH: 1
NAUSEA: 0
VOMITING: 0
COLOR CHANGE: 0
CHEST TIGHTNESS: 0

## 2023-04-13 NOTE — FLOWSHEET NOTE
Discharge instructions given to patient who verbalized understanding. Daughter Farzana Reed called and notified of patient's discharge. Daughter on her way to  the patient. Electronically signed by Desmond Barbour on 4/13/2023 at 2:39 PM

## 2023-04-13 NOTE — CARE COORDINATION
Advance Care Planning     Advance Care Planning Activator (Inpatient)  Conversation Note      Date of ACP Conversation: 4/10/2023     Conversation Conducted with: Patient with Decision Making Capacity    ACP Activator: Shasha Ramirez RN        Health Care Decision Maker:     Current Designated Health Care Decision Maker:     Primary Decision Maker: Kleber Palomares - Child - [phone]    Primary Decision Maker: Jaquelin Herbert - Child - [phone]  Click here to complete Healthcare Decision Makers including section of the Healthcare Decision Maker Relationship (ie \"Primary\")  Today we documented Decision Maker(s) consistent with Legal Next of Kin hierarchy. Care Preferences    Ventilation: \"If you were in your present state of health and suddenly became very ill and were unable to breathe on your own, what would your preference be about the use of a ventilator (breathing machine) if it were available to you? \"      Would the patient desire the use of ventilator (breathing machine)?: yes    \"If your health worsens and it becomes clear that your chance of recovery is unlikely, what would your preference be about the use of a ventilator (breathing machine) if it were available to you? \"     Would the patient desire the use of ventilator (breathing machine)?: Yes      Resuscitation  \"CPR works best to restart the heart when there is a sudden event, like a heart attack, in someone who is otherwise healthy. Unfortunately, CPR does not typically restart the heart for people who have serious health conditions or who are very sick. \"    \"In the event your heart stopped as a result of an underlying serious health condition, would you want attempts to be made to restart your heart (answer \"yes\" for attempt to resuscitate) or would you prefer a natural death (answer \"no\" for do not attempt to resuscitate)? \" yes       [x] Yes   [] No   Educated Patient / Decision Maker regarding differences between Advance Directives and
DC PLAN REMAINS HOME ONCE CLEARED. WILL FOLLOW.
SPOKE WITH PATIENT. POSSIBLE DC HOME TODAY. NO NEEDS IDENTIFIED. IMM REVIEWED AND VERBALIZED UNDERSTANDING.  COPY WAS PROVIDED
treatments reviewed. Importance of preventing infection including hand hygiene, keeping inhaler mouthpieces clean, and getting the flu and pneumonia vaccinations. Care Map of what to expect while hospitalized reviewed with patient. Ways to ease SOB explained including pursed lip breathing and diaphragmatic breathing. Energy conservation discussed. Possible medications that may be ordered were reviewed. I stressed that their physician would make that decision and explained the importance of taking the medication as directed. Good nutrition choices discussed. P  COPD booklet and zone pamphlet left for patient review. Offer for patient to verbalize any questions. All questions answered and patient denies further questions at this time.    Electronically signed by Darcy Belcher RN on 4/10/2023 at 2:59 PM
that supports the patient's individualized plan of care/goals and shares the quality data associated with the providers was provided to:     Patient Representative Name:       The Patient and/or Patient Representative Agree with the Discharge Plan?       Melody Piper RN  Case Management Department

## 2023-04-13 NOTE — DISCHARGE INSTRUCTIONS
Follow up with Dr. Gregg Harrington in the next 7 days or sooner if needed. Please return to ER or call 911 if you develop any significant signs or symptoms. I may or may not have addressed all of your symptoms, medical issues,  Illnesses, or all of the abnormal blood work or imaging therefore please ask your PCP and other specialists to obtain [id number] [facility] record entirely to follow up on all of your symptoms, illnesses, abnormal labs, imaging and findings that I have and have not addressed during your hospitalization. Discharging you from the hospital does not mean that your medical care ends here and now. You may still need to have additional work up, investigation, monitoring, surveillance, and treatment to be handled from this point on by in the out patient setting by  out patient providers including your PCP, Specialists and other healthcare providers. For medication questions, contact your retail pharmacy and your PCP. Your medical team at [facility] ([facility]) appreciates the opportunity to work with you to get well!     Wang Funez MD

## 2023-04-13 NOTE — DISCHARGE SUMMARY
triamcinolone 0.1 % cream  Commonly known as: KENALOG           * This list has 6 medication(s) that are the same as other medications prescribed for you. Read the directions carefully, and ask your doctor or other care provider to review them with you. ASK your doctor about these medications      furosemide 40 MG tablet  Commonly known as: LASIX  Take 1.5 tablets by mouth daily     metOLazone 2.5 MG tablet  Commonly known as: ZAROXOLYN  Take 1 tablet by mouth every other day               Where to Get Your Medications        These medications were sent to [facility]. [address], [address] [phone]  [address] [place] [phone]      Hours: 24-hours Phone: [phone]   predniSONE 10 MG tablet  tiotropium-olodaterol 2.5-2.5 MCG/ACT Aers       You can get these medications from any pharmacy    Bring a paper prescription for each of these medications  zolpidem 5 MG tablet         Disposition:   Discharged to Home. Any [facility] needs that were indicated and/or required as been addressed and set up by Social Work. Condition at discharge: Pt was medically stable at the time of discharge. Significant improvement in clinical condition compared to initial condition at presentation to hospital    Activity: activity as tolerated, fall precautions. Total time taken for discharging this patient: 40 minutes. Greater than 70% of time was spent focused exclusively on this patient. Time was taken to review chart, discuss plans with consultants, reconciling medications, discussing plan answering questions with patient. Signed:  Vida Ibarra MD  4/13/2023, 12:00 PM  ----------------------------------------------------------------------------------------------------------------------    Sandee Preciado,     Please return to ER or call 911 if you develop any significant signs or symptoms.      I may not have addressed all of your medical illnesses or

## 2023-04-13 NOTE — DISCHARGE INSTR - DIET

## 2023-04-17 NOTE — TELEPHONE ENCOUNTER
Comments: pt called to check on status of script. Last Office Visit (last PCP visit):   12/6/2022    Next Visit Date:  Future Appointments   Date Time Provider Efrem Camacho   4/20/2023  1:45 PM Shubham Evans MD [address] [place] [address]   12/28/2023  1:00 PM Shubham Evans MD [address] [place] [address]       **If hasn't been seen in over a year OR hasn't followed up according to last diabetes/ADHD visit, make appointment for patient before sending refill to provider.     Rx requested:  Requested Prescriptions     Pending Prescriptions Disp Refills    metoprolol tartrate (LOPRESSOR) 25 MG tablet [Pharmacy Med Name: METOPROLOL TARTRATE 25MG TABLETS] 180 tablet 3     Sig: TAKE 1 TABLET BY MOUTH TWICE DAILY

## 2023-04-18 NOTE — PROGRESS NOTES
0034 pt alert and oriented x4, denies any pain, vital signs stable. Pt educated on the importance of wearing bipap device and he agrees to wear it tonight. Pt requesting a snack and denies any other needs at this time.
1115- outer pressure dressing removed from right wrist. Remains stable, no hemaotoma noted. Reported given to autumn from [address] bed rest till 1210. Patient voided in urinal 300 ml.
Hospitalist Progress Note      PCP: Mariaa Marshall MD    Date of Admission: 4/9/2023    Chief Complaint:  no acute events, afebrile, stable HD, on 5 liters of O2    Medications:  Reviewed    Infusion Medications    sodium chloride      sodium chloride Stopped (04/11/23 0803)    sodium chloride      sodium chloride       Scheduled Medications    ammonium lactate   Topical Daily    sodium chloride flush  5-40 mL IntraVENous 2 times per day    sodium chloride flush  5-40 mL IntraVENous 2 times per day    enoxaparin  40 mg SubCUTAneous Daily    aspirin EC  81 mg Oral Daily    atorvastatin  20 mg Oral Nightly    guaiFENesin  600 mg Oral BID    lisinopril  10 mg Oral QPM    metoprolol tartrate  25 mg Oral BID    methylPREDNISolone  40 mg IntraVENous Q8H    cefTRIAXone (ROCEPHIN) IV  1,000 mg IntraVENous Q24H    doxycycline (VIBRAMYCIN) IV  100 mg IntraVENous Q12H    ipratropium-albuterol  1 ampule Inhalation TID    metOLazone  2.5 mg Oral Every Other Day    furosemide  60 mg Oral Daily    budesonide  0.5 mg Nebulization BID    tiotropium-olodaterol  2 puff Inhalation Daily     PRN Meds: sodium chloride flush, sodium chloride, iopamidol, sodium chloride flush, sodium chloride, acetaminophen, ondansetron, hydrALAZINE, labetalol, albuterol, sodium chloride, polyethylene glycol, zolpidem      Intake/Output Summary (Last 24 hours) at 4/11/2023 1251  Last data filed at 4/11/2023 0830  Gross per 24 hour   Intake 360 ml   Output 4225 ml   Net -3865 ml         Exam:    /77   Pulse 82   Temp 98.4 °F (36.9 °C)   Resp 22   Ht 5' 8\" (1.727 m)   Wt 253 lb 6.4 oz (114.9 kg)   SpO2 92%   BMI 38.53 kg/m²     General appearance: appears stated age and cooperative. Respiratory: diminished bilaterally  Cardiovascular: Regular rate and rhythm, S1/S2. Abdomen: Soft, active bowel sounds. Musculoskeletal: No edema bilaterally.      Labs:   Recent Labs     04/09/23  0622 04/10/23  0504 04/11/23  0542   WBC 8.0 12.5* 8.9   HGB 14.5
Hospitalist Progress Note      PCP: Mariaa Marshall MD    Date of Admission: 4/9/2023    Chief Complaint:  no acute events, afebrile, stable HD, on 5 liters of O2    Medications:  Reviewed    Infusion Medications    sodium chloride      sodium chloride Stopped (04/11/23 0803)    sodium chloride      sodium chloride       Scheduled Medications    ammonium lactate   Topical Daily    sodium chloride flush  5-40 mL IntraVENous 2 times per day    sodium chloride flush  5-40 mL IntraVENous 2 times per day    enoxaparin  40 mg SubCUTAneous Daily    aspirin EC  81 mg Oral Daily    atorvastatin  20 mg Oral Nightly    guaiFENesin  600 mg Oral BID    lisinopril  10 mg Oral QPM    metoprolol tartrate  25 mg Oral BID    methylPREDNISolone  40 mg IntraVENous Q8H    ipratropium-albuterol  1 ampule Inhalation TID    metOLazone  2.5 mg Oral Every Other Day    furosemide  60 mg Oral Daily    budesonide  0.5 mg Nebulization BID    tiotropium-olodaterol  2 puff Inhalation Daily     PRN Meds: sodium chloride flush, sodium chloride, sodium chloride flush, sodium chloride, acetaminophen, ondansetron, hydrALAZINE, labetalol, albuterol, sodium chloride, polyethylene glycol, zolpidem      Intake/Output Summary (Last 24 hours) at 4/13/2023 1126  Last data filed at 4/13/2023 1046  Gross per 24 hour   Intake 960 ml   Output 3710 ml   Net -2750 ml         Exam:    /85   Pulse 93   Temp 98.1 °F (36.7 °C) (Oral)   Resp 20   Ht 5' 8\" (1.727 m)   Wt 246 lb (111.6 kg)   SpO2 91%   BMI 37.40 kg/m²     General appearance: appears stated age and cooperative. Respiratory: diminished bilaterally  Cardiovascular: Regular rate and rhythm, S1/S2. Abdomen: Soft, active bowel sounds. Musculoskeletal: No edema, chronic discoloration of LE bilaterally.      Labs:   Recent Labs     04/11/23  0542 04/12/23  0529 04/13/23  0519   WBC 8.9 9.3 8.0   HGB 14.7 14.7 15.2   HCT 44.4 45.1 46.5    250 228       Recent Labs     04/11/23  0542
Hospitalist Progress Note      PCP: Tami Thomas MD    Date of Admission: 4/9/2023    Chief Complaint:  no acute events, afebrile, stable HD, on 5 liters of O2    Medications:  Reviewed    Infusion Medications    sodium chloride      sodium chloride Stopped (04/11/23 0803)    sodium chloride      sodium chloride       Scheduled Medications    ammonium lactate   Topical Daily    sodium chloride flush  5-40 mL IntraVENous 2 times per day    sodium chloride flush  5-40 mL IntraVENous 2 times per day    enoxaparin  40 mg SubCUTAneous Daily    aspirin EC  81 mg Oral Daily    atorvastatin  20 mg Oral Nightly    guaiFENesin  600 mg Oral BID    lisinopril  10 mg Oral QPM    metoprolol tartrate  25 mg Oral BID    methylPREDNISolone  40 mg IntraVENous Q8H    cefTRIAXone (ROCEPHIN) IV  1,000 mg IntraVENous Q24H    doxycycline (VIBRAMYCIN) IV  100 mg IntraVENous Q12H    ipratropium-albuterol  1 ampule Inhalation TID    metOLazone  2.5 mg Oral Every Other Day    furosemide  60 mg Oral Daily    budesonide  0.5 mg Nebulization BID    tiotropium-olodaterol  2 puff Inhalation Daily     PRN Meds: sodium chloride flush, sodium chloride, sodium chloride flush, sodium chloride, acetaminophen, ondansetron, hydrALAZINE, labetalol, albuterol, sodium chloride, polyethylene glycol, zolpidem      Intake/Output Summary (Last 24 hours) at 4/12/2023 1126  Last data filed at 4/12/2023 0539  Gross per 24 hour   Intake 355 ml   Output 2100 ml   Net -1745 ml         Exam:    /70   Pulse 86   Temp 97.7 °F (36.5 °C)   Resp 19   Ht 5' 8\" (1.727 m)   Wt 251 lb (113.9 kg)   SpO2 92%   BMI 38.16 kg/m²     General appearance: appears stated age and cooperative. Respiratory: diminished bilaterally  Cardiovascular: Regular rate and rhythm, S1/S2. Abdomen: Soft, active bowel sounds. Musculoskeletal: No edema, chronic discoloration of LE bilaterally.      Labs:   Recent Labs     04/10/23  0504 04/11/23  0542 04/12/23  0529   WBC 12.5* 8.9 9.3
INPATIENT PROGRESS NOTES    PATIENT NAME: Paulina Long  MRN: [id number]  SERVICE DATE:  April 12, 2023   SERVICE TIME:  7:44 AM      PRIMARY SERVICE: Pulmonary Disease    CHIEF COMPLAIN: Shortness of breath, acute on chronic respiratory failure      INTERVAL HPI: Patient seen and examined at bedside, Interval Notes, orders reviewed. Nursing notes noted  Patient said he is feeling better. No fever or chills. No nausea , vomiting or diarrhea. He is chronically on 3 to 4 L O2 via nasal cannula and he has noninvasive liters at home. He said he is not using NIV at home. Patient thinks pressure is too high. NIV settings seems to be okay at this time. Recommend [employer] to recheck he is on IV at home. Currently is on BiPAP therapy. OBJECTIVE    Body mass index is 38.16 kg/m². PHYSICAL EXAM:  Vitals:  /67   Pulse 80   Temp 98.4 °F (36.9 °C)   Resp 19   Ht 5' 8\" (1.727 m)   Wt 251 lb (113.9 kg)   SpO2 95%   BMI 38.16 kg/m²   General: Alert, awake . comfortable in bed, No distress. Head: Atraumatic , Normocephalic   Eyes: PERRL. No sclera icterus. No conjunctival injection. No discharge   ENT: No nasal  discharge. Pharynx clear. Neck:  Trachea midline. No thyromegaly, no JVD, No cervical adenopathy. Chest : Bilaterally symmetrical ,Normal effort,  No accessory muscle use  Lung : . Fair BS bilateral, decreased BS at bases. No Rales. No wheezing. No rhonchi. Heart[de-identified] Normal  rate. Regular rhythm. No mumur ,  Rub or gallop  ABD: Non-tender. Non-distended. No masses. No organmegaly. Normal bowel sounds. No hernia.   Ext :trace Pitting both leg left more than right, No Cyanosis No clubbing  Neuro: no focal weakness          DATA:   Recent Labs     04/11/23  0542 04/12/23  0529   WBC 8.9 9.3   HGB 14.7 14.7   HCT 44.4 45.1   MCV 89.8 89.1    250     Recent Labs     04/11/23  0542 04/12/23 0529    139   K 3.9 3.8   CL 93* 91*   CO2 38* 38*   BUN 24* 34*   CREATININE 1.00 1.11   GLUCOSE
[facility] Respiratory Therapy Evaluation   Current Order:  duoneb tid albuterol q2 prn      Home Regimen: 3 times weekly      Ordering Physician: chad  Re-evaluation Date:  4/15     Diagnosis: copd / nstemi      Patient Status: Stable / Unstable + Physician notified    The following MDI Criteria must be met in order to convert aerosol to MDI with spacer.  If unable to meet, MDI will be converted to aerosol:  []  Patient able to demonstrate the ability to use MDI effectively  []  Patient alert and cooperative  []  Patient able to take deep breath with 5-10 second hold  []  Medication(s) available in this delivery method   []  Peak flow greater than or equal to 200 ml/min            Current Order Substituted To  (same drug, same frequency)   Aerosol to MDI [] Albuterol Sulfate 0.083% unit dose by aerosol Albuterol Sulfate MDI 2 puffs by inhalation with spacer    [] Levalbuterol 1.25 mg unit dose by aerosol Levalbuterol MDI 2 puffs by inhalation with spacer    [] Levalbuterol 0.63 mg unit dose by aerosol Levalbuterol MDI 2 puffs by inhalation with spacer    [] Ipratropium Bromide 0.02% unit dose by aerosol Ipratropium Bromide MDI 2 puffs by inhalation with spacer    [] Duoneb (Ipratropium + Albuterol) unit dose by aerosol Ipratropium MDI + Albuterol MDI 2 puffs by inhalation w/spacer   MDI to Aerosol [] Albuterol Sulfate MDI Albuterol Sulfate 0.083% unit dose by aerosol    [] Levalbuterol MDI 2 puffs by inhalation Levalbuterol 1.25 mg unit dose by aerosol    [] Ipratropium Bromide MDI by inhalation Ipratropium Bromide 0.02% unit dose by aerosol    [] Combivent (Ipratropium + Albuterol) MDI by inhalation Duoneb (Ipratropium + Albuterol) unit dose by aerosol       Treatment Assessment [Frequency/Schedule]:  Change frequency to: ____________________no change______________________________per Protocol, P&T, MEC      Points 0 1 2 3 4   Pulmonary Status  Non-Smoker  []   Smoking history   < 20 pack years  []   Smoking
Patient arrived from pre/post, alert and oriented. Denies chestpain. Has chronic sob, wears 5l nc. Consents reviewed and signed. Asa given. Vitals stable.
Patient called regarding his scripts, the inhaler was not filled d/t insurance. This  called Dr Marilia Sewell office to have them address the meds. They should be calling the patient to inform of scripts being filled or of any issues.  Electronically signed by Estrellita Jean on 4/18/2023 at 11:46 AM
Progress Note  Patient: Mordecai Bence  Unit/Bed: Y334/Q697-49  YOB: 1955  MRN: [id number]  Acct: [de-identified]   Admitting Diagnosis: Acute on chronic respiratory failure with hypoxia [facility]) [J96.21]  Date:  4/9/2023  Hospital Day: 4    Chief Complaint:  SOB    Subjective    4/13/23 on 5L O2. No fever sleeping quitely near falt w BiPAP. Denies CP. No events overnight    4/12/23 remains on 5LO2 sats 92-95% no fever SR 91 on Telemetry. He is still SOB with walking to bathroom. His baseline O2 at home is 3.5-4L for a long time. Recently he has titrated up to 4-5L due to SOB.     4/11/23: Resting comfortably in bed in no acute distress. Denies chest pain. Shortness of breath improving. Currently on 5 L O2 with SPO2 of 94%. Underwent cardiac catheterization yesterday which revealed 90% diagonal 1 stenosis not amenable to PCI and otherwise mild to moderate CAD for which medical therapy advised. Hemodynamically stable. On telemetry he is maintaining sinus rhythm with heart rate in the 80s with occasional PVCs noted. A.m. labs reviewed. Renal function electrolytes stable. Stable for discharge from cardiac standpoint. 4/10/23: Procedure(s):  LHC, b/l coronary angio, LV gram  Pre-operative Diagnosis: NSTEMI troponins peaked at 0.3  H&P Status: Completed and reviewed. Post-operative Diagnosis: Stable CAD   Findings:  See full report  Right dominant system  Left main: Big size vessel mild disease  LAD: Moderate size vessel mild to moderate diffuse stenosis, tortuous vessel. Diagonal 1 branch 90% stenosis at the origin this vessel is small not amenable for PCI  Circumflex:  Tortuous vessel mild to moderate disease  RCA: Big size dominant vessel proximal to mid previously placed stent mild in-stent restenosis  LVEDP 5 mmHg  No aortic valve gradient on catheter pullback  No LV gram performed to preserve kidney function     Right radial access    4/9/23: Mordecai Bence is a 69 y.o. male who follows
Progress Note  Patient: Rachel Brush  Unit/Bed: Y515/P715-46  YOB: 1955  MRN: [id number]  Acct: [de-identified]   Admitting Diagnosis: Acute on chronic respiratory failure with hypoxia [facility]) [J96.21]  Date:  4/9/2023  Hospital Day: 2    Chief Complaint:  SOB    Subjective      4/11/23: Resting comfortably in bed in no acute distress. Denies chest pain. Shortness of breath improving. Currently on 5 L O2 with SPO2 of 94%. Underwent cardiac catheterization yesterday which revealed 90% diagonal 1 stenosis not amenable to PCI and otherwise mild to moderate CAD for which medical therapy advised. Hemodynamically stable. On telemetry he is maintaining sinus rhythm with heart rate in the 80s with occasional PVCs noted. A.m. labs reviewed. Renal function electrolytes stable. Stable for discharge from cardiac standpoint. 4/10/23: Procedure(s):  LHC, b/l coronary angio, LV gram  Pre-operative Diagnosis: NSTEMI troponins peaked at 0.3  H&P Status: Completed and reviewed. Post-operative Diagnosis: Stable CAD   Findings:  See full report  Right dominant system  Left main: Big size vessel mild disease  LAD: Moderate size vessel mild to moderate diffuse stenosis, tortuous vessel. Diagonal 1 branch 90% stenosis at the origin this vessel is small not amenable for PCI  Circumflex:  Tortuous vessel mild to moderate disease  RCA: Big size dominant vessel proximal to mid previously placed stent mild in-stent restenosis  LVEDP 5 mmHg  No aortic valve gradient on catheter pullback  No LV gram performed to preserve kidney function     Right radial access    4/9/23: Rachel Brush is a 69 y.o. male who follows up in clinic with Dr. Ramone Laughlin on hospital day 0 with a history of CAD with PCI to the RCA 2020, COPD on 3 L of oxygen, hypertension, hyperlipidemia heart failure with preserved ejection fraction who was admitted to the hospital yesterday to [facility] due to shortness of breath and subsequently transferred to
Progress Note  Patient: Sandee Preciado  Unit/Bed: K474/I052-69  YOB: 1955  MRN: [id number]  Acct: [de-identified]   Admitting Diagnosis: Acute on chronic respiratory failure with hypoxia [facility]) [J96.21]  Date:  4/9/2023  Hospital Day: 3    Chief Complaint:  SOB    Subjective    4/12/23 remains on 5LO2 sats 92-95% no fever SR 91 on Telemetry. He is still SOB with walking to bathroom. His baseline O2 at home is 3.5-4L for a long time. Recently he has titrated up to 4-5L due to SOB.     4/11/23: Resting comfortably in bed in no acute distress. Denies chest pain. Shortness of breath improving. Currently on 5 L O2 with SPO2 of 94%. Underwent cardiac catheterization yesterday which revealed 90% diagonal 1 stenosis not amenable to PCI and otherwise mild to moderate CAD for which medical therapy advised. Hemodynamically stable. On telemetry he is maintaining sinus rhythm with heart rate in the 80s with occasional PVCs noted. A.m. labs reviewed. Renal function electrolytes stable. Stable for discharge from cardiac standpoint. 4/10/23: Procedure(s):  LHC, b/l coronary angio, LV gram  Pre-operative Diagnosis: NSTEMI troponins peaked at 0.3  H&P Status: Completed and reviewed. Post-operative Diagnosis: Stable CAD   Findings:  See full report  Right dominant system  Left main: Big size vessel mild disease  LAD: Moderate size vessel mild to moderate diffuse stenosis, tortuous vessel. Diagonal 1 branch 90% stenosis at the origin this vessel is small not amenable for PCI  Circumflex:  Tortuous vessel mild to moderate disease  RCA: Big size dominant vessel proximal to mid previously placed stent mild in-stent restenosis  LVEDP 5 mmHg  No aortic valve gradient on catheter pullback  No LV gram performed to preserve kidney function     Right radial access    4/9/23: Sandee Preciado is a 69 y.o. male who follows up in clinic with Dr. Ajay Daniels on hospital day 0 with a history of CAD with PCI to the RCA 2020, COPD on
Pt returns from procedure to pre/post cath. Awake and alert. Received report from [employer]. R radial quik clot and outer pressure dressing in place. . No signs of bleeding or hematoma. Eating breakfast and drinking PO fluids.
Pt states he will place himself on BIPAP when he is ready. PT demonstrates ability to safely put the BIPAP on and off.  Will check on patient throughout the night
Wound Ostomy Continence Nurse  Consult Note       NAME:  Natividad Chamberlain  MEDICAL RECORD NUMBER:  [id number]  AGE: 69 y.o. GENDER: male  : 1955  TODAY'S DATE:  2023    Subjective   Reason for [address] Nurse Evaluation and Assessment: Bilateral LE stasis dermatitis      Natividad Chamberlain is a 69 y.o. male referred by:   [x] Physician  [] Nursing  [] Other:     Wound Identification:  Wound Type:  venous stasis dermatitis   Contributing Factors: edema, venous stasis, and obesity    Wound History: Patient admitted to [facility] with stasis dermatitis to bilateral LEs. Patient follows a dermatologist for his LE, is prescribed 2 topical treatments, which he admits he does not always use ever day. Current Wound Care Treatment: Recommending daily application of topical lac-hydrin lotion during this admission. Patient can continue to use his prescription creams after discharge and f/u with his dermatologist as needed.      Patient Goal of Care:  [x] Wound Healing  [] Odor Control  [] Palliative Care  [] Pain Control   [] Other:         PAST MEDICAL HISTORY        Diagnosis Date    CHF (congestive heart failure) ([facility])     COPD (chronic obstructive pulmonary disease) ([facility])     Hypertension     Lung disease     Osteoarthritis     hands worst    Sleep apnea        PAST SURGICAL HISTORY    Past Surgical History:   Procedure Laterality Date    CHOLECYSTECTOMY      CORONARY ANGIOPLASTY WITH STENT PLACEMENT  2020    DIAGNOSTIC CARDIAC CATH LAB PROCEDURE  2020    PTCA  2020    ROTATOR CUFF REPAIR      right       FAMILY HISTORY    Family History   Problem Relation Age of Onset    Heart Disease Mother     Other Father        SOCIAL HISTORY    Social History     Tobacco Use    Smoking status: Former     Packs/day: 2.00     Years: 49.00     Pack years: 98.00     Types: Cigarettes     Start date: 1971     Quit date: 12/15/2020     Years since quittin.3     Passive exposure: Past    Smokeless
15   ALT 30 28   LABGLOM >60.0 >60.0   GLOB 2.4 2.4     No results found for: YAS  Blood Culture, Routine   Date Value Ref Range Status   07/27/2019 No growth after 5 days of incubation. Final     No results found for: [email or web address]     MV Settings:          No results for input(s): PHART, ARO1PMZ, PO2ART, CML6KDS, BEART, P0NLWHOK in the last 72 hours. O2 Device: Nasal cannula  O2 Flow Rate (L/min): 5 L/min    ADULT DIET; Regular; Low Fat/Low Chol/High Fiber/WILMER     MEDICATIONS during current hospitalization:    Continuous Infusions:   sodium chloride      sodium chloride Stopped (04/11/23 0803)    sodium chloride      sodium chloride         Scheduled Meds:   ammonium lactate   Topical Daily    sodium chloride flush  5-40 mL IntraVENous 2 times per day    sodium chloride flush  5-40 mL IntraVENous 2 times per day    enoxaparin  40 mg SubCUTAneous Daily    aspirin EC  81 mg Oral Daily    atorvastatin  20 mg Oral Nightly    guaiFENesin  600 mg Oral BID    lisinopril  10 mg Oral QPM    metoprolol tartrate  25 mg Oral BID    methylPREDNISolone  40 mg IntraVENous Q8H    ipratropium-albuterol  1 ampule Inhalation TID    metOLazone  2.5 mg Oral Every Other Day    furosemide  60 mg Oral Daily    budesonide  0.5 mg Nebulization BID    tiotropium-olodaterol  2 puff Inhalation Daily       PRN Meds:sodium chloride flush, sodium chloride, sodium chloride flush, sodium chloride, acetaminophen, ondansetron, hydrALAZINE, labetalol, albuterol, sodium chloride, polyethylene glycol, zolpidem    Radiology  CTA CHEST W WO CONTRAST    Result Date: 4/9/2023  EXAMINATION: CTA OF THE CHEST WITH AND WITHOUT CONTRAST 4/9/2023 2:03 pm TECHNIQUE: CTA of the chest was performed before and after the administration of intravenous contrast.  Multiplanar reformatted images are provided for review. MIP images are provided for review.  Automated exposure control, iterative reconstruction, and/or weight based adjustment of the mA/kV was
s/p LHC with no intervention today per cardiology          Diet: ADULT DIET;  Regular; Low Fat/Low Chol/High Fiber/WILMER    Code Status: Full Code              Electronically signed by Amaury Gage MD on 4/10/2023 at 2:13 PM
the administration of intravenous contrast.  Multiplanar reformatted images are provided for review. MIP images are provided for review. Automated exposure control, iterative reconstruction, and/or weight based adjustment of the mA/kV was utilized to reduce the radiation dose to as low as reasonably achievable. COMPARISON: None. HISTORY: ORDERING SYSTEM PROVIDED HISTORY: Acute respiratory failure TECHNOLOGIST PROVIDED HISTORY: Reason for exam:->Acute respiratory failure What reading provider will be dictating this exam?->CRC FINDINGS: Aorta: The ascending thoracic aorta measures 4 cm compatible with fusiform ectasia. There are no signs of dissection. No acute abnormality of the aorta. The main, lobar and segmental pulmonary is revealed no filling defects to suggest emboli Mediastinum: There is a 14 mm right paratracheal lymph node present. There is subcentimeter AP window and right paratracheal lymphadenopathy. There are left paratracheal lymph nodes measuring 9 mm. These could be reactive, given the parenchymal densities noted within the lungs. The heart and pericardium demonstrate no acute abnormality. There are severe coronary artery calcifications present. Lungs/Pleura: The lungs reveal extensive bilateral areas of centrilobular and paraseptal emphysema. Marked interlobular septal thickening is observed. There is subpleural honeycombing bilaterally, right worse than left. This along with bronchiectatic changes concerning for idiopathic pulmonary fibrosis. There is subtle ground-glass opacities within each lung the largest in the right middle lobe is visualized on axial image (3: 77) measuring 7 mm, at the right lung base measuring 10 mm on axial image (3:72), in the lingula measuring 10 mm on axial image (3:73) measuring 5 mm along the fissure at the left lung base on axial image (3:77 these are difficult to characterize. There is a small left pleural effusion with adjacent atelectasis.   There are no
pneumothorax. Upper Abdomen: Limited images of the upper abdomen are unremarkable. The gallbladder is surgically absent. The region of the pancreas spleen and adrenal glands appear within normal range. Soft Tissues/Bones: No acute bone or soft tissue abnormality. 1.  There is marked interstitial prominence within the lungs concerning for idiopathic pulmonary fibrotic disease. 2.  There are scattered bilateral ground-glass opacities within the lungs that could represent zones of pneumonitis but these are difficult to characterize at this time. Each short-term follow-up was advised to exclude underlying developing pulmonary nodules and follow-up imaging in 6 months may be helpful for better characterization. 3.  Fusiform ectasia of the ascending thoracic aorta. 4.  Borderline right paratracheal lymphadenopathy 5. Advanced coronary artery calcifications     XR CHEST PORTABLE    Result Date: 4/9/2023  EXAMINATION: ONE XRAY VIEW OF THE CHEST 4/9/2023 8:43 am COMPARISON: None. HISTORY: ORDERING SYSTEM PROVIDED HISTORY: CHF vs fibrosis TECHNOLOGIST PROVIDED HISTORY: Reason for exam:->CHF vs fibrosis What reading provider will be dictating this exam?->CRC FINDINGS: The cardiac silhouette is at upper limits of normal in size. There are emphysematous changes with superimposed interstitial fibrosis. There is no focal consolidation to suggest pneumonia. There is no pneumothorax. 1. Emphysematous changes with superimposed interstitial pulmonary fibrosis 2. There is no pneumothorax or findings to suggest pneumonia. XR CHEST PORTABLE    Result Date: 4/8/2023  EXAMINATION: ONE XRAY VIEW OF THE CHEST 4/8/2023 10:25 am COMPARISON: CT scan of the chest dated 12/20/2022 HISTORY: ORDERING SYSTEM PROVIDED HISTORY: sob TECHNOLOGIST PROVIDED HISTORY: Reason for exam:->sob What reading provider will be dictating this exam?->CRC FINDINGS: The heart is mildly enlarged.   There is no mediastinal widening There are

## 2023-04-18 NOTE — TELEPHONE ENCOUNTER
Comments:     Last Office Visit (last PCP visit):   12/27/2022    Next Visit Date:  Future Appointments   Date Time Provider Efrem Camacho   4/27/2023  2:00 PM Tiffany Peterson MD [address] [place] [address]   12/28/2023  1:00 PM Tiffany Peterson MD [address] [place] [address]       **If hasn't been seen in over a year OR hasn't followed up according to last diabetes/ADHD visit, make appointment for patient before sending refill to provider.     Rx requested:  Requested Prescriptions     Pending Prescriptions Disp Refills    tiotropium-olodaterol (STIOLTO) 2.5-2.5 MCG/ACT AERS 1 each 1     Sig: Inhale 2 puffs into the lungs daily

## 2023-04-24 DIAGNOSIS — E78.5 DYSLIPIDEMIA: Primary | ICD-10-CM

## 2023-04-24 RX ORDER — LISINOPRIL 10 MG/1
10 TABLET ORAL EVERY EVENING
Qty: 90 TABLET | Refills: 3 | Status: SHIPPED | OUTPATIENT
Start: 2023-04-24

## 2023-04-24 RX ORDER — FUROSEMIDE 40 MG/1
TABLET ORAL
Qty: 135 TABLET | Refills: 3 | Status: SHIPPED | OUTPATIENT
Start: 2023-04-24

## 2023-04-24 NOTE — TELEPHONE ENCOUNTER
Requesting medication refill. Please approve or deny this request.    Rx requested:  Requested Prescriptions     Pending Prescriptions Disp Refills    lisinopril (PRINIVIL;ZESTRIL) 10 MG tablet [Pharmacy Med Name: LISINOPRIL 10MG TABLETS] 90 tablet 3     Sig: TAKE 1 TABLET BY MOUTH EVERY EVENING    furosemide (LASIX) 40 MG tablet [Pharmacy Med Name: FUROSEMIDE 40MG TABLETS] 135 tablet 3     Sig: TAKE 1 AND 1/2 TABLETS BY MOUTH DAILY         Last Office Visit:   12/6/2022      Next Visit Date:  Future Appointments   Date Time Provider Efrem Camacho   4/27/2023  2:00 PM Fabricio Askew MD [address] [place] [address]   12/28/2023  1:00 PM Fabricio Askew MD [address] [place] [address]             Please approve or deny.

## 2023-04-25 NOTE — TELEPHONE ENCOUNTER
Please do not deny as samples were already given to patient     Rx requested:  Requested Prescriptions      No prescriptions requested or ordered in this encounter         Last Office Visit:   1/4/2023      Next Visit Date:  Future Appointments   Date Time Provider Efrem Camacho   4/27/2023  2:00 PM Dusty Ruvalcaba MD [address] [place] [address]   12/28/2023  1:00 PM Dusty Ruvalcaba MD [address] [place] [address]

## 2023-04-27 ENCOUNTER — OFFICE VISIT (OUTPATIENT)
Dept: FAMILY MEDICINE CLINIC | Age: 68
End: 2023-04-27

## 2023-04-27 VITALS
SYSTOLIC BLOOD PRESSURE: 128 MMHG | HEART RATE: 89 BPM | WEIGHT: 244 LBS | OXYGEN SATURATION: 84 % | BODY MASS INDEX: 36.98 KG/M2 | TEMPERATURE: 98.2 F | DIASTOLIC BLOOD PRESSURE: 72 MMHG | HEIGHT: 68 IN

## 2023-04-27 DIAGNOSIS — J44.9 CHRONIC OBSTRUCTIVE PULMONARY DISEASE, UNSPECIFIED COPD TYPE (HCC): ICD-10-CM

## 2023-04-27 DIAGNOSIS — I20.8 ANGINA AT REST (HCC): ICD-10-CM

## 2023-04-27 DIAGNOSIS — F51.04 PSYCHOPHYSIOLOGICAL INSOMNIA: ICD-10-CM

## 2023-04-27 DIAGNOSIS — Z09 HOSPITAL DISCHARGE FOLLOW-UP: Primary | ICD-10-CM

## 2023-04-27 PROBLEM — R09.02 HYPOXIA: Status: RESOLVED | Noted: 2019-03-05 | Resolved: 2023-04-27

## 2023-04-27 PROBLEM — E66.9 OBESITY (BMI 30-39.9): Status: RESOLVED | Noted: 2019-03-05 | Resolved: 2023-04-27

## 2023-04-27 PROBLEM — J96.01 ACUTE HYPOXEMIC RESPIRATORY FAILURE (HCC): Status: RESOLVED | Noted: 2023-04-08 | Resolved: 2023-04-27

## 2023-04-27 PROBLEM — R06.09 DOE (DYSPNEA ON EXERTION): Status: RESOLVED | Noted: 2019-08-15 | Resolved: 2023-04-27

## 2023-04-27 RX ORDER — TRAZODONE HYDROCHLORIDE 50 MG/1
50 TABLET ORAL NIGHTLY PRN
Qty: 30 TABLET | Refills: 2 | Status: SHIPPED | OUTPATIENT
Start: 2023-04-27

## 2023-04-27 RX ORDER — PREDNISONE 10 MG/1
TABLET ORAL
Qty: 40 TABLET | Refills: 0 | Status: SHIPPED | OUTPATIENT
Start: 2023-04-27

## 2023-04-27 SDOH — ECONOMIC STABILITY: FOOD INSECURITY: WITHIN THE PAST 12 MONTHS, THE FOOD YOU BOUGHT JUST DIDN'T LAST AND YOU DIDN'T HAVE MONEY TO GET MORE.: NEVER TRUE

## 2023-04-27 SDOH — ECONOMIC STABILITY: HOUSING INSECURITY
IN THE LAST 12 MONTHS, WAS THERE A TIME WHEN YOU DID NOT HAVE A STEADY PLACE TO SLEEP OR SLEPT IN A SHELTER (INCLUDING NOW)?: NO

## 2023-04-27 SDOH — ECONOMIC STABILITY: INCOME INSECURITY: HOW HARD IS IT FOR YOU TO PAY FOR THE VERY BASICS LIKE FOOD, HOUSING, MEDICAL CARE, AND HEATING?: NOT HARD AT ALL

## 2023-04-27 SDOH — ECONOMIC STABILITY: FOOD INSECURITY: WITHIN THE PAST 12 MONTHS, YOU WORRIED THAT YOUR FOOD WOULD RUN OUT BEFORE YOU GOT MONEY TO BUY MORE.: NEVER TRUE

## 2023-04-27 ASSESSMENT — ENCOUNTER SYMPTOMS
SORE THROAT: 0
WHEEZING: 0
SHORTNESS OF BREATH: 0
RHINORRHEA: 0
ABDOMINAL PAIN: 0
CONSTIPATION: 0
DIARRHEA: 0
COUGH: 0

## 2023-04-27 ASSESSMENT — PATIENT HEALTH QUESTIONNAIRE - PHQ9
SUM OF ALL RESPONSES TO PHQ QUESTIONS 1-9: 0
SUM OF ALL RESPONSES TO PHQ QUESTIONS 1-9: 0
10. IF YOU CHECKED OFF ANY PROBLEMS, HOW DIFFICULT HAVE THESE PROBLEMS MADE IT FOR YOU TO DO YOUR WORK, TAKE CARE OF THINGS AT HOME, OR GET ALONG WITH OTHER PEOPLE: 0
2. FEELING DOWN, DEPRESSED OR HOPELESS: 0
SUM OF ALL RESPONSES TO PHQ QUESTIONS 1-9: 0
5. POOR APPETITE OR OVEREATING: 0
6. FEELING BAD ABOUT YOURSELF - OR THAT YOU ARE A FAILURE OR HAVE LET YOURSELF OR YOUR FAMILY DOWN: 0
9. THOUGHTS THAT YOU WOULD BE BETTER OFF DEAD, OR OF HURTING YOURSELF: 0
SUM OF ALL RESPONSES TO PHQ9 QUESTIONS 1 & 2: 0
3. TROUBLE FALLING OR STAYING ASLEEP: 0
1. LITTLE INTEREST OR PLEASURE IN DOING THINGS: 0
4. FEELING TIRED OR HAVING LITTLE ENERGY: 0
SUM OF ALL RESPONSES TO PHQ QUESTIONS 1-9: 0
7. TROUBLE CONCENTRATING ON THINGS, SUCH AS READING THE NEWSPAPER OR WATCHING TELEVISION: 0
8. MOVING OR SPEAKING SO SLOWLY THAT OTHER PEOPLE COULD HAVE NOTICED. OR THE OPPOSITE, BEING SO FIGETY OR RESTLESS THAT YOU HAVE BEEN MOVING AROUND A LOT MORE THAN USUAL: 0

## 2023-04-27 NOTE — PROGRESS NOTES
[address] [facility] [address] [facility] PRIMARY CARE  [address] [place] [id number]  Dept: [phone]  Dept Fax: [phone]: [phone]     Chief Complaint  Chief Complaint   Patient presents with    Follow-Up from Hospital     TCM hospital follow up       HPI:  69 y.o.male who presents for the following:      Hosp f/u: admitted for COPD exac improved with steroids/abx/oxygen; still gets the GARNETT but he oxygen helps much; no fevers; tolerating PO; needs to setup his pulm f/u visit. Insomnia: poor sleep; was given ambien 5mg; would like help with sleep; thinking of trying CBD gummies    Admit date:  4/9/2023                          Discharge date:  4/13/2023  Hospital Course:   Sam Davis is a 69 y.o. male that was admitted and treated at [facility] for the following medical issues:      Acute /chronic hypoxic and hypercapnic respiratory failure  - due to COPD exacerbation  - requiring 6 liters of O2 on admission  - CTA of the chest was negative for PE  - treated with IV Solumedrol, Duonebs, Stiolto, Rocephin, Doxycycline  - down to 5 liters of O2  - followed by pulmonology  - switched to Prednisone taper on d/c     Elevated troponins  - s/p [facility] with no intervention per cardiology   - continued home meds       Review of Systems   Constitutional:  Negative for chills and fever. HENT:  Negative for congestion, rhinorrhea and sore throat. Respiratory:  Negative for cough, shortness of breath and wheezing. Gastrointestinal:  Negative for abdominal pain, constipation and diarrhea. Endocrine: Negative for polydipsia and polyuria. Genitourinary:  Negative for dysuria, frequency and urgency. Neurological:  Negative for syncope, light-headedness, numbness and headaches. Psychiatric/Behavioral:  Negative for sleep disturbance. The patient is not nervous/anxious.       Past Medical History:   Diagnosis Date    CHF

## 2023-05-03 ENCOUNTER — APPOINTMENT (OUTPATIENT)
Dept: CT IMAGING | Age: 68
End: 2023-05-03
Payer: MEDICARE

## 2023-05-03 ENCOUNTER — APPOINTMENT (OUTPATIENT)
Dept: GENERAL RADIOLOGY | Age: 68
End: 2023-05-03
Payer: MEDICARE

## 2023-05-03 ENCOUNTER — HOSPITAL ENCOUNTER (INPATIENT)
Age: 68
LOS: 2 days | Discharge: NURSING, REHAB OR LONG-TERM CARE | End: 2023-05-05
Attending: INTERNAL MEDICINE | Admitting: INTERNAL MEDICINE
Payer: MEDICARE

## 2023-05-03 DIAGNOSIS — I63.9 CEREBROVASCULAR ACCIDENT (CVA), UNSPECIFIED MECHANISM (HCC): Primary | ICD-10-CM

## 2023-05-03 LAB
ALBUMIN SERPL-MCNC: 3.5 G/DL (ref 3.5–4.6)
ALP SERPL-CCNC: 82 U/L (ref 35–104)
ALT SERPL-CCNC: 11 U/L (ref 0–41)
ANION GAP SERPL CALCULATED.3IONS-SCNC: 6 MEQ/L (ref 9–15)
APTT PPP: 26.5 SEC (ref 24.4–36.8)
AST SERPL-CCNC: 21 U/L (ref 0–40)
BASOPHILS # BLD: 0 K/UL (ref 0–0.2)
BASOPHILS NFR BLD: 0.5 %
BILIRUB SERPL-MCNC: 1.1 MG/DL (ref 0.2–0.7)
BUN SERPL-MCNC: 13 MG/DL (ref 8–23)
CALCIUM SERPL-MCNC: 9.1 MG/DL (ref 8.5–9.9)
CHLORIDE SERPL-SCNC: 97 MEQ/L (ref 95–107)
CO2 SERPL-SCNC: 36 MEQ/L (ref 20–31)
CREAT SERPL-MCNC: 0.91 MG/DL (ref 0.7–1.2)
EOSINOPHIL # BLD: 0.1 K/UL (ref 0–0.7)
EOSINOPHIL NFR BLD: 1.9 %
ERYTHROCYTE [DISTWIDTH] IN BLOOD BY AUTOMATED COUNT: 15.4 % (ref 11.5–14.5)
GLOBULIN SER CALC-MCNC: 2.5 G/DL (ref 2.3–3.5)
GLUCOSE SERPL-MCNC: 100 MG/DL (ref 70–99)
HCT VFR BLD AUTO: 46.8 % (ref 42–52)
HGB BLD-MCNC: 15.2 G/DL (ref 14–18)
INR PPP: 1.1
LYMPHOCYTES # BLD: 0.8 K/UL (ref 1–4.8)
LYMPHOCYTES NFR BLD: 13 %
MAGNESIUM SERPL-MCNC: 2.1 MG/DL (ref 1.7–2.4)
MCH RBC QN AUTO: 29.5 PG (ref 27–31.3)
MCHC RBC AUTO-ENTMCNC: 32.5 % (ref 33–37)
MCV RBC AUTO: 90.5 FL (ref 79–92.2)
MONOCYTES # BLD: 0.5 K/UL (ref 0.2–0.8)
MONOCYTES NFR BLD: 8.6 %
NEUTROPHILS # BLD: 4.8 K/UL (ref 1.4–6.5)
NEUTS SEG NFR BLD: 76 %
PERFORMED ON: NORMAL
PLATELET # BLD AUTO: 139 K/UL (ref 130–400)
POC CREATININE WHOLE BLOOD: 1.1
POC CREATININE: 1.1 MG/DL (ref 0.8–1.3)
POC SAMPLE TYPE: NORMAL
POTASSIUM SERPL-SCNC: 3.9 MEQ/L (ref 3.4–4.9)
PROT SERPL-MCNC: 6 G/DL (ref 6.3–8)
PROTHROMBIN TIME: 14 SEC (ref 12.3–14.9)
RBC # BLD AUTO: 5.16 M/UL (ref 4.7–6.1)
SODIUM SERPL-SCNC: 139 MEQ/L (ref 135–144)
TROPONIN T SERPL-MCNC: 0.02 NG/ML (ref 0–0.01)
TROPONIN T SERPL-MCNC: 0.02 NG/ML (ref 0–0.01)
TSH SERPL-MCNC: 0.53 UIU/ML (ref 0.44–3.86)
WBC # BLD AUTO: 6.3 K/UL (ref 4.8–10.8)

## 2023-05-03 PROCEDURE — 5A09357 ASSISTANCE WITH RESPIRATORY VENTILATION, LESS THAN 24 CONSECUTIVE HOURS, CONTINUOUS POSITIVE AIRWAY PRESSURE: ICD-10-PCS | Performed by: INTERNAL MEDICINE

## 2023-05-03 PROCEDURE — 84443 ASSAY THYROID STIM HORMONE: CPT

## 2023-05-03 PROCEDURE — 70498 CT ANGIOGRAPHY NECK: CPT

## 2023-05-03 PROCEDURE — 6370000000 HC RX 637 (ALT 250 FOR IP)

## 2023-05-03 PROCEDURE — 85025 COMPLETE CBC W/AUTO DIFF WBC: CPT

## 2023-05-03 PROCEDURE — 83735 ASSAY OF MAGNESIUM: CPT

## 2023-05-03 PROCEDURE — 6360000002 HC RX W HCPCS: Performed by: NURSE PRACTITIONER

## 2023-05-03 PROCEDURE — 71045 X-RAY EXAM CHEST 1 VIEW: CPT

## 2023-05-03 PROCEDURE — 85730 THROMBOPLASTIN TIME PARTIAL: CPT

## 2023-05-03 PROCEDURE — 2580000003 HC RX 258: Performed by: NURSE PRACTITIONER

## 2023-05-03 PROCEDURE — 1210000000 HC MED SURG R&B

## 2023-05-03 PROCEDURE — 36415 COLL VENOUS BLD VENIPUNCTURE: CPT

## 2023-05-03 PROCEDURE — 80053 COMPREHEN METABOLIC PANEL: CPT

## 2023-05-03 PROCEDURE — 70450 CT HEAD/BRAIN W/O DYE: CPT

## 2023-05-03 PROCEDURE — 84484 ASSAY OF TROPONIN QUANT: CPT

## 2023-05-03 PROCEDURE — 99285 EMERGENCY DEPT VISIT HI MDM: CPT

## 2023-05-03 PROCEDURE — 70496 CT ANGIOGRAPHY HEAD: CPT

## 2023-05-03 PROCEDURE — 93005 ELECTROCARDIOGRAM TRACING: CPT | Performed by: PHYSICIAN ASSISTANT

## 2023-05-03 PROCEDURE — 85610 PROTHROMBIN TIME: CPT

## 2023-05-03 PROCEDURE — 6360000004 HC RX CONTRAST MEDICATION: Performed by: PHYSICIAN ASSISTANT

## 2023-05-03 PROCEDURE — 6370000000 HC RX 637 (ALT 250 FOR IP): Performed by: NURSE PRACTITIONER

## 2023-05-03 RX ORDER — PREDNISONE 10 MG/1
10 TABLET ORAL DAILY
Status: DISCONTINUED | OUTPATIENT
Start: 2023-05-09 | End: 2023-05-05 | Stop reason: HOSPADM

## 2023-05-03 RX ORDER — LISINOPRIL 5 MG/1
10 TABLET ORAL EVERY EVENING
Status: DISCONTINUED | OUTPATIENT
Start: 2023-05-03 | End: 2023-05-05 | Stop reason: HOSPADM

## 2023-05-03 RX ORDER — PREDNISONE 20 MG/1
20 TABLET ORAL DAILY
Status: DISCONTINUED | OUTPATIENT
Start: 2023-05-05 | End: 2023-05-05 | Stop reason: HOSPADM

## 2023-05-03 RX ORDER — ALBUTEROL SULFATE 2.5 MG/3ML
2.5 SOLUTION RESPIRATORY (INHALATION) EVERY 4 HOURS PRN
Status: DISCONTINUED | OUTPATIENT
Start: 2023-05-03 | End: 2023-05-04

## 2023-05-03 RX ORDER — CLOPIDOGREL BISULFATE 75 MG/1
75 TABLET ORAL DAILY
Status: DISCONTINUED | OUTPATIENT
Start: 2023-05-04 | End: 2023-05-05 | Stop reason: HOSPADM

## 2023-05-03 RX ORDER — ASPIRIN 81 MG/1
81 TABLET, CHEWABLE ORAL DAILY
Status: DISCONTINUED | OUTPATIENT
Start: 2023-05-03 | End: 2023-05-05 | Stop reason: HOSPADM

## 2023-05-03 RX ORDER — ACETAMINOPHEN 650 MG/1
650 SUPPOSITORY RECTAL EVERY 6 HOURS PRN
Status: DISCONTINUED | OUTPATIENT
Start: 2023-05-03 | End: 2023-05-05 | Stop reason: HOSPADM

## 2023-05-03 RX ORDER — ENOXAPARIN SODIUM 100 MG/ML
30 INJECTION SUBCUTANEOUS 2 TIMES DAILY
Status: DISCONTINUED | OUTPATIENT
Start: 2023-05-03 | End: 2023-05-05

## 2023-05-03 RX ORDER — CLOPIDOGREL BISULFATE 75 MG/1
150 TABLET ORAL ONCE
Status: COMPLETED | OUTPATIENT
Start: 2023-05-03 | End: 2023-05-03

## 2023-05-03 RX ORDER — IPRATROPIUM BROMIDE AND ALBUTEROL SULFATE 2.5; .5 MG/3ML; MG/3ML
1 SOLUTION RESPIRATORY (INHALATION) EVERY 4 HOURS PRN
Status: DISCONTINUED | OUTPATIENT
Start: 2023-05-03 | End: 2023-05-04

## 2023-05-03 RX ORDER — MECOBALAMIN 5000 MCG
5 TABLET,DISINTEGRATING ORAL NIGHTLY
Status: DISCONTINUED | OUTPATIENT
Start: 2023-05-03 | End: 2023-05-05 | Stop reason: HOSPADM

## 2023-05-03 RX ORDER — ASPIRIN 81 MG/1
81 TABLET ORAL DAILY
Status: DISCONTINUED | OUTPATIENT
Start: 2023-05-03 | End: 2023-05-03

## 2023-05-03 RX ORDER — ATORVASTATIN CALCIUM 20 MG/1
20 TABLET, FILM COATED ORAL NIGHTLY
Status: DISCONTINUED | OUTPATIENT
Start: 2023-05-03 | End: 2023-05-04

## 2023-05-03 RX ORDER — ONDANSETRON 4 MG/1
4 TABLET, ORALLY DISINTEGRATING ORAL EVERY 8 HOURS PRN
Status: DISCONTINUED | OUTPATIENT
Start: 2023-05-03 | End: 2023-05-05 | Stop reason: HOSPADM

## 2023-05-03 RX ORDER — SODIUM CHLORIDE 0.9 % (FLUSH) 0.9 %
5-40 SYRINGE (ML) INJECTION PRN
Status: DISCONTINUED | OUTPATIENT
Start: 2023-05-03 | End: 2023-05-05 | Stop reason: HOSPADM

## 2023-05-03 RX ORDER — ALBUTEROL SULFATE 90 UG/1
2 AEROSOL, METERED RESPIRATORY (INHALATION) EVERY 6 HOURS PRN
Status: DISCONTINUED | OUTPATIENT
Start: 2023-05-03 | End: 2023-05-05 | Stop reason: HOSPADM

## 2023-05-03 RX ORDER — SODIUM CHLORIDE 9 MG/ML
INJECTION, SOLUTION INTRAVENOUS PRN
Status: DISCONTINUED | OUTPATIENT
Start: 2023-05-03 | End: 2023-05-05 | Stop reason: HOSPADM

## 2023-05-03 RX ORDER — ACETAMINOPHEN 325 MG/1
650 TABLET ORAL EVERY 6 HOURS PRN
Status: DISCONTINUED | OUTPATIENT
Start: 2023-05-03 | End: 2023-05-05 | Stop reason: HOSPADM

## 2023-05-03 RX ORDER — SODIUM CHLORIDE 0.9 % (FLUSH) 0.9 %
5-40 SYRINGE (ML) INJECTION EVERY 12 HOURS SCHEDULED
Status: DISCONTINUED | OUTPATIENT
Start: 2023-05-03 | End: 2023-05-05 | Stop reason: HOSPADM

## 2023-05-03 RX ORDER — POLYETHYLENE GLYCOL 3350 17 G/17G
17 POWDER, FOR SOLUTION ORAL DAILY PRN
Status: DISCONTINUED | OUTPATIENT
Start: 2023-05-03 | End: 2023-05-05 | Stop reason: HOSPADM

## 2023-05-03 RX ORDER — ONDANSETRON 2 MG/ML
4 INJECTION INTRAMUSCULAR; INTRAVENOUS EVERY 6 HOURS PRN
Status: DISCONTINUED | OUTPATIENT
Start: 2023-05-03 | End: 2023-05-05 | Stop reason: HOSPADM

## 2023-05-03 RX ORDER — GUAIFENESIN 600 MG/1
600 TABLET, EXTENDED RELEASE ORAL 2 TIMES DAILY
Status: DISCONTINUED | OUTPATIENT
Start: 2023-05-03 | End: 2023-05-05 | Stop reason: HOSPADM

## 2023-05-03 RX ADMIN — CLOPIDOGREL BISULFATE 150 MG: 75 TABLET ORAL at 19:57

## 2023-05-03 RX ADMIN — ENOXAPARIN SODIUM 30 MG: 100 INJECTION SUBCUTANEOUS at 23:30

## 2023-05-03 RX ADMIN — ATORVASTATIN CALCIUM 20 MG: 20 TABLET, FILM COATED ORAL at 23:30

## 2023-05-03 RX ADMIN — IOPAMIDOL 75 ML: 612 INJECTION, SOLUTION INTRAVENOUS at 17:08

## 2023-05-03 RX ADMIN — Medication 10 ML: at 23:31

## 2023-05-03 RX ADMIN — ASPIRIN 81 MG: 81 TABLET, COATED ORAL at 19:58

## 2023-05-03 RX ADMIN — GUAIFENESIN 600 MG: 600 TABLET ORAL at 23:30

## 2023-05-03 ASSESSMENT — ENCOUNTER SYMPTOMS
EYE DISCHARGE: 0
DIARRHEA: 0
WHEEZING: 0
BACK PAIN: 0
VOMITING: 0
COLOR CHANGE: 0
ABDOMINAL DISTENTION: 0
SORE THROAT: 0
PHOTOPHOBIA: 0
SHORTNESS OF BREATH: 0
CONSTIPATION: 0
NAUSEA: 0
COUGH: 0
ABDOMINAL PAIN: 0
RHINORRHEA: 0

## 2023-05-03 ASSESSMENT — LIFESTYLE VARIABLES: HOW OFTEN DO YOU HAVE A DRINK CONTAINING ALCOHOL: NEVER

## 2023-05-03 ASSESSMENT — PAIN - FUNCTIONAL ASSESSMENT: PAIN_FUNCTIONAL_ASSESSMENT: NONE - DENIES PAIN

## 2023-05-03 NOTE — ED NOTES
Pt medicated per orders, uri. Well. Pt stable, resting in bed, a&ox4, skin w/d/pink, 0 pain, 0 distress, 0 sob, 0 n&v, calm, cooperative. Pt states he has blurry, double vision since Monday.       Deangelo Chamberlain RN  05/03/23 2000

## 2023-05-03 NOTE — ED NOTES
Taking over the care of pt. Pt eating dinner, alicja Singh.       Alexa Guardado, HUGO  05/03/23 0084

## 2023-05-03 NOTE — ED NOTES
Pt visual acutiy assessed.  Pt bilateral vision 20/40, Right eye 20/40, left 20/40     Nic Hutton, RN  05/03/23 4960

## 2023-05-03 NOTE — ED PROVIDER NOTES
infarction. No evidence of dissection, stenosis on CTA head/neck. At this time patient evaluated by me and still endorsing complaints of cross-eyed vision. No focal deficits identified on my exam at this time. NIH remains 0. Spoke with Dr. Cadence Han regarding presentation and  results we will add Plavix pt already on 81 mg ASA daily. Will admit, plan for MRI tomorrow. Admitting to Dr. Anders Manjarrez. CONSULTS:  None    PROCEDURES:  Unless otherwise noted below, none     Procedures    FINAL IMPRESSION      1. Cerebrovascular accident (CVA), unspecified mechanism ([facility] [address]          [address] Admitted 05/03/2023 07:25:26 PM      PATIENT REFERRED TO:  No follow-up provider specified.     DISCHARGE MEDICATIONS:  New Prescriptions    No medications on file          (Please note that portions of this note were completed with a voice recognition program.  Efforts were made to edit the dictations but occasionally words are mis-transcribed.)    BRYAN Zamorano (electronically signed)  Attending Emergency Physician         Lalitha Leary, [address]  05/03/23 1939

## 2023-05-03 NOTE — ED TRIAGE NOTES
Pt to ed from home with c/o vision changes and gait disturbance, onset sometime in the early morning  Pt denies injury, trauma or LOC  Pt denies pain, weakness, nausea or dizziness  ON arrival, pt skin WDI, speech clear, respirations even and unlabored. Pt on 4L home O2 for COPD.    Patient calm and cooperative, AOx3  No s/s of acute distress

## 2023-05-04 ENCOUNTER — APPOINTMENT (OUTPATIENT)
Dept: MRI IMAGING | Age: 68
End: 2023-05-04
Payer: MEDICARE

## 2023-05-04 LAB
ALBUMIN SERPL-MCNC: 2.9 G/DL (ref 3.5–4.6)
ALP SERPL-CCNC: 68 U/L (ref 35–104)
ALT SERPL-CCNC: 8 U/L (ref 0–41)
ANION GAP SERPL CALCULATED.3IONS-SCNC: 9 MEQ/L (ref 9–15)
ANISOCYTOSIS BLD QL SMEAR: ABNORMAL
AST SERPL-CCNC: 14 U/L (ref 0–40)
BASOPHILS # BLD: 0 K/UL (ref 0–0.2)
BASOPHILS NFR BLD: 0 %
BILIRUB SERPL-MCNC: 1.2 MG/DL (ref 0.2–0.7)
BUN SERPL-MCNC: 13 MG/DL (ref 8–23)
CALCIUM SERPL-MCNC: 8.4 MG/DL (ref 8.5–9.9)
CHLORIDE SERPL-SCNC: 101 MEQ/L (ref 95–107)
CHOLEST SERPL-MCNC: 106 MG/DL (ref 0–199)
CO2 SERPL-SCNC: 32 MEQ/L (ref 20–31)
CREAT SERPL-MCNC: 0.8 MG/DL (ref 0.7–1.2)
EKG ATRIAL RATE: 86 BPM
EKG P AXIS: 35 DEGREES
EKG P-R INTERVAL: 176 MS
EKG Q-T INTERVAL: 356 MS
EKG QRS DURATION: 72 MS
EKG QTC CALCULATION (BAZETT): 426 MS
EKG R AXIS: -15 DEGREES
EKG T AXIS: 44 DEGREES
EKG VENTRICULAR RATE: 86 BPM
EOSINOPHIL # BLD: 0.2 K/UL (ref 0–0.7)
EOSINOPHIL NFR BLD: 3 %
ERYTHROCYTE [DISTWIDTH] IN BLOOD BY AUTOMATED COUNT: 15.1 % (ref 11.5–14.5)
GLOBULIN SER CALC-MCNC: 2.4 G/DL (ref 2.3–3.5)
GLUCOSE SERPL-MCNC: 91 MG/DL (ref 70–99)
HBA1C MFR BLD: 5.6 % (ref 4.8–5.9)
HCT VFR BLD AUTO: 43.2 % (ref 42–52)
HDLC SERPL-MCNC: 47 MG/DL (ref 40–59)
HGB BLD-MCNC: 14.2 G/DL (ref 14–18)
LDLC SERPL CALC-MCNC: 42 MG/DL (ref 0–129)
LYMPHOCYTES # BLD: 1.1 K/UL (ref 1–4.8)
LYMPHOCYTES NFR BLD: 17 %
MAGNESIUM SERPL-MCNC: 1.9 MG/DL (ref 1.7–2.4)
MCH RBC QN AUTO: 29.5 PG (ref 27–31.3)
MCHC RBC AUTO-ENTMCNC: 32.9 % (ref 33–37)
MCV RBC AUTO: 89.7 FL (ref 79–92.2)
MONOCYTES # BLD: 0.5 K/UL (ref 0.2–0.8)
MONOCYTES NFR BLD: 8 %
MYELOCYTES NFR BLD MANUAL: 1 %
NEUTROPHILS # BLD: 4.5 K/UL (ref 1.4–6.5)
NEUTS SEG NFR BLD: 71 %
PLATELET # BLD AUTO: 135 K/UL (ref 130–400)
POLYCHROMASIA BLD QL SMEAR: ABNORMAL
POTASSIUM SERPL-SCNC: 3.9 MEQ/L (ref 3.4–4.9)
PROT SERPL-MCNC: 5.3 G/DL (ref 6.3–8)
RBC # BLD AUTO: 4.82 M/UL (ref 4.7–6.1)
SODIUM SERPL-SCNC: 142 MEQ/L (ref 135–144)
TRIGL SERPL-MCNC: 84 MG/DL (ref 0–150)
TROPONIN T SERPL-MCNC: 0.02 NG/ML (ref 0–0.01)
TSH REFLEX: 0.34 UIU/ML (ref 0.44–3.86)
WBC # BLD AUTO: 6.3 K/UL (ref 4.8–10.8)

## 2023-05-04 PROCEDURE — 6360000002 HC RX W HCPCS: Performed by: NURSE PRACTITIONER

## 2023-05-04 PROCEDURE — 83036 HEMOGLOBIN GLYCOSYLATED A1C: CPT

## 2023-05-04 PROCEDURE — 80061 LIPID PANEL: CPT

## 2023-05-04 PROCEDURE — 83516 IMMUNOASSAY NONANTIBODY: CPT

## 2023-05-04 PROCEDURE — 2580000003 HC RX 258: Performed by: NURSE PRACTITIONER

## 2023-05-04 PROCEDURE — 84484 ASSAY OF TROPONIN QUANT: CPT

## 2023-05-04 PROCEDURE — 83519 RIA NONANTIBODY: CPT

## 2023-05-04 PROCEDURE — 94660 CPAP INITIATION&MGMT: CPT

## 2023-05-04 PROCEDURE — 97162 PT EVAL MOD COMPLEX 30 MIN: CPT

## 2023-05-04 PROCEDURE — 6370000000 HC RX 637 (ALT 250 FOR IP): Performed by: INTERNAL MEDICINE

## 2023-05-04 PROCEDURE — 83735 ASSAY OF MAGNESIUM: CPT

## 2023-05-04 PROCEDURE — 99222 1ST HOSP IP/OBS MODERATE 55: CPT | Performed by: PSYCHIATRY & NEUROLOGY

## 2023-05-04 PROCEDURE — 70551 MRI BRAIN STEM W/O DYE: CPT

## 2023-05-04 PROCEDURE — 36415 COLL VENOUS BLD VENIPUNCTURE: CPT

## 2023-05-04 PROCEDURE — 97166 OT EVAL MOD COMPLEX 45 MIN: CPT

## 2023-05-04 PROCEDURE — 6370000000 HC RX 637 (ALT 250 FOR IP): Performed by: NURSE PRACTITIONER

## 2023-05-04 PROCEDURE — 93010 ELECTROCARDIOGRAM REPORT: CPT | Performed by: INTERNAL MEDICINE

## 2023-05-04 PROCEDURE — 2700000000 HC OXYGEN THERAPY PER DAY

## 2023-05-04 PROCEDURE — 94640 AIRWAY INHALATION TREATMENT: CPT

## 2023-05-04 PROCEDURE — 94664 DEMO&/EVAL PT USE INHALER: CPT

## 2023-05-04 PROCEDURE — 84443 ASSAY THYROID STIM HORMONE: CPT

## 2023-05-04 PROCEDURE — 99221 1ST HOSP IP/OBS SF/LOW 40: CPT | Performed by: INTERNAL MEDICINE

## 2023-05-04 PROCEDURE — 85025 COMPLETE CBC W/AUTO DIFF WBC: CPT

## 2023-05-04 PROCEDURE — APPSS45 APP SPLIT SHARED TIME 31-45 MINUTES: Performed by: NURSE PRACTITIONER

## 2023-05-04 PROCEDURE — 1210000000 HC MED SURG R&B

## 2023-05-04 PROCEDURE — 94761 N-INVAS EAR/PLS OXIMETRY MLT: CPT

## 2023-05-04 PROCEDURE — 80053 COMPREHEN METABOLIC PANEL: CPT

## 2023-05-04 RX ORDER — IPRATROPIUM BROMIDE AND ALBUTEROL SULFATE 2.5; .5 MG/3ML; MG/3ML
1 SOLUTION RESPIRATORY (INHALATION) 4 TIMES DAILY
Status: DISCONTINUED | OUTPATIENT
Start: 2023-05-04 | End: 2023-05-05 | Stop reason: HOSPADM

## 2023-05-04 RX ORDER — TRAZODONE HYDROCHLORIDE 50 MG/1
50 TABLET ORAL NIGHTLY
Status: DISCONTINUED | OUTPATIENT
Start: 2023-05-04 | End: 2023-05-05 | Stop reason: HOSPADM

## 2023-05-04 RX ORDER — ALBUTEROL SULFATE 2.5 MG/3ML
2.5 SOLUTION RESPIRATORY (INHALATION)
Status: DISCONTINUED | OUTPATIENT
Start: 2023-05-04 | End: 2023-05-05 | Stop reason: HOSPADM

## 2023-05-04 RX ORDER — ATORVASTATIN CALCIUM 40 MG/1
40 TABLET, FILM COATED ORAL NIGHTLY
Status: DISCONTINUED | OUTPATIENT
Start: 2023-05-04 | End: 2023-05-05 | Stop reason: HOSPADM

## 2023-05-04 RX ADMIN — ENOXAPARIN SODIUM 30 MG: 100 INJECTION SUBCUTANEOUS at 21:51

## 2023-05-04 RX ADMIN — TIOTROPIUM BROMIDE AND OLODATEROL 2 PUFF: 3.124; 2.736 SPRAY, METERED RESPIRATORY (INHALATION) at 07:52

## 2023-05-04 RX ADMIN — ASPIRIN 81 MG 81 MG: 81 TABLET ORAL at 11:50

## 2023-05-04 RX ADMIN — CLOPIDOGREL BISULFATE 75 MG: 75 TABLET ORAL at 11:49

## 2023-05-04 RX ADMIN — Medication 5 ML: at 21:51

## 2023-05-04 RX ADMIN — Medication 10 ML: at 11:51

## 2023-05-04 RX ADMIN — ATORVASTATIN CALCIUM 40 MG: 40 TABLET, FILM COATED ORAL at 21:51

## 2023-05-04 RX ADMIN — IPRATROPIUM BROMIDE AND ALBUTEROL SULFATE 1 AMPULE: .5; 2.5 SOLUTION RESPIRATORY (INHALATION) at 07:52

## 2023-05-04 RX ADMIN — IPRATROPIUM BROMIDE AND ALBUTEROL SULFATE 1 AMPULE: .5; 2.5 SOLUTION RESPIRATORY (INHALATION) at 15:37

## 2023-05-04 RX ADMIN — GUAIFENESIN 600 MG: 600 TABLET ORAL at 21:51

## 2023-05-04 RX ADMIN — METOPROLOL TARTRATE 25 MG: 25 TABLET, FILM COATED ORAL at 11:50

## 2023-05-04 RX ADMIN — ENOXAPARIN SODIUM 30 MG: 100 INJECTION SUBCUTANEOUS at 11:48

## 2023-05-04 RX ADMIN — IPRATROPIUM BROMIDE AND ALBUTEROL SULFATE 1 AMPULE: .5; 2.5 SOLUTION RESPIRATORY (INHALATION) at 12:07

## 2023-05-04 RX ADMIN — Medication 5 MG: at 21:51

## 2023-05-04 RX ADMIN — TRAZODONE HYDROCHLORIDE 50 MG: 50 TABLET ORAL at 21:51

## 2023-05-04 RX ADMIN — FUROSEMIDE 60 MG: 40 TABLET ORAL at 11:49

## 2023-05-04 RX ADMIN — ACETAMINOPHEN 650 MG: 325 TABLET ORAL at 18:59

## 2023-05-04 RX ADMIN — GUAIFENESIN 600 MG: 600 TABLET ORAL at 11:49

## 2023-05-04 RX ADMIN — PREDNISONE 30 MG: 20 TABLET ORAL at 11:50

## 2023-05-04 RX ADMIN — LISINOPRIL 10 MG: 5 TABLET ORAL at 18:59

## 2023-05-04 RX ADMIN — IPRATROPIUM BROMIDE AND ALBUTEROL SULFATE 1 AMPULE: .5; 2.5 SOLUTION RESPIRATORY (INHALATION) at 20:51

## 2023-05-04 RX ADMIN — METOPROLOL TARTRATE 25 MG: 25 TABLET, FILM COATED ORAL at 21:51

## 2023-05-04 ASSESSMENT — ENCOUNTER SYMPTOMS
ABDOMINAL PAIN: 0
VOMITING: 0
COUGH: 0
ABDOMINAL DISTENTION: 0
COLOR CHANGE: 0
NAUSEA: 0
EYE ITCHING: 0
PHOTOPHOBIA: 0
CHEST TIGHTNESS: 0
WHEEZING: 0
EYE REDNESS: 0
TROUBLE SWALLOWING: 0
EYE DISCHARGE: 0
SHORTNESS OF BREATH: 0
EYE PAIN: 0

## 2023-05-04 ASSESSMENT — PAIN SCALES - GENERAL: PAINLEVEL_OUTOF10: 2

## 2023-05-04 NOTE — FLOWSHEET NOTE
Pt vss, down for mri, still complaints of blurred vision, all other neuros wnl Electronically signed by Lisa Broderick RN on 5/4/2023 at 8:48 AM    Pt family, Christy Russo and Giselle Boyd would like social work to call them to discuss insurance  [phone] Electronically signed by Lisa Broderick RN on 5/4/2023 at 8:17 PM

## 2023-05-04 NOTE — CARE COORDINATION
MET W/PT TO ASSESS NEEDS AND DISCUSS DISCHARGE PLAN WHICH IS HOME ALONE. PT IS UNSURE IF HE WANTS HHC AT THIS TIME. PT HAS DME AND HOME O2. CM TO FOLLOW CLINICAL COURSE.

## 2023-05-04 NOTE — PLAN OF CARE
Problem: Skin/Tissue Integrity  Goal: Absence of new skin breakdown  Description: 1. Monitor for areas of redness and/or skin breakdown  2. Assess vascular access sites hourly  3. Every 4-6 hours minimum:  Change oxygen saturation probe site  4. Every 4-6 hours:  If on nasal continuous positive airway pressure, respiratory therapy assess nares and determine need for appliance change or resting period.   Outcome: Not Progressing     Problem: ABCDS Injury Assessment  Goal: Absence of physical injury  Outcome: Not Progressing     Problem: Safety - Adult  Goal: Free from fall injury  Outcome: Not Progressing

## 2023-05-04 NOTE — H&P
[place] [address] MEDICINE    HISTORY AND PHYSICAL EXAM    PATIENT NAME:  Maria E Mohan    MRN:  [id number]  SERVICE DATE:  5/3/2023   SERVICE TIME:  8:51 PM    Primary Care Physician: Josephine Byers MD         SUBJECTIVE  CHIEF COMPLAINT:   Vision disturbance    HPI: Patient being admitted for CVA. Patient is a pleasant, alert and oriented x1 61-year-old  male. Patient states that he had vision troubles around 3 AM on the morning of 5/2/2023. He states that he noticed this after coming back from the restroom and attempting to get back into his bed. He describes the vision as if he had his eyes crossed and had trouble with depth perception. He states that when he attempts to lay down in his bed he missed due to the perception problem and also had issues getting into a chair later on. He was able to go back to sleep and woke up around 8 AM finding the symptoms were persistent. He states that he had difficulty using a phone as he was unable to pass the numbers where he thought he was seeing them. The symptoms continued and he eventually decided to come into the ED. Otherwise, patient denies any headache, dizziness, numbness or tingling of extremities, weakness, chest pain, fever, abdominal pain, nausea, or vomiting. Patient's past medical history includes COPD, JONNY, HF, HTN, and sleep disturbance. Patient states he was recently prescribed trazodone but has not started taking it yet.       PAST MEDICAL HISTORY:    Past Medical History:   Diagnosis Date    CHF (congestive heart failure) ([facility])     COPD (chronic obstructive pulmonary disease) ([address]     Hypertension     Lung disease     Osteoarthritis     hands worst    Sleep apnea      PAST SURGICAL HISTORY:    Past Surgical History:   Procedure Laterality Date    CHOLECYSTECTOMY      CORONARY ANGIOPLASTY WITH STENT PLACEMENT  12/14/2020    DIAGNOSTIC CARDIAC CATH LAB PROCEDURE  12/14/2020    PTCA  12/14/2020    ROTATOR CUFF REPAIR      right

## 2023-05-04 NOTE — CARE COORDINATION
Readmission Assessment  Number of Days since last admission?: 8-30 days  Previous Disposition: Home Alone  Who is being Interviewed: Patient  What was the patient's/caregiver's perception as to why they think they needed to return back to the hospital?: Other (Comment) (acute problem not related to previous complaint.)  Did you visit your Primary Care Physician after you left the hospital, before you returned this time?: Yes  Did you see a specialist, such as Cardiac, Pulmonary, Orthopedic Physician, etc. after you left the hospital?: No  Who advised the patient to return to the hospital?: Self-referral  Does the patient report anything that got in the way of taking their medications?: No

## 2023-05-04 NOTE — CARE COORDINATION
Case Management Assessment  Initial Evaluation    Date/Time of Evaluation: 5/3/2023 8:43 PM  Assessment Completed by: Pete Andino RN    If patient is discharged prior to next notation, then this note serves as note for discharge by case management. Patient Name: Chanel Lopez                   YOB: 1955  Diagnosis: Acute CVA (cerebrovascular accident) [facility]) [I63.9]  Cerebrovascular accident (CVA), unspecified mechanism ([facility] [address] [I63.9]                   Date / Time: 5/3/2023  3:46 PM    Patient Admission Status: Inpatient   Readmission Risk (Low < 19, Mod (19-27), High > 27): Readmission Risk Score: 19.2    Current PCP: Nayeli Spaulding MD  PCP verified by CM? Yes    Chart Reviewed: Yes      History Provided by: Patient  Patient Orientation: Alert and Oriented, Person, Place, Situation, Self    Patient Cognition: Alert    Hospitalization in the last 30 days (Readmission):  Yes    If yes, Readmission Assessment in CM Navigator will be completed. Advance Directives:      Code Status: Full Code   Patient's Primary Decision Maker is: Legal Next of Kin (pt names his daughter Jorgito Mackay as his decision maker.)    [facility] [place] [facility] - [phone]    Primary Decision Maker: Shahid Constantino Child - [phone]    Discharge Planning:    Patient lives with: (P) Alone Type of Home: (P) Apartment  Primary Care Giver: Self  Patient Support Systems include: Children   Current Financial resources: Medicare  Current community resources: (P) None  Current services prior to admission: (P) None            Current DME:              Type of Home Care services:       ADLS  Prior functional level: Independent in ADLs/IADLs  Current functional level:  Independent in ADLs/IADLs    PT AM-PAC:   /24  OT AM-PAC:   /24    Family can provide assistance at DC: No  Would you like Case Management to discuss the discharge plan with any other family members/significant others, and if so, who?

## 2023-05-05 ENCOUNTER — HOSPITAL ENCOUNTER (INPATIENT)
Age: 68
LOS: 12 days | Discharge: HOME HEALTH | DRG: 057 | End: 2023-05-17
Attending: PHYSICAL MEDICINE & REHABILITATION | Admitting: PHYSICAL MEDICINE & REHABILITATION
Payer: MEDICARE

## 2023-05-05 ENCOUNTER — APPOINTMENT (OUTPATIENT)
Dept: CARDIAC CATH/INVASIVE PROCEDURES | Age: 68
End: 2023-05-05
Payer: MEDICARE

## 2023-05-05 VITALS
OXYGEN SATURATION: 98 % | SYSTOLIC BLOOD PRESSURE: 118 MMHG | RESPIRATION RATE: 20 BRPM | TEMPERATURE: 97.5 F | HEART RATE: 101 BPM | HEIGHT: 70 IN | BODY MASS INDEX: 33.77 KG/M2 | WEIGHT: 235.89 LBS | DIASTOLIC BLOOD PRESSURE: 65 MMHG

## 2023-05-05 DIAGNOSIS — Z78.9 IMPAIRED MOBILITY AND ACTIVITIES OF DAILY LIVING: ICD-10-CM

## 2023-05-05 DIAGNOSIS — Z74.09 IMPAIRED MOBILITY AND ACTIVITIES OF DAILY LIVING: ICD-10-CM

## 2023-05-05 DIAGNOSIS — H53.9 VISION CHANGES: Primary | ICD-10-CM

## 2023-05-05 LAB
ALBUMIN SERPL-MCNC: 3.2 G/DL (ref 3.5–4.6)
ALP SERPL-CCNC: 68 U/L (ref 35–104)
ALT SERPL-CCNC: 8 U/L (ref 0–41)
ANION GAP SERPL CALCULATED.3IONS-SCNC: 11 MEQ/L (ref 9–15)
AST SERPL-CCNC: 10 U/L (ref 0–40)
BASOPHILS # BLD: 0 K/UL (ref 0–0.2)
BASOPHILS NFR BLD: 0.3 %
BILIRUB SERPL-MCNC: 0.8 MG/DL (ref 0.2–0.7)
BILIRUB UR QL STRIP: NEGATIVE
BUN SERPL-MCNC: 16 MG/DL (ref 8–23)
CALCIUM SERPL-MCNC: 9 MG/DL (ref 8.5–9.9)
CHLORIDE SERPL-SCNC: 100 MEQ/L (ref 95–107)
CLARITY UR: CLEAR
CO2 SERPL-SCNC: 32 MEQ/L (ref 20–31)
COLOR UR: YELLOW
CREAT SERPL-MCNC: 0.95 MG/DL (ref 0.7–1.2)
EOSINOPHIL # BLD: 0 K/UL (ref 0–0.7)
EOSINOPHIL NFR BLD: 0.7 %
ERYTHROCYTE [DISTWIDTH] IN BLOOD BY AUTOMATED COUNT: 15.2 % (ref 11.5–14.5)
GLOBULIN SER CALC-MCNC: 2.4 G/DL (ref 2.3–3.5)
GLUCOSE SERPL-MCNC: 110 MG/DL (ref 70–99)
GLUCOSE UR STRIP-MCNC: NEGATIVE MG/DL
HCT VFR BLD AUTO: 42.8 % (ref 42–52)
HGB BLD-MCNC: 14.1 G/DL (ref 14–18)
HGB UR QL STRIP: NEGATIVE
KETONES UR STRIP-MCNC: NEGATIVE MG/DL
LEUKOCYTE ESTERASE UR QL STRIP: NEGATIVE
LV EF: 55 %
LVEF MODALITY: NORMAL
LYMPHOCYTES # BLD: 1 K/UL (ref 1–4.8)
LYMPHOCYTES NFR BLD: 13.8 %
MAGNESIUM SERPL-MCNC: 2.1 MG/DL (ref 1.7–2.4)
MCH RBC QN AUTO: 29.5 PG (ref 27–31.3)
MCHC RBC AUTO-ENTMCNC: 32.9 % (ref 33–37)
MCV RBC AUTO: 89.6 FL (ref 79–92.2)
MONOCYTES # BLD: 0.7 K/UL (ref 0.2–0.8)
MONOCYTES NFR BLD: 10.2 %
NEUTROPHILS # BLD: 5.4 K/UL (ref 1.4–6.5)
NEUTS SEG NFR BLD: 75 %
NITRITE UR QL STRIP: NEGATIVE
PH UR STRIP: 6 [PH] (ref 5–9)
PLATELET # BLD AUTO: 157 K/UL (ref 130–400)
POTASSIUM SERPL-SCNC: 4.2 MEQ/L (ref 3.4–4.9)
PROT SERPL-MCNC: 5.6 G/DL (ref 6.3–8)
PROT UR STRIP-MCNC: NEGATIVE MG/DL
RBC # BLD AUTO: 4.77 M/UL (ref 4.7–6.1)
SARS-COV-2 RDRP RESP QL NAA+PROBE: NOT DETECTED
SODIUM SERPL-SCNC: 143 MEQ/L (ref 135–144)
SP GR UR STRIP: 1.01 (ref 1–1.03)
T4 FREE SERPL-MCNC: 1.2 NG/DL (ref 0.84–1.68)
URINE REFLEX TO CULTURE: NORMAL
UROBILINOGEN UR STRIP-ACNC: 1 E.U./DL
WBC # BLD AUTO: 7.2 K/UL (ref 4.8–10.8)

## 2023-05-05 PROCEDURE — 84439 ASSAY OF FREE THYROXINE: CPT

## 2023-05-05 PROCEDURE — 81003 URINALYSIS AUTO W/O SCOPE: CPT

## 2023-05-05 PROCEDURE — 80053 COMPREHEN METABOLIC PANEL: CPT

## 2023-05-05 PROCEDURE — 2580000003 HC RX 258: Performed by: INTERNAL MEDICINE

## 2023-05-05 PROCEDURE — 36415 COLL VENOUS BLD VENIPUNCTURE: CPT

## 2023-05-05 PROCEDURE — 93321 DOPPLER ECHO F-UP/LMTD STD: CPT

## 2023-05-05 PROCEDURE — 6370000000 HC RX 637 (ALT 250 FOR IP): Performed by: INTERNAL MEDICINE

## 2023-05-05 PROCEDURE — 99221 1ST HOSP IP/OBS SF/LOW 40: CPT | Performed by: PHYSICAL MEDICINE & REHABILITATION

## 2023-05-05 PROCEDURE — 99232 SBSQ HOSP IP/OBS MODERATE 35: CPT | Performed by: PSYCHIATRY & NEUROLOGY

## 2023-05-05 PROCEDURE — 2700000000 HC OXYGEN THERAPY PER DAY

## 2023-05-05 PROCEDURE — 6360000002 HC RX W HCPCS

## 2023-05-05 PROCEDURE — 6370000000 HC RX 637 (ALT 250 FOR IP): Performed by: NURSE PRACTITIONER

## 2023-05-05 PROCEDURE — 93325 DOPPLER ECHO COLOR FLOW MAPG: CPT

## 2023-05-05 PROCEDURE — 94761 N-INVAS EAR/PLS OXIMETRY MLT: CPT

## 2023-05-05 PROCEDURE — 94760 N-INVAS EAR/PLS OXIMETRY 1: CPT

## 2023-05-05 PROCEDURE — 1180000000 HC REHAB R&B

## 2023-05-05 PROCEDURE — 83735 ASSAY OF MAGNESIUM: CPT

## 2023-05-05 PROCEDURE — APPSS15 APP SPLIT SHARED TIME 0-15 MINUTES: Performed by: NURSE PRACTITIONER

## 2023-05-05 PROCEDURE — 99231 SBSQ HOSP IP/OBS SF/LOW 25: CPT | Performed by: INTERNAL MEDICINE

## 2023-05-05 PROCEDURE — 6360000002 HC RX W HCPCS: Performed by: INTERNAL MEDICINE

## 2023-05-05 PROCEDURE — 85025 COMPLETE CBC W/AUTO DIFF WBC: CPT

## 2023-05-05 PROCEDURE — 93312 ECHO TRANSESOPHAGEAL: CPT

## 2023-05-05 PROCEDURE — 97116 GAIT TRAINING THERAPY: CPT

## 2023-05-05 PROCEDURE — 6360000002 HC RX W HCPCS: Performed by: NURSE PRACTITIONER

## 2023-05-05 PROCEDURE — 87635 SARS-COV-2 COVID-19 AMP PRB: CPT

## 2023-05-05 PROCEDURE — 94640 AIRWAY INHALATION TREATMENT: CPT

## 2023-05-05 PROCEDURE — 2580000003 HC RX 258

## 2023-05-05 RX ORDER — PREDNISONE 20 MG/1
20 TABLET ORAL DAILY
Status: COMPLETED | OUTPATIENT
Start: 2023-05-06 | End: 2023-05-08

## 2023-05-05 RX ORDER — ONDANSETRON 2 MG/ML
4 INJECTION INTRAMUSCULAR; INTRAVENOUS EVERY 6 HOURS PRN
Status: CANCELLED | OUTPATIENT
Start: 2023-05-05

## 2023-05-05 RX ORDER — CLOPIDOGREL BISULFATE 75 MG/1
75 TABLET ORAL DAILY
Status: CANCELLED | OUTPATIENT
Start: 2023-05-06

## 2023-05-05 RX ORDER — SODIUM CHLORIDE 0.9 % (FLUSH) 0.9 %
5-40 SYRINGE (ML) INJECTION EVERY 12 HOURS SCHEDULED
Status: DISCONTINUED | OUTPATIENT
Start: 2023-05-05 | End: 2023-05-05 | Stop reason: HOSPADM

## 2023-05-05 RX ORDER — GUAIFENESIN 600 MG/1
600 TABLET, EXTENDED RELEASE ORAL 2 TIMES DAILY
Status: DISCONTINUED | OUTPATIENT
Start: 2023-05-05 | End: 2023-05-17 | Stop reason: HOSPADM

## 2023-05-05 RX ORDER — GUAIFENESIN 600 MG/1
600 TABLET, EXTENDED RELEASE ORAL 2 TIMES DAILY
Status: CANCELLED | OUTPATIENT
Start: 2023-05-05

## 2023-05-05 RX ORDER — PREDNISONE 20 MG/1
20 TABLET ORAL DAILY
Status: CANCELLED | OUTPATIENT
Start: 2023-05-06 | End: 2023-05-09

## 2023-05-05 RX ORDER — ASPIRIN 81 MG/1
81 TABLET, CHEWABLE ORAL DAILY
Status: CANCELLED | OUTPATIENT
Start: 2023-05-06

## 2023-05-05 RX ORDER — TRAZODONE HYDROCHLORIDE 50 MG/1
50 TABLET ORAL NIGHTLY
Status: CANCELLED | OUTPATIENT
Start: 2023-05-05

## 2023-05-05 RX ORDER — SODIUM CHLORIDE 9 MG/ML
INJECTION, SOLUTION INTRAVENOUS CONTINUOUS
Status: DISCONTINUED | OUTPATIENT
Start: 2023-05-05 | End: 2023-05-05 | Stop reason: HOSPADM

## 2023-05-05 RX ORDER — SODIUM CHLORIDE 0.9 % (FLUSH) 0.9 %
5-40 SYRINGE (ML) INJECTION PRN
Status: DISCONTINUED | OUTPATIENT
Start: 2023-05-05 | End: 2023-05-05 | Stop reason: HOSPADM

## 2023-05-05 RX ORDER — ALBUTEROL SULFATE 90 UG/1
2 AEROSOL, METERED RESPIRATORY (INHALATION) EVERY 6 HOURS PRN
Status: DISCONTINUED | OUTPATIENT
Start: 2023-05-05 | End: 2023-05-06

## 2023-05-05 RX ORDER — ATORVASTATIN CALCIUM 40 MG/1
40 TABLET, FILM COATED ORAL NIGHTLY
Status: DISCONTINUED | OUTPATIENT
Start: 2023-05-05 | End: 2023-05-17 | Stop reason: HOSPADM

## 2023-05-05 RX ORDER — MECOBALAMIN 5000 MCG
5 TABLET,DISINTEGRATING ORAL NIGHTLY
Status: DISCONTINUED | OUTPATIENT
Start: 2023-05-05 | End: 2023-05-17 | Stop reason: HOSPADM

## 2023-05-05 RX ORDER — CLOPIDOGREL BISULFATE 75 MG/1
75 TABLET ORAL DAILY
Status: DISCONTINUED | OUTPATIENT
Start: 2023-05-06 | End: 2023-05-17 | Stop reason: HOSPADM

## 2023-05-05 RX ORDER — TRAZODONE HYDROCHLORIDE 50 MG/1
50 TABLET ORAL NIGHTLY
Status: DISCONTINUED | OUTPATIENT
Start: 2023-05-05 | End: 2023-05-17 | Stop reason: HOSPADM

## 2023-05-05 RX ORDER — LISINOPRIL 10 MG/1
10 TABLET ORAL EVERY EVENING
Status: DISCONTINUED | OUTPATIENT
Start: 2023-05-06 | End: 2023-05-17 | Stop reason: HOSPADM

## 2023-05-05 RX ORDER — ACETAMINOPHEN 650 MG/1
650 SUPPOSITORY RECTAL EVERY 6 HOURS PRN
Status: CANCELLED | OUTPATIENT
Start: 2023-05-05

## 2023-05-05 RX ORDER — LIDOCAINE HYDROCHLORIDE 20 MG/ML
15 SOLUTION OROPHARYNGEAL ONCE
Status: DISCONTINUED | OUTPATIENT
Start: 2023-05-05 | End: 2023-05-05 | Stop reason: HOSPADM

## 2023-05-05 RX ORDER — BACTERIOSTATIC SODIUM CHLORIDE 0.9 %
VIAL (ML) INJECTION
Status: DISCONTINUED
Start: 2023-05-05 | End: 2023-05-05 | Stop reason: HOSPADM

## 2023-05-05 RX ORDER — ACETAMINOPHEN 325 MG/1
650 TABLET ORAL EVERY 6 HOURS PRN
Status: DISCONTINUED | OUTPATIENT
Start: 2023-05-05 | End: 2023-05-17 | Stop reason: HOSPADM

## 2023-05-05 RX ORDER — ACETAMINOPHEN 650 MG/1
650 SUPPOSITORY RECTAL EVERY 6 HOURS PRN
Status: DISCONTINUED | OUTPATIENT
Start: 2023-05-05 | End: 2023-05-17 | Stop reason: HOSPADM

## 2023-05-05 RX ORDER — POLYETHYLENE GLYCOL 3350 17 G/17G
17 POWDER, FOR SOLUTION ORAL DAILY PRN
Status: DISCONTINUED | OUTPATIENT
Start: 2023-05-05 | End: 2023-05-17 | Stop reason: HOSPADM

## 2023-05-05 RX ORDER — LISINOPRIL 5 MG/1
10 TABLET ORAL EVERY EVENING
Status: CANCELLED | OUTPATIENT
Start: 2023-05-06

## 2023-05-05 RX ORDER — SODIUM CHLORIDE 9 MG/ML
INJECTION, SOLUTION INTRAVENOUS PRN
Status: DISCONTINUED | OUTPATIENT
Start: 2023-05-05 | End: 2023-05-05 | Stop reason: HOSPADM

## 2023-05-05 RX ORDER — ACETAMINOPHEN 325 MG/1
650 TABLET ORAL EVERY 6 HOURS PRN
Status: CANCELLED | OUTPATIENT
Start: 2023-05-05

## 2023-05-05 RX ORDER — ONDANSETRON 4 MG/1
4 TABLET, ORALLY DISINTEGRATING ORAL EVERY 8 HOURS PRN
Status: CANCELLED | OUTPATIENT
Start: 2023-05-05

## 2023-05-05 RX ORDER — ATORVASTATIN CALCIUM 40 MG/1
40 TABLET, FILM COATED ORAL NIGHTLY
Status: CANCELLED | OUTPATIENT
Start: 2023-05-05

## 2023-05-05 RX ORDER — POLYETHYLENE GLYCOL 3350 17 G/17G
17 POWDER, FOR SOLUTION ORAL DAILY PRN
Status: CANCELLED | OUTPATIENT
Start: 2023-05-05

## 2023-05-05 RX ORDER — PREDNISONE 10 MG/1
10 TABLET ORAL DAILY
Status: CANCELLED | OUTPATIENT
Start: 2023-05-09 | End: 2023-05-13

## 2023-05-05 RX ORDER — ALBUTEROL SULFATE 2.5 MG/3ML
2.5 SOLUTION RESPIRATORY (INHALATION)
Status: DISCONTINUED | OUTPATIENT
Start: 2023-05-05 | End: 2023-05-06

## 2023-05-05 RX ORDER — ONDANSETRON 2 MG/ML
4 INJECTION INTRAMUSCULAR; INTRAVENOUS EVERY 6 HOURS PRN
Status: DISCONTINUED | OUTPATIENT
Start: 2023-05-05 | End: 2023-05-17 | Stop reason: HOSPADM

## 2023-05-05 RX ORDER — MECOBALAMIN 5000 MCG
5 TABLET,DISINTEGRATING ORAL NIGHTLY
Status: CANCELLED | OUTPATIENT
Start: 2023-05-05

## 2023-05-05 RX ORDER — ALBUTEROL SULFATE 90 UG/1
2 AEROSOL, METERED RESPIRATORY (INHALATION) EVERY 6 HOURS PRN
Status: CANCELLED | OUTPATIENT
Start: 2023-05-05

## 2023-05-05 RX ORDER — PREDNISONE 10 MG/1
10 TABLET ORAL DAILY
Status: COMPLETED | OUTPATIENT
Start: 2023-05-09 | End: 2023-05-12

## 2023-05-05 RX ORDER — ALBUTEROL SULFATE 2.5 MG/3ML
2.5 SOLUTION RESPIRATORY (INHALATION)
Status: CANCELLED | OUTPATIENT
Start: 2023-05-05

## 2023-05-05 RX ORDER — ONDANSETRON 4 MG/1
4 TABLET, ORALLY DISINTEGRATING ORAL EVERY 8 HOURS PRN
Status: DISCONTINUED | OUTPATIENT
Start: 2023-05-05 | End: 2023-05-17 | Stop reason: HOSPADM

## 2023-05-05 RX ORDER — ASPIRIN 81 MG/1
81 TABLET, CHEWABLE ORAL DAILY
Status: DISCONTINUED | OUTPATIENT
Start: 2023-05-06 | End: 2023-05-07

## 2023-05-05 RX ADMIN — ATORVASTATIN CALCIUM 40 MG: 40 TABLET, FILM COATED ORAL at 22:31

## 2023-05-05 RX ADMIN — CLOPIDOGREL BISULFATE 75 MG: 75 TABLET ORAL at 10:50

## 2023-05-05 RX ADMIN — FUROSEMIDE 60 MG: 40 TABLET ORAL at 10:50

## 2023-05-05 RX ADMIN — Medication 5 MG: at 22:32

## 2023-05-05 RX ADMIN — PREDNISONE 20 MG: 20 TABLET ORAL at 10:50

## 2023-05-05 RX ADMIN — METOPROLOL TARTRATE 25 MG: 25 TABLET, FILM COATED ORAL at 10:50

## 2023-05-05 RX ADMIN — IPRATROPIUM BROMIDE AND ALBUTEROL SULFATE 1 AMPULE: .5; 2.5 SOLUTION RESPIRATORY (INHALATION) at 15:46

## 2023-05-05 RX ADMIN — IPRATROPIUM BROMIDE AND ALBUTEROL SULFATE 1 AMPULE: .5; 2.5 SOLUTION RESPIRATORY (INHALATION) at 11:06

## 2023-05-05 RX ADMIN — APIXABAN 5 MG: 5 TABLET, FILM COATED ORAL at 22:32

## 2023-05-05 RX ADMIN — ENOXAPARIN SODIUM 30 MG: 100 INJECTION SUBCUTANEOUS at 10:50

## 2023-05-05 RX ADMIN — ASPIRIN 81 MG 81 MG: 81 TABLET ORAL at 10:50

## 2023-05-05 RX ADMIN — GUAIFENESIN 600 MG: 600 TABLET ORAL at 10:50

## 2023-05-05 RX ADMIN — LISINOPRIL 10 MG: 5 TABLET ORAL at 17:48

## 2023-05-05 RX ADMIN — METOPROLOL TARTRATE 25 MG: 25 TABLET, FILM COATED ORAL at 22:32

## 2023-05-05 RX ADMIN — GUAIFENESIN 600 MG: 600 TABLET ORAL at 22:32

## 2023-05-05 RX ADMIN — ACETAMINOPHEN 650 MG: 325 TABLET ORAL at 23:25

## 2023-05-05 RX ADMIN — TRAZODONE HYDROCHLORIDE 50 MG: 50 TABLET ORAL at 22:31

## 2023-05-05 RX ADMIN — SODIUM CHLORIDE, PRESERVATIVE FREE 10 ML: 5 INJECTION INTRAVENOUS at 10:51

## 2023-05-05 RX ADMIN — ALBUTEROL SULFATE 2.5 MG: 2.5 SOLUTION RESPIRATORY (INHALATION) at 21:44

## 2023-05-05 ASSESSMENT — ENCOUNTER SYMPTOMS
VOMITING: 0
WHEEZING: 0
NAUSEA: 0
BACK PAIN: 1
ABDOMINAL PAIN: 0
STRIDOR: 0
COUGH: 0
EYE REDNESS: 0
EYE DISCHARGE: 1
CONSTIPATION: 0
DIARRHEA: 0
DOUBLE VISION: 1
SORE THROAT: 0
COLOR CHANGE: 0
ABDOMINAL DISTENTION: 0
PHOTOPHOBIA: 0
SHORTNESS OF BREATH: 0
DIPLOPIA: BILATERAL
BLOOD IN STOOL: 0
CHEST TIGHTNESS: 0
EYE PAIN: 0
TROUBLE SWALLOWING: 0

## 2023-05-05 ASSESSMENT — PAIN DESCRIPTION - ORIENTATION: ORIENTATION: ANTERIOR

## 2023-05-05 ASSESSMENT — PAIN SCALES - GENERAL: PAINLEVEL_OUTOF10: 2

## 2023-05-05 ASSESSMENT — PAIN - FUNCTIONAL ASSESSMENT: PAIN_FUNCTIONAL_ASSESSMENT: ACTIVITIES ARE NOT PREVENTED

## 2023-05-05 ASSESSMENT — PAIN DESCRIPTION - DESCRIPTORS: DESCRIPTORS: ACHING

## 2023-05-05 ASSESSMENT — PAIN DESCRIPTION - LOCATION: LOCATION: HEAD

## 2023-05-05 NOTE — FLOWSHEET NOTE
Report called to [facility] on Rehab. Waiting for Dr. Odell Munoz to place signed and held orders for Rehab to D/C pt.

## 2023-05-05 NOTE — DISCHARGE SUMMARY
seen.  Mild  generalized cerebral volume loss is noted with moderate to severe chronic  microvascular ischemic changes. No hydrocephalus or extra-axial fluid is  seen. ORBITS: The visualized portion of the orbits demonstrate no acute abnormality. SINUSES: Moderate mucosal thickening is seen in the right maxillary sinus. Minimal mucosal thickening in the remainder of the paranasal sinuses. Moderate bilateral mastoid effusions, larger on the right. BONES/SOFT TISSUES: The bone marrow signal intensity appears normal. The soft  tissues demonstrate no acute abnormality. IMPRESSION:  1. Right PCA territory ACUTE OR EARLY SUBACUTE INFARCTION involving the right  occipital and medial temporal lobe, as well as a small focus in the medial  right thalamus. 2. Tiny, 4 mm focus of ACUTE OR EARLY SUBACUTE INFARCTION also noted in the  left medial thalamus (left PCA territory). 3. No hemorrhage, significant mass effect or midline shift. 4. Bilateral mastoid effusions, right greater than left, which may be due to  eustachian tube dysfunction or otomastoiditis. Discharge Medications:      Eliquis 5 mg twice daily  Aspirin 81 mg daily  Lipitor 40 mg daily  Plavix 75 mg daily  Lasix 80 mg daily  Mucinex 6 mg twice daily  DuoNebs 4 times daily. Lidocaine to milligrams oral daily  Melatonin 5 mg oral nightly  Metoprolol tartrate 25 mg p.o. twice daily  Prednisone 20 mg daily x4 doses  Followed by prednisone 10 g daily x4 doses  Stiolto 2 puffs daily  Trazodone 50 mg nightly. Sleep      Disposition:   Discharged to acute rehab    Condition at discharge: Pt was medically stable at the time of discharge. Activity: activity as tolerated    Total time taken for discharging this patient: 40 minutes. Greater than 70% of time was spent focused exclusively on this patient. Time was taken to review chart, discuss plans with consultants, reconciling medications, discussing plan answering questions with patient.

## 2023-05-05 NOTE — CARE COORDINATION
[facility] Pre-Admission Screening Document      Patient Name: Hallie Mackey       MRN: [id number]    : 1955    Age: 77 y.o. Gender: male   Payor: Payor: MEDICARE / Plan: MEDICARE PART A AND B / Product Type: *No Product type* /   MSSP: No    Admitted from: [facility] Floor: 2W  Attending Care Provider: Karen Steward DO  Inpatient Rehab Referring Care Provider: NEIL Diaz - CNP  Primary Care Provider: Derick Curran MD  Inpatient Treatment Team including Consults: Treatment Team: Attending Provider: Karen Steward DO; Consulting Physician: Mary Flores MD; Consulting Physician: Roldan Castañeda DO; Utilization Reviewer: Leta Martínez RN; Consulting Physician: Iker Hernández MD; Consulting Physician: Heath Torres MD; Registered Nurse: Milla York RN; Patient Care Tech: Ewelina Goodrich; Consulting Physician: Nain Lane DPM    Reason for Hospitalization:   1. Cerebrovascular accident (CVA), unspecified mechanism [facility])      Chief Complaint   Patient presents with    Eye Problem     Pt to ed from [facility] with reports of vision changes, onset sometime in the early hours of the morning, denies other complaint     Isolation:No active isolations    Hospital Course:  Admit Date: 5/3/2023  3:46 PM  Inpatient Rehab Referral Date: 2023  Narrative of hospital course/history of present illness: Patient presents to the emergency department with complaint of blurred vision which patient states started sometime around 3 AM.  Patient states he got up to use the bathroom, he states his vision was cloudy at that time, he said he got up and it did not clear, he walked to the bathroom, and then back into his bedroom and went into the bed, he states he slid off of the bed and onto the floor. There is no injury, he states he did not fall. He states that he did not feel getting up so he fell asleep on the floor.   Patient admitted Acute

## 2023-05-05 NOTE — FLOWSHEET NOTE
5/5/23 @ 1004 Notified Podiatry Resident (Dr. Feng Phillips) of consult via [address] [place] [address]

## 2023-05-05 NOTE — CARE COORDINATION
Inpatient Rehab referral received. Met with patient and explained [facility] Acute Inpatient Rehab program and requirements, including 3 hours of intense therapy daily, anticipated length of stay and goal of discharge to home. All questions answered and patient verbalized understanding. Freedom of choice provided and patient requests admit to [facility] if appropriate. PM&R consult pending. Patient would like acute Inpatient rehab to improve his ,mobility and function better with his vision and mobility. He is from home alone has some local support from his children.   Electronically signed by Sherley Bowen RN on 5/5/23 at 2:42 PM EDT

## 2023-05-05 NOTE — BRIEF OP NOTE
Brief Postoperative Note      Patient: Angel Fountain  YOB: 1955  MRN: [id number]     Section of Cardiology  Adult Brief Procedure Note        Procedure(s):  BARBARA with bubble study    Pre-operative Diagnosis:  CVA    H&P Status: Completed and reviewed. Post-operative Diagnosis:  LVEF 55-60% Trace AR.  AV thick but no AS. No Thrombus. Mild Aortic atherosclerosis. Large PFO noted. Rec: will benefit form Percutaneous PFO closure. We will plan as out pt in few weeks.  Continue antiPLT Rx    Findings: see full report    Complications:  none    Primary Proceduralist:   Rafa Andrews MD

## 2023-05-05 NOTE — CARE COORDINATION
MET W/ PATIENT IN ROOM. IM PG 2 REVIEWED, COPY PROVIDED. PT PLANS TO RETURN HOME, ALONE AT D/C. PT EXPRESSED HE IS NOT READY TO LEAVE YET, D/C HIS VISION. \" I FEEL CROSS EYED\" PT DECLINED [facility].

## 2023-05-05 NOTE — DISCHARGE INSTR - COC
Acute on chronic respiratory failure with hypoxia (HCC) J96.21    Cerebrovascular accident (CVA) ([facility] [address] I63.9       Isolation/Infection:   Isolation            No Isolation          Patient Infection Status       Infection Onset Added Last Indicated Last Indicated By Review Planned Expiration Resolved Resolved By    None active    Resolved    COVID-19 (Rule Out) 23 Respiratory Panel, Molecular, with COVID-19 (Restricted: peds pts or suitable admitted adults) (Ordered)   23 Rule-Out Test Resulted    COVID-19 (Rule Out) 23 COVID-19, Rapid (Ordered)   23 Rule-Out Test Resulted            Nurse Assessment:  Last Vital Signs: BP (!) 121/59   Pulse 74   Temp 97.5 °F (36.4 °C) (Oral)   Resp 13   Ht 5' 10\" (1.778 m)   Wt 244 lb (110.7 kg)   SpO2 96%   BMI 35.01 kg/m²     Last documented pain score (0-10 scale): Pain Level: 2  Last Weight:   Wt Readings from Last 1 Encounters:   23 244 lb (110.7 kg)     Mental Status:  {IP PT MENTAL STATUS:}    IV Access:  { [facility] IV ACCESS:746579780}    Nursing Mobility/ADLs:  Walking   {[facility] DME BFCN:710709506}  Transfer  {[facility] DME KRH}  Bathing  {[facility] DME VIVZ:581302451}  Dressing  {[facility] DME YJYE:783119808}  Toileting  {[facility] DME UNLV:077442177}  Feeding  {[facility] DME BFQS:235450098}  Med Admin  {[facility] DME DTSY:857680938}  Med Delivery   { [facility] MED Delivery:480553605}    Wound Care Documentation and Therapy:        Elimination:  Continence: Bowel: {YES / DY:84246}  Bladder: {YES / OE:89046}  Urinary Catheter: {Urinary Catheter:467921417}   Colostomy/Ileostomy/Ileal Conduit: {YES / VA:66726}       Date of Last BM: ***    Intake/Output Summary (Last 24 hours) at 2023 1102  Last data filed at 2023 0200  Gross per 24 hour   Intake 480 ml   Output 1890 ml   Net -1410 ml     I/O last 3 completed shifts:   In: 480 [P.O.:480]  Out: 2340 [Urine:2340]    Safety Concerns:     [address] ADRI Safety

## 2023-05-05 NOTE — CONSULTS
[facility] Neurology Consult Note  Name: Ari Cowart  Age: 77 y.o. Gender: male  CodeStatus: Full Code  Allergies: No Known Allergies    Chief Complaint:Eye Problem (Pt to ed from [facility] with reports of vision changes, onset sometime in the early hours of the morning, denies other complaint)    Primary Care Provider: Carey Estrada MD  InpatientTreatment Team: Treatment Team: Attending Provider: Gabriela Viera DO; Consulting Physician: Colonel Wolf MD; Consulting Physician: Gabriela Viera DO; Respiratory Therapist (Day): Jameson Randhawa RCP; : Annah Landau, HUGO; Registered Nurse: Sathish Gutierrez RN; Utilization Reviewer: Cassie Aguila RN  Admission Date: 5/3/2023      HPI   Consulting provider: Geoff Bustamante for CVA  Pt seen and examined for neurology consult. Patient is a 75-year-old  male with past medical history of obstructive sleep apnea, hypertension, COPD, chronic respiratory failure on home O2 at 4 L per nasal cannula CHF CAD status post stent and PTCA scented to [facility] emergency room on 5/3/2023 with vision changes. Patient reports that vision changes started suddenly on Monday. He has a difficult time describing the visual changes. Denies double vision but states it feels like he has his eyes crossed. Reports blurriness. Vision seem to be worse in the left eye than the right. Denies history of CVA or seizure. Denies history of cancer. Denies recent weight loss. History of tobacco use. Denies history of recent or remote head trauma. No new medications. Does take aspirin 81 mg daily. No associated dysphagia, dysarthria, headache, focal neurodeficits. Initial vital signs 134/79, 97, 16, 97.5, 96% on 4 L per nasal cannula. EKG showed normal sinus rhythm at 96 bpm.  CT of the head completed and shows edema in the right occipital lobe likely representing acute or subacute infarct. Lucent/attic lesion in the left frontal lobe of unclear etiology.
Inpatient consult to Cardiology  Consult performed by:  Lani Coelho MD  Consult ordered by: NEIL Olivia - LISS    Patient Name: Jc Clause Date: 5/3/2023  3:46 PM  MR #: [id number]  : 1955    Attending Physician: Faisal Mobley DO  Reason for consult: BARBARA    History of Presenting Illness:      Shelly Briseno is a 77 y.o. male who follows up in clinic with Dr. Renetta Feliz on hospital day 1 with a history of CAD status post PCI to the RCA, hypertension, hyperlipidemia, COPD/cor pulmonale, heart failure with preserved ejection fraction who initially presented to the hospital yesterday with signs of CVA  Cardiology consulted for BARBARA to rule out cardioembolic source of CVA   history Obtained From:  patient, electronic medical record    Patient laying in bed comfortably denies chest pain    History:      Past Medical History:   Diagnosis Date    CHF (congestive heart failure) ([facility])     COPD (chronic obstructive pulmonary disease) ([address]     Hypertension     Lung disease     Osteoarthritis     hands worst    Sleep apnea      Past Surgical History:   Procedure Laterality Date    CHOLECYSTECTOMY      CORONARY ANGIOPLASTY WITH STENT PLACEMENT  2020    DIAGNOSTIC CARDIAC CATH LAB PROCEDURE  2020    PTCA  2020    ROTATOR CUFF REPAIR      right     Family History  Family History   Problem Relation Age of Onset    Heart Disease Mother     Other Father      [] Unable to obtain due to ventilated and/ or neurologic status  Social History     Socioeconomic History    Marital status: Single     Spouse name: Not on file    Number of children: Not on file    Years of education: Not on file    Highest education level: Not on file   Occupational History    Not on file   Tobacco Use    Smoking status: Former     Packs/day: 2.00     Years: 49.00     Pack years: 98.00     Types: Cigarettes     Start date: 1971     Quit date: 12/15/2020     Years since quittin.3     Passive exposure: Past
[facility] Neurology Consult Note  Name: Brian Herrmann  Age: 77 y.o. Gender: male  CodeStatus: Full Code  Allergies: No Known Allergies    Chief Complaint:Eye Problem (Pt to ed from [facility] with reports of vision changes, onset sometime in the early hours of the morning, denies other complaint)    Primary Care Provider: Roel Neri MD  InpatientTreatment Team: Treatment Team: Attending Provider: Ruby Herrmann DO; Consulting Physician: Chasidy Locke MD; Consulting Physician: Ruby Herrmann DO; Respiratory Therapist (Day): Liliam Montanez RCP; : Liv Wiggins RN; Registered Nurse: Zoe Paula RN; Utilization Reviewer: Monique Rick RN; Consulting Physician: Justina Rivera MD  Admission Date: 5/3/2023      Eye Problem   Pertinent negatives include no eye discharge, eye redness, fever, itching, nausea, photophobia, vomiting or weakness. Consulting provider: Tirso Mark for CVA  Pt seen and examined for neurology consult. Patient is a 75-year-old  male with past medical history of obstructive sleep apnea, hypertension, COPD, chronic respiratory failure on home O2 at 4 L per nasal cannula CHF CAD status post stent and PTCA scented to [facility] emergency room on 5/3/2023 with vision changes. Patient reports that vision changes started suddenly on Monday. He has a difficult time describing the visual changes. Denies double vision but states it feels like he has his eyes crossed. Reports blurriness. Vision seem to be worse in the left eye than the right. Denies history of CVA or seizure. Denies history of cancer. Denies recent weight loss. History of tobacco use. Denies history of recent or remote head trauma. No new medications. Does take aspirin 81 mg daily. No associated dysphagia, dysarthria, headache, focal neurodeficits. Initial vital signs 134/79, 97, 16, 97.5, 96% on 4 L per nasal cannula.   EKG showed normal sinus rhythm at 96 bpm.  CT of the head
Small to moderate right mastoid effusion. SOFT TISSUES/SKULL: Lucent/lytic lesions along the inner table of the left frontal lobe of unclear etiology. CTA NECK: AORTIC ARCH/ARCH VESSELS: No dissection or arterial injury. No significant stenosis of the brachiocephalic or subclavian arteries. CAROTID ARTERIES: Mild calcified atherosclerosis along the bulbs. No dissection, arterial injury, or hemodynamically significant stenosis by NASCET criteria. VERTEBRAL ARTERIES: No dissection, arterial injury, or significant stenosis. SOFT TISSUES: The lung apices are clear. Moderate emphysematous changes. Enlarged, 2.4 x 1.5 cm right paratracheal lymph node. The larynx and pharynx are unremarkable. No acute abnormality of the salivary and thyroid glands. BONES: No acute osseous abnormality. CTA HEAD: ANTERIOR CIRCULATION: No significant stenosis of the intracranial internal carotid, anterior cerebral, or middle cerebral arteries. No aneurysm. POSTERIOR CIRCULATION: No significant stenosis of the vertebral, basilar, or posterior cerebral arteries. No aneurysm. OTHER: No dural venous sinus thrombosis on this non-dedicated study. 1. Edema in the right occipital lobe likely representing an acute or subacute infarction. 2. Unremarkable CTA of the head and neck. 3. Enlarged right paratracheal lymph node measuring 2.4 x 1.5 cm. It is grossly stable as of the CT of the chest from 04/09/2023. Follow-up CT of the chest is recommended in 3-6 months. 4. Lucent/attic lesions in the left frontal lobe of unclear etiology. Consider bone scan for further evaluation.  RECOMMENDATIONS: Unavailable     CTA NECK W WO CONTRAST    Result Date: 5/3/2023  EXAMINATION: CT OF THE HEAD WITHOUT CONTRAST; CTA OF THE NECK; CTA OF THE HEAD WITHOUT AND WITH CONTRAST 5/3/2023 5:03 pm: TECHNIQUE: CT of the head was performed without the administration of intravenous contrast. Automated exposure control, iterative reconstruction, and/or weight based

## 2023-05-05 NOTE — CARE COORDINATION
PAS reviewed by the PM&R and the patient has been accepted to acute Inpatient rehab and he can admit to room 245 pending medical clearance and a negative covid screening.   Electronically signed by Ema Lundy RN on 5/5/23 at 2:37 PM EDT

## 2023-05-06 LAB — ACHR BIND AB SER-SCNC: 0 NMOL/L (ref 0–0.4)

## 2023-05-06 PROCEDURE — 94761 N-INVAS EAR/PLS OXIMETRY MLT: CPT

## 2023-05-06 PROCEDURE — 97162 PT EVAL MOD COMPLEX 30 MIN: CPT

## 2023-05-06 PROCEDURE — 6360000002 HC RX W HCPCS

## 2023-05-06 PROCEDURE — 97166 OT EVAL MOD COMPLEX 45 MIN: CPT

## 2023-05-06 PROCEDURE — 94664 DEMO&/EVAL PT USE INHALER: CPT

## 2023-05-06 PROCEDURE — 97112 NEUROMUSCULAR REEDUCATION: CPT

## 2023-05-06 PROCEDURE — 6370000000 HC RX 637 (ALT 250 FOR IP): Performed by: INTERNAL MEDICINE

## 2023-05-06 PROCEDURE — 97535 SELF CARE MNGMENT TRAINING: CPT

## 2023-05-06 PROCEDURE — 2700000000 HC OXYGEN THERAPY PER DAY

## 2023-05-06 PROCEDURE — 1180000000 HC REHAB R&B

## 2023-05-06 PROCEDURE — 97116 GAIT TRAINING THERAPY: CPT

## 2023-05-06 PROCEDURE — 94660 CPAP INITIATION&MGMT: CPT

## 2023-05-06 PROCEDURE — 6360000002 HC RX W HCPCS: Performed by: INTERNAL MEDICINE

## 2023-05-06 PROCEDURE — 94640 AIRWAY INHALATION TREATMENT: CPT

## 2023-05-06 RX ORDER — ALBUTEROL SULFATE 2.5 MG/3ML
2.5 SOLUTION RESPIRATORY (INHALATION)
Status: DISCONTINUED | OUTPATIENT
Start: 2023-05-06 | End: 2023-05-17 | Stop reason: HOSPADM

## 2023-05-06 RX ORDER — ALBUTEROL SULFATE 2.5 MG/3ML
SOLUTION RESPIRATORY (INHALATION)
Status: COMPLETED
Start: 2023-05-06 | End: 2023-05-06

## 2023-05-06 RX ORDER — ACETAMINOPHEN 325 MG/1
650 TABLET ORAL EVERY 4 HOURS PRN
Status: DISCONTINUED | OUTPATIENT
Start: 2023-05-06 | End: 2023-05-17 | Stop reason: HOSPADM

## 2023-05-06 RX ORDER — ALBUTEROL SULFATE 2.5 MG/3ML
2.5 SOLUTION RESPIRATORY (INHALATION) 4 TIMES DAILY
Status: DISCONTINUED | OUTPATIENT
Start: 2023-05-06 | End: 2023-05-06

## 2023-05-06 RX ORDER — BISACODYL 10 MG
10 SUPPOSITORY, RECTAL RECTAL DAILY PRN
Status: DISCONTINUED | OUTPATIENT
Start: 2023-05-06 | End: 2023-05-17 | Stop reason: HOSPADM

## 2023-05-06 RX ORDER — SODIUM PHOSPHATE, DIBASIC AND SODIUM PHOSPHATE, MONOBASIC 7; 19 G/133ML; G/133ML
1 ENEMA RECTAL DAILY PRN
Status: DISCONTINUED | OUTPATIENT
Start: 2023-05-06 | End: 2023-05-17 | Stop reason: HOSPADM

## 2023-05-06 RX ORDER — ALBUTEROL SULFATE 2.5 MG/3ML
2.5 SOLUTION RESPIRATORY (INHALATION) 3 TIMES DAILY
Status: DISCONTINUED | OUTPATIENT
Start: 2023-05-07 | End: 2023-05-17 | Stop reason: HOSPADM

## 2023-05-06 RX ADMIN — APIXABAN 5 MG: 5 TABLET, FILM COATED ORAL at 21:21

## 2023-05-06 RX ADMIN — PREDNISONE 20 MG: 20 TABLET ORAL at 09:09

## 2023-05-06 RX ADMIN — CLOPIDOGREL BISULFATE 75 MG: 75 TABLET ORAL at 09:08

## 2023-05-06 RX ADMIN — ALBUTEROL SULFATE 2.5 MG: 2.5 SOLUTION RESPIRATORY (INHALATION) at 04:59

## 2023-05-06 RX ADMIN — Medication 5 MG: at 21:21

## 2023-05-06 RX ADMIN — GUAIFENESIN 600 MG: 600 TABLET ORAL at 21:21

## 2023-05-06 RX ADMIN — LISINOPRIL 10 MG: 10 TABLET ORAL at 19:07

## 2023-05-06 RX ADMIN — METOPROLOL TARTRATE 25 MG: 25 TABLET, FILM COATED ORAL at 09:22

## 2023-05-06 RX ADMIN — ASPIRIN 81 MG 81 MG: 81 TABLET ORAL at 09:22

## 2023-05-06 RX ADMIN — METOPROLOL TARTRATE 25 MG: 25 TABLET, FILM COATED ORAL at 21:21

## 2023-05-06 RX ADMIN — TRAZODONE HYDROCHLORIDE 50 MG: 50 TABLET ORAL at 21:21

## 2023-05-06 RX ADMIN — ATORVASTATIN CALCIUM 40 MG: 40 TABLET, FILM COATED ORAL at 21:21

## 2023-05-06 RX ADMIN — TIOTROPIUM BROMIDE AND OLODATEROL 2 PUFF: 3.124; 2.736 SPRAY, METERED RESPIRATORY (INHALATION) at 05:03

## 2023-05-06 RX ADMIN — APIXABAN 5 MG: 5 TABLET, FILM COATED ORAL at 09:08

## 2023-05-06 RX ADMIN — ALBUTEROL SULFATE 2.5 MG: 2.5 SOLUTION RESPIRATORY (INHALATION) at 16:06

## 2023-05-06 RX ADMIN — FUROSEMIDE 60 MG: 40 TABLET ORAL at 09:19

## 2023-05-06 RX ADMIN — GUAIFENESIN 600 MG: 600 TABLET ORAL at 09:08

## 2023-05-06 ASSESSMENT — LIFESTYLE VARIABLES: HOW OFTEN DO YOU HAVE A DRINK CONTAINING ALCOHOL: NEVER

## 2023-05-07 LAB — ACHR BLOCK AB/ACHR TOTAL SFR SER: 0 % (ref 0–26)

## 2023-05-07 PROCEDURE — 94760 N-INVAS EAR/PLS OXIMETRY 1: CPT

## 2023-05-07 PROCEDURE — 99223 1ST HOSP IP/OBS HIGH 75: CPT | Performed by: INTERNAL MEDICINE

## 2023-05-07 PROCEDURE — 2700000000 HC OXYGEN THERAPY PER DAY

## 2023-05-07 PROCEDURE — 1180000000 HC REHAB R&B

## 2023-05-07 PROCEDURE — 6370000000 HC RX 637 (ALT 250 FOR IP): Performed by: PHYSICAL MEDICINE & REHABILITATION

## 2023-05-07 PROCEDURE — 99222 1ST HOSP IP/OBS MODERATE 55: CPT | Performed by: INTERNAL MEDICINE

## 2023-05-07 PROCEDURE — 6360000002 HC RX W HCPCS: Performed by: INTERNAL MEDICINE

## 2023-05-07 PROCEDURE — 6370000000 HC RX 637 (ALT 250 FOR IP): Performed by: INTERNAL MEDICINE

## 2023-05-07 PROCEDURE — 94640 AIRWAY INHALATION TREATMENT: CPT

## 2023-05-07 PROCEDURE — 94761 N-INVAS EAR/PLS OXIMETRY MLT: CPT

## 2023-05-07 RX ORDER — DOCUSATE SODIUM 100 MG/1
100 CAPSULE, LIQUID FILLED ORAL 2 TIMES DAILY PRN
Status: DISCONTINUED | OUTPATIENT
Start: 2023-05-07 | End: 2023-05-07

## 2023-05-07 RX ORDER — DOCUSATE SODIUM 100 MG/1
100 CAPSULE, LIQUID FILLED ORAL 2 TIMES DAILY
Status: DISCONTINUED | OUTPATIENT
Start: 2023-05-07 | End: 2023-05-17 | Stop reason: HOSPADM

## 2023-05-07 RX ORDER — AMMONIUM LACTATE 12 G/100G
LOTION TOPICAL 2 TIMES DAILY
Status: DISCONTINUED | OUTPATIENT
Start: 2023-05-08 | End: 2023-05-17 | Stop reason: HOSPADM

## 2023-05-07 RX ADMIN — APIXABAN 5 MG: 5 TABLET, FILM COATED ORAL at 09:30

## 2023-05-07 RX ADMIN — DOCUSATE SODIUM 100 MG: 100 CAPSULE, LIQUID FILLED ORAL at 20:53

## 2023-05-07 RX ADMIN — FUROSEMIDE 60 MG: 40 TABLET ORAL at 09:36

## 2023-05-07 RX ADMIN — ATORVASTATIN CALCIUM 40 MG: 40 TABLET, FILM COATED ORAL at 20:53

## 2023-05-07 RX ADMIN — ALBUTEROL SULFATE 2.5 MG: 2.5 SOLUTION RESPIRATORY (INHALATION) at 15:27

## 2023-05-07 RX ADMIN — APIXABAN 5 MG: 5 TABLET, FILM COATED ORAL at 20:53

## 2023-05-07 RX ADMIN — PREDNISONE 20 MG: 20 TABLET ORAL at 09:30

## 2023-05-07 RX ADMIN — LISINOPRIL 10 MG: 10 TABLET ORAL at 17:57

## 2023-05-07 RX ADMIN — ALBUTEROL SULFATE 2.5 MG: 2.5 SOLUTION RESPIRATORY (INHALATION) at 05:17

## 2023-05-07 RX ADMIN — METOPROLOL TARTRATE 25 MG: 25 TABLET, FILM COATED ORAL at 20:56

## 2023-05-07 RX ADMIN — METOPROLOL TARTRATE 25 MG: 25 TABLET, FILM COATED ORAL at 09:37

## 2023-05-07 RX ADMIN — GUAIFENESIN 600 MG: 600 TABLET ORAL at 20:53

## 2023-05-07 RX ADMIN — CLOPIDOGREL BISULFATE 75 MG: 75 TABLET ORAL at 09:34

## 2023-05-07 RX ADMIN — TRAZODONE HYDROCHLORIDE 50 MG: 50 TABLET ORAL at 20:53

## 2023-05-07 RX ADMIN — ALBUTEROL SULFATE 2.5 MG: 2.5 SOLUTION RESPIRATORY (INHALATION) at 10:40

## 2023-05-07 RX ADMIN — Medication 5 MG: at 20:53

## 2023-05-07 RX ADMIN — ASPIRIN 81 MG 81 MG: 81 TABLET ORAL at 09:30

## 2023-05-07 RX ADMIN — TIOTROPIUM BROMIDE AND OLODATEROL 2 PUFF: 3.124; 2.736 SPRAY, METERED RESPIRATORY (INHALATION) at 05:17

## 2023-05-07 RX ADMIN — GUAIFENESIN 600 MG: 600 TABLET ORAL at 09:30

## 2023-05-07 ASSESSMENT — ENCOUNTER SYMPTOMS
SHORTNESS OF BREATH: 0
GASTROINTESTINAL NEGATIVE: 1
BLOOD IN STOOL: 0
COUGH: 0
NAUSEA: 0
WHEEZING: 0
RESPIRATORY NEGATIVE: 1
CHEST TIGHTNESS: 0
STRIDOR: 0

## 2023-05-08 PROBLEM — H53.9 VISION CHANGES: Status: ACTIVE | Noted: 2023-05-08

## 2023-05-08 PROBLEM — Q21.12 PFO (PATENT FORAMEN OVALE): Status: ACTIVE | Noted: 2023-05-08

## 2023-05-08 PROBLEM — R41.3 MEMORY DEFICIT: Status: ACTIVE | Noted: 2023-05-08

## 2023-05-08 LAB
ALBUMIN SERPL-MCNC: 3.3 G/DL (ref 3.5–4.6)
ALP SERPL-CCNC: 67 U/L (ref 35–104)
ALT SERPL-CCNC: 22 U/L (ref 0–41)
ANION GAP SERPL CALCULATED.3IONS-SCNC: 8 MEQ/L (ref 9–15)
AST SERPL-CCNC: 15 U/L (ref 0–40)
BASOPHILS # BLD: 0 K/UL (ref 0–0.2)
BASOPHILS NFR BLD: 0.5 %
BILIRUB SERPL-MCNC: 0.8 MG/DL (ref 0.2–0.7)
BUN SERPL-MCNC: 19 MG/DL (ref 8–23)
CALCIUM SERPL-MCNC: 8.9 MG/DL (ref 8.5–9.9)
CHLORIDE SERPL-SCNC: 98 MEQ/L (ref 95–107)
CO2 SERPL-SCNC: 34 MEQ/L (ref 20–31)
CREAT SERPL-MCNC: 0.93 MG/DL (ref 0.7–1.2)
EKG ATRIAL RATE: 83 BPM
EKG P AXIS: 48 DEGREES
EKG P-R INTERVAL: 186 MS
EKG Q-T INTERVAL: 368 MS
EKG QRS DURATION: 94 MS
EKG QTC CALCULATION (BAZETT): 432 MS
EKG R AXIS: 46 DEGREES
EKG T AXIS: 58 DEGREES
EKG VENTRICULAR RATE: 83 BPM
EOSINOPHIL # BLD: 0.1 K/UL (ref 0–0.7)
EOSINOPHIL NFR BLD: 0.8 %
ERYTHROCYTE [DISTWIDTH] IN BLOOD BY AUTOMATED COUNT: 15.5 % (ref 11.5–14.5)
GLOBULIN SER CALC-MCNC: 2.4 G/DL (ref 2.3–3.5)
GLUCOSE SERPL-MCNC: 109 MG/DL (ref 70–99)
HCT VFR BLD AUTO: 42.8 % (ref 42–52)
HGB BLD-MCNC: 14.1 G/DL (ref 14–18)
LYMPHOCYTES # BLD: 1.4 K/UL (ref 1–4.8)
LYMPHOCYTES NFR BLD: 20 %
MCH RBC QN AUTO: 29.3 PG (ref 27–31.3)
MCHC RBC AUTO-ENTMCNC: 33 % (ref 33–37)
MCV RBC AUTO: 88.9 FL (ref 79–92.2)
MONOCYTES # BLD: 0.7 K/UL (ref 0.2–0.8)
MONOCYTES NFR BLD: 11 %
NEUTROPHILS # BLD: 4.6 K/UL (ref 1.4–6.5)
NEUTS SEG NFR BLD: 67.7 %
PLATELET # BLD AUTO: 184 K/UL (ref 130–400)
POTASSIUM SERPL-SCNC: 3.7 MEQ/L (ref 3.4–4.9)
PROT SERPL-MCNC: 5.7 G/DL (ref 6.3–8)
RBC # BLD AUTO: 4.82 M/UL (ref 4.7–6.1)
SODIUM SERPL-SCNC: 140 MEQ/L (ref 135–144)
TROPONIN T SERPL-MCNC: 0.01 NG/ML (ref 0–0.01)
TROPONIN T SERPL-MCNC: 0.01 NG/ML (ref 0–0.01)
WBC # BLD AUTO: 6.8 K/UL (ref 4.8–10.8)

## 2023-05-08 PROCEDURE — 94640 AIRWAY INHALATION TREATMENT: CPT

## 2023-05-08 PROCEDURE — 99221 1ST HOSP IP/OBS SF/LOW 40: CPT | Performed by: NURSE PRACTITIONER

## 2023-05-08 PROCEDURE — 94761 N-INVAS EAR/PLS OXIMETRY MLT: CPT

## 2023-05-08 PROCEDURE — 6370000000 HC RX 637 (ALT 250 FOR IP): Performed by: INTERNAL MEDICINE

## 2023-05-08 PROCEDURE — 85025 COMPLETE CBC W/AUTO DIFF WBC: CPT

## 2023-05-08 PROCEDURE — 97535 SELF CARE MNGMENT TRAINING: CPT

## 2023-05-08 PROCEDURE — 80053 COMPREHEN METABOLIC PANEL: CPT

## 2023-05-08 PROCEDURE — 99233 SBSQ HOSP IP/OBS HIGH 50: CPT | Performed by: PHYSICAL MEDICINE & REHABILITATION

## 2023-05-08 PROCEDURE — 6360000002 HC RX W HCPCS: Performed by: PHYSICAL MEDICINE & REHABILITATION

## 2023-05-08 PROCEDURE — 97530 THERAPEUTIC ACTIVITIES: CPT

## 2023-05-08 PROCEDURE — 1180000000 HC REHAB R&B

## 2023-05-08 PROCEDURE — 93005 ELECTROCARDIOGRAM TRACING: CPT | Performed by: REGISTERED NURSE

## 2023-05-08 PROCEDURE — 2700000000 HC OXYGEN THERAPY PER DAY

## 2023-05-08 PROCEDURE — 6360000002 HC RX W HCPCS: Performed by: INTERNAL MEDICINE

## 2023-05-08 PROCEDURE — 97116 GAIT TRAINING THERAPY: CPT

## 2023-05-08 PROCEDURE — 36415 COLL VENOUS BLD VENIPUNCTURE: CPT

## 2023-05-08 PROCEDURE — 84484 ASSAY OF TROPONIN QUANT: CPT

## 2023-05-08 PROCEDURE — 93010 ELECTROCARDIOGRAM REPORT: CPT | Performed by: INTERNAL MEDICINE

## 2023-05-08 PROCEDURE — 6370000000 HC RX 637 (ALT 250 FOR IP): Performed by: PHYSICAL MEDICINE & REHABILITATION

## 2023-05-08 RX ORDER — ISOSORBIDE MONONITRATE 60 MG/1
60 TABLET, EXTENDED RELEASE ORAL DAILY
Status: DISCONTINUED | OUTPATIENT
Start: 2023-05-08 | End: 2023-05-17 | Stop reason: HOSPADM

## 2023-05-08 RX ORDER — CYANOCOBALAMIN 1000 UG/ML
1000 INJECTION, SOLUTION INTRAMUSCULAR; SUBCUTANEOUS WEEKLY
Status: DISCONTINUED | OUTPATIENT
Start: 2023-05-08 | End: 2023-05-17 | Stop reason: HOSPADM

## 2023-05-08 RX ORDER — UBIDECARENONE 100 MG
100 CAPSULE ORAL DAILY
Status: DISCONTINUED | OUTPATIENT
Start: 2023-05-08 | End: 2023-05-17 | Stop reason: HOSPADM

## 2023-05-08 RX ORDER — LIDOCAINE 4 G/G
3 PATCH TOPICAL DAILY
Status: DISCONTINUED | OUTPATIENT
Start: 2023-05-08 | End: 2023-05-17 | Stop reason: HOSPADM

## 2023-05-08 RX ORDER — VITAMIN B COMPLEX
2000 TABLET ORAL
Status: DISCONTINUED | OUTPATIENT
Start: 2023-05-08 | End: 2023-05-17 | Stop reason: HOSPADM

## 2023-05-08 RX ADMIN — PREDNISONE 20 MG: 20 TABLET ORAL at 08:53

## 2023-05-08 RX ADMIN — Medication: at 17:42

## 2023-05-08 RX ADMIN — CLOPIDOGREL BISULFATE 75 MG: 75 TABLET ORAL at 08:53

## 2023-05-08 RX ADMIN — DOCUSATE SODIUM 100 MG: 100 CAPSULE, LIQUID FILLED ORAL at 08:53

## 2023-05-08 RX ADMIN — ACETAMINOPHEN 650 MG: 325 TABLET ORAL at 22:55

## 2023-05-08 RX ADMIN — ALBUTEROL SULFATE 2.5 MG: 2.5 SOLUTION RESPIRATORY (INHALATION) at 11:13

## 2023-05-08 RX ADMIN — ISOSORBIDE MONONITRATE 60 MG: 60 TABLET, EXTENDED RELEASE ORAL at 12:17

## 2023-05-08 RX ADMIN — METOPROLOL TARTRATE 25 MG: 25 TABLET, FILM COATED ORAL at 08:53

## 2023-05-08 RX ADMIN — ATORVASTATIN CALCIUM 40 MG: 40 TABLET, FILM COATED ORAL at 22:55

## 2023-05-08 RX ADMIN — ALBUTEROL SULFATE 2.5 MG: 2.5 SOLUTION RESPIRATORY (INHALATION) at 04:26

## 2023-05-08 RX ADMIN — Medication 5 MG: at 22:53

## 2023-05-08 RX ADMIN — GUAIFENESIN 600 MG: 600 TABLET ORAL at 08:52

## 2023-05-08 RX ADMIN — Medication: at 08:55

## 2023-05-08 RX ADMIN — FUROSEMIDE 60 MG: 40 TABLET ORAL at 08:53

## 2023-05-08 RX ADMIN — APIXABAN 5 MG: 5 TABLET, FILM COATED ORAL at 08:53

## 2023-05-08 RX ADMIN — ALBUTEROL SULFATE 2.5 MG: 2.5 SOLUTION RESPIRATORY (INHALATION) at 17:01

## 2023-05-08 RX ADMIN — DOCUSATE SODIUM 100 MG: 100 CAPSULE, LIQUID FILLED ORAL at 22:55

## 2023-05-08 RX ADMIN — Medication 100 MG: at 16:02

## 2023-05-08 RX ADMIN — Medication 2000 UNITS: at 17:37

## 2023-05-08 RX ADMIN — TIOTROPIUM BROMIDE AND OLODATEROL 2 PUFF: 3.124; 2.736 SPRAY, METERED RESPIRATORY (INHALATION) at 04:26

## 2023-05-08 RX ADMIN — TRAZODONE HYDROCHLORIDE 50 MG: 50 TABLET ORAL at 22:55

## 2023-05-08 RX ADMIN — APIXABAN 5 MG: 5 TABLET, FILM COATED ORAL at 22:55

## 2023-05-08 RX ADMIN — METOPROLOL TARTRATE 25 MG: 25 TABLET, FILM COATED ORAL at 22:58

## 2023-05-08 RX ADMIN — CYANOCOBALAMIN 1000 MCG: 1000 INJECTION, SOLUTION INTRAMUSCULAR; SUBCUTANEOUS at 16:02

## 2023-05-08 RX ADMIN — GUAIFENESIN 600 MG: 600 TABLET ORAL at 22:53

## 2023-05-08 RX ADMIN — LISINOPRIL 10 MG: 10 TABLET ORAL at 17:37

## 2023-05-08 ASSESSMENT — PAIN SCALES - GENERAL: PAINLEVEL_OUTOF10: 2

## 2023-05-08 ASSESSMENT — ENCOUNTER SYMPTOMS
GASTROINTESTINAL NEGATIVE: 1
VOMITING: 0
COUGH: 0
RESPIRATORY NEGATIVE: 1
NAUSEA: 0
CHEST TIGHTNESS: 0
BLOOD IN STOOL: 0
ABDOMINAL PAIN: 0
COLOR CHANGE: 0
SHORTNESS OF BREATH: 0
TROUBLE SWALLOWING: 0
WHEEZING: 0
STRIDOR: 0
ABDOMINAL DISTENTION: 0

## 2023-05-08 ASSESSMENT — PAIN DESCRIPTION - LOCATION: LOCATION: EYE

## 2023-05-08 ASSESSMENT — PAIN DESCRIPTION - ONSET: ONSET: ON-GOING

## 2023-05-08 ASSESSMENT — PAIN DESCRIPTION - DESCRIPTORS: DESCRIPTORS: ACHING;SORE

## 2023-05-08 ASSESSMENT — PAIN - FUNCTIONAL ASSESSMENT: PAIN_FUNCTIONAL_ASSESSMENT: PREVENTS OR INTERFERES SOME ACTIVE ACTIVITIES AND ADLS

## 2023-05-08 ASSESSMENT — PAIN DESCRIPTION - PAIN TYPE: TYPE: ACUTE PAIN

## 2023-05-08 ASSESSMENT — PAIN DESCRIPTION - FREQUENCY: FREQUENCY: INTERMITTENT

## 2023-05-08 ASSESSMENT — PAIN DESCRIPTION - ORIENTATION: ORIENTATION: RIGHT;LEFT

## 2023-05-09 PROBLEM — J96.11 CHRONIC RESPIRATORY FAILURE WITH HYPOXIA AND HYPERCAPNIA (HCC): Status: ACTIVE | Noted: 2023-04-09

## 2023-05-09 PROBLEM — J96.12 CHRONIC RESPIRATORY FAILURE WITH HYPOXIA AND HYPERCAPNIA (HCC): Status: ACTIVE | Noted: 2023-04-09

## 2023-05-09 LAB
TROPONIN T SERPL-MCNC: 0.02 NG/ML (ref 0–0.01)
TROPONIN T SERPL-MCNC: <0.01 NG/ML (ref 0–0.01)

## 2023-05-09 PROCEDURE — 92523 SPEECH SOUND LANG COMPREHEN: CPT

## 2023-05-09 PROCEDURE — 6370000000 HC RX 637 (ALT 250 FOR IP): Performed by: INTERNAL MEDICINE

## 2023-05-09 PROCEDURE — 6370000000 HC RX 637 (ALT 250 FOR IP): Performed by: PHYSICAL MEDICINE & REHABILITATION

## 2023-05-09 PROCEDURE — 99232 SBSQ HOSP IP/OBS MODERATE 35: CPT | Performed by: PHYSICAL MEDICINE & REHABILITATION

## 2023-05-09 PROCEDURE — 1180000000 HC REHAB R&B

## 2023-05-09 PROCEDURE — 6360000002 HC RX W HCPCS: Performed by: INTERNAL MEDICINE

## 2023-05-09 PROCEDURE — 92610 EVALUATE SWALLOWING FUNCTION: CPT

## 2023-05-09 PROCEDURE — 84484 ASSAY OF TROPONIN QUANT: CPT

## 2023-05-09 PROCEDURE — 94664 DEMO&/EVAL PT USE INHALER: CPT

## 2023-05-09 PROCEDURE — 2700000000 HC OXYGEN THERAPY PER DAY

## 2023-05-09 PROCEDURE — 97116 GAIT TRAINING THERAPY: CPT

## 2023-05-09 PROCEDURE — 94761 N-INVAS EAR/PLS OXIMETRY MLT: CPT

## 2023-05-09 PROCEDURE — 97530 THERAPEUTIC ACTIVITIES: CPT

## 2023-05-09 PROCEDURE — 36415 COLL VENOUS BLD VENIPUNCTURE: CPT

## 2023-05-09 PROCEDURE — 97535 SELF CARE MNGMENT TRAINING: CPT

## 2023-05-09 PROCEDURE — 99233 SBSQ HOSP IP/OBS HIGH 50: CPT | Performed by: INTERNAL MEDICINE

## 2023-05-09 PROCEDURE — 94640 AIRWAY INHALATION TREATMENT: CPT

## 2023-05-09 PROCEDURE — 99223 1ST HOSP IP/OBS HIGH 75: CPT | Performed by: INTERNAL MEDICINE

## 2023-05-09 RX ADMIN — Medication: at 17:40

## 2023-05-09 RX ADMIN — ISOSORBIDE MONONITRATE 60 MG: 60 TABLET, EXTENDED RELEASE ORAL at 08:26

## 2023-05-09 RX ADMIN — TIOTROPIUM BROMIDE AND OLODATEROL 2 PUFF: 3.124; 2.736 SPRAY, METERED RESPIRATORY (INHALATION) at 04:59

## 2023-05-09 RX ADMIN — FUROSEMIDE 60 MG: 40 TABLET ORAL at 08:25

## 2023-05-09 RX ADMIN — TRAZODONE HYDROCHLORIDE 50 MG: 50 TABLET ORAL at 21:04

## 2023-05-09 RX ADMIN — GUAIFENESIN 600 MG: 600 TABLET ORAL at 21:04

## 2023-05-09 RX ADMIN — ATORVASTATIN CALCIUM 40 MG: 40 TABLET, FILM COATED ORAL at 21:04

## 2023-05-09 RX ADMIN — Medication: at 08:34

## 2023-05-09 RX ADMIN — PREDNISONE 10 MG: 10 TABLET ORAL at 08:26

## 2023-05-09 RX ADMIN — LISINOPRIL 10 MG: 10 TABLET ORAL at 17:37

## 2023-05-09 RX ADMIN — DOCUSATE SODIUM 100 MG: 100 CAPSULE, LIQUID FILLED ORAL at 08:26

## 2023-05-09 RX ADMIN — GUAIFENESIN 600 MG: 600 TABLET ORAL at 08:26

## 2023-05-09 RX ADMIN — METOPROLOL TARTRATE 25 MG: 25 TABLET, FILM COATED ORAL at 08:24

## 2023-05-09 RX ADMIN — Medication 5 MG: at 21:04

## 2023-05-09 RX ADMIN — Medication 100 MG: at 08:26

## 2023-05-09 RX ADMIN — CLOPIDOGREL BISULFATE 75 MG: 75 TABLET ORAL at 08:26

## 2023-05-09 RX ADMIN — ALBUTEROL SULFATE 2.5 MG: 2.5 SOLUTION RESPIRATORY (INHALATION) at 15:48

## 2023-05-09 RX ADMIN — APIXABAN 5 MG: 5 TABLET, FILM COATED ORAL at 08:24

## 2023-05-09 RX ADMIN — METOPROLOL TARTRATE 25 MG: 25 TABLET, FILM COATED ORAL at 21:04

## 2023-05-09 RX ADMIN — DOCUSATE SODIUM 100 MG: 100 CAPSULE, LIQUID FILLED ORAL at 21:04

## 2023-05-09 RX ADMIN — ALBUTEROL SULFATE 2.5 MG: 2.5 SOLUTION RESPIRATORY (INHALATION) at 04:58

## 2023-05-09 RX ADMIN — Medication 2000 UNITS: at 17:36

## 2023-05-09 RX ADMIN — APIXABAN 5 MG: 5 TABLET, FILM COATED ORAL at 21:04

## 2023-05-09 ASSESSMENT — ENCOUNTER SYMPTOMS
WHEEZING: 0
BLOOD IN STOOL: 0
COUGH: 0
SHORTNESS OF BREATH: 0
NAUSEA: 0
RESPIRATORY NEGATIVE: 1
STRIDOR: 0
GASTROINTESTINAL NEGATIVE: 1
CHEST TIGHTNESS: 0

## 2023-05-10 PROCEDURE — 97535 SELF CARE MNGMENT TRAINING: CPT

## 2023-05-10 PROCEDURE — 94761 N-INVAS EAR/PLS OXIMETRY MLT: CPT

## 2023-05-10 PROCEDURE — 99232 SBSQ HOSP IP/OBS MODERATE 35: CPT | Performed by: INTERNAL MEDICINE

## 2023-05-10 PROCEDURE — 6370000000 HC RX 637 (ALT 250 FOR IP): Performed by: PHYSICAL MEDICINE & REHABILITATION

## 2023-05-10 PROCEDURE — 94640 AIRWAY INHALATION TREATMENT: CPT

## 2023-05-10 PROCEDURE — 2700000000 HC OXYGEN THERAPY PER DAY

## 2023-05-10 PROCEDURE — 99231 SBSQ HOSP IP/OBS SF/LOW 25: CPT | Performed by: NURSE PRACTITIONER

## 2023-05-10 PROCEDURE — 97530 THERAPEUTIC ACTIVITIES: CPT

## 2023-05-10 PROCEDURE — 97533 SENSORY INTEGRATION: CPT

## 2023-05-10 PROCEDURE — 97110 THERAPEUTIC EXERCISES: CPT

## 2023-05-10 PROCEDURE — 6370000000 HC RX 637 (ALT 250 FOR IP): Performed by: INTERNAL MEDICINE

## 2023-05-10 PROCEDURE — 1180000000 HC REHAB R&B

## 2023-05-10 PROCEDURE — 97116 GAIT TRAINING THERAPY: CPT

## 2023-05-10 PROCEDURE — 99232 SBSQ HOSP IP/OBS MODERATE 35: CPT | Performed by: PHYSICAL MEDICINE & REHABILITATION

## 2023-05-10 PROCEDURE — 6360000002 HC RX W HCPCS: Performed by: INTERNAL MEDICINE

## 2023-05-10 RX ADMIN — ALBUTEROL SULFATE 2.5 MG: 2.5 SOLUTION RESPIRATORY (INHALATION) at 16:49

## 2023-05-10 RX ADMIN — GUAIFENESIN 600 MG: 600 TABLET ORAL at 22:53

## 2023-05-10 RX ADMIN — ATORVASTATIN CALCIUM 40 MG: 40 TABLET, FILM COATED ORAL at 22:54

## 2023-05-10 RX ADMIN — GUAIFENESIN 600 MG: 600 TABLET ORAL at 08:56

## 2023-05-10 RX ADMIN — ACETAMINOPHEN 650 MG: 325 TABLET ORAL at 22:54

## 2023-05-10 RX ADMIN — PREDNISONE 10 MG: 10 TABLET ORAL at 09:00

## 2023-05-10 RX ADMIN — Medication: at 09:04

## 2023-05-10 RX ADMIN — Medication 100 MG: at 09:01

## 2023-05-10 RX ADMIN — LISINOPRIL 10 MG: 10 TABLET ORAL at 17:37

## 2023-05-10 RX ADMIN — METOPROLOL TARTRATE 25 MG: 25 TABLET, FILM COATED ORAL at 09:00

## 2023-05-10 RX ADMIN — DOCUSATE SODIUM 100 MG: 100 CAPSULE, LIQUID FILLED ORAL at 09:00

## 2023-05-10 RX ADMIN — Medication 5 MG: at 22:54

## 2023-05-10 RX ADMIN — APIXABAN 5 MG: 5 TABLET, FILM COATED ORAL at 22:53

## 2023-05-10 RX ADMIN — Medication 2000 UNITS: at 17:37

## 2023-05-10 RX ADMIN — FUROSEMIDE 60 MG: 40 TABLET ORAL at 08:57

## 2023-05-10 RX ADMIN — APIXABAN 5 MG: 5 TABLET, FILM COATED ORAL at 08:57

## 2023-05-10 RX ADMIN — ISOSORBIDE MONONITRATE 60 MG: 60 TABLET, EXTENDED RELEASE ORAL at 09:01

## 2023-05-10 RX ADMIN — TIOTROPIUM BROMIDE AND OLODATEROL 2 PUFF: 3.124; 2.736 SPRAY, METERED RESPIRATORY (INHALATION) at 04:37

## 2023-05-10 RX ADMIN — CLOPIDOGREL BISULFATE 75 MG: 75 TABLET ORAL at 08:56

## 2023-05-10 RX ADMIN — ALBUTEROL SULFATE 2.5 MG: 2.5 SOLUTION RESPIRATORY (INHALATION) at 04:39

## 2023-05-10 RX ADMIN — TRAZODONE HYDROCHLORIDE 50 MG: 50 TABLET ORAL at 22:54

## 2023-05-10 RX ADMIN — METOPROLOL TARTRATE 25 MG: 25 TABLET, FILM COATED ORAL at 22:54

## 2023-05-10 RX ADMIN — Medication: at 23:01

## 2023-05-10 RX ADMIN — DOCUSATE SODIUM 100 MG: 100 CAPSULE, LIQUID FILLED ORAL at 22:54

## 2023-05-10 ASSESSMENT — ENCOUNTER SYMPTOMS
COUGH: 0
SHORTNESS OF BREATH: 0
RESPIRATORY NEGATIVE: 1
STRIDOR: 0
CHEST TIGHTNESS: 0
BLOOD IN STOOL: 0
WHEEZING: 0
TROUBLE SWALLOWING: 0
NAUSEA: 0
GASTROINTESTINAL NEGATIVE: 1
COLOR CHANGE: 0
VOMITING: 0

## 2023-05-10 NOTE — PROGRESS NOTES
+ gag reflex, drinking coffee and orange juice without complications.
Error with return/waste of fentanyl. Removed 200 mcg (2 vials). Administered 50 mcg during BARBARA. 1 Vial (100 mcg) returned to [facility] but documented in [facility] that 100 mcg was administered. Unable to waste the remaining 50 mcg of Fentanyl. Notified pharmacist, Lesli Dumont, and was instructed to make a note of the error in Epic.  Waste was completed and witnessed by [employer]      Given: Fentanyl 50 mcg  Returned: Fentanyl 100 mcg  Waste: Fentanyl 50 mcg         Witnessed waste with Chinmay Steele RN
Hospitalist Daily Progress Note  Name: Arnold Davison  Age: 77 y.o. Gender: male  CodeStatus: Full Code  Allergies: No Known Allergies    Chief Complaint:Eye Problem (Pt to ed from [facility] with reports of vision changes, onset sometime in the early hours of the morning, denies other complaint)    Primary Care Provider: Anne Paredes MD  InpatientTreatment Team: Treatment Team: Attending Provider: Mary Patton DO; Consulting Physician: Ochoa Gama MD; Consulting Physician: Shanna Shoemaker DO; Utilization Reviewer: Troy Robles RN; Consulting Physician: Efe Hull MD; Consulting Physician: Rolla Boast, MD; Registered Nurse: Vinicio Thomas RN; Patient Care Tech: Tiburcio Keyes; Consulting Physician: Celia Ventura DPM  Admission Date: 5/3/2023      Subjective: Patient seen evaluated bedside. Continues to have double vision with bilateral upward gaze palsy. Large PFO appreciated on trans esophageal echocardiogram today results discussed with the patient and plans of possible closure in the next couple weeks with cardiology. I also discussed the possibility of using an eye patch for his vertically disconjugate gaze to assist with his vision but he is declining    Patient discussed with cardiology or neurology. Appreciate recommendations    Physical Exam  Constitutional:       Appearance: Normal appearance. He is not ill-appearing or diaphoretic. HENT:      Head: Normocephalic and atraumatic. Nose: Nose normal.      Mouth/Throat:      Mouth: Mucous membranes are moist.      Pharynx: Oropharynx is clear. Eyes:      Comments: Vertical gaze palsy bilateral   Cardiovascular:      Rate and Rhythm: Normal rate. Heart sounds: No murmur heard. No friction rub. No gallop. Pulmonary:      Breath sounds: No wheezing, rhonchi or rales. Abdominal:      General: There is no distension. Tenderness: There is no abdominal tenderness. There is no guarding.    Musculoskeletal:         General:
[facility] Neurology Daily Progress Note  Name: Sandee Preciado  Age: 77 y.o. Gender: male  CodeStatus: Full Code  Allergies: No Known Allergies    Chief Complaint:Eye Problem (Pt to ed from [facility] with reports of vision changes, onset sometime in the early hours of the morning, denies other complaint)    Primary Care Provider: Ivan Roman MD  InpatientTreatment Team: Treatment Team: Attending Provider: Gloria Calloway DO; Consulting Physician: Keena Thurman MD; Consulting Physician: Ruthann Adorno DO; Utilization Reviewer: Joseph Gustafson RN; Consulting Physician: Elvira Lopes MD; Consulting Physician: Timothy Malave MD; Registered Nurse: Malou Schofield, HUGO; Patient Care Tech: Ambrosejamil Wallace; Consulting Physician: Corey Olson DPM  Admission Date: 5/3/2023      HPI   Pt seen and examined for neuro follow up. Currently alert and oriented x3, no acute distress, cooperative. Patient still complains of visual disturbances. No other focal neurodeficits. Denies headache. No dysphagia, dysarthria, aphasia. Afebrile. No seizure activity reported. Blood pressure stable. Vitals:    05/05/23 0915   BP: (!) 121/59   Pulse: 74   Resp: 13   Temp:    SpO2: 96%        Review of Systems   Constitutional:  Negative for appetite change and fever. HENT:  Negative for hearing loss and trouble swallowing. Eyes:  Positive for visual disturbance. Respiratory:  Negative for cough, chest tightness, shortness of breath and wheezing. Cardiovascular:  Negative for chest pain, palpitations and leg swelling. Gastrointestinal:  Negative for abdominal distention, abdominal pain, nausea and vomiting. Skin:  Negative for color change and rash. Neurological:  Negative for dizziness, tremors, seizures, syncope, facial asymmetry, speech difficulty, weakness, light-headedness, numbness and headaches. Psychiatric/Behavioral:  Negative for agitation, confusion and hallucinations. The patient is not nervous/anxious.
Notified attending provider of trending troponin's that were called in from lab. Last trop resulting 0.016.  All are trending down
Nursing report called to [facility]. Pt is a/o x 4. Skin is pwd. Vital signs stable. Remains on 4L O2 NC. Pt placed dentures back into mouth. Drank po fluids without complications.
Occupational Therapy  [facility] OCCUPATIONAL THERAPY EVALUATION - ACUTE     NAME: Clotilde Cristobal  : 1955 (77 y.o.)  MRN: [id number]  CODE STATUS: Full Code  Room: I969/B544-59    Date of Service: 2023    Patient Diagnosis(es): Acute CVA (cerebrovascular accident) [facility]) [I63.9]  Cerebrovascular accident (CVA), unspecified mechanism ([address] [I63.9]   Patient Active Problem List    Diagnosis Date Noted    Angina at rest [facility])     Bilateral lower extremity edema 2020    Lung density on x-ray 2022    Interstitial pulmonary fibrosis ([address] 2022    Cerebrovascular accident (CVA) ([address] 2023    Acute on chronic respiratory failure with hypoxia ([address] 2023    Bilateral carotid bruits 2020    Cor pulmonale (chronic) ([address] 08/15/2019    Acute on chronic diastolic (congestive) heart failure ([address] 2019    JONNY (obstructive sleep apnea) 2018    Tobacco abuse 2017    Psychophysiological insomnia 2017    COPD with acute exacerbation ([address]     Osteoarthritis     Essential hypertension, benign 2013        Past Medical History:   Diagnosis Date    CHF (congestive heart failure) (HCC)     COPD (chronic obstructive pulmonary disease) ([address]     Hypertension     Lung disease     Osteoarthritis     hands worst    Sleep apnea      Past Surgical History:   Procedure Laterality Date    CHOLECYSTECTOMY      CORONARY ANGIOPLASTY WITH STENT PLACEMENT  2020    DIAGNOSTIC CARDIAC CATH LAB PROCEDURE  2020    PTCA  2020    ROTATOR CUFF REPAIR      right        Restrictions  Restrictions/Precautions: Fall Risk              Safety Devices: Safety Devices  Type of Devices:  All fall risk precautions in place     Patient's date of birth confirmed: Yes    General:  Chart Reviewed: Yes  Patient assessed for rehabilitation services?: Yes  Family / Caregiver Present: No    Subjective  Subjective: \"I live by myself, but my vision is so bad with this stroke I don't
Physical Therapy  Facility/Department: [facility] MED SURG J758/G414-59  Physical Therapy Discharge      NAME: Bridgett Summers    : 1955 (77 y.o.)  MRN: [id number]    Account: [de-identified]  Gender: male      Patient has been discharged from acute care hospital. DC patient from current PT program.      Electronically signed by Cheryln Bumpers, PT on 5/10/23 at 4:30 PM EDT
Physical Therapy Med Surg Daily Treatment Note  Fac[facility]/Department[facility]  [facility]       NAME: Dirk Lovell  : 1955 (77 y.o.)  MRN: [id number]  CODE STATUS: Full Code    Date of Service: 2023    Patient Diagnosis(es): Acute CVA (cerebrovascular accident) [facility]) [I63.9]  Cerebrovascular accident (CVA), unspecified mechanism ([address] [I63.9]   Chief Complaint   Patient presents with    Eye Problem     Pt to ed from [facility] with reports of vision changes, onset sometime in the early hours of the morning, denies other complaint     Patient Active Problem List    Diagnosis Date Noted    Angina at rest [facility])     Bilateral lower extremity edema 2020    Lung density on x-ray 2022    Interstitial pulmonary fibrosis ([address] 2022    Impaired mobility and activities of daily living dt CVA 2023    Cerebrovascular accident (CVA) ([address] 2023    Acute on chronic respiratory failure with hypoxia ([address] 2023    Bilateral carotid bruits 2020    Cor pulmonale (chronic) ([address] 08/15/2019    Acute on chronic diastolic (congestive) heart failure ([address] 2019    JONNY (obstructive sleep apnea) 2018    Tobacco abuse 2017    Psychophysiological insomnia 2017    COPD with acute exacerbation ([address]     Osteoarthritis     Essential hypertension, benign 2013        Past Medical History:   Diagnosis Date    CHF (congestive heart failure) (HCC)     COPD (chronic obstructive pulmonary disease) ([address]     Hypertension     Lung disease     Osteoarthritis     hands worst    Sleep apnea      Past Surgical History:   Procedure Laterality Date    CHOLECYSTECTOMY      CORONARY ANGIOPLASTY WITH STENT PLACEMENT  2020    DIAGNOSTIC CARDIAC CATH LAB PROCEDURE  2020    PTCA  2020    ROTATOR CUFF REPAIR      right       Chart Reviewed: Yes  Patient assessed for rehabilitation services?: Yes    Restrictions:  Restrictions/Precautions: Fall
Physical Therapy Med Surg Initial Assessment  [facility]Department[facility]  [facility]       NAME: Wan Mayes  : 1955 (77 y.o.)  MRN: [id number]  CODE STATUS: Full Code    Date of Service: 2023    Patient Diagnosis(es): Acute CVA (cerebrovascular accident) [facility]) [I63.9]  Cerebrovascular accident (CVA), unspecified mechanism ([address] [I63.9]   Chief Complaint   Patient presents with    Eye Problem     Pt to ed from [facility] with reports of vision changes, onset sometime in the early hours of the morning, denies other complaint     Patient Active Problem List    Diagnosis Date Noted    Angina at rest [facility])     Bilateral lower extremity edema 2020    Lung density on x-ray 2022    Interstitial pulmonary fibrosis ([address] 2022    Cerebrovascular accident (CVA) ([address] 2023    Acute on chronic respiratory failure with hypoxia ([address] 2023    Bilateral carotid bruits 2020    Cor pulmonale (chronic) ([address] 08/15/2019    Acute on chronic diastolic (congestive) heart failure ([address] 2019    JONNY (obstructive sleep apnea) 2018    Tobacco abuse 2017    Psychophysiological insomnia 2017    COPD with acute exacerbation ([address]     Osteoarthritis     Essential hypertension, benign 2013        Past Medical History:   Diagnosis Date    CHF (congestive heart failure) (HCC)     COPD (chronic obstructive pulmonary disease) ([address]     Hypertension     Lung disease     Osteoarthritis     hands worst    Sleep apnea      Past Surgical History:   Procedure Laterality Date    CHOLECYSTECTOMY      CORONARY ANGIOPLASTY WITH STENT PLACEMENT  2020    DIAGNOSTIC CARDIAC CATH LAB PROCEDURE  2020    PTCA  2020    ROTATOR CUFF REPAIR      right       Chart Reviewed: Yes  Patient assessed for rehabilitation services?: Yes    Restrictions:  Restrictions/Precautions: Fall Risk     SUBJECTIVE:        Pain  Pain: denies pain pre/post session    Prior Level of
Physical Therapy Missed Treatment   Facility/Department: [facility] MED SURG L909/X948-43    NAME: Tai Uribe  Patient Status:   : 1955 (67 y.o.)  MRN: [id number]  Account: [de-identified]  Gender: male        [] Patient Declines PT Treatment            [x] Patient Unavailable:     Pt currently in cath lab and is not available for PT tx. Will attempt PT Treatment again at earliest convenience.         Electronically signed by Mejia Tellez PTA on 23 at 8:42 AM EDT
Physician Progress Note    5/4/2023   12:54 PM    Name:  Shawn Barrios  MRN:    [id number]      Day: 1     Admit Date: 5/3/2023  3:46 PM  PCP: Cyndee Vazquez MD    Code Status:  Full Code    Subjective:     Still with blurry vision.     Current Facility-Administered Medications   Medication Dose Route Frequency Provider Last Rate Last Admin    albuterol (PROVENTIL) nebulizer solution 2.5 mg  2.5 mg Nebulization Q2H PRN Selvin Swan MD        ipratropium-albuterol (DUONEB) nebulizer solution 1 ampule  1 ampule Inhalation 4x daily Selvin Swan MD   1 ampule at 05/04/23 1207    atorvastatin (LIPITOR) tablet 40 mg  40 mg Oral Nightly Naeem Render, DO        traZODone (DESYREL) tablet 50 mg  50 mg Oral Nightly Carlos R Bryant, DO        sodium chloride flush 0.9 % injection 5-40 mL  5-40 mL IntraVENous 2 times per day Nicoletto Bolzan-Roche, APRN - CNP   10 mL at 05/04/23 1151    sodium chloride flush 0.9 % injection 5-40 mL  5-40 mL IntraVENous PRN Nicoletto Bolzan-Roche, APRN - CNP        0.9 % sodium chloride infusion   IntraVENous PRN Nicoletto Bolzan-Roche, APRN - CNP        enoxaparin Sodium (LOVENOX) injection 30 mg  30 mg SubCUTAneous BID Nicoletto Bolzan-Roche, APRN - CNP   30 mg at 05/04/23 1148    ondansetron (ZOFRAN-ODT) disintegrating tablet 4 mg  4 mg Oral Q8H PRN Nicoletto Bolzan-Roche, APRN - CNP        Or    ondansetron (ZOFRAN) injection 4 mg  4 mg IntraVENous Q6H PRN Nicoletto Bolzan-Roche, APRN - CNP        polyethylene glycol (GLYCOLAX) packet 17 g  17 g Oral Daily PRN Nicoletto Bolzan-Roche, APRN - CNP        acetaminophen (TYLENOL) tablet 650 mg  650 mg Oral Q6H PRN Nicoletto Bolzan-Roche, APRN - CNP        Or    acetaminophen (TYLENOL) suppository 650 mg  650 mg Rectal Q6H PRN Nicoletto Bolzan-Roche, APRN - CNP        clopidogrel (PLAVIX) tablet 75 mg  75 mg Oral Daily Nicoletto Bolzan-Roche, APRN - CNP   75 mg at 05/04/23 1149    aspirin chewable tablet 81 mg  81 mg Oral Daily Lenin
Pt consent reviewed. Dentures removed, and placed in cup. Sedation form completed. Pt is on 4L O2 NC continuous. . Pt to cath lab at this time for procedure.
Pt returned from cath lab. Bedside report received. Will monitor patient. Vital signs stable.
[facility] EMERGENCY [facility] AT [place] Respiratory Therapy Evaluation   Current Order:  DUONB Q4       Home Regimen: QID       Ordering Physician: Todd York  Re-evaluation Date:  5/7/23     Diagnosis: ACUTE CVA       Patient Status: Stable / Unstable + Physician notified    The following MDI Criteria must be met in order to convert aerosol to MDI with spacer. If unable to meet, MDI will be converted to aerosol:  []  Patient able to demonstrate the ability to use MDI effectively  []  Patient alert and cooperative  []  Patient able to take deep breath with 5-10 second hold  []  Medication(s) available in this delivery method   []  Peak flow greater than or equal to 200 ml/min            Current Order Substituted To  (same drug, same frequency)   Aerosol to MDI [] Albuterol Sulfate 0.083% unit dose by aerosol Albuterol Sulfate MDI 2 puffs by inhalation with spacer    [] Levalbuterol 1.25 mg unit dose by aerosol Levalbuterol MDI 2 puffs by inhalation with spacer    [] Levalbuterol 0.63 mg unit dose by aerosol Levalbuterol MDI 2 puffs by inhalation with spacer    [] Ipratropium Bromide 0.02% unit dose by aerosol Ipratropium Bromide MDI 2 puffs by inhalation with spacer    [] Duoneb (Ipratropium + Albuterol) unit dose by aerosol Ipratropium MDI + Albuterol MDI 2 puffs by inhalation w/spacer   MDI to Aerosol [] Albuterol Sulfate MDI Albuterol Sulfate 0.083% unit dose by aerosol    [] Levalbuterol MDI 2 puffs by inhalation Levalbuterol 1.25 mg unit dose by aerosol    [] Ipratropium Bromide MDI by inhalation Ipratropium Bromide 0.02% unit dose by aerosol    [] Combivent (Ipratropium + Albuterol) MDI by inhalation Duoneb (Ipratropium + Albuterol) unit dose by aerosol       Treatment Assessment [Frequency/Schedule]:  Change frequency to: ___________DUONEB QID PER HOME FREQ _______________________________________per Protocol, P&T, MEC      Points 0 1 2 3 4   Pulmonary Status  Non-Smoker  []   Smoking history   < 20 pack years  []   Smoking history  ?  21
telemetry  Continue aspirin and statin  Plan for BARBARA    Please keep patient n.p.o. after midnight  Management of CVA as per neurology and primary team  Please keep potassium between 4 and 5 and magnesium above 2    Thank you for allowing us to participate in the care of this patient. Will continue to follow. Please call if questions or concerns arise.     Electronically signed by Moi Baires MD on 5/5/2023 at 8:44 AM

## 2023-05-11 PROCEDURE — 6370000000 HC RX 637 (ALT 250 FOR IP): Performed by: INTERNAL MEDICINE

## 2023-05-11 PROCEDURE — 2700000000 HC OXYGEN THERAPY PER DAY

## 2023-05-11 PROCEDURE — 97535 SELF CARE MNGMENT TRAINING: CPT

## 2023-05-11 PROCEDURE — 99233 SBSQ HOSP IP/OBS HIGH 50: CPT | Performed by: PHYSICAL MEDICINE & REHABILITATION

## 2023-05-11 PROCEDURE — 6370000000 HC RX 637 (ALT 250 FOR IP): Performed by: PHYSICAL MEDICINE & REHABILITATION

## 2023-05-11 PROCEDURE — 1180000000 HC REHAB R&B

## 2023-05-11 PROCEDURE — 97533 SENSORY INTEGRATION: CPT

## 2023-05-11 PROCEDURE — 6360000002 HC RX W HCPCS: Performed by: INTERNAL MEDICINE

## 2023-05-11 PROCEDURE — 97116 GAIT TRAINING THERAPY: CPT

## 2023-05-11 PROCEDURE — 94761 N-INVAS EAR/PLS OXIMETRY MLT: CPT

## 2023-05-11 PROCEDURE — 94640 AIRWAY INHALATION TREATMENT: CPT

## 2023-05-11 RX ORDER — ISOSORBIDE MONONITRATE 60 MG/1
60 TABLET, EXTENDED RELEASE ORAL DAILY
Qty: 30 TABLET | Refills: 3 | Status: SHIPPED | OUTPATIENT
Start: 2023-05-12

## 2023-05-11 RX ORDER — FUROSEMIDE 20 MG/1
60 TABLET ORAL DAILY
Qty: 60 TABLET | Refills: 3 | Status: SHIPPED | OUTPATIENT
Start: 2023-05-12

## 2023-05-11 RX ORDER — UBIDECARENONE 100 MG
100 CAPSULE ORAL DAILY
Qty: 30 CAPSULE | Refills: 0 | Status: SHIPPED | OUTPATIENT
Start: 2023-05-12

## 2023-05-11 RX ORDER — AMMONIUM LACTATE 12 G/100G
LOTION TOPICAL
Qty: 225 G | Refills: 0 | Status: SHIPPED | OUTPATIENT
Start: 2023-05-12

## 2023-05-11 RX ORDER — ALBUTEROL SULFATE 2.5 MG/3ML
2.5 SOLUTION RESPIRATORY (INHALATION) 3 TIMES DAILY
Qty: 120 EACH | Refills: 3 | Status: SHIPPED | OUTPATIENT
Start: 2023-05-12

## 2023-05-11 RX ORDER — LISINOPRIL 10 MG/1
10 TABLET ORAL EVERY EVENING
Qty: 30 TABLET | Refills: 3 | Status: SHIPPED | OUTPATIENT
Start: 2023-05-12

## 2023-05-11 RX ORDER — PSEUDOEPHEDRINE HCL 30 MG
100 TABLET ORAL 2 TIMES DAILY
Qty: 60 CAPSULE | Refills: 0 | Status: SHIPPED | OUTPATIENT
Start: 2023-05-11

## 2023-05-11 RX ORDER — CLOPIDOGREL BISULFATE 75 MG/1
75 TABLET ORAL DAILY
Qty: 30 TABLET | Refills: 3 | Status: SHIPPED | OUTPATIENT
Start: 2023-05-12

## 2023-05-11 RX ORDER — GUAIFENESIN 600 MG/1
600 TABLET, EXTENDED RELEASE ORAL 2 TIMES DAILY
Qty: 60 TABLET | Refills: 0 | Status: SHIPPED | OUTPATIENT
Start: 2023-05-11

## 2023-05-11 RX ORDER — TRAZODONE HYDROCHLORIDE 50 MG/1
50 TABLET ORAL NIGHTLY
Qty: 30 TABLET | Refills: 0 | Status: SHIPPED | OUTPATIENT
Start: 2023-05-11

## 2023-05-11 RX ORDER — CHOLECALCIFEROL (VITAMIN D3) 50 MCG
2000 TABLET ORAL
Qty: 60 TABLET | Refills: 0 | Status: SHIPPED | OUTPATIENT
Start: 2023-05-12

## 2023-05-11 RX ORDER — ATORVASTATIN CALCIUM 40 MG/1
40 TABLET, FILM COATED ORAL NIGHTLY
Qty: 30 TABLET | Refills: 3 | Status: SHIPPED | OUTPATIENT
Start: 2023-05-11

## 2023-05-11 RX ADMIN — CLOPIDOGREL BISULFATE 75 MG: 75 TABLET ORAL at 08:26

## 2023-05-11 RX ADMIN — APIXABAN 5 MG: 5 TABLET, FILM COATED ORAL at 08:27

## 2023-05-11 RX ADMIN — Medication 2000 UNITS: at 17:45

## 2023-05-11 RX ADMIN — TIOTROPIUM BROMIDE AND OLODATEROL 2 PUFF: 3.124; 2.736 SPRAY, METERED RESPIRATORY (INHALATION) at 04:04

## 2023-05-11 RX ADMIN — ISOSORBIDE MONONITRATE 60 MG: 60 TABLET, EXTENDED RELEASE ORAL at 08:27

## 2023-05-11 RX ADMIN — DOCUSATE SODIUM 100 MG: 100 CAPSULE, LIQUID FILLED ORAL at 08:26

## 2023-05-11 RX ADMIN — ATORVASTATIN CALCIUM 40 MG: 40 TABLET, FILM COATED ORAL at 21:40

## 2023-05-11 RX ADMIN — METOPROLOL TARTRATE 25 MG: 25 TABLET, FILM COATED ORAL at 08:26

## 2023-05-11 RX ADMIN — Medication: at 08:31

## 2023-05-11 RX ADMIN — APIXABAN 5 MG: 5 TABLET, FILM COATED ORAL at 21:40

## 2023-05-11 RX ADMIN — GUAIFENESIN 600 MG: 600 TABLET ORAL at 21:40

## 2023-05-11 RX ADMIN — LISINOPRIL 10 MG: 10 TABLET ORAL at 17:45

## 2023-05-11 RX ADMIN — PREDNISONE 10 MG: 10 TABLET ORAL at 08:27

## 2023-05-11 RX ADMIN — ALBUTEROL SULFATE 2.5 MG: 2.5 SOLUTION RESPIRATORY (INHALATION) at 16:58

## 2023-05-11 RX ADMIN — Medication 100 MG: at 08:26

## 2023-05-11 RX ADMIN — FUROSEMIDE 60 MG: 40 TABLET ORAL at 08:27

## 2023-05-11 RX ADMIN — DOCUSATE SODIUM 100 MG: 100 CAPSULE, LIQUID FILLED ORAL at 21:40

## 2023-05-11 RX ADMIN — Medication: at 17:59

## 2023-05-11 RX ADMIN — Medication 5 MG: at 21:40

## 2023-05-11 RX ADMIN — ALBUTEROL SULFATE 2.5 MG: 2.5 SOLUTION RESPIRATORY (INHALATION) at 04:04

## 2023-05-11 RX ADMIN — TRAZODONE HYDROCHLORIDE 50 MG: 50 TABLET ORAL at 21:40

## 2023-05-11 RX ADMIN — GUAIFENESIN 600 MG: 600 TABLET ORAL at 08:27

## 2023-05-11 RX ADMIN — ALBUTEROL SULFATE 2.5 MG: 2.5 SOLUTION RESPIRATORY (INHALATION) at 09:34

## 2023-05-12 PROCEDURE — 2700000000 HC OXYGEN THERAPY PER DAY

## 2023-05-12 PROCEDURE — 6370000000 HC RX 637 (ALT 250 FOR IP): Performed by: PHYSICAL MEDICINE & REHABILITATION

## 2023-05-12 PROCEDURE — 94640 AIRWAY INHALATION TREATMENT: CPT

## 2023-05-12 PROCEDURE — 6370000000 HC RX 637 (ALT 250 FOR IP): Performed by: INTERNAL MEDICINE

## 2023-05-12 PROCEDURE — 94761 N-INVAS EAR/PLS OXIMETRY MLT: CPT

## 2023-05-12 PROCEDURE — 6360000002 HC RX W HCPCS: Performed by: INTERNAL MEDICINE

## 2023-05-12 PROCEDURE — 94664 DEMO&/EVAL PT USE INHALER: CPT

## 2023-05-12 PROCEDURE — APPSS15 APP SPLIT SHARED TIME 0-15 MINUTES: Performed by: NURSE PRACTITIONER

## 2023-05-12 PROCEDURE — 97535 SELF CARE MNGMENT TRAINING: CPT

## 2023-05-12 PROCEDURE — 97116 GAIT TRAINING THERAPY: CPT

## 2023-05-12 PROCEDURE — 99232 SBSQ HOSP IP/OBS MODERATE 35: CPT | Performed by: PSYCHIATRY & NEUROLOGY

## 2023-05-12 PROCEDURE — 99232 SBSQ HOSP IP/OBS MODERATE 35: CPT | Performed by: INTERNAL MEDICINE

## 2023-05-12 PROCEDURE — 1180000000 HC REHAB R&B

## 2023-05-12 PROCEDURE — 99232 SBSQ HOSP IP/OBS MODERATE 35: CPT | Performed by: PHYSICAL MEDICINE & REHABILITATION

## 2023-05-12 PROCEDURE — 97112 NEUROMUSCULAR REEDUCATION: CPT

## 2023-05-12 PROCEDURE — 97530 THERAPEUTIC ACTIVITIES: CPT

## 2023-05-12 RX ADMIN — ALBUTEROL SULFATE 2.5 MG: 2.5 SOLUTION RESPIRATORY (INHALATION) at 16:46

## 2023-05-12 RX ADMIN — APIXABAN 5 MG: 5 TABLET, FILM COATED ORAL at 21:20

## 2023-05-12 RX ADMIN — TIOTROPIUM BROMIDE AND OLODATEROL 2 PUFF: 3.124; 2.736 SPRAY, METERED RESPIRATORY (INHALATION) at 04:40

## 2023-05-12 RX ADMIN — DOCUSATE SODIUM 100 MG: 100 CAPSULE, LIQUID FILLED ORAL at 21:20

## 2023-05-12 RX ADMIN — FUROSEMIDE 60 MG: 40 TABLET ORAL at 08:19

## 2023-05-12 RX ADMIN — PREDNISONE 10 MG: 10 TABLET ORAL at 08:19

## 2023-05-12 RX ADMIN — Medication: at 08:26

## 2023-05-12 RX ADMIN — TRAZODONE HYDROCHLORIDE 50 MG: 50 TABLET ORAL at 21:21

## 2023-05-12 RX ADMIN — GUAIFENESIN 600 MG: 600 TABLET ORAL at 08:19

## 2023-05-12 RX ADMIN — CLOPIDOGREL BISULFATE 75 MG: 75 TABLET ORAL at 08:19

## 2023-05-12 RX ADMIN — Medication 100 MG: at 08:18

## 2023-05-12 RX ADMIN — Medication 2000 UNITS: at 17:44

## 2023-05-12 RX ADMIN — METOPROLOL TARTRATE 25 MG: 25 TABLET, FILM COATED ORAL at 21:21

## 2023-05-12 RX ADMIN — ALBUTEROL SULFATE 2.5 MG: 2.5 SOLUTION RESPIRATORY (INHALATION) at 10:42

## 2023-05-12 RX ADMIN — ALBUTEROL SULFATE 2.5 MG: 2.5 SOLUTION RESPIRATORY (INHALATION) at 04:44

## 2023-05-12 RX ADMIN — DOCUSATE SODIUM 100 MG: 100 CAPSULE, LIQUID FILLED ORAL at 08:24

## 2023-05-12 RX ADMIN — ISOSORBIDE MONONITRATE 60 MG: 60 TABLET, EXTENDED RELEASE ORAL at 08:19

## 2023-05-12 RX ADMIN — ATORVASTATIN CALCIUM 40 MG: 40 TABLET, FILM COATED ORAL at 21:21

## 2023-05-12 RX ADMIN — APIXABAN 5 MG: 5 TABLET, FILM COATED ORAL at 08:19

## 2023-05-12 RX ADMIN — GUAIFENESIN 600 MG: 600 TABLET ORAL at 21:20

## 2023-05-12 RX ADMIN — METOPROLOL TARTRATE 25 MG: 25 TABLET, FILM COATED ORAL at 08:19

## 2023-05-12 RX ADMIN — Medication 5 MG: at 21:21

## 2023-05-12 RX ADMIN — ACETAMINOPHEN 650 MG: 325 TABLET ORAL at 21:20

## 2023-05-12 ASSESSMENT — ENCOUNTER SYMPTOMS
CHEST TIGHTNESS: 0
SHORTNESS OF BREATH: 0
TROUBLE SWALLOWING: 0
STRIDOR: 0
COUGH: 0
WHEEZING: 0
BLOOD IN STOOL: 0
NAUSEA: 0
COLOR CHANGE: 0
GASTROINTESTINAL NEGATIVE: 1
RESPIRATORY NEGATIVE: 1
VOMITING: 0

## 2023-05-12 ASSESSMENT — PAIN DESCRIPTION - FREQUENCY: FREQUENCY: INTERMITTENT

## 2023-05-12 ASSESSMENT — PAIN DESCRIPTION - LOCATION: LOCATION: EYE

## 2023-05-12 ASSESSMENT — PAIN DESCRIPTION - PAIN TYPE: TYPE: ACUTE PAIN

## 2023-05-12 ASSESSMENT — PAIN SCALES - GENERAL
PAINLEVEL_OUTOF10: 2
PAINLEVEL_OUTOF10: 2

## 2023-05-12 ASSESSMENT — PAIN DESCRIPTION - DESCRIPTORS: DESCRIPTORS: ACHING;DISCOMFORT

## 2023-05-12 ASSESSMENT — PAIN DESCRIPTION - ORIENTATION: ORIENTATION: RIGHT;LEFT

## 2023-05-12 ASSESSMENT — PAIN - FUNCTIONAL ASSESSMENT: PAIN_FUNCTIONAL_ASSESSMENT: PREVENTS OR INTERFERES SOME ACTIVE ACTIVITIES AND ADLS

## 2023-05-12 ASSESSMENT — PAIN DESCRIPTION - ONSET: ONSET: ON-GOING

## 2023-05-13 PROCEDURE — 6370000000 HC RX 637 (ALT 250 FOR IP): Performed by: PHYSICAL MEDICINE & REHABILITATION

## 2023-05-13 PROCEDURE — 1180000000 HC REHAB R&B

## 2023-05-13 PROCEDURE — 94640 AIRWAY INHALATION TREATMENT: CPT

## 2023-05-13 PROCEDURE — 6370000000 HC RX 637 (ALT 250 FOR IP): Performed by: INTERNAL MEDICINE

## 2023-05-13 PROCEDURE — 6360000002 HC RX W HCPCS: Performed by: INTERNAL MEDICINE

## 2023-05-13 PROCEDURE — 2700000000 HC OXYGEN THERAPY PER DAY

## 2023-05-13 PROCEDURE — 94761 N-INVAS EAR/PLS OXIMETRY MLT: CPT

## 2023-05-13 PROCEDURE — 97116 GAIT TRAINING THERAPY: CPT

## 2023-05-13 RX ADMIN — Medication 100 MG: at 09:35

## 2023-05-13 RX ADMIN — ACETAMINOPHEN 650 MG: 325 TABLET ORAL at 17:20

## 2023-05-13 RX ADMIN — METOPROLOL TARTRATE 25 MG: 25 TABLET, FILM COATED ORAL at 22:19

## 2023-05-13 RX ADMIN — ATORVASTATIN CALCIUM 40 MG: 40 TABLET, FILM COATED ORAL at 22:18

## 2023-05-13 RX ADMIN — METOPROLOL TARTRATE 25 MG: 25 TABLET, FILM COATED ORAL at 09:35

## 2023-05-13 RX ADMIN — APIXABAN 5 MG: 5 TABLET, FILM COATED ORAL at 22:18

## 2023-05-13 RX ADMIN — APIXABAN 5 MG: 5 TABLET, FILM COATED ORAL at 09:35

## 2023-05-13 RX ADMIN — DOCUSATE SODIUM 100 MG: 100 CAPSULE, LIQUID FILLED ORAL at 09:35

## 2023-05-13 RX ADMIN — FUROSEMIDE 60 MG: 40 TABLET ORAL at 09:35

## 2023-05-13 RX ADMIN — GUAIFENESIN 600 MG: 600 TABLET ORAL at 09:34

## 2023-05-13 RX ADMIN — CLOPIDOGREL BISULFATE 75 MG: 75 TABLET ORAL at 09:35

## 2023-05-13 RX ADMIN — GUAIFENESIN 600 MG: 600 TABLET ORAL at 22:19

## 2023-05-13 RX ADMIN — ISOSORBIDE MONONITRATE 60 MG: 60 TABLET, EXTENDED RELEASE ORAL at 09:35

## 2023-05-13 RX ADMIN — TIOTROPIUM BROMIDE AND OLODATEROL 2 PUFF: 3.124; 2.736 SPRAY, METERED RESPIRATORY (INHALATION) at 05:11

## 2023-05-13 RX ADMIN — ACETAMINOPHEN 650 MG: 325 TABLET ORAL at 22:18

## 2023-05-13 RX ADMIN — Medication 5 MG: at 22:19

## 2023-05-13 RX ADMIN — Medication 2000 UNITS: at 17:12

## 2023-05-13 RX ADMIN — ALBUTEROL SULFATE 2.5 MG: 2.5 SOLUTION RESPIRATORY (INHALATION) at 14:32

## 2023-05-13 RX ADMIN — ALBUTEROL SULFATE 2.5 MG: 2.5 SOLUTION RESPIRATORY (INHALATION) at 05:12

## 2023-05-13 RX ADMIN — LISINOPRIL 10 MG: 10 TABLET ORAL at 17:12

## 2023-05-13 RX ADMIN — DOCUSATE SODIUM 100 MG: 100 CAPSULE, LIQUID FILLED ORAL at 22:19

## 2023-05-13 RX ADMIN — TRAZODONE HYDROCHLORIDE 50 MG: 50 TABLET ORAL at 22:19

## 2023-05-13 ASSESSMENT — PAIN DESCRIPTION - LOCATION
LOCATION: EYE
LOCATION: EYE

## 2023-05-13 ASSESSMENT — PAIN - FUNCTIONAL ASSESSMENT: PAIN_FUNCTIONAL_ASSESSMENT: PREVENTS OR INTERFERES SOME ACTIVE ACTIVITIES AND ADLS

## 2023-05-13 ASSESSMENT — PAIN DESCRIPTION - DESCRIPTORS: DESCRIPTORS: ACHING;DISCOMFORT

## 2023-05-13 ASSESSMENT — PAIN SCALES - GENERAL
PAINLEVEL_OUTOF10: 3
PAINLEVEL_OUTOF10: 4

## 2023-05-13 ASSESSMENT — PAIN DESCRIPTION - ORIENTATION
ORIENTATION: RIGHT;LEFT
ORIENTATION: RIGHT;LEFT

## 2023-05-13 ASSESSMENT — PAIN DESCRIPTION - PAIN TYPE: TYPE: ACUTE PAIN

## 2023-05-14 PROCEDURE — 2700000000 HC OXYGEN THERAPY PER DAY

## 2023-05-14 PROCEDURE — 6370000000 HC RX 637 (ALT 250 FOR IP): Performed by: PHYSICAL MEDICINE & REHABILITATION

## 2023-05-14 PROCEDURE — 6360000002 HC RX W HCPCS: Performed by: INTERNAL MEDICINE

## 2023-05-14 PROCEDURE — 94761 N-INVAS EAR/PLS OXIMETRY MLT: CPT

## 2023-05-14 PROCEDURE — 6370000000 HC RX 637 (ALT 250 FOR IP): Performed by: INTERNAL MEDICINE

## 2023-05-14 PROCEDURE — 1180000000 HC REHAB R&B

## 2023-05-14 PROCEDURE — 94640 AIRWAY INHALATION TREATMENT: CPT

## 2023-05-14 RX ADMIN — FUROSEMIDE 60 MG: 40 TABLET ORAL at 08:37

## 2023-05-14 RX ADMIN — LISINOPRIL 10 MG: 10 TABLET ORAL at 16:11

## 2023-05-14 RX ADMIN — Medication 5 MG: at 20:54

## 2023-05-14 RX ADMIN — ISOSORBIDE MONONITRATE 60 MG: 60 TABLET, EXTENDED RELEASE ORAL at 08:37

## 2023-05-14 RX ADMIN — ALBUTEROL SULFATE 2.5 MG: 2.5 SOLUTION RESPIRATORY (INHALATION) at 16:20

## 2023-05-14 RX ADMIN — Medication 100 MG: at 08:37

## 2023-05-14 RX ADMIN — APIXABAN 5 MG: 5 TABLET, FILM COATED ORAL at 20:55

## 2023-05-14 RX ADMIN — Medication 2000 UNITS: at 16:11

## 2023-05-14 RX ADMIN — GUAIFENESIN 600 MG: 600 TABLET ORAL at 08:37

## 2023-05-14 RX ADMIN — ALBUTEROL SULFATE 2.5 MG: 2.5 SOLUTION RESPIRATORY (INHALATION) at 04:56

## 2023-05-14 RX ADMIN — APIXABAN 5 MG: 5 TABLET, FILM COATED ORAL at 08:37

## 2023-05-14 RX ADMIN — CLOPIDOGREL BISULFATE 75 MG: 75 TABLET ORAL at 08:37

## 2023-05-14 RX ADMIN — ALBUTEROL SULFATE 2.5 MG: 2.5 SOLUTION RESPIRATORY (INHALATION) at 11:12

## 2023-05-14 RX ADMIN — DOCUSATE SODIUM 100 MG: 100 CAPSULE, LIQUID FILLED ORAL at 20:54

## 2023-05-14 RX ADMIN — DOCUSATE SODIUM 100 MG: 100 CAPSULE, LIQUID FILLED ORAL at 08:37

## 2023-05-14 RX ADMIN — TRAZODONE HYDROCHLORIDE 50 MG: 50 TABLET ORAL at 20:54

## 2023-05-14 RX ADMIN — METOPROLOL TARTRATE 25 MG: 25 TABLET, FILM COATED ORAL at 08:37

## 2023-05-14 RX ADMIN — TIOTROPIUM BROMIDE AND OLODATEROL 2 PUFF: 3.124; 2.736 SPRAY, METERED RESPIRATORY (INHALATION) at 04:56

## 2023-05-14 RX ADMIN — ATORVASTATIN CALCIUM 40 MG: 40 TABLET, FILM COATED ORAL at 20:54

## 2023-05-14 RX ADMIN — GUAIFENESIN 600 MG: 600 TABLET ORAL at 20:55

## 2023-05-14 RX ADMIN — METOPROLOL TARTRATE 25 MG: 25 TABLET, FILM COATED ORAL at 20:54

## 2023-05-15 LAB
ANION GAP SERPL CALCULATED.3IONS-SCNC: 7 MEQ/L (ref 9–15)
BUN SERPL-MCNC: 19 MG/DL (ref 8–23)
CALCIUM SERPL-MCNC: 9.1 MG/DL (ref 8.5–9.9)
CHLORIDE SERPL-SCNC: 97 MEQ/L (ref 95–107)
CO2 SERPL-SCNC: 35 MEQ/L (ref 20–31)
CREAT SERPL-MCNC: 0.85 MG/DL (ref 0.7–1.2)
ERYTHROCYTE [DISTWIDTH] IN BLOOD BY AUTOMATED COUNT: 15 % (ref 11.5–14.5)
GLUCOSE SERPL-MCNC: 126 MG/DL (ref 70–99)
HCT VFR BLD AUTO: 41.9 % (ref 42–52)
HGB BLD-MCNC: 13.8 G/DL (ref 14–18)
MCH RBC QN AUTO: 29 PG (ref 27–31.3)
MCHC RBC AUTO-ENTMCNC: 32.9 % (ref 33–37)
MCV RBC AUTO: 88.4 FL (ref 79–92.2)
PLATELET # BLD AUTO: 219 K/UL (ref 130–400)
POTASSIUM SERPL-SCNC: 4.1 MEQ/L (ref 3.4–4.9)
RBC # BLD AUTO: 4.75 M/UL (ref 4.7–6.1)
SODIUM SERPL-SCNC: 139 MEQ/L (ref 135–144)
WBC # BLD AUTO: 7.8 K/UL (ref 4.8–10.8)

## 2023-05-15 PROCEDURE — 99232 SBSQ HOSP IP/OBS MODERATE 35: CPT | Performed by: INTERNAL MEDICINE

## 2023-05-15 PROCEDURE — 2500000003 HC RX 250 WO HCPCS: Performed by: PHYSICAL MEDICINE & REHABILITATION

## 2023-05-15 PROCEDURE — 85027 COMPLETE CBC AUTOMATED: CPT

## 2023-05-15 PROCEDURE — 97530 THERAPEUTIC ACTIVITIES: CPT

## 2023-05-15 PROCEDURE — 6370000000 HC RX 637 (ALT 250 FOR IP): Performed by: PHYSICAL MEDICINE & REHABILITATION

## 2023-05-15 PROCEDURE — 6360000002 HC RX W HCPCS: Performed by: PHYSICAL MEDICINE & REHABILITATION

## 2023-05-15 PROCEDURE — 97535 SELF CARE MNGMENT TRAINING: CPT

## 2023-05-15 PROCEDURE — 6370000000 HC RX 637 (ALT 250 FOR IP): Performed by: INTERNAL MEDICINE

## 2023-05-15 PROCEDURE — 94640 AIRWAY INHALATION TREATMENT: CPT

## 2023-05-15 PROCEDURE — 97116 GAIT TRAINING THERAPY: CPT

## 2023-05-15 PROCEDURE — 94664 DEMO&/EVAL PT USE INHALER: CPT

## 2023-05-15 PROCEDURE — 6360000002 HC RX W HCPCS: Performed by: INTERNAL MEDICINE

## 2023-05-15 PROCEDURE — 94761 N-INVAS EAR/PLS OXIMETRY MLT: CPT

## 2023-05-15 PROCEDURE — 99231 SBSQ HOSP IP/OBS SF/LOW 25: CPT | Performed by: NURSE PRACTITIONER

## 2023-05-15 PROCEDURE — 36415 COLL VENOUS BLD VENIPUNCTURE: CPT

## 2023-05-15 PROCEDURE — 99233 SBSQ HOSP IP/OBS HIGH 50: CPT | Performed by: PHYSICAL MEDICINE & REHABILITATION

## 2023-05-15 PROCEDURE — 80048 BASIC METABOLIC PNL TOTAL CA: CPT

## 2023-05-15 PROCEDURE — 1180000000 HC REHAB R&B

## 2023-05-15 PROCEDURE — 97533 SENSORY INTEGRATION: CPT

## 2023-05-15 PROCEDURE — 94760 N-INVAS EAR/PLS OXIMETRY 1: CPT

## 2023-05-15 PROCEDURE — 97112 NEUROMUSCULAR REEDUCATION: CPT

## 2023-05-15 PROCEDURE — 2700000000 HC OXYGEN THERAPY PER DAY

## 2023-05-15 RX ADMIN — Medication: at 18:26

## 2023-05-15 RX ADMIN — APIXABAN 5 MG: 5 TABLET, FILM COATED ORAL at 08:45

## 2023-05-15 RX ADMIN — GUAIFENESIN 600 MG: 600 TABLET ORAL at 22:38

## 2023-05-15 RX ADMIN — ALBUTEROL SULFATE 2.5 MG: 2.5 SOLUTION RESPIRATORY (INHALATION) at 17:06

## 2023-05-15 RX ADMIN — TUBERCULIN PURIFIED PROTEIN DERIVATIVE 5 UNITS: 5 INJECTION, SOLUTION INTRADERMAL at 18:08

## 2023-05-15 RX ADMIN — FUROSEMIDE 60 MG: 40 TABLET ORAL at 08:45

## 2023-05-15 RX ADMIN — TIOTROPIUM BROMIDE AND OLODATEROL 2 PUFF: 3.124; 2.736 SPRAY, METERED RESPIRATORY (INHALATION) at 04:16

## 2023-05-15 RX ADMIN — ATORVASTATIN CALCIUM 40 MG: 40 TABLET, FILM COATED ORAL at 22:39

## 2023-05-15 RX ADMIN — TRAZODONE HYDROCHLORIDE 50 MG: 50 TABLET ORAL at 22:39

## 2023-05-15 RX ADMIN — ALBUTEROL SULFATE 2.5 MG: 2.5 SOLUTION RESPIRATORY (INHALATION) at 04:16

## 2023-05-15 RX ADMIN — APIXABAN 5 MG: 5 TABLET, FILM COATED ORAL at 22:39

## 2023-05-15 RX ADMIN — Medication 2000 UNITS: at 18:04

## 2023-05-15 RX ADMIN — Medication 5 MG: at 22:37

## 2023-05-15 RX ADMIN — DOCUSATE SODIUM 100 MG: 100 CAPSULE, LIQUID FILLED ORAL at 08:46

## 2023-05-15 RX ADMIN — ISOSORBIDE MONONITRATE 60 MG: 60 TABLET, EXTENDED RELEASE ORAL at 08:46

## 2023-05-15 RX ADMIN — METOPROLOL TARTRATE 25 MG: 25 TABLET, FILM COATED ORAL at 22:40

## 2023-05-15 RX ADMIN — CYANOCOBALAMIN 1000 MCG: 1000 INJECTION, SOLUTION INTRAMUSCULAR; SUBCUTANEOUS at 08:46

## 2023-05-15 RX ADMIN — Medication 100 MG: at 08:46

## 2023-05-15 RX ADMIN — GUAIFENESIN 600 MG: 600 TABLET ORAL at 08:45

## 2023-05-15 RX ADMIN — METOPROLOL TARTRATE 25 MG: 25 TABLET, FILM COATED ORAL at 08:45

## 2023-05-15 RX ADMIN — ALBUTEROL SULFATE 2.5 MG: 2.5 SOLUTION RESPIRATORY (INHALATION) at 10:15

## 2023-05-15 RX ADMIN — CLOPIDOGREL BISULFATE 75 MG: 75 TABLET ORAL at 08:46

## 2023-05-15 RX ADMIN — Medication: at 08:47

## 2023-05-15 RX ADMIN — ACETAMINOPHEN 650 MG: 325 TABLET ORAL at 22:39

## 2023-05-15 RX ADMIN — LISINOPRIL 10 MG: 10 TABLET ORAL at 18:04

## 2023-05-15 RX ADMIN — DOCUSATE SODIUM 100 MG: 100 CAPSULE, LIQUID FILLED ORAL at 22:39

## 2023-05-15 ASSESSMENT — ENCOUNTER SYMPTOMS
COUGH: 0
CHEST TIGHTNESS: 0
RESPIRATORY NEGATIVE: 1
BLOOD IN STOOL: 0
GASTROINTESTINAL NEGATIVE: 1
NAUSEA: 0
STRIDOR: 0
SHORTNESS OF BREATH: 0
WHEEZING: 0

## 2023-05-15 ASSESSMENT — PAIN DESCRIPTION - LOCATION: LOCATION: EYE

## 2023-05-15 ASSESSMENT — PAIN DESCRIPTION - PAIN TYPE: TYPE: ACUTE PAIN

## 2023-05-15 ASSESSMENT — PAIN SCALES - GENERAL: PAINLEVEL_OUTOF10: 4

## 2023-05-15 ASSESSMENT — PAIN DESCRIPTION - ORIENTATION: ORIENTATION: RIGHT;LEFT

## 2023-05-15 ASSESSMENT — PAIN DESCRIPTION - ONSET: ONSET: ON-GOING

## 2023-05-15 ASSESSMENT — PAIN DESCRIPTION - DESCRIPTORS: DESCRIPTORS: ACHING;DISCOMFORT

## 2023-05-15 NOTE — CARE COORDINATION
Pt has decided he would like AL placement as respite first with possibly staying long term. Wade Marina from [address] [place] [address] for Mom has made referrals to [facility], and [place]. LSW spoke with Rebecca Feliz (dtr) and she stated that she would prefer Medicaid waiver. LSW called [facility] again and stressed that to her as this was told to Raven Zpaien from [address] [place] [address] for Mom on Friday. LSW will wait to hear back from Wade Marina. LSW also called [facility] [employer] [facility] and left a voicemail notifying them to not deliver meals at this time as pt has decided to not discharge to [address] [place] [address] in [place].  Electronically signed by SAL Colon, JAZ on 5/15/2023 at 1:26 PM

## 2023-05-15 NOTE — CARE COORDINATION
[facility] REHABILITATION  INDEPENDENT IN ROOM NOTE  Room: R245/R245-01  Admit Date: 2023       Date: 5/15/2023  Patient Name: Benja Watt        MRN: [id number]    : 1955  (77 y.o.)  Gender: male      Diagnosis: Impaired mobility and ADL's due to CVA-with severe visual deficits mild cognitive deficits and moderate balance deficits         Discipline pre admission summary:    Nursing:  Patient orientation to the room/suite:  [] Yes    [x]   No  (Safety checks to consider: Oxygen, Fire Alarm, Stove safety, Call alarm, Bed alarm,   Bed power, TV, Phone)        1. Pt physical ability to be independent in room/suite:  [x] Yes    []   No   Comment:    2. Pt demonstrates cognitive ability to be independent in room/suite:  [x] Yes    []   No   Comment:    3. Pt demonstrates emotional ability to be independent in room/suite:  [] Yes    [x]   No   Comment:    4. Special Considerations/Equipment if needed: [x] NA   Comment:remove all obstacles in his way. Recommend independent in room/suite:  [] Yes    [x]   No            Signature: Electronically signed by Ke Zuluaga RN on 2023 at 6:02 PM        Occupational Therapy:  1. Pt physical ability to be independent in room/suite:  [] Yes    [x]   No   Comment:     2. Pt demonstrates cognitive ability to be independent in room/suite:  [x] Yes    []   No   Comment:    3. Pt demonstrates emotional ability to be independent in room/suite:  [x] Yes    []   No   Comment:    4. Special Considerations/Equipment if needed: [] NA   Comment:    Recommend independent in room/suite:  [x] Yes    []   No       Signature: Electronically signed by ELZBIETA Jernigan on  at 10:23 AM EDT       Physical Therapy:  1. Pt physical ability to be independent in room/suite:  [x] Yes    []   No   Comment:     2. Pt demonstrates cognitive ability to be independent in room/suite:  [x] Yes    []   No   Comment:    3.  Pt demonstrates

## 2023-05-15 NOTE — CARE COORDINATION
[facility]  INPATIENT REHABILITATION  TEAM CONFERENCE NOTE  Room: R245R245-01  Admit Date: 2023       Date: 5/15/2023  Patient Name: Gordy Murphy        MRN: [id number]    : 1955  (77 y.o.)  Gender: male        REHAB DIAGNOSIS:   Diagnosis: Impaired mobility and ADL's due to CVA-with severe visual deficits mild cognitive deficits and moderate balance deficits    CO MORBIDITIES:      Past Medical History:   Diagnosis Date    CHF (congestive heart failure) (HCC)     COPD (chronic obstructive pulmonary disease) (HCC)     Hypertension     Lung disease     Osteoarthritis     hands worst    Sleep apnea      Past Surgical History:   Procedure Laterality Date    CHOLECYSTECTOMY      CORONARY ANGIOPLASTY WITH STENT PLACEMENT  2020    DIAGNOSTIC CARDIAC CATH LAB PROCEDURE  2020    PTCA  2020    ROTATOR CUFF REPAIR      right        Restrictions  Restrictions/Precautions: Fall Risk  CASE MANAGEMENT    Social/Functional History  Social/Functional History  Lives With: Alone  Type of Home: Apartment  Home Layout: One level  Home Access: Level entry  Bathroom Shower/Tub: Tub/Shower unit, Curtain  Bathroom Toilet: Standard  Bathroom Equipment: None  Bathroom Accessibility: Walker accessible  Home Equipment: Oxygen  Has the patient had two or more falls in the past year or any fall with injury in the past year?: No  ADL Assistance: Independent  Homemaking Assistance: Independent  Homemaking Responsibilities: Yes  Meal Prep Responsibility: Primary  Laundry Responsibility: Primary  Cleaning Responsibility: Primary  Bill Paying/Finance Responsibility: Primary  Shopping Responsibility: Primary  Health Care Management: Primary  Ambulation Assistance: Independent  Transfer Assistance: Independent  Active : Yes  Mode of Transportation: Car  Education: High school graduate  Occupation: Retired  Type of Occupation:   Leisure & [address]: golf  IADL Comments: I with all

## 2023-05-15 NOTE — CARE COORDINATION
LSW spoke with [facility] from [address] [facility] [address] for Mom and she stated that they do not work with any AL facilities that offer Medicaid Waiver. LSW called [facility] and was told that they currently have a waiting list of 13 people. LSW called [employer] and left a message for [facility] ([address] [facility]). LSW was told by Tej Quintana from [place] that [address] is currently full. LSW left a voicemail for [facility]. LSW also called [facility] (medicaid waiver [address] [facility]) and spoke to Viki Heron. She stated that they have availability at this time and could offer respite. Viki Heron stated that respite is $150/day until pt would be placed in his permanent room and their base rate (which should meet pt's needs) is $2940/month. Bryson plans to email a packet with info to LSW. LSW then spoke with Giovanni Hitchcock (pt's dtr) and updated her with current progress. Giovanni Hitchcock stated that [facility] is far, but that they would look into it. Giovanni Hitchcock has also been in contact with [facility] and is aware they can accept pt for respite on 5/17. [facility] recently applied for [facility], but Bryant Prado noted it could take 6 months to hear back. Giovanni Hitchcock is concerned with this as pt's funds are limited. LSW emailed over a list of Medicaid Waiver AL facilities to Giovanni Hitchcock via [employer] ''R'' . [email or web address] com, Giovanni Hitchcock confirmed she received it and plans to review. Giovanni Hitchcock is aware that pt is extended until 5/17 and that rehab cannot justify keeping pt past that due to him meeting his goals of independence.  Electronically signed by SAL Arteaga LSW on 5/15/2023 at 4:12 PM

## 2023-05-16 PROCEDURE — 97112 NEUROMUSCULAR REEDUCATION: CPT

## 2023-05-16 PROCEDURE — 99232 SBSQ HOSP IP/OBS MODERATE 35: CPT | Performed by: INTERNAL MEDICINE

## 2023-05-16 PROCEDURE — 97535 SELF CARE MNGMENT TRAINING: CPT

## 2023-05-16 PROCEDURE — 6360000002 HC RX W HCPCS: Performed by: INTERNAL MEDICINE

## 2023-05-16 PROCEDURE — 99232 SBSQ HOSP IP/OBS MODERATE 35: CPT | Performed by: PHYSICAL MEDICINE & REHABILITATION

## 2023-05-16 PROCEDURE — 1180000000 HC REHAB R&B

## 2023-05-16 PROCEDURE — 6370000000 HC RX 637 (ALT 250 FOR IP): Performed by: INTERNAL MEDICINE

## 2023-05-16 PROCEDURE — 94640 AIRWAY INHALATION TREATMENT: CPT

## 2023-05-16 PROCEDURE — 97530 THERAPEUTIC ACTIVITIES: CPT

## 2023-05-16 PROCEDURE — 97116 GAIT TRAINING THERAPY: CPT

## 2023-05-16 PROCEDURE — 2700000000 HC OXYGEN THERAPY PER DAY

## 2023-05-16 PROCEDURE — 6370000000 HC RX 637 (ALT 250 FOR IP): Performed by: PHYSICAL MEDICINE & REHABILITATION

## 2023-05-16 PROCEDURE — 94761 N-INVAS EAR/PLS OXIMETRY MLT: CPT

## 2023-05-16 RX ADMIN — GUAIFENESIN 600 MG: 600 TABLET ORAL at 23:33

## 2023-05-16 RX ADMIN — Medication: at 17:34

## 2023-05-16 RX ADMIN — LISINOPRIL 10 MG: 10 TABLET ORAL at 17:33

## 2023-05-16 RX ADMIN — Medication 5 MG: at 23:34

## 2023-05-16 RX ADMIN — APIXABAN 5 MG: 5 TABLET, FILM COATED ORAL at 23:33

## 2023-05-16 RX ADMIN — ATORVASTATIN CALCIUM 40 MG: 40 TABLET, FILM COATED ORAL at 23:34

## 2023-05-16 RX ADMIN — METOPROLOL TARTRATE 25 MG: 25 TABLET, FILM COATED ORAL at 08:24

## 2023-05-16 RX ADMIN — GUAIFENESIN 600 MG: 600 TABLET ORAL at 08:24

## 2023-05-16 RX ADMIN — DOCUSATE SODIUM 100 MG: 100 CAPSULE, LIQUID FILLED ORAL at 23:33

## 2023-05-16 RX ADMIN — APIXABAN 5 MG: 5 TABLET, FILM COATED ORAL at 08:24

## 2023-05-16 RX ADMIN — ALBUTEROL SULFATE 2.5 MG: 2.5 SOLUTION RESPIRATORY (INHALATION) at 10:20

## 2023-05-16 RX ADMIN — ALBUTEROL SULFATE 2.5 MG: 2.5 SOLUTION RESPIRATORY (INHALATION) at 16:25

## 2023-05-16 RX ADMIN — DOCUSATE SODIUM 100 MG: 100 CAPSULE, LIQUID FILLED ORAL at 08:24

## 2023-05-16 RX ADMIN — FUROSEMIDE 60 MG: 40 TABLET ORAL at 08:23

## 2023-05-16 RX ADMIN — TIOTROPIUM BROMIDE AND OLODATEROL 2 PUFF: 3.124; 2.736 SPRAY, METERED RESPIRATORY (INHALATION) at 04:29

## 2023-05-16 RX ADMIN — TRAZODONE HYDROCHLORIDE 50 MG: 50 TABLET ORAL at 23:33

## 2023-05-16 RX ADMIN — CLOPIDOGREL BISULFATE 75 MG: 75 TABLET ORAL at 08:23

## 2023-05-16 RX ADMIN — ACETAMINOPHEN 650 MG: 325 TABLET ORAL at 23:33

## 2023-05-16 RX ADMIN — Medication: at 08:26

## 2023-05-16 RX ADMIN — ACETAMINOPHEN 650 MG: 325 TABLET ORAL at 17:38

## 2023-05-16 RX ADMIN — Medication 2000 UNITS: at 17:33

## 2023-05-16 RX ADMIN — ISOSORBIDE MONONITRATE 60 MG: 60 TABLET, EXTENDED RELEASE ORAL at 08:24

## 2023-05-16 RX ADMIN — Medication 100 MG: at 08:23

## 2023-05-16 RX ADMIN — METOPROLOL TARTRATE 25 MG: 25 TABLET, FILM COATED ORAL at 23:33

## 2023-05-16 RX ADMIN — ALBUTEROL SULFATE 2.5 MG: 2.5 SOLUTION RESPIRATORY (INHALATION) at 04:28

## 2023-05-16 ASSESSMENT — ENCOUNTER SYMPTOMS
COLOR CHANGE: 0
NAUSEA: 0
WHEEZING: 0
TROUBLE SWALLOWING: 0
SHORTNESS OF BREATH: 0
CHEST TIGHTNESS: 0
VOMITING: 0
COUGH: 0

## 2023-05-16 ASSESSMENT — PAIN DESCRIPTION - ORIENTATION: ORIENTATION: RIGHT;LEFT

## 2023-05-16 ASSESSMENT — PAIN SCALES - GENERAL: PAINLEVEL_OUTOF10: 3

## 2023-05-16 ASSESSMENT — PAIN DESCRIPTION - LOCATION: LOCATION: EYE

## 2023-05-16 NOTE — FLOWSHEET NOTE
Patient assessment complete, he is resting in bed at this time with some complaints of bilateral eye discomfort due to straining to see consistently. Patient medicated with Tylenol 650 MG PO PRN for eye discomfort. Patient was able to take all HS medications at once with sips of water without difficulty. Patient requested sandwich, chips, ice cream and soda for HS snack. Patient verbalized no further needs at this time, bed alarm engaged and call light within reach.

## 2023-05-16 NOTE — CARE COORDINATION
LSW spoke with dtr Martell Enrique) and she stated that she would prefer [facility] at this time due to proximity and cost. She also noted it would give them enough time to find a better long term option as she would like to explore more [place]aiver [place] facilities. LSW spoke with pt and he is unsure where he would like to go, he had met with [facility] and they completed their assessment today. Pt had questions regarding medicaid waiver and how it works, LSW explained it and pt verbalized understanding. Pt is aware discharge is still planned for 5/17. Pt and dtr plan to discuss further to see if he would prefer [employer], as he does not want to go to his apartment at [address] [place] [address] in [place] at this time.  Electronically signed by SAL Dow, JAZ on 5/16/2023 at 1:07 PM

## 2023-05-17 VITALS
HEART RATE: 88 BPM | WEIGHT: 231.3 LBS | DIASTOLIC BLOOD PRESSURE: 61 MMHG | BODY MASS INDEX: 33.11 KG/M2 | RESPIRATION RATE: 20 BRPM | HEIGHT: 70 IN | TEMPERATURE: 97.9 F | OXYGEN SATURATION: 95 % | SYSTOLIC BLOOD PRESSURE: 147 MMHG

## 2023-05-17 PROCEDURE — 6360000002 HC RX W HCPCS: Performed by: INTERNAL MEDICINE

## 2023-05-17 PROCEDURE — 2700000000 HC OXYGEN THERAPY PER DAY

## 2023-05-17 PROCEDURE — 97116 GAIT TRAINING THERAPY: CPT

## 2023-05-17 PROCEDURE — 99232 SBSQ HOSP IP/OBS MODERATE 35: CPT | Performed by: INTERNAL MEDICINE

## 2023-05-17 PROCEDURE — 97535 SELF CARE MNGMENT TRAINING: CPT

## 2023-05-17 PROCEDURE — 6370000000 HC RX 637 (ALT 250 FOR IP): Performed by: INTERNAL MEDICINE

## 2023-05-17 PROCEDURE — 6370000000 HC RX 637 (ALT 250 FOR IP): Performed by: PHYSICAL MEDICINE & REHABILITATION

## 2023-05-17 PROCEDURE — 94761 N-INVAS EAR/PLS OXIMETRY MLT: CPT

## 2023-05-17 PROCEDURE — 94640 AIRWAY INHALATION TREATMENT: CPT

## 2023-05-17 PROCEDURE — 97530 THERAPEUTIC ACTIVITIES: CPT

## 2023-05-17 PROCEDURE — 99239 HOSP IP/OBS DSCHRG MGMT >30: CPT | Performed by: PHYSICAL MEDICINE & REHABILITATION

## 2023-05-17 RX ADMIN — GUAIFENESIN 600 MG: 600 TABLET ORAL at 18:32

## 2023-05-17 RX ADMIN — Medication 2000 UNITS: at 18:30

## 2023-05-17 RX ADMIN — ISOSORBIDE MONONITRATE 60 MG: 60 TABLET, EXTENDED RELEASE ORAL at 09:35

## 2023-05-17 RX ADMIN — APIXABAN 5 MG: 5 TABLET, FILM COATED ORAL at 18:33

## 2023-05-17 RX ADMIN — ACETAMINOPHEN 650 MG: 325 TABLET ORAL at 18:39

## 2023-05-17 RX ADMIN — DOCUSATE SODIUM 100 MG: 100 CAPSULE, LIQUID FILLED ORAL at 18:33

## 2023-05-17 RX ADMIN — APIXABAN 5 MG: 5 TABLET, FILM COATED ORAL at 09:36

## 2023-05-17 RX ADMIN — Medication 100 MG: at 09:36

## 2023-05-17 RX ADMIN — ATORVASTATIN CALCIUM 40 MG: 40 TABLET, FILM COATED ORAL at 18:33

## 2023-05-17 RX ADMIN — ALBUTEROL SULFATE 2.5 MG: 2.5 SOLUTION RESPIRATORY (INHALATION) at 16:39

## 2023-05-17 RX ADMIN — DOCUSATE SODIUM 100 MG: 100 CAPSULE, LIQUID FILLED ORAL at 09:35

## 2023-05-17 RX ADMIN — LISINOPRIL 10 MG: 10 TABLET ORAL at 18:30

## 2023-05-17 RX ADMIN — ALBUTEROL SULFATE 2.5 MG: 2.5 SOLUTION RESPIRATORY (INHALATION) at 05:05

## 2023-05-17 RX ADMIN — GUAIFENESIN 600 MG: 600 TABLET ORAL at 09:35

## 2023-05-17 RX ADMIN — METOPROLOL TARTRATE 25 MG: 25 TABLET, FILM COATED ORAL at 18:33

## 2023-05-17 RX ADMIN — CLOPIDOGREL BISULFATE 75 MG: 75 TABLET ORAL at 09:36

## 2023-05-17 RX ADMIN — TIOTROPIUM BROMIDE AND OLODATEROL 2 PUFF: 3.124; 2.736 SPRAY, METERED RESPIRATORY (INHALATION) at 05:06

## 2023-05-17 ASSESSMENT — ENCOUNTER SYMPTOMS
SHORTNESS OF BREATH: 0
CHEST TIGHTNESS: 0
STRIDOR: 0
GASTROINTESTINAL NEGATIVE: 1
COUGH: 0
NAUSEA: 0
RESPIRATORY NEGATIVE: 1
BLOOD IN STOOL: 0
WHEEZING: 0

## 2023-05-17 ASSESSMENT — PAIN SCALES - GENERAL: PAINLEVEL_OUTOF10: 0

## 2023-05-17 NOTE — CARE COORDINATION
Pt chose [facility] for respite care. LSW confirmed with Tiffany mckeon Allen in [facility] (where pt is currently receiving O2) that a concentrator and tanks will be delivered to [facility] today. [facility] previously delivered an O2 tank last weekend for pt to use in transport. Transportation was discussed and Ulises Holland (dtr) requested for LSW to get pricing/schedule from [address] in case they cannot find someone to take pt. LSW spoke with Norma and scheduled transport for 6PM and received an estimate of $62. However, LSW then received a call from Ulises Holland and she stated that pt's dtr Ce Morris is able to transport pt and that she would pick him up around 6:30PM/7PM today. Ulises Holland noted that Ce Morris also purchased some clothing and toiletries for pt. LSW notified pt. LSW completed PPOC and faxed it over along with chest x-ray results and H&P to [facility]. Kolton (Marketing/Admission) from [facility] confirmed they received it. LSW then met with pt while Urbano John ( from [facility]) and Ulises Holland (dtr--over the phone) were present. Urbano John stated that pt would receive medications from [address] [place] [address], pt in agreement. Therapy was discussed and Urbano John inquired if pt would like OP with their therapy onsite team. Yolande Arabella noted that pt originally chose [facility] and that they accepted, but gave pt the option to go with OP at [facility]. Pt chose to go with [facility]. Pt stated that he had no questions or concerns at this time. Ulises Holland stated that she did not either. [facility]rb[facility] John confirmed that they are ready to admit pt. LSW notified Nola Awan (RN) that pt will be picked up around 6:30/7PM today by his dtr Ce Morris.  Electronically signed by SAL Maxwell, MAKENZIEW on 5/17/2023 at 4:55 PM

## 2023-05-17 NOTE — FLOWSHEET NOTE
Patient assessment complete, he is resting in bed at this time with some complaints of eye discomfort from straining and would like Tylenol this evening. Medicated patient with Tylenol 650 MG PO PRN for eye discomfort. Patient was able to take all HS PO medications at once with sips of water without difficulty. Patient had a sandwich, jello, and soda for HS snack. Patient is concerned about his discharge plans for 05/17 because he would like to know whom is responsible for getting his prescriptions and oxygen to the facility. He states he is interested in being discharged to [facility] assisted living to see if he is able to live independently. Informed patient that once a definite facility is picked and he is accepted that the facility will make us aware of the requirements. Patient verbalized no further needs at this time, bed alarm engaged and call light within reach.

## 2023-05-17 NOTE — DISCHARGE SUMMARY
Subjective: The patient complains of  moderate to severe acute     Visual deficits partially relieved by rest, PT, OT,   and exacerbated by recent illness and exertion. Patient initially presented through the emergency room on 5/3/2023 with complaints of blurred vision and gait instability. He denied injury. He denied slurred speech. He was diagnosed with a CVA likely etiology embolic from a PFO. I am concerned about patients medical complexities and current active problems and barriers to progress including  cortical blindness. I discussed current functional, rehabilitation, medical status with other rehabilitation providers including nursing and case management. According to recent   note, \" Patient assessment complete, he is resting in bed at this time with some complaints of eye discomfort from straining and would like Tylenol this evening. Medicated patient with Tylenol 650 MG PO PRN for eye discomfort. Patient was able to take all HS PO medications at once with sips of water without difficulty. Patient had a sandwich, jello, and soda for HS snack. Patient is concerned about his discharge plans for 05/17 because he would like to know whom is responsible for getting his prescriptions and oxygen to the facility. He states he is interested in being discharged to [facility] assisted living to see if he is able to live independently. Informed patient that once a definite facility is picked and he is accepted that the facility will make us aware of the requirements. Patient verbalized no further needs at this time, bed alarm engaged and call light within reach. \". He has been a pivotal part of his discharge planning the  has been working with him several times a day on this. He tells me he is not heard or talk to the  this is not true. [id number] [address] Course:  The patient was admitted to the Rehabilitation Unit to address ADL and mobility deficits-as

## 2023-05-17 NOTE — PROGRESS NOTES
INDIVIDUALIZED OVERALL REHAB PLAN OF CARE  ADDENDUM TO REHAB PROGRESS NOTE-for audit purposes must also refer to this day's clinical note and combine the information      Date: 5/15/2023  Patient Name: Claire Guaman   Room: T062/N367-51    MRN: [id number]    : 1955  (77 y.o.)  Gender: male       Today 5/15/2023 during weekly team meeting, I reviewed the patient Claire Guaman in detail with the therapists and nurses involved in patient's care gathering complex physiatric data regarding current medical issues, progress in therapies, factors limiting progress, social issues, psychological issues, ongoing therapeutic plans and discharge planning. Legend:  I= independent Im =Modified independent  S=Supervised SB=stand by HUTTON=set up CG=contact hamida Min= minimal Mod=Moderate Max=maximal Max of 2 =maximal assist of 2 people      CURRENT FUNCTIONAL STATUS:    Patient initially presented through the emergency room on 5/3/2023 with complaints of blurred vision and gait instability. He denied injury. He denied slurred speech. He was diagnosed with a CVA likely etiology embolic from a PFO. He will need to FU as an out-pt re his PFO. He was admitted under the care of the hospitalist with neurology and cardiology consulting        MRI revealed edema in the right occipital lobe consistent with CVA    NURSING ISSUES:   Patient is resting in bed with no c/o pain or discomfort noted at this time. O2 @ 4L in place. Patient slept well through out the nightNursing will continue to focus on bowel and bladder continence transitioning toward independence by time of discharge. Monitoring post void residuals monitoring for severe constipation and bowel obstruction.       Barriers to progress and discharge complex medical conditions and complex social situations      Bowel function- constipation  Plans to address- scheduled voids     Bladder function-  continent    Plans to address- schedule voids before bed and therapy
INPATIENT PROGRESS NOTES    PATIENT NAME: Kelsey Boston  MRN: [id number]  SERVICE DATE:  May 16, 2023   SERVICE TIME:  8:14 AM      PRIMARY SERVICE: Pulmonary Disease    CHIEF COMPLAINTS: COPD and pulmonary fibrosis    INTERVAL HPI: Patient seen and examined at bedside, Interval Notes, orders reviewed. Nursing notes noted    Feels better, shortness of breath at baseline, no chest pain, no coughing, vision is improving, no nausea no vomiting    New information updated in the note today, rest of the examination did not change compared to yesterday. Review of system:     GI Abdominal pain No  Skin Rash No    Social History     Tobacco Use    Smoking status: Former     Packs/day: 2.00     Years: 49.00     Pack years: 98.00     Types: Cigarettes     Start date: 1971     Quit date: 12/15/2020     Years since quittin.4     Passive exposure: Past    Smokeless tobacco: Never   Substance Use Topics    Alcohol use: Yes     Comment: occ. 12 pack         Problem Relation Age of Onset    Heart Disease Mother     Other Father          OBJECTIVE    Body mass index is 32.99 kg/m². PHYSICAL EXAM:  Vitals:  /63   Pulse 69   Temp 98.1 °F (36.7 °C) (Oral)   Resp 17   Ht 5' 10\" (1.778 m)   Wt 229 lb 14.4 oz (104.3 kg)   SpO2 96%   BMI 32.99 kg/m²     General: alert, cooperative, no distress  Head: normocephalic, atraumatic  Eyes:No gross abnormalities. ENT:  MMM no lesions  Neck:  supple and no masses  Chest : Few bilateral basal rales, no wheezing good air movement, nontender, tympanic  Heart[de-identified] Heart sounds are normal.  Regular rate and rhythm without murmur, gallop or rub. ABD:  symmetric, soft, non-tender  Musculoskeletal : no cyanosis, no clubbing, and no edema  Neuro:  Grossly normal  Skin: No rashes or nodules noted.   Lymph node:  no cervical nodes  Urology: No Mon   Psychiatric: appropriate    DATA:   Recent Labs     05/15/23  0827   WBC 7.8   HGB 13.8*   HCT 41.9*   MCV 88.4        Recent
INPATIENT PROGRESS NOTES    PATIENT NAME: Kylie Tellez  MRN: [id number]  SERVICE DATE:  May 15, 2023   SERVICE TIME:  8:36 AM      PRIMARY SERVICE: Pulmonary Disease    CHIEF COMPLAINTS: COPD and pulmonary fibrosis    INTERVAL HPI: Patient seen and examined at bedside, Interval Notes, orders reviewed. Nursing notes noted        New information updated in the note today, rest of the examination did not change compared to yesterday. Patient report shortness of breath at baseline, no coughing, no chest pain, slept well, no nausea no vomiting, no worsening lower extremity edema. Review of system:     GI Abdominal pain No  Skin Rash No    Social History     Tobacco Use    Smoking status: Former     Packs/day: 2.00     Years: 49.00     Pack years: 98.00     Types: Cigarettes     Start date: 1971     Quit date: 12/15/2020     Years since quittin.4     Passive exposure: Past    Smokeless tobacco: Never   Substance Use Topics    Alcohol use: Yes     Comment: occ. 12 pack         Problem Relation Age of Onset    Heart Disease Mother     Other Father          OBJECTIVE    Body mass index is 33.2 kg/m². PHYSICAL EXAM:  Vitals:  /72   Pulse 78   Temp 97.9 °F (36.6 °C)   Resp 19   Ht 5' 10\" (1.778 m)   Wt 231 lb 6.4 oz (105 kg)   SpO2 98%   BMI 33.20 kg/m²     General: alert, cooperative, no distress  Head: normocephalic, atraumatic  Eyes:No gross abnormalities. ENT:  MMM no lesions  Neck:  supple and no masses  Chest : Few bilateral basal rales, no wheezing, good air movement, nontender, tympanic  Heart[de-identified] Heart sounds are normal.  Regular rate and rhythm without murmur, gallop or rub. ABD:  symmetric, soft, non-tender  Musculoskeletal : no cyanosis, no clubbing, and no edema  Neuro:  Grossly normal  Skin: No rashes or nodules noted. Lymph node:  no cervical nodes  Urology: No Mon   Psychiatric: appropriate    DATA:   No results for input(s): WBC, HGB, HCT, MCV, PLT in the last 72 hours.   No
[facility] Respiratory Therapy Evaluation   Current Order:  albuterol tid + q2 prn      Home Regimen: tid      Ordering Physician: alexx  Re-evaluation Date:  5/18     Diagnosis: impaired mobility      Patient Status: Stable / Unstable + Physician notified    The following MDI Criteria must be met in order to convert aerosol to MDI with spacer. If unable to meet, MDI will be converted to aerosol:  []  Patient able to demonstrate the ability to use MDI effectively  []  Patient alert and cooperative  []  Patient able to take deep breath with 5-10 second hold  []  Medication(s) available in this delivery method   []  Peak flow greater than or equal to 200 ml/min            Current Order Substituted To  (same drug, same frequency)   Aerosol to MDI [] Albuterol Sulfate 0.083% unit dose by aerosol Albuterol Sulfate MDI 2 puffs by inhalation with spacer    [] Levalbuterol 1.25 mg unit dose by aerosol Levalbuterol MDI 2 puffs by inhalation with spacer    [] Levalbuterol 0.63 mg unit dose by aerosol Levalbuterol MDI 2 puffs by inhalation with spacer    [] Ipratropium Bromide 0.02% unit dose by aerosol Ipratropium Bromide MDI 2 puffs by inhalation with spacer    [] Duoneb (Ipratropium + Albuterol) unit dose by aerosol Ipratropium MDI + Albuterol MDI 2 puffs by inhalation w/spacer   MDI to Aerosol [] Albuterol Sulfate MDI Albuterol Sulfate 0.083% unit dose by aerosol    [] Levalbuterol MDI 2 puffs by inhalation Levalbuterol 1.25 mg unit dose by aerosol    [] Ipratropium Bromide MDI by inhalation Ipratropium Bromide 0.02% unit dose by aerosol    [] Combivent (Ipratropium + Albuterol) MDI by inhalation Duoneb (Ipratropium + Albuterol) unit dose by aerosol       Treatment Assessment [Frequency/Schedule]:  Change frequency to: ___________________no change_______________________________per Protocol, P&T, MEC      Points 0 1 2 3 4   Pulmonary Status  Non-Smoker  []   Smoking history   < 20 pack years  []   Smoking history  ?  [address] [facility] Neurology Daily Progress Note  Name: Ang Apodaca  Age: 77 y.o. Gender: male  CodeStatus: Full Code  Allergies: No Known Allergies    Chief Complaint:No chief complaint on file. Primary Care Provider: Alannah Moore MD  InpatientTreatment Team: Treatment Team: Attending Provider: Isrrael Atkins DO; Consulting Physician: Corry Mendoza MD; Consulting Physician: Barbie Linares MD; Consulting Physician: Mini Mai MD; Consulting Physician: Gray Carroll, PhD; Consulting Physician: Kika Fernandez DO; Occupational Therapist: Cristy Barclay OT; Registered Nurse: Najma Gresham RN; Patient Care Tech: [facility]; Occupational Therapist Assistant: CESAR New; : SAL Roldan, JAZ  Admission Date: 5/5/2023      HPI   Pt seen and examined on rehab unit for neurology follow-up. Patient currently alert and oriented x3, no acute distress, cooperative. Visual changes persist.  Exam reveals slight improvement in upward vertical gaze palsy of the left. No one-sided weakness. No dysarthria, dysphagia, aphasia. Blood pressure stable. Vitals:    05/16/23 1023   BP:    Pulse: 87   Resp: 20   Temp:    SpO2: 95%        Review of Systems   Constitutional:  Negative for appetite change and fever. HENT:  Negative for hearing loss and trouble swallowing. Eyes:  Positive for visual disturbance. Respiratory:  Negative for cough, chest tightness, shortness of breath and wheezing. Cardiovascular:  Negative for chest pain, palpitations and leg swelling. Gastrointestinal:  Negative for nausea and vomiting. Musculoskeletal:  Negative for gait problem. Skin:  Negative for color change and rash. Neurological:  Negative for dizziness, tremors, seizures, syncope, facial asymmetry, speech difficulty, weakness, light-headedness, numbness and headaches. Psychiatric/Behavioral:  Negative for agitation, confusion and hallucinations. The patient is not nervous/anxious.
OCCUPATIONAL THERAPY  INPATIENT REHAB TREATMENT NOTE  [facility]      NAME: José Claire  : 1955 (77 y.o.)  MRN: [id number]  CODE STATUS: Full Code  Room: R245/R245-01    Date of Service: 2023    Referring Physician: Dr. Mallory Fiore  Rehab Diagnosis: Impaired mobility and ADLs due to CVA-with severe visual deficits, mild cognitive deficits, and moderate balance deficits    Restrictions  Restrictions/Precautions  Restrictions/Precautions: Fall Risk              Patient's date of birth confirmed: Yes    SAFETY:  Safety Devices  Safety Devices in place: Yes  Type of devices: All fall risk precautions in place    SUBJECTIVE:       Pain at start of treatment: No    Pain at end of treatment: No        COGNITION:  Orientation  Overall Orientation Status: Within Functional Limits  Orientation Level: Oriented to place;Oriented to time;Oriented to situation;Oriented to person  Cognition  Overall Cognitive Status: WFL          OBJECTIVE:     Provided Principles of Energy Conservation Techniques for Home program.  Principles under each subject were picked that mostly applied to pt needs. Pt was educated on each of these, and d/t vision deficits, pt cannot read them therefore minimal amounts were provided. Pt educated on proper pacing when completing an activity  2. To avoid awkward stooped posturing d/t it decreasing  the lung capacity. 3. Avoiding humidity such as when showering and using a vent  4. Discussed pt completing the more exhausting activity when he has the most energy. 5. In addition, to not complete 2 energy consuming tasks in row such as showering then going to shopping/to a doctors appt. 6. Pt educated to have items out and ready the night before for mrn ADL routine to save on energy.        Education:  Education  Education Given To: Patient  Education Provided: Energy Conservation  Education Method: Verbal  Barriers to Learning: Vision;Cognition  Education Outcome: Verbalized
OCCUPATIONAL THERAPY  INPATIENT REHAB TREATMENT NOTE  [facility]      NAME: John Gauthier  : 1955 (77 y.o.)  MRN: [id number]  CODE STATUS: Full Code  Room: R245/R245-01    Date of Service: 5/15/2023    Referring Physician: Dr. Mita Glover  Rehab Diagnosis: Impaired mobility and ADLs due to CVA-with severe visual deficits, mild cognitive deficits, and moderate balance deficits    Restrictions  Restrictions/Precautions  Restrictions/Precautions: Fall Risk          Patient's date of birth confirmed: Yes    SAFETY:  Safety Devices  Safety Devices in place: Yes  Type of devices: All fall risk precautions in place    SUBJECTIVE:  Subjective: \" They arent the same size. \"    Pain at start of treatment: No    Pain at end of treatment: No    COGNITION:  Orientation  Overall Orientation Status: Within Functional Limits  Orientation Level: Oriented to place;Oriented to time;Oriented to situation;Oriented to person  Cognition  Overall Cognitive Status: WFL      OBJECTIVE:     Pt completed button sorting/matching by color seated at table top height. Pt had primarily used R hand to sort them, taking an extended amount of time d/t having difficulty with differentiating between the black and gray buttons. Brought \"black\" button pile up in front of pt to see if he could tell the different between the 2 colors but could not. Pt then picked up each button, switching back and forth between hands. Pt completed task without difficulty but needed to concentrate hard d/t visual deficits. During sorting, pt would close one eye to gain focus on what he was looking at. Pt required extended time d/t visual deficits.   Pt completed task to increase fine/gross motor tasks and increase ability to learn to compensate for visual deficits in order to complete functional daily task performance skills at optimal levels of safety and Ind.     ASSESSMENT:  Assessment: Pt demo difficulty this day on following through and needing
OCCUPATIONAL THERAPY  INPATIENT REHAB TREATMENT NOTE  [facility]      NAME: Sorin Mcguire  : 1955 (77 y.o.)  MRN: [id number]  CODE STATUS: Full Code  Room: 45/R245-01    Date of Service: 2023    Referring Physician: Dr. Evern Hodgkins  Rehab Diagnosis: Impaired mobility and ADLs due to CVA-with severe visual deficits, mild cognitive deficits, and moderate balance deficits    Restrictions:Fall,O2                 Patient's date of birth confirmed: Yes    SAFETY:  Safety Devices  Safety Devices in place: Yes  Type of devices: All fall risk precautions in place    SUBJECTIVE:Pt cooperative. Pain at start of treatment: No    Pain at end of treatment: No    Location:   Description   Nursing notified: Not Applicable  RN:   Intervention: Repositioned    COGNITION:         Patient's cognition will improve or maintain baseline to safely perform ADLs:    OBJECTIVE:         Feeding  Assistance Level: Modified independent  Grooming/Oral Hygiene  Assistance Level: Modified independent  Upper Extremity Bathing  Assistance Level: Modified independent  Lower Extremity Bathing  Assistance Level: Modified independent  Upper Extremity Dressing  Skilled Clinical Factors: gown only  Lower Extremity Dressing  Assistance Level: Modified independent  Putting On/Taking Off Footwear  Assistance Level: Modified independent  Toilet Transfers  Equipment: Grab bars  Assistance Level: Modified independent  Tub/Shower Transfers  Type: Shower  Transfer From: Standing without device  Transfer To: Shower chair with back  Additional Factors: With handrails  Assistance Level: Modified independent       Pt completed independent in room to manage wheelchair brake and retrieve item from closet. Pt independent with bed mobility. .       Education:       Equipment recommendations:         ASSESSMENT:Pt has achieved highest level of function.          PLAN OF CARE:  Strengthening, Balance training, Functional mobility training,
OCCUPATIONAL THERAPY  INPATIENT REHAB TREATMENT NOTE  [facility]      NAME: Shaun Barry  : 1955 (77 y.o.)  MRN: [id number]  CODE STATUS: Full Code  Room: R245/R245-01    Date of Service: 2023    Referring Physician: Dr. Kirsten Velásquez  Rehab Diagnosis: Impaired mobility and ADLs due to CVA-with severe visual deficits, mild cognitive deficits, and moderate balance deficits    Restrictions:Fall                 Patient's date of birth confirmed: Yes    SAFETY:  Safety Devices  Safety Devices in place: Yes  Type of devices: All fall risk precautions in place    SUBJECTIVE:Pt pleasant and cooperative. Pain at start of treatment: No    Pain at end of treatment: No    Location:   Description   Nursing notified: Not Applicable  RN:   Intervention: Repositioned    COGNITION:         Patient's cognition will improve or maintain baseline to safely perform ADLs:    OBJECTIVE:         Feeding  Assistance Level: Modified independent  Grooming/Oral Hygiene  Assistance Level: Modified independent  Upper Extremity Bathing  Assistance Level: Modified independent  Lower Extremity Bathing  Assistance Level: Modified independent  Upper Extremity Dressing  Skilled Clinical Factors: gown only  Lower Extremity Dressing  Assistance Level: Modified independent  Putting On/Taking Off Footwear  Assistance Level: Modified independent  Tub/Shower Transfers  Transfer To: Shower chair with back  Additional Factors:  With handrails  Assistance Level: Modified independent                                                           Education:       Equipment recommendations:         ASSESSMENT:         PLAN OF CARE:  Strengthening, Balance training, Functional mobility training, Endurance training, Safety education & training, Patient/Caregiver education & training, Self-Care / ADL, Home management training, Cognitive/Perceptual training, Coordination training, Other (comment)  continue with OT plan of care    Patient goals :
OCCUPATIONAL THERAPY  INPATIENT REHAB TREATMENT NOTE  [facility]      NAME: Sorin Mcguire  : 1955 (77 y.o.)  MRN: [id number]  CODE STATUS: Full Code  Room: [facility]/R245-01    Date of Service: 5/15/2023    Referring Physician: Dr. Evern Hodgkins  Rehab Diagnosis: Impaired mobility and ADLs due to CVA-with severe visual deficits, mild cognitive deficits, and moderate balance deficits    Restrictions  Restrictions/Precautions  Restrictions/Precautions: Fall Risk              Patient's date of birth confirmed: Yes    SAFETY:  Safety Devices  Safety Devices in place: Yes  Type of devices: All fall risk precautions in place    SUBJECTIVE:       Pain at start of treatment: No    Pain at end of treatment: No        COGNITION:         Pt's current cognitive status is:  Comprehension: Supervision  Expression: Mod I  Social Interaction: Mod I  Problem Solving: Supervision  Memory: Mod I    OBJECTIVE:         Grooming/Oral Hygiene  Assistance Level: Modified independent  Upper Extremity Bathing  Assistance Level: Modified independent  Lower Extremity Bathing  Assistance Level: Modified independent  Upper Extremity Dressing  Assistance Level: Modified independent  Lower Extremity Dressing  Assistance Level: Modified independent  Putting On/Taking Off Footwear  Assistance Level: Modified independent  Toileting  Assistance Level: Modified independent  Skilled Clinical Factors: sitting at EOB using urinal  Toilet Transfers  Skilled Clinical Factors: none  Tub/Shower Transfers  Type: Shower  Transfer From: Standing without device  Transfer To:  Shower chair with back  Assistance Level: Supervision         Functional Mobility  Activity: To/From bathroom  Assistance Level: Modified independent  Skilled Clinical Factors: No AD  Sit to Supine  Assistance Level: Modified independent  Supine to Sit  Assistance Level: Modified independent  Sit to Stand  Assistance Level: Modified independent  Stand to Sit  Assistance Level:
OCCUPATIONAL THERAPY  INPATIENT REHAB TREATMENT NOTE  [facility]      NAME: Marlena Lopez  : 1955 (77 y.o.)  MRN: [id number]  CODE STATUS: Full Code  Room: R245/R245-01    Date of Service: 2023    Referring Physician: Dr. Iram Valdez  Rehab Diagnosis: Impaired mobility and ADLs due to CVA-with severe visual deficits, mild cognitive deficits, and moderate balance deficits    Restrictions  Restrictions/Precautions  Restrictions/Precautions: Fall Risk              Patient's date of birth confirmed: Yes    SAFETY:  Safety Devices  Safety Devices in place: Yes  Type of devices: All fall risk precautions in place    SUBJECTIVE:     Pain at start of treatment: No    Pain at end of treatment: No    COGNITION:  Orientation  Overall Orientation Status: Within Functional Limits  Orientation Level: Oriented to place;Oriented to time;Oriented to situation;Oriented to person  Cognition  Overall Cognitive Status: WFL          OBJECTIVE:    At beginning of therapy session pt started discussing that he was leaving today probably. Discussed 02 for assisted living, letting pt know that the place may have a vendor they use for all 02, and to just ask. Pt very anxious at this point and wanted to talk a lot about his concerns. Provided verbal re-direction to keep therapy going and try to stay on task. Grooming/Oral Hygiene  Assistance Level: Modified independent  Skilled Clinical Factors: wash hands only  Upper Extremity Dressing  Assistance Level: Modified independent  Lower Extremity Dressing  Assistance Level: Modified independent  Putting On/Taking Off Footwear  Assistance Level: Modified independent  Toileting  Assistance Level: Modified independent  Toilet Transfers  Technique: Stand step  Equipment: Grab bars  Assistance Level: Modified independent  Skilled Clinical Factors: good 02 cord management  Pt took extended time to complete tasks d/t excessive talking and requiring re-direction.        Functional
OCCUPATIONAL THERAPY  INPATIENT REHAB TREATMENT NOTE  [facility]      NAME: Merrill Medley  : 1955 (77 y.o.)  MRN: [id number]  CODE STATUS: Full Code  Room: R245/R245-01    Date of Service: 5/15/2023    Referring Physician: Dr. Marvin Rm  Rehab Diagnosis: Impaired mobility and ADLs due to CVA-with severe visual deficits, mild cognitive deficits, and moderate balance deficits    Restrictions  Restrictions/Precautions  Restrictions/Precautions: Fall Risk              Patient's date of birth confirmed: Yes    SAFETY:  Safety Devices  Safety Devices in place: Yes  Type of devices: All fall risk precautions in place    SUBJECTIVE:  Subjective: \" I think these will fit. \"    Pain at start of treatment: No    Pain at end of treatment: No    COGNITION:  Orientation  Overall Orientation Status: Within Functional Limits  Orientation Level: Oriented to place;Oriented to time;Oriented to situation;Oriented to person      OBJECTIVE:     Toileting  Assistance Level: Modified independent  Skilled Clinical Factors: sitting at EOB using urinal; pant management  Toilet Transfers  Skilled Clinical Factors: none  Tub/Shower Transfers       Functional Mobility  Sit to Stand  Assistance Level: Modified independent  Stand to Sit  Assistance Level: Modified independent     ASSESSMENT:  Assessment: Pt cooperative  Activity Tolerance: Patient tolerated treatment well      PLAN OF CARE:  Strengthening, Balance training, Functional mobility training, Endurance training, Safety education & training, Patient/Caregiver education & training, Self-Care / ADL, Home management training, Cognitive/Perceptual training, Coordination training, Other (comment)  continue with OT plan of care    Patient goals : Pt verbalized, \"I want my vision to return and to be able to do all of the things by myself. \"  Time Frame for Long Term Goals :  Within 1-2 weeks, Pt to demonstrate progress in ronnie following areas listed below to achieve specific LTGs
PROGRESS NOTE    Patient Name: Buck Metz Date: 2023  8:45 PM  MR #: [id number]  : 1955    Attending Physician: Aminta Rodríguez DO  Reason for consult: CVA    History of Presenting Illness:      Sommer Coronel is a 77 y.o. male on hospital day 10 with a history of . History Obtained From:  patient, electronic medical record    Pt transferred to Rehab. Seen in Rehab Unit. He still has vision defects. Feels like he is looking joseph crossed. No nausea no headache. Denies CP nor SOB. He has a larger PFO by BARBARA. This will be addressed w Percutaneous closure as out pt.     23 I was contacted by RN staff. Early this am around 0300 pt woke up and was SOB and felt heavy pressure like he had ran. Felt vry tired. Feels fine now. 23 No further symptoms since starting Imdur yesterday. No cp no sob no fatigue  Telemetry no malignant arrhythmia    5/10/23 no further angina complaints on Nitrate. Still w visual defect. Has mild headache but this was since CVA and not Nitrate. 23 denies cp nor sob doing well w rehab. Early thi misael felt like his heart was racing. Nurse did his vitals while he felt this way and he was not tachycardic. Since resolved. 5/15/23 no acute evetns overnight. Getting stronger. Still nee standby assistnace to walk. Does not feel sure of himself yet.  No cp no sob   History:      EKG: SR  Past Medical History:   Diagnosis Date    CHF (congestive heart failure) (HCC)     COPD (chronic obstructive pulmonary disease) (HCC)     Hypertension     Lung disease     Osteoarthritis     hands worst    Sleep apnea      Past Surgical History:   Procedure Laterality Date    CHOLECYSTECTOMY      CORONARY ANGIOPLASTY WITH STENT PLACEMENT  2020    DIAGNOSTIC CARDIAC CATH LAB PROCEDURE  2020    PTCA  2020    ROTATOR CUFF REPAIR      right       Family History  Family History   Problem Relation Age of Onset    Heart Disease Mother     Other Father
Patient is resting in bed with no c/o pain or discomfort noted at this time. O2 @ 4L in place. Patient slept well through out the night.
Physical Therapy Missed Treatment   Facility/Department: [facility] G005/D606-55    NAME: Padmaja Rivera    : 1955 (77 y.o.)  MRN: [id number]    Account: [de-identified]  Gender: male      Patient unavailable to participate in scheduled therapy session at 36 secondary to patient meeting with AL facility at this time. Patient missed 30 minutes of scheduled therapy.       Manda Lozoya PTA, 23 at 12:14 PM
Physical Therapy Rehab Treatment Note  Facility/Department: [facility]  Room: [place] [address]       NAME: Trev Kirby  : 1955 (77 y.o.)  MRN: [id number]  CODE STATUS: Full Code    Date of Service: 2023     Restrictions:  Restrictions/Precautions: Fall Risk    SUBJECTIVE:   Subjective: \" I guess I am getting out of here tomorrow\"    Pain  Pain: denies pain pre and post session    OBJECTIVE:     Sit to Stand  Assistance Level: Independent  Stand to Sit  Assistance Level: Independent  Car Transfer  Assistance Level: Independent    Ambulation  Surface: Level surface  Device:  (No AD)  Distance: 100'  Activity: Within Unit  Activity Comments: Reciprocal pattern wide MAG increased lateral excursion. Improved safety with obstacle avoidance able to negotiate around objects  Assistance Level: Modified independent  Gait Deviations: Slow red; Path deviations; Wide base of support    Stairs  Stair Height: 6''  Device: Bilateral handrails  Number of Stairs: 4  Additional Factors: Verbal cues; Non-reciprocal going up;Reciprocal going down  Assistance Level: Independent  Skilled Clinical Factors: Reciprocal ascending non reciprocal descending , pt with good technique and safety. Able to manage O2 line this session without the need for cueing at this time. ASSESSMENT/PROGRESS TOWARDS GOALS:   Assessment  Assessment: Patient continues to meet goals at this time. TUG completed in 13 seconds. Good safety noted with all transfers, gait and stair negotiation.   Activity Tolerance: Patient tolerated treatment well  Discharge Recommendations: Continue to assess pending progress    Goals:  Long Term Goals  Long Term Goal 1: Pt will be indep with bed mobility  Long Term Goal 2: Pt will be indep with functional transfers  Long Term Goal 3: Pt will ambulate 50ft with or without appropriate AD, indep  Long Term Goal 4: Pt will demonstrate TUG <15 seconds to demonstrate low fall risk  Long Term Goal 5: Pt to achieve >19/24
Physical Therapy Rehab Treatment Note  [facility]       NAME: Merrill Medley  : 1955 (77 y.o.)  MRN: [id number]  CODE STATUS: Full Code    Date of Service: 5/15/2023       Restrictions:  Restrictions/Precautions: Fall Risk       SUBJECTIVE:   Subjective: \" I am going to go to assisted living first\"    Pain  Pain: denies pain pre and post session      OBJECTIVE:      Outcomes Measures:  Dynamic Gait Total Score: 19  Timed Up and Go: 12     Supine to Sit  Assistance Level: Independent    Sit to Stand  Assistance Level: Modified independent  Skilled Clinical Factors: Improved hand placement and safety  Stand to Sit  Assistance Level: Modified independent  Skilled Clinical Factors: Reaches back controls descent into chair  Bed To/From Chair  Technique: Stand step  Assistance Level: Modified independent    Stairs  Stair Height: 6''  Device: Bilateral handrails  Number of Stairs: 4  Additional Factors: Verbal cues; Non-reciprocal going up;Reciprocal going down  Assistance Level: Modified independent    Activity Tolerance  Activity Tolerance: Patient tolerated treatment well    ASSESSMENT/PROGRESS TOWARDS GOALS:   Assessment  Assessment: Patient is meeting goals for TUG DGI bed mobility transfer and stairs. Activity Tolerance: Patient tolerated treatment well  Discharge Recommendations: Continue to assess pending progress    Goals:  Long Term Goals  Long Term Goal 1: Pt will be indep with bed mobility  Long Term Goal 2: Pt will be indep with functional transfers  Long Term Goal 3: Pt will ambulate 50ft with or without appropriate AD, indep  Long Term Goal 4: Pt will demonstrate TUG <15 seconds to demonstrate low fall risk  Long Term Goal 5: Pt to achieve >19/24 DGI  Patient Goals   Patient Goals : to go home and see better    PLAN OF CARE/Safety:   Safety Devices  Type of Devices: All fall risk precautions in place;Call light within reach; Bed alarm in place      Therapy
Physical Therapy Rehab Treatment Note  [facility]       NAME: Sam Davis  : 1955 (89 y.o.)  MRN: [id number]  CODE STATUS: Full Code    Date of Service: 2023       Restrictions:  Restrictions/Precautions: Fall Risk       SUBJECTIVE:   Subjective: \" I am doing good\"    Pain  Pain: denies pain pre and post session    OBJECTIVE:     Sit to Stand  Assistance Level: Independent  Stand to Sit  Assistance Level: Independent  Bed To/From Chair  Technique: Stand step  Assistance Level: Independent  Car Transfer  Assistance Level: Independent    Ambulation  Surface: Level surface  Distance: 76'  Activity: Within Unit  Activity Comments: Reciprocal pattern wide MAG increased lateral excursion. Improved safety with obstacle avoidance able to negotiate around objects. Maintains oxygen tubing throughout  Assistance Level: Independent    Picking up object with reacher: Independent    Activity Tolerance  Activity Tolerance: Patient tolerated treatment well    ASSESSMENT/PROGRESS TOWARDS GOALS:   Assessment  Assessment: Patient has met all goals  Activity Tolerance: Patient tolerated treatment well  Discharge Recommendations: Continue to assess pending progress    Goals:  Long Term Goals  Long Term Goal 1: Pt will be indep with bed mobility- Met  Long Term Goal 2: Pt will be indep with functional transfers- Met  Long Term Goal 3: Pt will ambulate 50ft with or without appropriate AD, indep- Met  Long Term Goal 4: Pt will demonstrate TUG <15 seconds to demonstrate low fall risk- Met 13 seconds  Long Term Goal 5: Pt to achieve >19/24 DGI- Met 19/24  Patient Goals   Patient Goals : to go home and see better    PLAN OF CARE/Safety:   Safety Devices  Type of Devices: All fall risk precautions in place;Call light within reach; Bed alarm in place      Therapy Time:   Individual   Time In 1430   Time Out 1500   Minutes 30     Minutes: 30  Transfer/Bed mobility training:15  Gait training:
Physical Therapy Rehab Treatment Note  [facility]  [facility]       NAME: Gordy Murphy  : 1955 (24 y.o.)  MRN: [id number]  CODE STATUS: Full Code    Date of Service: 2023     Restrictions:  Restrictions/Precautions: Fall Risk    SUBJECTIVE:   Subjective: \" I am doing good\"    Pain  Pain: denies pain pre and post session    OBJECTIVE:     Sit to Supine  Assistance Level: Independent  Supine to Sit  Assistance Level: Independent    Sit to Stand  Assistance Level: Independent  Stand to Sit  Assistance Level: Independent  Bed To/From Chair  Technique: Stand step  Assistance Level: Independent    Ambulation  Surface: Level surface  Distance: 76'  Activity: Within Unit  Activity Comments: Reciprocal pattern wide MAG increased lateral excursion. Improved safety with obstacle avoidance able to negotiate around objects. MMMMMMMaintains oxygen tubing throughout  Assistance Level: Independent    ASSESSMENT/PROGRESS TOWARDS GOALS:   Assessment  Assessment: Patient consistently meeting gait transfer and bed mobility goals at this time.  Continues to show improved safety with O2 tubing management  Activity Tolerance: Patient tolerated treatment well  Discharge Recommendations: Continue to assess pending progress    Goals:  Long Term Goals  Long Term Goal 1: Pt will be indep with bed mobility  Long Term Goal 2: Pt will be indep with functional transfers  Long Term Goal 3: Pt will ambulate 50ft with or without appropriate AD, indep  Long Term Goal 4: Pt will demonstrate TUG <15 seconds to demonstrate low fall risk  Long Term Goal 5: Pt to achieve >19/24 DGI  Patient Goals   Patient Goals : to go home and see better    PLAN OF CARE/Safety:          Therapy Time:   Individual   Time In 1130   Time Out 1200   Minutes 30     Minutes: 30  Transfer/Bed mobility training: 15  Gait trainin [place], PTA, 23 at 12:36 PM
Physical Therapy Rehab Treatment Note  Facility/Department: [facility]  Room: R245/R245-01       NAME: Isra Arzate  : 1955 (77 y.o.)  MRN: [id number]  CODE STATUS: Full Code    Date of Service: 2023       Restrictions:  Restrictions/Precautions: Fall Risk       SUBJECTIVE:   Subjective: \"I am waiting on a call so I will need to answer if my phone rings. \"    Pain  Pain: denies pain pre and post session      OBJECTIVE:     Roll Left  Assistance Level: Independent  Sit to Supine  Assistance Level: Independent  Supine to Sit  Assistance Level: Independent  Scooting  Assistance Level: Independent    Transfers  Surface: From bed; Wheelchair  Sit to Stand  Assistance Level: Modified independent  Stand to Sit  Assistance Level: Modified independent    Ambulation  Surface: Level surface  Distance: distance not focus - short distances around room  Activity: Within Room  Assistance Level: Modified independent  Gait Deviations: Slow red; Path deviations; Wide base of support  Skilled Clinical Factors: lateral sway throughout, no instability or LOB    Neuromuscular Education  Neuromuscular Comments: functional standing tasks with fwd reaching at sink, manuevering around objects in room, turning on and off lights, open/closing doors, opening/closing curtain, performed with no LOB or instability. Pt aware of improtance of managing O2 line and requires increased time to manage appropriately around objects      ASSESSMENT/PROGRESS TOWARDS GOALS:   Assessment  Assessment: Pt assessed for independence in room this PM. Pt challenged with manuevering around room while managing O2 line. Pt requires increased time to complete O2 line management when manuevering d/t visual defecits but is able to complete. No instability or LOB exhibited with trsfs or gait training.     Goals:  Long Term Goals  Long Term Goal 1: Pt will be indep with bed mobility  Long Term Goal 2: Pt will be indep with functional transfers  Long Term Goal
Physical Therapy Rehab Treatment Note.rehab    [facility]       NAME: Niya Rendon  : 1955 (77 y.o.)  MRN: [id number]  CODE STATUS: Full Code    Date of Service: 5/15/2023                                                                                                                                                                                                                                                    Restrictions:  Restrictions/Precautions: Fall Risk    SUBJECTIVE:   Subjective: \" I am going to go to assisted living first\"    Pain  Pain: denies pain pre and post session    OBJECTIVE:     Roll Left  Assistance Level: Independent  Roll Right  Assistance Level: Independent  Sit to Supine  Assistance Level: Independent  Supine to Sit  Assistance Level: Independent  Scooting  Assistance Level: Independent    Sit to Stand  Assistance Level: Modified independent  Skilled Clinical Factors: Improved hand placement and safety  Stand to Sit  Assistance Level: Modified independent  Skilled Clinical Factors: Reaches back controls descent into chair  Bed To/From Chair  Technique: Stand step  Assistance Level: Modified independent    Ambulation  Surface: Level surface  Device:  (No AD)  Distance: 100'  Activity: Within Room; Within Unit  Activity Comments: Reciprocal pattern wide MAG increased lateral excursion. Improved safety with obstacle avoidance able to negotiate around objects  Assistance Level: Modified independent  Gait Deviations: Slow red; Path deviations; Wide base of support    ASSESSMENT/PROGRESS TOWARDS GOALS:   Assessment  Assessment: Patient with improved safety awareness with O2 line management with gait training around obstacles and in room.   Activity Tolerance: Patient tolerated treatment well  Discharge Recommendations: Continue to assess pending progress    Goals:  Long Term Goals  Long Term Goal 1: Pt will be indep with bed mobility  Long Term
Progress Note    Date:5/15/2023       [facility]  Patient Name:Lele Roy     YOB: 1955     Age:68 y.o. Assessment        Hospital Problems             Last Modified POA    * (Principal) Impaired mobility and activities of daily living dt CVA 5/6/2023 Yes    Angina at rest [facility]) 5/8/2023 Yes    Interstitial pulmonary fibrosis ([address] 5/8/2023 Yes    Essential hypertension, benign 5/8/2023 Yes    Related Goals    Blood Pressure < 140/90    COPD with acute exacerbation ([address] 5/8/2023 Yes    Osteoarthritis 5/8/2023 Yes    Overview Signed 4/24/2017  1:38 PM by Ray Arreguin MD     hands worst         Tobacco abuse 5/8/2023 Yes    Psychophysiological insomnia 5/8/2023 Yes    JONNY (obstructive sleep apnea) 5/8/2023 Yes    Chronic respiratory failure with hypoxia and hypercapnia ([address] 5/9/2023 Yes    Cerebrovascular accident (CVA) ([address] 5/8/2023 Yes    Memory deficit 5/8/2023 Yes    Vision changes 5/8/2023 Yes    PFO (patent foramen ovale) 5/8/2023 Yes     Plan:        Impaired mobility and activities of daily living due to acute CVA - Dr. Heath Pereira managing. Acute CVA - right occipital and medial temporal lobe metabolic in left lung, infarct - neurology, Dr. Loni Servin managing. On Eliquis, statin, and Plavix. Essential hypertension, large PFO, HFpEF- cardiology following. Blood pressure within acceptable range, latest blood pressure 110/72. BARBARA with bubble study revealed LVEF 55 to 60%. Euvolemic on assessment. Neurology and cardiology recommended elective PFO closure in 3 weeks as an outpatient basis. On Eliquis, Lipitor, Plavix, Lasix, Imdur, lisinopril, and Lopressor. COPD - O2 saturation stable at high 90s on 4 L NC. Denies worsening SOB. With scheduled and PRN breathing treatments. JONNY - CPAP at night and during daytime naps. Hospital medicine managing acute needs. Patient will need to follow up with PCP for chronic disease management.      Time spent evaluating and
Pt. Evaluated by AL staff. Pt. Is doing well with therapy. Mantou test complete 5/15/2023. No significant reaction noted at this time.
Subjective: The patient complains of  moderate to severe acute     Visual deficits partially relieved by rest, PT, OT,   and exacerbated by recent illness and exertion. Patient initially presented through the emergency room on 5/3/2023 with complaints of blurred vision and gait instability. He denied injury. He denied slurred speech. He was diagnosed with a CVA likely etiology embolic from a PFO. I am concerned about patients medical complexities and current active problems and barriers to progress including  cortical blindness. I discussed current functional, rehabilitation, medical status with other rehabilitation providers including nursing and case management. According to recent   note, \" Patient is resting in bed with no c/o pain or discomfort noted at this time. O2 @ 4L in place. Patient slept well through out the night\". He continues to have mild bilateral lower extremity edema. He is now wanting to go to assisted living from rehab he is looking for \"a place for mom\". He tells me he is not take to assisted living yet. I reviewed his labs CBC and BMP essentially unremarkable. He needs a TB test prior to going to the assisted living we are waiting to see if that was indeed sent as it was ordered on Saturday. ROS x10: The patient also complains of severely impaired mobility and activities of daily living. Otherwise no new problems with vision, hearing, nose, mouth, throat, dermal, cardiovascular, GI, , pulmonary, musculoskeletal, psychiatric or neurological. See Rehab H&P on Rehab chart dated . Vital signs:  /72   Pulse 78   Temp 97.9 °F (36.6 °C)   Resp 19   Ht 5' 10\" (1.778 m)   Wt 231 lb 6.4 oz (105 kg)   SpO2 98%   BMI 33.20 kg/m²   I/O:   PO/Intake:  fair PO intake, no problems observed or reported with Reg diet    Bowel/Bladder:  continent, constipation and urinary urgency.   General:  Patient is well developed, adequately nourished, non-obese
Grooming: Modified independent   UE Bathing: Modified independent   LE Bathing: Modified independent   UE Dressing: Modified independent   LE Dressing: Modified independent   Toileting: Modified independent   Toilet Transfers  Toilet - Technique: Ambulating  Equipment Used: Grab bars  Toilet Transfer: Modified independent     Shower Transfers  Shower - Transfer Type: To and From  Shower - Transfer To: Shower seat with back  Shower - Technique: Ambulating  Shower Transfers: Modified independence      Orientation Status:  Orientation  Overall Orientation Status: Within Functional Limits    Cognition Status:  Cognition  Overall Cognitive Status: WFL  Cognition Comment: Patient required extra time to process information    Perception Status:  Perception  Overall Perceptual Status:  (continued difficulty with perception)    Sensation Status:  Sensation  Overall Sensation Status: WNL    Vision and Hearing Status:  Vision  Vision: Impaired  Vision Exceptions: Wears glasses for reading (New visual deficits from CVA)  Hearing  Hearing: Within functional limits     UE Function Status:    ROM:   LUE AROM (degrees)  LUE AROM : WFL  Left Hand AROM (degrees)  Left Hand AROM: WFL  RUE AROM (degrees)  RUE AROM : WFL  Right Hand AROM (degrees)  Right Hand AROM: WFL    Strength:  LUE Strength  Gross LUE Strength: WFL  RUE Strength  Gross RUE Strength: WFL    Coordination, Tone, Quality of Movement:    Tone RUE  RUE Tone: Normotonic  Tone LUE  LUE Tone: Normotonic  Coordination  Movements Are Fluid And Coordinated: Yes    D/C Recommendations:    Equipment Recommendations:   Patient will benefit from a shower chair and handheld shower    OT Follow Up:  OT D/C RECOMMENDATIONS  REQUIRES OT FOLLOW-UP: Yes  Type: [address] Exercise Program Provided: [] Yes [x] No  Patient to continue with therapy at the assisted living facility to work on perception and increasing safety     Electronically signed by:    Nicolasa Garcia
to improve the P/AROM. Therapeutic modifications regarding activities in therapies, place, amount of time per day and intensity of therapy made daily. In bed therapies or bedside therapies prn. Bowel progressive constipation, and Bladder dysfunction monitoring neurogenic bladder:  frequent toileting, ambulate to bathroom with assistance, check post void residuals. Check for C.difficile x1 if >2 loose stools in 24 hours, continue bowel & bladder program.  Monitor bowel and bladder function. Lactinex 2 PO every AC. MOM prn, Brown Bomb prn, Glycerin suppository prn, enema prn. Moderate upper extremity arthritic pain as well as generalized OA pain: reassess pain every shift and prior to and after each therapy session, give prn Tylenol and consider scheduled Tylenol, modalities prn in therapy, Lidoderm, K-pad prn. Skin healing and breakdown risk:  continue pressure relief program.  Daily skin exams and reports from nursing. Lac-Hydrin- He complains of dry skin of ordered Lac-Hydrin. Severe fatigue due to nutritional and hydration deficiency: Add vitamin B12 vitamin D and CoQ10 continue to monitor I&Os, calorie counts prn, dietary consult prn. Add healthy HS snack. Acute episodic insomnia with situational adjustment disorder:  consider prn low dose Ambien, monitor for day time sedation. Add HS \"Tuck In\"  Falls risk elevated:  patient to use call light to get nursing assistance to get up, bed and chair alarm. Elevated DVT risk: progressive activities in PT, continue prophylaxis MISTY hose, elevation and Eliquis. Complex discharge planning:   Begin medication reconciliation, patient and family education, and medication simplification coordinating follow-up care with case management and social work as we progressed toward patient's plan pending Discharge is planned for this weekend 5/17/2023 home to [facility] at assisted living facility alone with help from home health care.   Patient is status post weekly
HISTORY: Reason for exam:->CVA What reading provider will be dictating this exam?->CRC FINDINGS: INTRACRANIAL STRUCTURES/VENTRICLES: There is an acute or early subacute infarction in the right occipital lobe, extending into the posteromedial temporal lobe. A tiny, 4 mm focus of acute or early subacute infarction is also seen in the medial right thalamus. All these areas constitute a right PCA territory infarction. A tiny focus of acute or early subacute infarction is also noted in the medial left thalamus, which is within the left PCA territory. No hemorrhage, significant mass effect or midline shift seen. Mild generalized cerebral volume loss is noted with moderate to severe chronic microvascular ischemic changes. No hydrocephalus or extra-axial fluid is seen. ORBITS: The visualized portion of the orbits demonstrate no acute abnormality. SINUSES: Moderate mucosal thickening is seen in the right maxillary sinus. Minimal mucosal thickening in the remainder of the paranasal sinuses. Moderate bilateral mastoid effusions, larger on the right. BONES/SOFT TISSUES: The bone marrow signal intensity appears normal. The soft tissues demonstrate no acute abnormality. 1. Right PCA territory ACUTE OR EARLY SUBACUTE INFARCTION involving the right occipital and medial temporal lobe, as well as a small focus in the medial right thalamus. 2. Tiny, 4 mm focus of ACUTE OR EARLY SUBACUTE INFARCTION also noted in the left medial thalamus (left PCA territory). 3. No hemorrhage, significant mass effect or midline shift. 4. Bilateral mastoid effusions, right greater than left, which may be due to eustachian tube dysfunction or otomastoiditis.  RECOMMENDATIONS: Unavailable     No orders to display       IMPRESSION AND SUGGESTION:  Acute stroke, improving, vision is getting better  Pulmonary emphysema with fibrosis,  COPD, no signs of exacerbation  Pulmonary fibrosis  Chronic respiratory failure on 3 L O2 at home,   JONNY,
A tiny, 4 mm focus of acute or early subacute infarction is also seen in the medial right thalamus. All these areas constitute a right PCA territory infarction. A tiny focus of acute or early subacute infarction is also noted in the medial left thalamus, which is within the left PCA territory. No hemorrhage, significant mass effect or midline shift seen. Mild generalized cerebral volume loss is noted with moderate to severe chronic microvascular ischemic changes. No hydrocephalus or extra-axial fluid is seen. ORBITS: The visualized portion of the orbits demonstrate no acute abnormality. SINUSES: Moderate mucosal thickening is seen in the right maxillary sinus. Minimal mucosal thickening in the remainder of the paranasal sinuses. Moderate bilateral mastoid effusions, larger on the right. BONES/SOFT TISSUES: The bone marrow signal intensity appears normal. The soft tissues demonstrate no acute abnormality. 1. Right PCA territory ACUTE OR EARLY SUBACUTE INFARCTION involving the right occipital and medial temporal lobe, as well as a small focus in the medial right thalamus. 2. Tiny, 4 mm focus of ACUTE OR EARLY SUBACUTE INFARCTION also noted in the left medial thalamus (left PCA territory). 3. No hemorrhage, significant mass effect or midline shift. 4. Bilateral mastoid effusions, right greater than left, which may be due to eustachian tube dysfunction or otomastoiditis.  RECOMMENDATIONS: Unavailable       Active Hospital Problems    Diagnosis Date Noted    Angina at rest [facility]) [I20.8]      Priority: High    Interstitial pulmonary fibrosis ([facility] [J84.10] 12/06/2022     Priority: Medium    Memory deficit [R41.3] 05/08/2023     Priority: Low    Vision changes [H53.9] 05/08/2023     Priority: Low    PFO (patent foramen ovale) [Q21.12] 05/08/2023     Priority: Low    Impaired mobility and activities of daily living dt CVA [Z74.09, Z78.9] 05/05/2023     Priority: Low    Cerebrovascular accident (CVA) ([facility] [I63.9]

## 2023-05-19 ENCOUNTER — TELEPHONE (OUTPATIENT)
Dept: ORTHOPEDIC SURGERY | Age: 68
End: 2023-05-19

## 2023-05-19 NOTE — TELEPHONE ENCOUNTER
Care Transitions Initial Follow Up Call    Outreach made within 2 business days of discharge: Yes    Patient: Velma Navarro Patient : 1955   MRN: [id number]  Reason for Admission: There are no discharge diagnoses documented for the most recent discharge. Discharge Date: 23       Spoke with: Pt    Discharge department/facility: [facility] Patient Contact:  Was patient able to fill all prescriptions: Yes  Was patient instructed to bring all medications to the follow-up visit: Yes  Is patient taking all medications as directed in the discharge summary? Yes  Does patient understand their discharge instructions: Yes  Does patient have questions or concerns that need addressed prior to 7-14 day follow up office visit: no, is currently in an assisted living and will call when discharged.      Scheduled appointment with PCP within 7-14 days    Follow Up  Future Appointments   Date Time Provider Efrem Camacho   2023  2:00 PM Sara Lira [address] [place] [address]   2023  3:15 PM Mita Daigle MD [address] Neurology -   2023  2:00 PM Ai Aquino MD [address] [place] [address]   2023  1:00 PM Ai Aquino MD [address] [place] [address]

## 2023-05-24 ENCOUNTER — TELEPHONE (OUTPATIENT)
Dept: PULMONOLOGY | Age: 68
End: 2023-05-24

## 2023-05-24 DIAGNOSIS — J44.9 CHRONIC OBSTRUCTIVE PULMONARY DISEASE, UNSPECIFIED COPD TYPE (HCC): Primary | ICD-10-CM

## 2023-05-26 ENCOUNTER — TELEPHONE (OUTPATIENT)
Dept: FAMILY MEDICINE CLINIC | Age: 68
End: 2023-05-26

## 2023-05-26 DIAGNOSIS — F51.04 PSYCHOPHYSIOLOGICAL INSOMNIA: Primary | ICD-10-CM

## 2023-05-26 RX ORDER — NEBULIZER ACCESSORIES
EACH MISCELLANEOUS
Qty: 1 EACH | Refills: 0 | Status: SHIPPED | OUTPATIENT
Start: 2023-05-26

## 2023-05-31 NOTE — TELEPHONE ENCOUNTER
Spoke with [employer] and she took our number so one of the nurses can call the office back for the message.

## 2023-05-31 NOTE — TELEPHONE ENCOUNTER
Nurse from [facility] returned call and states that they are an Assisted Living facility and that the patient would like to keep Dr. Yariel Capps as his physician. So, for the patient to receive the sleeping medication and PRN Tylenol, the prescription/order would have to come from Dr. Yariel Capps.

## 2023-06-01 RX ORDER — TRAZODONE HYDROCHLORIDE 100 MG/1
100 TABLET ORAL NIGHTLY
Qty: 90 TABLET | Refills: 1 | Status: SHIPPED | OUTPATIENT
Start: 2023-06-01 | End: 2023-06-02 | Stop reason: SDUPTHER

## 2023-06-01 RX ORDER — ACETAMINOPHEN 500 MG
500 TABLET ORAL 3 TIMES DAILY PRN
Qty: 90 TABLET | Refills: 1 | Status: SHIPPED | OUTPATIENT
Start: 2023-06-01 | End: 2023-06-02 | Stop reason: SDUPTHER

## 2023-06-02 RX ORDER — ACETAMINOPHEN 500 MG
500 TABLET ORAL 3 TIMES DAILY PRN
Qty: 90 TABLET | Refills: 1 | Status: SHIPPED | OUTPATIENT
Start: 2023-06-02

## 2023-06-02 RX ORDER — TRAZODONE HYDROCHLORIDE 100 MG/1
100 TABLET ORAL NIGHTLY
Qty: 90 TABLET | Refills: 1 | Status: SHIPPED | OUTPATIENT
Start: 2023-06-02

## 2023-06-02 NOTE — TELEPHONE ENCOUNTER
Will need printed copies of these orders to fax to the estates so that they can get them on the [facility] to give him. Fax # [phone]. Comments:     Last Office Visit (last PCP visit):   4/27/2023    Next Visit Date:  Future Appointments   Date Time Provider Efrem Camacho   6/5/2023  1:45 PM Piyush Mcdaniels  [facility]   6/13/2023  3:15 PM Vanessa Infante MD [facility]   7/19/2023  9:30 AM Rosales Morrison MD [address] [place] [address]   7/27/2023  2:00 PM Ana Maria Aponte MD [address] [place] [address]   12/28/2023  1:00 PM Ana Maria Aponte MD [address] [place] [address]       **If hasn't been seen in over a year OR hasn't followed up according to last diabetes/ADHD visit, make appointment for patient before sending refill to provider.     Rx requested:  Requested Prescriptions     Pending Prescriptions Disp Refills    traZODone (DESYREL) 100 MG tablet 90 tablet 1     Sig: Take 1 tablet by mouth nightly    acetaminophen (TYLENOL) 500 MG tablet 90 tablet 1     Sig: Take 1 tablet by mouth 3 times daily as needed for Pain     Signed Prescriptions Disp Refills    acetaminophen (TYLENOL) 500 MG tablet 90 tablet 1     Sig: Take 1 tablet by mouth 3 times daily as needed for Pain     Authorizing Provider: Ivonne Wells    traZODone (DESYREL) 100 MG tablet 90 tablet 1     Sig: Take 1 tablet by mouth nightly     Authorizing Provider: Ivonne Wells

## 2023-06-08 ENCOUNTER — OFFICE VISIT (OUTPATIENT)
Dept: CARDIOLOGY CLINIC | Age: 68
End: 2023-06-08
Payer: MEDICARE

## 2023-06-08 VITALS
SYSTOLIC BLOOD PRESSURE: 116 MMHG | OXYGEN SATURATION: 77 % | BODY MASS INDEX: 35.18 KG/M2 | HEART RATE: 80 BPM | DIASTOLIC BLOOD PRESSURE: 66 MMHG | WEIGHT: 245.2 LBS

## 2023-06-08 DIAGNOSIS — I10 ESSENTIAL HYPERTENSION: ICD-10-CM

## 2023-06-08 DIAGNOSIS — R06.09 DOE (DYSPNEA ON EXERTION): ICD-10-CM

## 2023-06-08 DIAGNOSIS — I50.33 ACUTE ON CHRONIC DIASTOLIC (CONGESTIVE) HEART FAILURE (HCC): ICD-10-CM

## 2023-06-08 DIAGNOSIS — I10 ESSENTIAL HYPERTENSION, BENIGN: ICD-10-CM

## 2023-06-08 DIAGNOSIS — R09.89 BILATERAL CAROTID BRUITS: ICD-10-CM

## 2023-06-08 DIAGNOSIS — I25.10 CORONARY ARTERY DISEASE INVOLVING NATIVE CORONARY ARTERY OF NATIVE HEART WITHOUT ANGINA PECTORIS: ICD-10-CM

## 2023-06-08 DIAGNOSIS — E66.01 SEVERE OBESITY (BMI 35.0-39.9) WITH COMORBIDITY (HCC): ICD-10-CM

## 2023-06-08 DIAGNOSIS — J44.9 CHRONIC OBSTRUCTIVE PULMONARY DISEASE, UNSPECIFIED COPD TYPE (HCC): ICD-10-CM

## 2023-06-08 DIAGNOSIS — R60.0 BILATERAL LOWER EXTREMITY EDEMA: ICD-10-CM

## 2023-06-08 DIAGNOSIS — E78.5 DYSLIPIDEMIA: Primary | ICD-10-CM

## 2023-06-08 DIAGNOSIS — I27.81 COR PULMONALE (CHRONIC) (HCC): ICD-10-CM

## 2023-06-08 PROCEDURE — 99215 OFFICE O/P EST HI 40 MIN: CPT | Performed by: INTERNAL MEDICINE

## 2023-06-08 PROCEDURE — 1111F DSCHRG MED/CURRENT MED MERGE: CPT | Performed by: INTERNAL MEDICINE

## 2023-06-08 PROCEDURE — G8417 CALC BMI ABV UP PARAM F/U: HCPCS | Performed by: INTERNAL MEDICINE

## 2023-06-08 PROCEDURE — 3017F COLORECTAL CA SCREEN DOC REV: CPT | Performed by: INTERNAL MEDICINE

## 2023-06-08 PROCEDURE — 1123F ACP DISCUSS/DSCN MKR DOCD: CPT | Performed by: INTERNAL MEDICINE

## 2023-06-08 PROCEDURE — 3078F DIAST BP <80 MM HG: CPT | Performed by: INTERNAL MEDICINE

## 2023-06-08 PROCEDURE — G8427 DOCREV CUR MEDS BY ELIG CLIN: HCPCS | Performed by: INTERNAL MEDICINE

## 2023-06-08 PROCEDURE — 3023F SPIROM DOC REV: CPT | Performed by: INTERNAL MEDICINE

## 2023-06-08 PROCEDURE — 3074F SYST BP LT 130 MM HG: CPT | Performed by: INTERNAL MEDICINE

## 2023-06-08 PROCEDURE — 1036F TOBACCO NON-USER: CPT | Performed by: INTERNAL MEDICINE

## 2023-06-08 RX ORDER — METOLAZONE 2.5 MG/1
2.5 TABLET ORAL DAILY
Qty: 90 TABLET | Refills: 3 | Status: SHIPPED | OUTPATIENT
Start: 2023-06-08

## 2023-06-08 RX ORDER — POTASSIUM CHLORIDE 20 MEQ/1
40 TABLET, EXTENDED RELEASE ORAL DAILY
Qty: 180 TABLET | Refills: 3 | Status: SHIPPED | OUTPATIENT
Start: 2023-06-08

## 2023-06-08 ASSESSMENT — ENCOUNTER SYMPTOMS
EYES NEGATIVE: 1
CHEST TIGHTNESS: 0
COUGH: 0
STRIDOR: 0
NAUSEA: 0
WHEEZING: 0
BLOOD IN STOOL: 0
SHORTNESS OF BREATH: 1
GASTROINTESTINAL NEGATIVE: 1

## 2023-06-08 NOTE — PROGRESS NOTES
Subsequent Progress Note  Patient: José Claire  YOB: 1955  MRN: [id number]    Chief Complaint: hf LE edema copd garnett   Chief Complaint   Patient presents with    Follow-Up from [facility]: 5/3-5/5, 5/5-5/17 for CVA       CV Data:  7/2019 echo EF 60  9/2019 spect negative   9/2019 CUS- mild   9/2019 Abd US negative AAA  12/14/2020 RCA SHIRIN EF 60  1/21 PVR negative  1/21 CUS mild   4/23 Echo EF 60 RVSP 67  5/5/23 BARBARA LArge PFO EF 55    Subjective/HPI: no edema anymore on diuretics. No cp +garnett chronic 3L o2      1/6/2020 still on 3L O2 SUBJECTIVE: till struggles to stop smoke. No cp. No falls no bleed. Takes meds. Walks. 5/19/2020 TELEHEALTH EVALUATION -- Audio/Visual (During KGKFX-78 public health emergency)    Doing well no cp still has sob. Still trying to stop smoke. Uses 3L O2    6/22/2020 TELEHEALTH EVALUATION -- Audio/Visual (During ZTQYJ-04 public health emergency)    Called in 10 days ago c/o LE Edema. We advised low salt, walk and compression. Edema is now completely resolved. He feels much better. He admits he took lots of salty foods few weeks ago. No cp    7/27/2020  Leg edema was better but now came back again. He recently ran out of couple of his meds to include ACEI and BB.     8/10/2020 leg edema much improved with low salt and Fluid restrict. Now has 1-2+. No cp chronic SOB on O2.     9/11/2020 still has GARNETT. Uses 3L O2. Sate are 80s but feels comfortable at rest.  No CP no bleed no falls. 12/3/2020 no cp . GARNETT is worse. Weak and tired. No bleed. No falls. 12/121/220 feels ok. No cp no sob no falls no bleed. Takes meds. Trying to quit cigs    3/3/21 still SOB using 3L O2. Low stamina no cp no falls no bleed no edema takes meds. Not ilene active. 6/3/21 no cp still uses 3L O2. No falls no bleed. 9/3/21 chronic sob 3.5 L O2. No cp no falls no bleed. Takes meds    1/7/22 on 3L no cp but still SOB. LE edema little worse.    1/21/22 lost 8Lbs in water.  Feels

## 2023-06-13 ENCOUNTER — OFFICE VISIT (OUTPATIENT)
Dept: NEUROLOGY | Age: 68
End: 2023-06-13
Payer: MEDICARE

## 2023-06-13 VITALS
SYSTOLIC BLOOD PRESSURE: 102 MMHG | BODY MASS INDEX: 35.41 KG/M2 | HEART RATE: 76 BPM | DIASTOLIC BLOOD PRESSURE: 68 MMHG | WEIGHT: 246.8 LBS

## 2023-06-13 DIAGNOSIS — H53.9 VISION CHANGES: ICD-10-CM

## 2023-06-13 DIAGNOSIS — R41.3 MEMORY DEFICIT: ICD-10-CM

## 2023-06-13 DIAGNOSIS — I63.433 CEREBROVASCULAR ACCIDENT (CVA) DUE TO BILATERAL EMBOLISM OF POSTERIOR CEREBRAL ARTERIES (HCC): Primary | ICD-10-CM

## 2023-06-13 DIAGNOSIS — G89.0 THALAMIC SYNDROME: ICD-10-CM

## 2023-06-13 PROCEDURE — 99214 OFFICE O/P EST MOD 30 MIN: CPT | Performed by: PSYCHIATRY & NEUROLOGY

## 2023-06-13 PROCEDURE — 3078F DIAST BP <80 MM HG: CPT | Performed by: PSYCHIATRY & NEUROLOGY

## 2023-06-13 PROCEDURE — 3074F SYST BP LT 130 MM HG: CPT | Performed by: PSYCHIATRY & NEUROLOGY

## 2023-06-13 PROCEDURE — 1123F ACP DISCUSS/DSCN MKR DOCD: CPT | Performed by: PSYCHIATRY & NEUROLOGY

## 2023-06-13 PROCEDURE — G8427 DOCREV CUR MEDS BY ELIG CLIN: HCPCS | Performed by: PSYCHIATRY & NEUROLOGY

## 2023-06-13 PROCEDURE — G8417 CALC BMI ABV UP PARAM F/U: HCPCS | Performed by: PSYCHIATRY & NEUROLOGY

## 2023-06-13 PROCEDURE — 1111F DSCHRG MED/CURRENT MED MERGE: CPT | Performed by: PSYCHIATRY & NEUROLOGY

## 2023-06-13 PROCEDURE — 1036F TOBACCO NON-USER: CPT | Performed by: PSYCHIATRY & NEUROLOGY

## 2023-06-13 PROCEDURE — 3017F COLORECTAL CA SCREEN DOC REV: CPT | Performed by: PSYCHIATRY & NEUROLOGY

## 2023-06-13 ASSESSMENT — ENCOUNTER SYMPTOMS
TROUBLE SWALLOWING: 0
BACK PAIN: 0
NAUSEA: 0
SHORTNESS OF BREATH: 0
PHOTOPHOBIA: 0
CHOKING: 0
COLOR CHANGE: 0
VOMITING: 0

## 2023-06-20 ENCOUNTER — TELEPHONE (OUTPATIENT)
Dept: FAMILY MEDICINE CLINIC | Age: 68
End: 2023-06-20

## 2023-06-20 NOTE — TELEPHONE ENCOUNTER
Lesley Jay from [facility] called and said she wanted to extend pt's home PT to 2 times weekly for 3 more weeks if you are okay with that?

## 2023-07-07 ENCOUNTER — TELEPHONE (OUTPATIENT)
Dept: FAMILY MEDICINE CLINIC | Age: 68
End: 2023-07-07

## 2023-07-07 DIAGNOSIS — J44.9 CHRONIC OBSTRUCTIVE PULMONARY DISEASE, UNSPECIFIED COPD TYPE (HCC): Primary | ICD-10-CM

## 2023-07-20 ENCOUNTER — HOSPITAL ENCOUNTER (OUTPATIENT)
Dept: DATA CONVERSION | Facility: HOSPITAL | Age: 68
End: 2023-07-20
Attending: INTERNAL MEDICINE | Admitting: INTERNAL MEDICINE

## 2023-07-20 DIAGNOSIS — Z87.891 PERSONAL HISTORY OF NICOTINE DEPENDENCE: ICD-10-CM

## 2023-07-20 DIAGNOSIS — Z53.8 PROCEDURE AND TREATMENT NOT CARRIED OUT FOR OTHER REASONS: ICD-10-CM

## 2023-07-20 DIAGNOSIS — Z86.73 PERSONAL HISTORY OF TRANSIENT ISCHEMIC ATTACK (TIA), AND CEREBRAL INFARCTION WITHOUT RESIDUAL DEFICITS: ICD-10-CM

## 2023-07-20 DIAGNOSIS — I48.91 UNSPECIFIED ATRIAL FIBRILLATION (MULTI): ICD-10-CM

## 2023-07-20 DIAGNOSIS — Q21.12 PATENT FORAMEN OVALE (HHS-HCC): ICD-10-CM

## 2023-07-20 LAB
ACTIVATED PARTIAL THROMBOPLASTIN TIME IN PPP BY COAGULATION ASSAY: 34 SEC (ref 27–38)
ANION GAP IN SER/PLAS: 12 MMOL/L (ref 10–20)
ATRIAL RATE: 77 BPM
CALCIUM (MG/DL) IN SER/PLAS: 9.7 MG/DL (ref 8.6–10.3)
CARBON DIOXIDE, TOTAL (MMOL/L) IN SER/PLAS: 28 MMOL/L (ref 21–32)
CHLORIDE (MMOL/L) IN SER/PLAS: 104 MMOL/L (ref 98–107)
CREATININE (MG/DL) IN SER/PLAS: 1.83 MG/DL (ref 0.5–1.3)
ERYTHROCYTE DISTRIBUTION WIDTH (RATIO) BY AUTOMATED COUNT: 13.6 % (ref 11.5–14.5)
ERYTHROCYTE MEAN CORPUSCULAR HEMOGLOBIN CONCENTRATION (G/DL) BY AUTOMATED: 32.5 G/DL (ref 32–36)
ERYTHROCYTE MEAN CORPUSCULAR VOLUME (FL) BY AUTOMATED COUNT: 91 FL (ref 80–100)
ERYTHROCYTES (10*6/UL) IN BLOOD BY AUTOMATED COUNT: 4.84 X10E12/L (ref 4.5–5.9)
GFR MALE: 39 ML/MIN/1.73M2
GLUCOSE (MG/DL) IN SER/PLAS: 95 MG/DL (ref 74–99)
HEMATOCRIT (%) IN BLOOD BY AUTOMATED COUNT: 44 % (ref 41–52)
HEMOGLOBIN (G/DL) IN BLOOD: 14.3 G/DL (ref 13.5–17.5)
INR IN PPP BY COAGULATION ASSAY: 1.1 (ref 0.9–1.1)
LEUKOCYTES (10*3/UL) IN BLOOD BY AUTOMATED COUNT: 8.3 X10E9/L (ref 4.4–11.3)
P AXIS: 43 DEGREES
P OFFSET: 185 MS
P ONSET: 126 MS
PLATELETS (10*3/UL) IN BLOOD AUTOMATED COUNT: 182 X10E9/L (ref 150–450)
POTASSIUM (MMOL/L) IN SER/PLAS: 4.3 MMOL/L (ref 3.5–5.3)
PR INTERVAL: 200 MS
PROTHROMBIN TIME (PT) IN PPP BY COAGULATION ASSAY: 12.3 SEC (ref 9.8–12.8)
Q ONSET: 226 MS
QRS COUNT: 13 BEATS
QRS DURATION: 82 MS
QT INTERVAL: 374 MS
QTC CALCULATION(BAZETT): 423 MS
QTC FREDERICIA: 406 MS
R AXIS: 0 DEGREES
SODIUM (MMOL/L) IN SER/PLAS: 140 MMOL/L (ref 136–145)
T AXIS: 52 DEGREES
T OFFSET: 413 MS
UREA NITROGEN (MG/DL) IN SER/PLAS: 53 MG/DL (ref 6–23)
VENTRICULAR RATE: 77 BPM

## 2023-07-22 LAB
ATRIAL RATE: 117 BPM
Q ONSET: 221 MS
QRS COUNT: 16 BEATS
QRS DURATION: 88 MS
QT INTERVAL: 292 MS
QTC CALCULATION(BAZETT): 391 MS
QTC FREDERICIA: 355 MS
R AXIS: 14 DEGREES
T AXIS: 52 DEGREES
T OFFSET: 367 MS
VENTRICULAR RATE: 108 BPM

## 2023-07-24 ENCOUNTER — TELEPHONE (OUTPATIENT)
Dept: CARDIOLOGY CLINIC | Age: 68
End: 2023-07-24

## 2023-07-24 DIAGNOSIS — I50.33 ACUTE ON CHRONIC DIASTOLIC (CONGESTIVE) HEART FAILURE (HCC): Primary | ICD-10-CM

## 2023-07-24 NOTE — TELEPHONE ENCOUNTER
----- Message from Telma Zaman DO sent at 7/23/2023  5:51 PM EDT -----  I called the patient today and told him to hold Lasix, Zaroxylin, potassium, and Lisinopril. Have him get a BMP this week and follow up with dr Brad Hale.      Electronically signed by Telma Zaman DO on 7/23/2023 at 5:52 PM

## 2023-07-28 DIAGNOSIS — I50.33 ACUTE ON CHRONIC DIASTOLIC (CONGESTIVE) HEART FAILURE (HCC): ICD-10-CM

## 2023-07-28 LAB
ANION GAP SERPL CALCULATED.3IONS-SCNC: 10 MEQ/L (ref 9–15)
BUN SERPL-MCNC: 31 MG/DL (ref 8–23)
CALCIUM SERPL-MCNC: 9.9 MG/DL (ref 8.5–9.9)
CHLORIDE SERPL-SCNC: 102 MEQ/L (ref 95–107)
CO2 SERPL-SCNC: 29 MEQ/L (ref 20–31)
CREAT SERPL-MCNC: 1.42 MG/DL (ref 0.7–1.2)
GLUCOSE SERPL-MCNC: 99 MG/DL (ref 70–99)
POTASSIUM SERPL-SCNC: 4.7 MEQ/L (ref 3.4–4.9)
SODIUM SERPL-SCNC: 141 MEQ/L (ref 135–144)

## 2023-08-01 ENCOUNTER — OFFICE VISIT (OUTPATIENT)
Dept: CARDIOLOGY CLINIC | Age: 68
End: 2023-08-01
Payer: MEDICARE

## 2023-08-01 VITALS
HEART RATE: 86 BPM | BODY MASS INDEX: 35.5 KG/M2 | SYSTOLIC BLOOD PRESSURE: 110 MMHG | DIASTOLIC BLOOD PRESSURE: 66 MMHG | WEIGHT: 247.4 LBS | OXYGEN SATURATION: 86 %

## 2023-08-01 DIAGNOSIS — N17.9 AKI (ACUTE KIDNEY INJURY) (HCC): ICD-10-CM

## 2023-08-01 DIAGNOSIS — E78.5 DYSLIPIDEMIA: ICD-10-CM

## 2023-08-01 DIAGNOSIS — I25.10 CORONARY ARTERY DISEASE INVOLVING NATIVE CORONARY ARTERY OF NATIVE HEART WITHOUT ANGINA PECTORIS: ICD-10-CM

## 2023-08-01 DIAGNOSIS — R09.89 BILATERAL CAROTID BRUITS: ICD-10-CM

## 2023-08-01 DIAGNOSIS — J44.9 CHRONIC OBSTRUCTIVE PULMONARY DISEASE, UNSPECIFIED COPD TYPE (HCC): ICD-10-CM

## 2023-08-01 DIAGNOSIS — I50.33 ACUTE ON CHRONIC DIASTOLIC (CONGESTIVE) HEART FAILURE (HCC): Primary | ICD-10-CM

## 2023-08-01 DIAGNOSIS — N18.9 CHRONIC KIDNEY DISEASE, UNSPECIFIED CKD STAGE: ICD-10-CM

## 2023-08-01 DIAGNOSIS — I27.81 COR PULMONALE (CHRONIC) (HCC): ICD-10-CM

## 2023-08-01 DIAGNOSIS — I10 ESSENTIAL HYPERTENSION, BENIGN: ICD-10-CM

## 2023-08-01 PROCEDURE — 1123F ACP DISCUSS/DSCN MKR DOCD: CPT | Performed by: INTERNAL MEDICINE

## 2023-08-01 PROCEDURE — 3023F SPIROM DOC REV: CPT | Performed by: INTERNAL MEDICINE

## 2023-08-01 PROCEDURE — 3074F SYST BP LT 130 MM HG: CPT | Performed by: INTERNAL MEDICINE

## 2023-08-01 PROCEDURE — 1036F TOBACCO NON-USER: CPT | Performed by: INTERNAL MEDICINE

## 2023-08-01 PROCEDURE — 99214 OFFICE O/P EST MOD 30 MIN: CPT | Performed by: INTERNAL MEDICINE

## 2023-08-01 PROCEDURE — G8427 DOCREV CUR MEDS BY ELIG CLIN: HCPCS | Performed by: INTERNAL MEDICINE

## 2023-08-01 PROCEDURE — 3017F COLORECTAL CA SCREEN DOC REV: CPT | Performed by: INTERNAL MEDICINE

## 2023-08-01 PROCEDURE — 3078F DIAST BP <80 MM HG: CPT | Performed by: INTERNAL MEDICINE

## 2023-08-01 PROCEDURE — G8417 CALC BMI ABV UP PARAM F/U: HCPCS | Performed by: INTERNAL MEDICINE

## 2023-08-01 ASSESSMENT — ENCOUNTER SYMPTOMS
EYES NEGATIVE: 1
NAUSEA: 0
SHORTNESS OF BREATH: 1
STRIDOR: 0
BLOOD IN STOOL: 0
CHEST TIGHTNESS: 0
COUGH: 0
WHEEZING: 0
GASTROINTESTINAL NEGATIVE: 1

## 2023-08-01 NOTE — PROGRESS NOTES
Subsequent Progress Note  Patient: Marty James  YOB: 1955  MRN: [id number]    Chief Complaint: hf LE edema copd garnett   Chief Complaint   Patient presents with    Follow-up     Per Dr. Kellie Khanna    Results     BMP    Discuss Medications     Lasix, Zaroxylin, potassium, and Lisinopril were held       CV Data:  7/2019 echo EF 60  9/2019 spect negative   9/2019 CUS- mild   9/2019 Abd US negative AAA  12/14/2020 RCA SHIRIN EF 60  1/21 PVR negative  1/21 CUS mild   4/23 Echo EF 60 RVSP 67  5/5/23 BARBARA  PFO EF 55    7/23 He was sent to [facility] for PFO closure but PFO could not be reproduced. Subjective/HPI: no edema anymore on diuretics. No cp +garnett chronic 3L o2      1/6/2020 still on 3L O2 SUBJECTIVE: till struggles to stop smoke. No cp. No falls no bleed. Takes meds. Walks. 5/19/2020 TELEHEALTH EVALUATION -- Audio/Visual (During NPRAY-60 public health emergency)    Doing well no cp still has sob. Still trying to stop smoke. Uses 3L O2    6/22/2020 TELEHEALTH EVALUATION -- Audio/Visual (During IUDHV-72 public health emergency)    Called in 10 days ago c/o LE Edema. We advised low salt, walk and compression. Edema is now completely resolved. He feels much better. He admits he took lots of salty foods few weeks ago. No cp    7/27/2020  Leg edema was better but now came back again. He recently ran out of couple of his meds to include ACEI and BB.     8/10/2020 leg edema much improved with low salt and Fluid restrict. Now has 1-2+. No cp chronic SOB on O2.     9/11/2020 still has GARNETT. Uses 3L O2. Sate are 80s but feels comfortable at rest.  No CP no bleed no falls. 12/3/2020 no cp . GARNETT is worse. Weak and tired. No bleed. No falls. 12/121/220 feels ok. No cp no sob no falls no bleed. Takes meds. Trying to quit cigs    3/3/21 still SOB using 3L O2. Low stamina no cp no falls no bleed no edema takes meds. Not ilene active. 6/3/21 no cp still uses 3L O2. No falls no bleed.      9/3/21 chronic sob 3.5 L

## 2023-08-04 ENCOUNTER — OFFICE VISIT (OUTPATIENT)
Dept: INTERNAL MEDICINE | Age: 68
End: 2023-08-04

## 2023-08-04 VITALS
WEIGHT: 241.8 LBS | BODY MASS INDEX: 34.62 KG/M2 | OXYGEN SATURATION: 84 % | TEMPERATURE: 97.2 F | DIASTOLIC BLOOD PRESSURE: 86 MMHG | HEIGHT: 70 IN | HEART RATE: 65 BPM | SYSTOLIC BLOOD PRESSURE: 126 MMHG

## 2023-08-04 DIAGNOSIS — I50.33 ACUTE ON CHRONIC DIASTOLIC (CONGESTIVE) HEART FAILURE (HCC): ICD-10-CM

## 2023-08-04 DIAGNOSIS — J44.1 COPD WITH ACUTE EXACERBATION (HCC): ICD-10-CM

## 2023-08-04 DIAGNOSIS — I63.433 CEREBROVASCULAR ACCIDENT (CVA) DUE TO BILATERAL EMBOLISM OF POSTERIOR CEREBRAL ARTERIES (HCC): ICD-10-CM

## 2023-08-04 DIAGNOSIS — N52.9 ERECTILE DYSFUNCTION, UNSPECIFIED ERECTILE DYSFUNCTION TYPE: ICD-10-CM

## 2023-08-04 DIAGNOSIS — Z09 HOSPITAL DISCHARGE FOLLOW-UP: Primary | ICD-10-CM

## 2023-08-04 DIAGNOSIS — J96.12 CHRONIC RESPIRATORY FAILURE WITH HYPOXIA AND HYPERCAPNIA (HCC): ICD-10-CM

## 2023-08-04 DIAGNOSIS — J96.11 CHRONIC RESPIRATORY FAILURE WITH HYPOXIA AND HYPERCAPNIA (HCC): ICD-10-CM

## 2023-08-04 RX ORDER — SILDENAFIL 25 MG/1
25 TABLET, FILM COATED ORAL PRN
Qty: 10 TABLET | Refills: 3 | Status: SHIPPED | OUTPATIENT
Start: 2023-08-04

## 2023-08-04 ASSESSMENT — ENCOUNTER SYMPTOMS
SHORTNESS OF BREATH: 0
CONSTIPATION: 0
DIARRHEA: 0
SORE THROAT: 0
ABDOMINAL PAIN: 0
COUGH: 0
WHEEZING: 0
RHINORRHEA: 0

## 2023-08-04 NOTE — PROGRESS NOTES
[address] [facility] [address] [facility]  [address] [address]  [address]  [address]  Dept: [phone]  Dept Fax: [phone]: [phone]     Chief Complaint  Chief Complaint   Patient presents with    COPD     3 month follow up       HPI:  77 y.o.male who presents for the following:      Since last visit had CVA with visual deficits; has had some improvements but not back to his baseline; wondering if he will get back to normal or if seeing an eye doctor can help; also found a large PFO but could not find it on attempt to repair; had JORGE so had diuretics and lisinopril removed recently      Review of Systems   Constitutional:  Negative for chills and fever. HENT:  Negative for congestion, rhinorrhea and sore throat. Respiratory:  Negative for cough, shortness of breath and wheezing. Gastrointestinal:  Negative for abdominal pain, constipation and diarrhea. Endocrine: Negative for polydipsia and polyuria. Genitourinary:  Negative for dysuria, frequency and urgency. Neurological:  Negative for syncope, light-headedness, numbness and headaches. Psychiatric/Behavioral:  Negative for sleep disturbance. The patient is not nervous/anxious.       Past Medical History:   Diagnosis Date    CHF (congestive heart failure) (HCC)     COPD (chronic obstructive pulmonary disease) (HCC)     Hypertension     Lung disease     Osteoarthritis     hands worst    Sleep apnea      Past Surgical History:   Procedure Laterality Date    CHOLECYSTECTOMY      CORONARY ANGIOPLASTY WITH STENT PLACEMENT  12/14/2020    DIAGNOSTIC CARDIAC CATH LAB PROCEDURE  12/14/2020    PTCA  12/14/2020    ROTATOR CUFF REPAIR      right     Social History     Socioeconomic History    Marital status: Single     Spouse name: Not on file    Number of children: Not on file    Years of education: Not on file    Highest education level: Not on file   Occupational History    Not on file

## 2023-08-10 ENCOUNTER — TELEPHONE (OUTPATIENT)
Dept: CARDIOLOGY CLINIC | Age: 68
End: 2023-08-10

## 2023-08-10 NOTE — TELEPHONE ENCOUNTER
Pt calling to find out what four medications he is supposed to stop taking? And if he can start taking lasix again? On pt's 8/1/2023 AVS - STOP taking:  furosemide 20 MG tablet (LASIX)  lisinopril 10 MG tablet (PRINIVIL;ZESTRIL)  metOLazone 2.5 MG tablet (ZAROXOLYN)    What is the 4th medication that he was supposed to stop? He said that he stopped taking Potassium chloride. Pt states that his legs are swelling up again, he wants to start taking lasix again, is that ok?        Pt # [phone]- [id number]

## 2023-08-15 NOTE — TELEPHONE ENCOUNTER
EVE FROM [facility] CALLED IN TO THE OFFICE BECAUSE THE PT IS NOT USING HIS TRILOGY MACHINE. SHE SAID OUR OFFICE REQUESTED A DOWNLOAD AND THE PT GIVES EXCUSE AFTER EXCUSE TO WHY HE WILL NOT USE IT. SHE STATED SINCE January OF 2022 HE HAS ONLY USED IT FOR 14 HOURS TOTAL. PT TOLD [facility] HE TOLD DR TURCIOS HE USES IT ALL THE TIME. EVE IS THE MANAGER AND WANTED TO MAKE DR TURCIOS AWARE THAT THE PT IS NOT USING HIS MACHINE. SHE SAID [facility] HAS PROVIDED HIM WITH A BRAND NEW MASK BECAUSE THAT WAS HIS EXCUSE LAST TIME THEY ASKED WHY HE WAS NOT USING THE MACHINE.         IF YOU HAVE ANY QUESTIONS YOU CAN CALL EVE AT [facility] [phone] [FreeTextEntry1] : 60 year old male with a past medical history of HTN, HL, obstructive CAD complex PCI mRCA 10/27/22 laser and brachytherapy, COPD, CKD, Depression. GERD. Depression. Chronic back pain 2/2 spinal stenosis. Aortic dilation. BPH. prior lad/diag stents.   8/2023 VISIT: pending gi work up. 2 weeks ago had resting right chest "grabbing" resolved with NTG. no other recurrence.  cr 1.63   7/2023 VISIT: no further "heart burn." Trying to get further cardiac rehab approved.  Feels dizzy.  Mild tachycardia on beta-blocker.  on imdur 120. fatigue is improved after stopping beta blocker. +weight loss. Has mild improvement in his creatinine overall.  LDL uncontrolled 120. right hip flexor hernia from rowing.   6/2023 VISIT: interim multiple issues: fatigue, epigastric pain. randomly has heart burning twice in past 2 weeks lasting a few seconds. reports this started when on metoprolol. there is no exertional component.  on praluent, off statin.  is awaiting to restart cardiac rehab.   5/2023 VISIT: main concern is memory changes since 2019 and word finding difficulties.  interim testing labwork: cbc, lft, cr 1.75. was dehydrated.  nuclear no perfusion issue. 6 METS abd u/s no organ pathology carotid doppler no significant disease but some calcified structure anterior to r cca  echo: preserved ef. normal diastology. mild vhd 7 day zio average 73 bpm, no sig rhythm disturbance   4/2023 VISIT: ER 4/2023 with low blood pressure. Cardiology was consulted for hypotension. The patient was at cardiac rehab when he finished his time on treadmill and VS were checked. They noted his HR was elevated and SBP 90's patient said they rechecked twice with no improvement. RRT called because patient was weak. Was given IVF and BP improved. Olmesartan dc'ed. Signed out AMA. Presents today for BP evaluation/HFU. Denies Chest pain. There is some intermittent SOB, occasional flutters, and dizziness and lightheadedness. Feels better off of the Olmesartan.  11/28/2022: underwent brachytherapy RCA. doing well. ekg unchanged. surveillance pending. compliant DAPT.   10/2022: CAD with recent unstable angina due to mid RCA ISR mechanism underexpansion with minimal luminal gain after high-pressure noncompliant ballooning and scoring balloon necessitating laser atherectomy. Expansion was limited by fibrotic tissue.  He had laser/shockwave/cutting 10/27/22 with good result of mid RCA ISR. He was to be set up for brachytherapy within a month at [place].   TESTING I REVIEWED TODAY excluding above: 5/2023  labwork: cbc, lft, cr 1.75. was dehydrated.  nuclear no perfusion issue. 6 METS abd u/s no organ pathology carotid doppler no significant disease but some calcified structure anterior to r cca  echo: preserved ef. normal diastology. mild vhd 7 day zio average 73 bpm, no sig rhythm disturbance   Labs 4/2023: Na 139, K 3.9, Cr 1.75, Ca 9.4, Mag 2, AST 92, ALT 53, Trops < 0.010 x 3, BNP < 5, WBC 7.78, Hgb 13.7, HCT 41.9, plt 265,   CT chest/abd/pelvis w or without contrast 4/7/23: No acute pathology in the chest, abd, or pelvis. Other stable chronic findings with mild ectasia of the ascending thoracid aorta and main pulmonary artery enlargement. Recommend CXR correlation and follow up to exclude developing pneumonic process at the right base.  CXR 4/7/23: No acute cardiopulmonary dz.   EKG 4/7/23: SR at 84 bpm, RP interval 164 ms, QTc 453, LAD, PRWP, nonspecific ST-T wave abnormalities   Labs 1/25/23: Trigs 125, HDL 42, LDL 39, Chol 106, TSH 2.33.  [facility] 10/18/21: Lexiscan. Negative.

## 2023-08-31 ENCOUNTER — TRANSCRIBE ORDERS (OUTPATIENT)
Dept: ADMINISTRATIVE | Age: 68
End: 2023-08-31

## 2023-08-31 DIAGNOSIS — N18.31 CHRONIC KIDNEY DISEASE (CKD) STAGE G3A/A1, MODERATELY DECREASED GLOMERULAR FILTRATION RATE (GFR) BETWEEN 45-59 ML/MIN/1.73 SQUARE METER AND ALBUMINURIA CREATININE RATIO LESS THAN 30 MG/G (HCC): Primary | ICD-10-CM

## 2023-09-12 ENCOUNTER — TELEPHONE (OUTPATIENT)
Dept: PULMONOLOGY | Age: 68
End: 2023-09-12

## 2023-09-12 NOTE — TELEPHONE ENCOUNTER
OTIS COVARRUBIAS RESPIRATORY THERAPIST FROM [employer] STATES SHE SPOKE WITH PATIENT HE IS NOT BEEN USING HIS VENTILATOR IS BECAUSE THE MASK IS NOT WORKING FOR HIM. SHE IS ASKING IF YOU CAN ORDER A MOUTHPIECE VENTILATOR AND ALSO A NEBULIZER AND MEDICATION TO GO IN NEBULIZER. PLEASE ADVISE.

## 2023-09-18 ENCOUNTER — HOSPITAL ENCOUNTER (OUTPATIENT)
Dept: LAB | Age: 68
Discharge: HOME | End: 2023-09-18
Payer: MEDICARE

## 2023-09-18 ENCOUNTER — HOSPITAL ENCOUNTER (OUTPATIENT)
Dept: ULTRASOUND IMAGING | Age: 68
Discharge: HOME | End: 2023-09-20
Payer: MEDICARE

## 2023-09-18 DIAGNOSIS — N18.31 CHRONIC KIDNEY DISEASE (CKD) STAGE G3A/A1, MODERATELY DECREASED GLOMERULAR FILTRATION RATE (GFR) BETWEEN 45-59 ML/MIN/1.73 SQUARE METER AND ALBUMINURIA CREATININE RATIO LESS THAN 30 MG/G (HCC): ICD-10-CM

## 2023-09-18 LAB
ALBUMIN SERPL-MCNC: 3.8 G/DL (ref 3.5–4.6)
ANION GAP SERPL CALCULATED.3IONS-SCNC: 10 MEQ/L (ref 9–15)
BNP BLD-MCNC: 240 PG/ML
BUN SERPL-MCNC: 14 MG/DL (ref 8–23)
CALCIUM SERPL-MCNC: 9.3 MG/DL (ref 8.5–9.9)
CHLORIDE SERPL-SCNC: 98 MEQ/L (ref 95–107)
CO2 SERPL-SCNC: 27 MEQ/L (ref 20–31)
CREAT SERPL-MCNC: 0.89 MG/DL (ref 0.7–1.2)
CREAT UR-MCNC: 32.1 MG/DL
ERYTHROCYTE [DISTWIDTH] IN BLOOD BY AUTOMATED COUNT: 13.4 % (ref 11.5–14.5)
GLUCOSE SERPL-MCNC: 83 MG/DL (ref 70–99)
HCT VFR BLD AUTO: 43.9 % (ref 42–52)
HGB BLD-MCNC: 14.1 G/DL (ref 14–18)
MCH RBC QN AUTO: 29.8 PG (ref 27–31.3)
MCHC RBC AUTO-ENTMCNC: 32.1 % (ref 33–37)
MCV RBC AUTO: 92.8 FL (ref 79–92.2)
MICROALBUMIN UR-MCNC: <1.2 MG/DL
MICROALBUMIN/CREAT UR-RTO: NORMAL MG/G (ref 0–30)
PHOSPHATE SERPL-MCNC: 1.8 MG/DL (ref 2.3–4.8)
PLATELET # BLD AUTO: 207 K/UL (ref 130–400)
POTASSIUM SERPL-SCNC: 4.7 MEQ/L (ref 3.4–4.9)
RBC # BLD AUTO: 4.73 M/UL (ref 4.7–6.1)
SODIUM SERPL-SCNC: 135 MEQ/L (ref 135–144)
TSH SERPL-MCNC: 0.99 UIU/ML (ref 0.44–3.86)
WBC # BLD AUTO: 8.6 K/UL (ref 4.8–10.8)

## 2023-09-18 PROCEDURE — 36415 COLL VENOUS BLD VENIPUNCTURE: CPT

## 2023-09-18 PROCEDURE — 82043 UR ALBUMIN QUANTITATIVE: CPT

## 2023-09-18 PROCEDURE — 83880 ASSAY OF NATRIURETIC PEPTIDE: CPT

## 2023-09-18 PROCEDURE — 76770 US EXAM ABDO BACK WALL COMP: CPT

## 2023-09-18 PROCEDURE — 82570 ASSAY OF URINE CREATININE: CPT

## 2023-09-18 PROCEDURE — 84443 ASSAY THYROID STIM HORMONE: CPT

## 2023-09-18 PROCEDURE — 80069 RENAL FUNCTION PANEL: CPT

## 2023-09-18 PROCEDURE — 85027 COMPLETE CBC AUTOMATED: CPT

## 2023-09-29 VITALS — WEIGHT: 232.81 LBS | BODY MASS INDEX: 33.33 KG/M2 | HEIGHT: 70 IN

## 2023-09-30 NOTE — H&P
History of Present Illness:   Admission Reason: PFO   HPI:    DARIAN CUNNINGHAM is a 68 year old Male with past medical history of CVA as well as large PFO by JILLIAN in May 2023 presents for elective PFO closure.    Past medical history of CVA, hypertension, COPD, PFO  Past surgical history none  Social history and habits positive former tobacco no alcohol or drugs  Family history noncontributory    Allergies please see MAR    Review of system 14 point review of systems all negative      Comorbidities:   Comorbidites:  ·  Comorbid Conditions CVA            Allergies:  ·  No Known Allergies :     Medications Prior to Admission:   Admission Medication Reconciliation has not been completed for this patient.    Objective:   Medications:    Medications:          Continuous Medications       --------------------------------    1. Sodium Chloride 0.9% Infusion:  1000  mL  IntraVenous  <Continuous>         Scheduled Medications       --------------------------------    1. ceFAZolin IV Piggy Back:  1  gram(s)  IntraVenous Piggyback  Once         PRN Medications       --------------------------------  No PRN medications are active        Recent Lab Results:    Results:    CBC: 7/20/2023 06:08              \     Hgb     /                              \     14.3       /  WBC  ----------------  Plt               8.3       ----------------    182              /     Hct     \                              /     44.0       \            RBC: 4.84     MCV: 91           BMP: 7/20/2023 06:08  NA+        Cl-     BUN  /                         140    104    53 H /  --------------------------------  Glucose                ---------------------------  95    K+     HCO3-   Creat \                         4.3  28    1.83 H \  Calcium : 9.7     Anion Gap : 12      Coagulation: 7/20/2023 06:08  PT  /                    12.3  /  -------<    INR          ----------<      1.1  PTT\                    34  \                       Assessment  and Plan:   Assessment:    Assessment: Large PFO by JILLIAN    History of  CVA    Hypertension    Former tobacco use      Plan:    Ice directed PFO closure today.      Electronic Signatures:  Nick Shell)  (Signed 20-Jul-2023 07:06)   Authored: History of Present Illness, Comorbidities,  Allergies, Medications Prior to Admission, Objective, Assessment and Plan, Note Completion      Last Updated: 20-Jul-2023 07:06 by Nick Shell)

## 2023-09-30 NOTE — H&P
History & Physical Reviewed:   I have reviewed the History and Physical dated:  20-Jul-2023   History and Physical reviewed and relevant findings noted. Patient examined to review pertinent physical  findings.: No significant changes   Home Medications Reviewed: no changes noted   Allergies Reviewed: no changes noted       Airway/Sedation Assessment:  ·  Emotional Status calm   ·  Neurologic alert & oriented x 3   ·  Respiratory clear to auscultation   ·  Cardiovascular rhythm & rate regular   ·  Oropharyngeal Classification Class III   ·  Sedation Plan moderate sedation       ERAS (Enhanced Recovery After Surgery):  ·  ERAS Patient: no     Consent:   COVID-19 Consent:  ·  COVID-19 Risk Consent Surgeon has reviewed key risks related to the risk of claudia COVID-19 and if they contract COVID-19 what the risks are.       Electronic Signatures:  Nick Shell)  (Signed 20-Jul-2023 06:51)   Authored: History & Physical Reviewed, Airway/Sedation,  ERAS, Consent, Note Completion      Last Updated: 20-Jul-2023 06:51 by Nick Shell)

## 2023-10-02 ENCOUNTER — OFFICE VISIT (OUTPATIENT)
Dept: CARDIOLOGY CLINIC | Age: 68
End: 2023-10-02
Payer: MEDICARE

## 2023-10-02 VITALS
DIASTOLIC BLOOD PRESSURE: 78 MMHG | HEIGHT: 70 IN | WEIGHT: 246.4 LBS | BODY MASS INDEX: 35.28 KG/M2 | OXYGEN SATURATION: 79 % | SYSTOLIC BLOOD PRESSURE: 130 MMHG | HEART RATE: 94 BPM

## 2023-10-02 DIAGNOSIS — I25.10 CORONARY ARTERY DISEASE INVOLVING NATIVE CORONARY ARTERY OF NATIVE HEART WITHOUT ANGINA PECTORIS: ICD-10-CM

## 2023-10-02 DIAGNOSIS — J44.9 CHRONIC OBSTRUCTIVE PULMONARY DISEASE, UNSPECIFIED COPD TYPE (HCC): ICD-10-CM

## 2023-10-02 DIAGNOSIS — Q21.12 PFO (PATENT FORAMEN OVALE): ICD-10-CM

## 2023-10-02 DIAGNOSIS — I10 ESSENTIAL HYPERTENSION, BENIGN: ICD-10-CM

## 2023-10-02 DIAGNOSIS — R06.09 DOE (DYSPNEA ON EXERTION): ICD-10-CM

## 2023-10-02 DIAGNOSIS — I27.81 COR PULMONALE (CHRONIC) (HCC): ICD-10-CM

## 2023-10-02 DIAGNOSIS — E78.5 DYSLIPIDEMIA: ICD-10-CM

## 2023-10-02 DIAGNOSIS — E66.01 SEVERE OBESITY (BMI 35.0-39.9) WITH COMORBIDITY (HCC): ICD-10-CM

## 2023-10-02 DIAGNOSIS — I50.33 ACUTE ON CHRONIC DIASTOLIC (CONGESTIVE) HEART FAILURE (HCC): Primary | ICD-10-CM

## 2023-10-02 DIAGNOSIS — R09.89 BILATERAL CAROTID BRUITS: ICD-10-CM

## 2023-10-02 PROCEDURE — 3017F COLORECTAL CA SCREEN DOC REV: CPT | Performed by: INTERNAL MEDICINE

## 2023-10-02 PROCEDURE — 3075F SYST BP GE 130 - 139MM HG: CPT | Performed by: INTERNAL MEDICINE

## 2023-10-02 PROCEDURE — 3078F DIAST BP <80 MM HG: CPT | Performed by: INTERNAL MEDICINE

## 2023-10-02 PROCEDURE — 99214 OFFICE O/P EST MOD 30 MIN: CPT | Performed by: INTERNAL MEDICINE

## 2023-10-02 PROCEDURE — 1123F ACP DISCUSS/DSCN MKR DOCD: CPT | Performed by: INTERNAL MEDICINE

## 2023-10-02 PROCEDURE — 1036F TOBACCO NON-USER: CPT | Performed by: INTERNAL MEDICINE

## 2023-10-02 PROCEDURE — G8417 CALC BMI ABV UP PARAM F/U: HCPCS | Performed by: INTERNAL MEDICINE

## 2023-10-02 PROCEDURE — G8484 FLU IMMUNIZE NO ADMIN: HCPCS | Performed by: INTERNAL MEDICINE

## 2023-10-02 PROCEDURE — 3023F SPIROM DOC REV: CPT | Performed by: INTERNAL MEDICINE

## 2023-10-02 PROCEDURE — G8427 DOCREV CUR MEDS BY ELIG CLIN: HCPCS | Performed by: INTERNAL MEDICINE

## 2023-10-02 RX ORDER — POTASSIUM CHLORIDE 20 MEQ/1
20 TABLET, EXTENDED RELEASE ORAL DAILY
Qty: 180 TABLET | Refills: 3
Start: 2023-10-02

## 2023-10-02 RX ORDER — FUROSEMIDE 40 MG/1
TABLET ORAL
COMMUNITY
Start: 2023-09-28

## 2023-10-02 ASSESSMENT — ENCOUNTER SYMPTOMS
SHORTNESS OF BREATH: 1
BLOOD IN STOOL: 0
CHEST TIGHTNESS: 0
NAUSEA: 0
COUGH: 0
WHEEZING: 0
STRIDOR: 0
GASTROINTESTINAL NEGATIVE: 1
EYES NEGATIVE: 1

## 2023-10-02 NOTE — PROGRESS NOTES
Subsequent Progress Note  Patient: Stephanie Ugarte  YOB: 1955  MRN: [id number]    Chief Complaint: hf LE edema copd garnett   Chief Complaint   Patient presents with    Follow-up     2 month    Congestive Heart Failure       CV Data:  7/2019 echo EF 60  9/2019 spect negative   9/2019 CUS- mild   9/2019 Abd US negative AAA  12/14/2020 RCA SHIRIN EF 60  1/21 PVR negative  1/21 CUS mild   4/23 Echo EF 60 RVSP 67  5/5/23 BARBARA  PFO EF 55    7/23 He was sent to [facility] for PFO closure but PFO could not be reproduced. Subjective/HPI: no edema anymore on diuretics. No cp +garnett chronic 3L o2      1/6/2020 still on 3L O2 SUBJECTIVE: till struggles to stop smoke. No cp. No falls no bleed. Takes meds. Walks. 5/19/2020 TELEHEALTH EVALUATION -- Audio/Visual (During TPFEU-25 public health emergency)    Doing well no cp still has sob. Still trying to stop smoke. Uses 3L O2    6/22/2020 TELEHEALTH EVALUATION -- Audio/Visual (During FPWYI-07 public health emergency)    Called in 10 days ago c/o LE Edema. We advised low salt, walk and compression. Edema is now completely resolved. He feels much better. He admits he took lots of salty foods few weeks ago. No cp    7/27/2020  Leg edema was better but now came back again. He recently ran out of couple of his meds to include ACEI and BB.     8/10/2020 leg edema much improved with low salt and Fluid restrict. Now has 1-2+. No cp chronic SOB on O2.     9/11/2020 still has GARNETT. Uses 3L O2. Sate are 80s but feels comfortable at rest.  No CP no bleed no falls. 12/3/2020 no cp . GARNETT is worse. Weak and tired. No bleed. No falls. 12/121/220 feels ok. No cp no sob no falls no bleed. Takes meds. Trying to quit cigs    3/3/21 still SOB using 3L O2. Low stamina no cp no falls no bleed no edema takes meds. Not ilene active. 6/3/21 no cp still uses 3L O2. No falls no bleed. 9/3/21 chronic sob 3.5 L O2. No cp no falls no bleed. Takes meds    1/7/22 on 3L no cp but still SOB.  LE

## 2023-10-09 PROBLEM — I48.91 UNSPECIFIED ATRIAL FIBRILLATION (HCC): Status: ACTIVE | Noted: 2023-07-20

## 2023-10-17 ENCOUNTER — OFFICE VISIT (OUTPATIENT)
Dept: NEUROLOGY | Age: 68
End: 2023-10-17
Payer: MEDICARE

## 2023-10-17 VITALS
SYSTOLIC BLOOD PRESSURE: 132 MMHG | BODY MASS INDEX: 35.79 KG/M2 | HEART RATE: 70 BPM | WEIGHT: 249.4 LBS | DIASTOLIC BLOOD PRESSURE: 64 MMHG

## 2023-10-17 DIAGNOSIS — G89.0 THALAMIC SYNDROME: ICD-10-CM

## 2023-10-17 DIAGNOSIS — R41.3 MEMORY DEFICIT: ICD-10-CM

## 2023-10-17 DIAGNOSIS — H53.9 VISION CHANGES: ICD-10-CM

## 2023-10-17 DIAGNOSIS — D68.69 SECONDARY HYPERCOAGULABLE STATE (HCC): ICD-10-CM

## 2023-10-17 DIAGNOSIS — I48.11 LONGSTANDING PERSISTENT ATRIAL FIBRILLATION (HCC): ICD-10-CM

## 2023-10-17 DIAGNOSIS — I63.433 CEREBROVASCULAR ACCIDENT (CVA) DUE TO BILATERAL EMBOLISM OF POSTERIOR CEREBRAL ARTERIES (HCC): Primary | ICD-10-CM

## 2023-10-17 PROCEDURE — 3078F DIAST BP <80 MM HG: CPT | Performed by: PSYCHIATRY & NEUROLOGY

## 2023-10-17 PROCEDURE — G8484 FLU IMMUNIZE NO ADMIN: HCPCS | Performed by: PSYCHIATRY & NEUROLOGY

## 2023-10-17 PROCEDURE — G8427 DOCREV CUR MEDS BY ELIG CLIN: HCPCS | Performed by: PSYCHIATRY & NEUROLOGY

## 2023-10-17 PROCEDURE — 1123F ACP DISCUSS/DSCN MKR DOCD: CPT | Performed by: PSYCHIATRY & NEUROLOGY

## 2023-10-17 PROCEDURE — 99214 OFFICE O/P EST MOD 30 MIN: CPT | Performed by: PSYCHIATRY & NEUROLOGY

## 2023-10-17 PROCEDURE — 3017F COLORECTAL CA SCREEN DOC REV: CPT | Performed by: PSYCHIATRY & NEUROLOGY

## 2023-10-17 PROCEDURE — 3074F SYST BP LT 130 MM HG: CPT | Performed by: PSYCHIATRY & NEUROLOGY

## 2023-10-17 PROCEDURE — 1036F TOBACCO NON-USER: CPT | Performed by: PSYCHIATRY & NEUROLOGY

## 2023-10-17 PROCEDURE — G8417 CALC BMI ABV UP PARAM F/U: HCPCS | Performed by: PSYCHIATRY & NEUROLOGY

## 2023-10-17 ASSESSMENT — ENCOUNTER SYMPTOMS
NAUSEA: 0
PHOTOPHOBIA: 0
BACK PAIN: 0
CHOKING: 0
TROUBLE SWALLOWING: 0
COLOR CHANGE: 0
SHORTNESS OF BREATH: 0
VOMITING: 0

## 2023-10-17 NOTE — PROGRESS NOTES
Chronic kidney disease (CKD) stage G3a/A1, moderately decreased glomerular filtration rate (GFR) between 45-59 mL/min/1.73 square meter and albuminuria creatinine ratio less than 30 mg/g [facility]) TECHNOLOGIST PROVIDED HISTORY: What reading provider will be dictating this exam?->CRC FINDINGS: Kidneys: The right kidney measures 10.6 cm in length and the left kidney measures 11.3 cm in length. Kidneys demonstrate normal cortical echogenicity. No evidence of hydronephrosis or intrarenal stones. Bladder: Diffuse urinary bladder wall thickening up to 5.9 mm. Pre voiding bladder volume 336 cc.  57 cc postvoid urinary bladder residual.  Only a left ureteral jet noted. Unremarkable ultrasound of the kidneys. Diffuse urinary bladder wall thickening.        Lab Results   Component Value Date/Time    WBC 8.6 09/18/2023 12:32 PM    RBC 4.73 09/18/2023 12:32 PM    RBC 5.51 05/29/2012 08:28 PM    HGB 14.1 09/18/2023 12:32 PM    HCT 43.9 09/18/2023 12:32 PM    MCV 92.8 09/18/2023 12:32 PM    MCH 29.8 09/18/2023 12:32 PM    MCHC 32.1 09/18/2023 12:32 PM    RDW 13.4 09/18/2023 12:32 PM     09/18/2023 12:32 PM    MPV 10.1 10/16/2014 05:40 PM     Lab Results   Component Value Date/Time     09/18/2023 12:32 PM    K 4.7 09/18/2023 12:32 PM    K 4.1 05/15/2023 08:27 AM    CL 98 09/18/2023 12:32 PM    CO2 27 09/18/2023 12:32 PM    BUN 14 09/18/2023 12:32 PM    CREATININE 0.89 09/18/2023 12:32 PM    GFRAA >60.0 08/15/2022 12:01 PM    LABGLOM >60.0 09/18/2023 12:32 PM    GLUCOSE 83 09/18/2023 12:32 PM    GLUCOSE 95 07/20/2023 06:08 AM    PROT 5.7 05/08/2023 03:01 AM    LABALBU 3.8 09/18/2023 12:32 PM    LABALBU 4.6 05/29/2012 08:28 PM    CALCIUM 9.3 09/18/2023 12:32 PM    BILITOT 0.8 05/08/2023 03:01 AM    ALKPHOS 67 05/08/2023 03:01 AM    AST 15 05/08/2023 03:01 AM    ALT 22 05/08/2023 03:01 AM     Lab Results   Component Value Date/Time    PROTIME 12.3 07/20/2023 06:08 AM    INR 1.1 07/20/2023 06:08 AM     Lab Results

## 2023-11-08 ENCOUNTER — OFFICE VISIT (OUTPATIENT)
Dept: PULMONOLOGY | Age: 68
End: 2023-11-08
Payer: MEDICARE

## 2023-11-08 VITALS
SYSTOLIC BLOOD PRESSURE: 136 MMHG | HEART RATE: 91 BPM | OXYGEN SATURATION: 94 % | WEIGHT: 249 LBS | BODY MASS INDEX: 35.73 KG/M2 | DIASTOLIC BLOOD PRESSURE: 70 MMHG

## 2023-11-08 DIAGNOSIS — J96.11 CHRONIC RESPIRATORY FAILURE WITH HYPOXIA AND HYPERCAPNIA (HCC): Primary | ICD-10-CM

## 2023-11-08 DIAGNOSIS — J84.10 PULMONARY INTERSTITIAL FIBROSIS (HCC): ICD-10-CM

## 2023-11-08 DIAGNOSIS — J96.12 CHRONIC RESPIRATORY FAILURE WITH HYPOXIA AND HYPERCAPNIA (HCC): Primary | ICD-10-CM

## 2023-11-08 DIAGNOSIS — J44.9 CHRONIC OBSTRUCTIVE PULMONARY DISEASE, UNSPECIFIED COPD TYPE (HCC): ICD-10-CM

## 2023-11-08 DIAGNOSIS — G47.33 OSA (OBSTRUCTIVE SLEEP APNEA): ICD-10-CM

## 2023-11-08 DIAGNOSIS — E66.9 OBESITY (BMI 30-39.9): ICD-10-CM

## 2023-11-08 PROCEDURE — 99215 OFFICE O/P EST HI 40 MIN: CPT | Performed by: INTERNAL MEDICINE

## 2023-11-08 PROCEDURE — 1036F TOBACCO NON-USER: CPT | Performed by: INTERNAL MEDICINE

## 2023-11-08 PROCEDURE — G8427 DOCREV CUR MEDS BY ELIG CLIN: HCPCS | Performed by: INTERNAL MEDICINE

## 2023-11-08 PROCEDURE — 3017F COLORECTAL CA SCREEN DOC REV: CPT | Performed by: INTERNAL MEDICINE

## 2023-11-08 PROCEDURE — 3074F SYST BP LT 130 MM HG: CPT | Performed by: INTERNAL MEDICINE

## 2023-11-08 PROCEDURE — G8417 CALC BMI ABV UP PARAM F/U: HCPCS | Performed by: INTERNAL MEDICINE

## 2023-11-08 PROCEDURE — 1123F ACP DISCUSS/DSCN MKR DOCD: CPT | Performed by: INTERNAL MEDICINE

## 2023-11-08 PROCEDURE — 3023F SPIROM DOC REV: CPT | Performed by: INTERNAL MEDICINE

## 2023-11-08 PROCEDURE — G8484 FLU IMMUNIZE NO ADMIN: HCPCS | Performed by: INTERNAL MEDICINE

## 2023-11-08 PROCEDURE — 3078F DIAST BP <80 MM HG: CPT | Performed by: INTERNAL MEDICINE

## 2023-11-08 RX ORDER — IPRATROPIUM BROMIDE AND ALBUTEROL SULFATE 2.5; .5 MG/3ML; MG/3ML
1 SOLUTION RESPIRATORY (INHALATION) 4 TIMES DAILY
Qty: 360 ML | Refills: 3 | Status: SHIPPED | OUTPATIENT
Start: 2023-11-08

## 2023-11-08 RX ORDER — ALBUTEROL SULFATE 90 UG/1
2 AEROSOL, METERED RESPIRATORY (INHALATION) EVERY 6 HOURS PRN
Qty: 1 EACH | Refills: 5 | Status: SHIPPED | OUTPATIENT
Start: 2023-11-08

## 2023-11-08 RX ORDER — ALBUTEROL SULFATE 2.5 MG/3ML
2.5 SOLUTION RESPIRATORY (INHALATION)
Qty: 120 EACH | Refills: 3 | Status: SHIPPED | OUTPATIENT
Start: 2023-11-08 | End: 2023-11-08 | Stop reason: ALTCHOICE

## 2023-11-08 ASSESSMENT — ENCOUNTER SYMPTOMS
VOICE CHANGE: 0
VOMITING: 0
CHEST TIGHTNESS: 0
SORE THROAT: 0
EYE ITCHING: 0
RHINORRHEA: 0
ABDOMINAL PAIN: 0
WHEEZING: 1
NAUSEA: 0
SHORTNESS OF BREATH: 1
COUGH: 1
DIARRHEA: 0

## 2023-11-08 NOTE — PROGRESS NOTES
attenuation. No bulky mediastinal  adenopathy. Central airways are patent. Esophagus is normal course and  caliber. Cardiac size within normal limits without pericardial effusion. Lungs/pleura: Chronic changes of moderate upper lobe predominant  centrilobular emphysema along with central bronchiectasis. Midlung scarring  atelectasis. Subpleural reticulation with honeycombing consistent of  superimposed pulmonary fibrosis upon chronic obstructive pulmonary disease  findings. No focal consolidation of acute airspace disease. Right lower  lobe 5 mm noncalcified mildly irregular or spiculated pulmonary nodule. Upper Abdomen: Visualized portions of the upper abdomen unremarkable. Soft Tissues/Bones: No acute osseous or soft tissue findings. No aggressive  osseous lesion. Impression  Chronic obstructive pulmonary disease with emphysematous changes and central  bronchiectasis. Superimposed or intermixed pulmonary fibrotic change with  honeycombing. 5 mm noncalcified right lower lobe pulmonary nodule. CT lung  rads category 3 with follow-up recommended in 6 months    RECOMMENDATIONS:  Lung rads category 3 mildly suspicious nodule with follow-up recommended in 6  months utilizing low-dose CT.  ]  No results found for this or any previous visit.  ]    Assessment/Plan:     1. Chronic obstructive pulmonary disease, unspecified COPD type ([address])  He is using  3-4 lit 24 hour day. He is on neb with duoneb QID, albuterol HFA prn. he need new neb machine he said he lost it when he moved to different apartment . He will need prescription of duo neb also for lincareHe is taking mucinex. C/o chest congestion,  shortness of breath with exertion and sometimes at rest, Cough with thick clear mucus and  Off and on Wheezing. CXR 5/23   1. Redemonstration of chronic irregular interstitial opacities bilaterally  which could indicate chronic interstitial lung disease.   2. No evidence of pneumonia or pleural

## 2023-11-17 ENCOUNTER — TELEPHONE (OUTPATIENT)
Dept: PULMONOLOGY | Age: 68
End: 2023-11-17

## 2023-11-17 NOTE — TELEPHONE ENCOUNTER
[facility] WANTED YOU TO KNOW THAT PATIENT IS NOT BEING COMPLIANT WITH HIS NOV TRILOGY. THEY ARE GOING TO HAVE TO PICK IT UP FROM PT.

## 2023-11-24 DIAGNOSIS — J44.9 CHRONIC OBSTRUCTIVE PULMONARY DISEASE, UNSPECIFIED COPD TYPE (HCC): ICD-10-CM

## 2023-11-29 ENCOUNTER — TELEPHONE (OUTPATIENT)
Dept: PULMONOLOGY | Age: 68
End: 2023-11-29

## 2023-11-29 DIAGNOSIS — J44.9 CHRONIC OBSTRUCTIVE PULMONARY DISEASE, UNSPECIFIED COPD TYPE (HCC): ICD-10-CM

## 2023-11-29 NOTE — TELEPHONE ENCOUNTER
[facility] IS CALLING STATING THAT THEY NEVER RECEIVED THE ORDER FOR THE NEBULIZER.  CAN YOU PLEASE REPRINT SO IT CAN BE FAXED WITH OFFICE NOTES ([facility] [phone])

## 2023-12-04 RX ORDER — ALBUTEROL SULFATE 90 UG/1
2 AEROSOL, METERED RESPIRATORY (INHALATION) EVERY 6 HOURS PRN
Qty: 8.5 G | Refills: 3 | Status: SHIPPED | OUTPATIENT
Start: 2023-12-04

## 2023-12-04 NOTE — TELEPHONE ENCOUNTER
Please approve or deny this request.    Rx requested:  Requested Prescriptions     Pending Prescriptions Disp Refills    albuterol sulfate HFA (PROVENTIL;VENTOLIN;PROAIR) 108 (90 Base) MCG/ACT inhaler [Pharmacy Med Name: ALBUTEROL HFA INH (200 PUFFS) 8.5GM] 8.5 g 3     Sig: INHALE 2 PUFFS INTO THE LUNGS EVERY 6 HOURS AS NEEDED FOR WHEEZING         Last Office Visit:   11/8/2023      Next Visit Date:  Future Appointments   Date Time Provider Department Center   1/10/2024  9:15 AM Yovanny Felder MD [place] [facility]   1/17/2024 10:00 AM Abiel Burnette MD [facility]   2/26/2024  1:15 PM Marc Banda MD [place] [facility]   10/15/2024  1:30 PM Igor Garcia MD [facility]  NEUR Neurology -

## 2023-12-17 NOTE — PROGRESS NOTES
Arrived to pre/post from the lab and report from [facility]. Attached to monitor and vitals are stable. 6 Slovak sheath attached to pressure bag and flushes easily along with  [place] venous sheath. No bleeding or hematoma. Alert and talking.   Will continue to monitor 2 seconds or less

## 2023-12-23 DIAGNOSIS — F51.04 PSYCHOPHYSIOLOGICAL INSOMNIA: ICD-10-CM

## 2023-12-26 RX ORDER — TRAZODONE HYDROCHLORIDE 100 MG/1
100 TABLET ORAL NIGHTLY
Qty: 90 TABLET | Refills: 0 | Status: SHIPPED | OUTPATIENT
Start: 2023-12-26

## 2023-12-26 NOTE — TELEPHONE ENCOUNTER
Comments:     Last Office Visit (last PCP visit):   8/4/2023    Next Visit Date:  Future Appointments   Date Time Provider [address]   1/10/2024  9:15 AM Joshua Bailey MD [address] [place] [address]   1/17/2024 10:00 AM Salvador Gilmore MD [facility]   2/26/2024  1:15 PM Efe Cabral MD [address] [facility]   10/15/2024  1:30 PM Phuong Gray  [address] [place] [address] Neurology -       **If hasn't been seen in over a year OR hasn't followed up according to last diabetes/ADHD visit, make appointment for patient before sending refill to provider.     Rx requested:  Requested Prescriptions     Pending Prescriptions Disp Refills    traZODone (DESYREL) 100 MG tablet [Pharmacy Med Name: TRAZODONE 100MG TABLETS] 90 tablet 1     Sig: TAKE 1 TABLET BY MOUTH EVERY NIGHT

## 2024-01-02 ENCOUNTER — TELEPHONE (OUTPATIENT)
Dept: CARDIOLOGY CLINIC | Age: 69
End: 2024-01-02

## 2024-01-02 DIAGNOSIS — I10 ESSENTIAL HYPERTENSION, BENIGN: Primary | ICD-10-CM

## 2024-01-02 NOTE — TELEPHONE ENCOUNTER
Pt called for samples of Eliquis 5mg    I packaged up 3 boxes of Eliquis 5mg  LOT [id number]  EXP 05/25

## 2024-01-10 ENCOUNTER — TELEPHONE (OUTPATIENT)
Dept: INTERNAL MEDICINE | Age: 69
End: 2024-01-10

## 2024-01-10 ENCOUNTER — OFFICE VISIT (OUTPATIENT)
Dept: INTERNAL MEDICINE | Age: 69
End: 2024-01-10

## 2024-01-10 VITALS
HEART RATE: 91 BPM | SYSTOLIC BLOOD PRESSURE: 110 MMHG | WEIGHT: 260 LBS | BODY MASS INDEX: 37.22 KG/M2 | DIASTOLIC BLOOD PRESSURE: 74 MMHG | RESPIRATION RATE: 16 BRPM | OXYGEN SATURATION: 80 % | HEIGHT: 70 IN | TEMPERATURE: 97.3 F

## 2024-01-10 DIAGNOSIS — Z00.00 MEDICARE ANNUAL WELLNESS VISIT, SUBSEQUENT: Primary | ICD-10-CM

## 2024-01-10 DIAGNOSIS — Z87.891 PERSONAL HISTORY OF TOBACCO USE: ICD-10-CM

## 2024-01-10 DIAGNOSIS — J44.9 CHRONIC OBSTRUCTIVE PULMONARY DISEASE, UNSPECIFIED COPD TYPE (HCC): ICD-10-CM

## 2024-01-10 DIAGNOSIS — I50.33 ACUTE ON CHRONIC DIASTOLIC (CONGESTIVE) HEART FAILURE (HCC): ICD-10-CM

## 2024-01-10 DIAGNOSIS — I48.11 LONGSTANDING PERSISTENT ATRIAL FIBRILLATION (HCC): ICD-10-CM

## 2024-01-10 DIAGNOSIS — L60.8 TOENAIL DEFORMITY: Primary | ICD-10-CM

## 2024-01-10 DIAGNOSIS — Z00.00 MEDICARE ANNUAL WELLNESS VISIT, SUBSEQUENT: ICD-10-CM

## 2024-01-10 DIAGNOSIS — J84.10 PULMONARY INTERSTITIAL FIBROSIS (HCC): ICD-10-CM

## 2024-01-10 PROBLEM — I27.81 COR PULMONALE (CHRONIC) (HCC): Status: RESOLVED | Noted: 2019-08-15 | Resolved: 2024-01-10

## 2024-01-10 PROBLEM — H53.9 VISION CHANGES: Status: RESOLVED | Noted: 2023-05-08 | Resolved: 2024-01-10

## 2024-01-10 PROBLEM — D68.69 SECONDARY HYPERCOAGULABLE STATE (HCC): Status: RESOLVED | Noted: 2023-10-17 | Resolved: 2024-01-10

## 2024-01-10 LAB
ALBUMIN SERPL-MCNC: 4.1 G/DL (ref 3.5–4.6)
ALP SERPL-CCNC: 88 U/L (ref 35–104)
ALT SERPL-CCNC: 6 U/L (ref 0–41)
ANION GAP SERPL CALCULATED.3IONS-SCNC: 8 MEQ/L (ref 9–15)
AST SERPL-CCNC: 12 U/L (ref 0–40)
BILIRUB SERPL-MCNC: 0.7 MG/DL (ref 0.2–0.7)
BUN SERPL-MCNC: 15 MG/DL (ref 8–23)
CALCIUM SERPL-MCNC: 9.7 MG/DL (ref 8.5–9.9)
CHLORIDE SERPL-SCNC: 99 MEQ/L (ref 95–107)
CHOLEST SERPL-MCNC: 107 MG/DL (ref 0–199)
CO2 SERPL-SCNC: 33 MEQ/L (ref 20–31)
CREAT SERPL-MCNC: 1.2 MG/DL (ref 0.7–1.2)
GLOBULIN SER CALC-MCNC: 2.8 G/DL (ref 2.3–3.5)
GLUCOSE SERPL-MCNC: 112 MG/DL (ref 70–99)
HDLC SERPL-MCNC: 41 MG/DL (ref 40–59)
LDLC SERPL CALC-MCNC: 49 MG/DL (ref 0–129)
POTASSIUM SERPL-SCNC: 4.4 MEQ/L (ref 3.4–4.9)
PROT SERPL-MCNC: 6.9 G/DL (ref 6.3–8)
SODIUM SERPL-SCNC: 140 MEQ/L (ref 135–144)
TRIGL SERPL-MCNC: 85 MG/DL (ref 0–150)

## 2024-01-10 ASSESSMENT — PATIENT HEALTH QUESTIONNAIRE - PHQ9
SUM OF ALL RESPONSES TO PHQ QUESTIONS 1-9: 0
SUM OF ALL RESPONSES TO PHQ QUESTIONS 1-9: 0
2. FEELING DOWN, DEPRESSED OR HOPELESS: 0
SUM OF ALL RESPONSES TO PHQ9 QUESTIONS 1 & 2: 0
SUM OF ALL RESPONSES TO PHQ QUESTIONS 1-9: 0
1. LITTLE INTEREST OR PLEASURE IN DOING THINGS: 0
SUM OF ALL RESPONSES TO PHQ QUESTIONS 1-9: 0

## 2024-01-10 ASSESSMENT — LIFESTYLE VARIABLES
HOW MANY STANDARD DRINKS CONTAINING ALCOHOL DO YOU HAVE ON A TYPICAL DAY: 3 OR 4
HOW OFTEN DO YOU HAVE A DRINK CONTAINING ALCOHOL: MONTHLY OR LESS

## 2024-01-10 NOTE — PROGRESS NOTES
Supports (MEDICAL COMPRESSION STOCKINGS) MISC 2 each by Does not apply route daily On in QAM and off QHS.  B/L Knee high, 20-30mmHg Yes Marc Banda MD   Respiratory Therapy Supplies DREW New CPAP Full face  mask and supplies. Dispense heated tubing and adjust humidity in CPAP due to c/o dry mouth. Yes Abiel Burnette MD   OXYGEN POC    3 lit via NC     Dx copd Yes Abiel Burnette MD   CPAP Machine MISC by Does not apply route New CPAP with 7 cm with 3 lit O2 bled Yes Abiel Burnette MD   Compression Bandages KIT LLE: knee high: 20-30mmhg Yes Yovanny Felder MD   furosemide (LASIX) 40 MG tablet TAKE 1 1/2 TABLETS BY MOUTH DAILY  Patient not taking: Reported on 11/8/2023  Mitzi Soriano MD   docusate sodium (COLACE, DULCOLAX) 100 MG CAPS Take 100 mg by mouth 2 times daily  Patient not taking: Reported on 11/8/2023  Nayeli Chan, NEIL - CNP   tiotropium-olodaterol (STIOLTO) 2.5-2.5 MCG/ACT AERS Inhale 2 puffs into the lungs daily  Patient not taking: Reported on 11/8/2023  Abiel Burnette MD       [facility] (Including outside providers/suppliers regularly involved in providing care):   Patient Care Team:  Yovanny Felder MD as PCP - General (Family Medicine)  Yovanny Felder MD as PCP - Empaneled Provider  Marc Banda MD as Cardiologist (Cardiology)  Igor Garcia MD as Consulting Physician (Neurology)     Reviewed and updated this visit:  Allergies  Meds          Patient is interested home care for nail clipping; will check on this

## 2024-01-10 NOTE — TELEPHONE ENCOUNTER
Eugene Tyler. This patient has trouble trimming his toe nails. He also has trouble with transportation while also being in a wheelchair mostly. He would like to know if there is a way to have someone come to his home to trim his toenails. Is this a possible service with home health?

## 2024-01-10 NOTE — PATIENT INSTRUCTIONS
Learning About Being Active as an Older Adult  Why is being active important as you get older?     Being active is one of the best things you can do for your health. And it's never too late to start. Being active--or getting active, if you aren't already--has definite benefits. It can:  Give you more energy,  Keep your mind sharp.  Improve balance to reduce your risk of falls.  Help you manage chronic illness with fewer medicines.  No matter how old you are, how fit you are, or what health problems you have, there is a form of activity that will work for you. And the more physical activity you can do, the better your overall health will be.  What kinds of activity can help you stay healthy?  Being more active will make your daily activities easier. Physical activity includes planned exercise and things you do in daily life. There are four types of activity:  Aerobic.  Doing aerobic activity makes your heart and lungs strong.  Includes walking, dancing, and gardening.  Aim for at least 2½ hours spread throughout the week.  It improves your energy and can help you sleep better.  Muscle-strengthening.  This type of activity can help maintain muscle and strengthen bones.  Includes climbing stairs, using resistance bands, and lifting or carrying heavy loads.  Aim for at least twice a week.  It can help protect the knees and other joints.  Stretching.  Stretching gives you better range of motion in joints and muscles.  Includes upper arm stretches, calf stretches, and gentle yoga.  Aim for at least twice a week, preferably after your muscles are warmed up from other activities.  It can help you function better in daily life.  Balancing.  This helps you stay coordinated and have good posture.  Includes heel-to-toe walking, petra chi, and certain types of yoga.  Aim for at least 3 days a week.  It can reduce your risk of falling.  Even if you have a hard time meeting the recommendations, it's better to be more active

## 2024-03-05 DIAGNOSIS — F51.04 PSYCHOPHYSIOLOGICAL INSOMNIA: ICD-10-CM

## 2024-03-05 RX ORDER — TRAZODONE HYDROCHLORIDE 100 MG/1
100 TABLET ORAL NIGHTLY
Qty: 90 TABLET | Refills: 0 | Status: SHIPPED | OUTPATIENT
Start: 2024-03-05

## 2024-03-05 NOTE — TELEPHONE ENCOUNTER
Comments:     Last Office Visit (last PCP visit):   Visit date not found    Next Visit Date:  Future Appointments   Date Time Provider Department Center   3/12/2024  1:00 PM Marc Banda MD [place] [facility]   3/13/2024  9:30 AM Makadia, Abiel P, MD [place] [facility]   10/15/2024  1:30 PM Igor Garcia MD [facility]  NEUR Neurology -       **If hasn't been seen in over a year OR hasn't followed up according to last diabetes/ADHD visit, make appointment for patient before sending refill to provider.    Rx requested:  Requested Prescriptions     Pending Prescriptions Disp Refills    traZODone (DESYREL) 100 MG tablet [Pharmacy Med Name: trazodone 100 mg tablet] 90 tablet 0     Sig: TAKE 1 TABLET BY MOUTH EVERY NIGHT AT BEDTIME

## 2024-03-11 ENCOUNTER — TELEPHONE (OUTPATIENT)
Dept: CARDIOLOGY CLINIC | Age: 69
End: 2024-03-11

## 2024-03-12 ENCOUNTER — OFFICE VISIT (OUTPATIENT)
Dept: CARDIOLOGY CLINIC | Age: 69
End: 2024-03-12
Payer: MEDICARE

## 2024-03-12 VITALS
SYSTOLIC BLOOD PRESSURE: 132 MMHG | BODY MASS INDEX: 38.37 KG/M2 | OXYGEN SATURATION: 82 % | WEIGHT: 268 LBS | DIASTOLIC BLOOD PRESSURE: 68 MMHG | HEIGHT: 70 IN | HEART RATE: 81 BPM

## 2024-03-12 DIAGNOSIS — I25.10 CORONARY ARTERY DISEASE INVOLVING NATIVE CORONARY ARTERY OF NATIVE HEART WITHOUT ANGINA PECTORIS: ICD-10-CM

## 2024-03-12 DIAGNOSIS — R09.89 BILATERAL CAROTID BRUITS: ICD-10-CM

## 2024-03-12 DIAGNOSIS — Q21.12 PFO (PATENT FORAMEN OVALE): ICD-10-CM

## 2024-03-12 DIAGNOSIS — I50.33 ACUTE ON CHRONIC DIASTOLIC (CONGESTIVE) HEART FAILURE (HCC): ICD-10-CM

## 2024-03-12 DIAGNOSIS — I27.81 COR PULMONALE (CHRONIC) (HCC): ICD-10-CM

## 2024-03-12 DIAGNOSIS — I10 ESSENTIAL HYPERTENSION, BENIGN: Primary | ICD-10-CM

## 2024-03-12 DIAGNOSIS — E78.5 DYSLIPIDEMIA: ICD-10-CM

## 2024-03-12 DIAGNOSIS — I20.9 ANGINA PECTORIS, UNSPECIFIED (HCC): ICD-10-CM

## 2024-03-12 DIAGNOSIS — I25.119 ATHEROSCLEROSIS OF NATIVE CORONARY ARTERY OF NATIVE HEART WITH ANGINA PECTORIS (HCC): ICD-10-CM

## 2024-03-12 DIAGNOSIS — E66.01 SEVERE OBESITY (BMI 35.0-39.9) WITH COMORBIDITY (HCC): ICD-10-CM

## 2024-03-12 DIAGNOSIS — J44.9 CHRONIC OBSTRUCTIVE PULMONARY DISEASE, UNSPECIFIED COPD TYPE (HCC): ICD-10-CM

## 2024-03-12 PROCEDURE — 3017F COLORECTAL CA SCREEN DOC REV: CPT | Performed by: INTERNAL MEDICINE

## 2024-03-12 PROCEDURE — 99214 OFFICE O/P EST MOD 30 MIN: CPT | Performed by: INTERNAL MEDICINE

## 2024-03-12 PROCEDURE — 3078F DIAST BP <80 MM HG: CPT | Performed by: INTERNAL MEDICINE

## 2024-03-12 PROCEDURE — 1123F ACP DISCUSS/DSCN MKR DOCD: CPT | Performed by: INTERNAL MEDICINE

## 2024-03-12 PROCEDURE — 3075F SYST BP GE 130 - 139MM HG: CPT | Performed by: INTERNAL MEDICINE

## 2024-03-12 PROCEDURE — 1036F TOBACCO NON-USER: CPT | Performed by: INTERNAL MEDICINE

## 2024-03-12 PROCEDURE — 3023F SPIROM DOC REV: CPT | Performed by: INTERNAL MEDICINE

## 2024-03-12 PROCEDURE — G8427 DOCREV CUR MEDS BY ELIG CLIN: HCPCS | Performed by: INTERNAL MEDICINE

## 2024-03-12 PROCEDURE — G8417 CALC BMI ABV UP PARAM F/U: HCPCS | Performed by: INTERNAL MEDICINE

## 2024-03-12 PROCEDURE — G8484 FLU IMMUNIZE NO ADMIN: HCPCS | Performed by: INTERNAL MEDICINE

## 2024-03-12 RX ORDER — FUROSEMIDE 40 MG/1
40 TABLET ORAL DAILY
Qty: 90 TABLET | Refills: 3 | Status: SHIPPED | OUTPATIENT
Start: 2024-03-12

## 2024-03-12 RX ORDER — CLOPIDOGREL BISULFATE 75 MG/1
75 TABLET ORAL DAILY
Qty: 90 TABLET | Refills: 3 | Status: SHIPPED | OUTPATIENT
Start: 2024-03-12

## 2024-03-12 ASSESSMENT — ENCOUNTER SYMPTOMS
NAUSEA: 0
STRIDOR: 0
CHEST TIGHTNESS: 0
GASTROINTESTINAL NEGATIVE: 1
COUGH: 0
WHEEZING: 0
SHORTNESS OF BREATH: 1
BLOOD IN STOOL: 0
EYES NEGATIVE: 1

## 2024-03-12 NOTE — PROGRESS NOTES
(CVA) (HCC)    Impaired mobility and activities of daily living dt CVA    Memory deficit    PFO (patent foramen ovale)    Severe obesity (BMI 35.0-39.9) with comorbidity (HCC)    Thalamic syndrome    Unspecified atrial fibrillation (HCC)    Angina pectoris, unspecified    Atherosclerotic heart disease of native coronary artery with unspecified angina pectoris       There are no discontinued medications.        Modified Medications    No medications on file       No orders of the defined types were placed in this encounter.      Assessment/Plan:    1. Essential hypertension, benign - stable.  continue meds. Low salt diet.     2. Acute on chronic diastolic (congestive) heart failure (HCC)  Chronic LE Edema - continue meds. Lasix 40 lower K to 20 QD. Low salt.     3. Cor pulmonale (chronic) - chronic    4. Shortness of breath  No worse- on 4.5 L O2 at home.  Recently stopped smoking.  Continue to stay off smoking.     6. Carotid Bruits- CUS - stable. Will continue surveillance     7. Dry skin- see Dermatology.     8. HPL- Statin low fat diet. Labs reviewed w pt- excellent.     8. CVA - with visual deficits. - same     9. BARBARA  PFO - unable to be reproduced    10. JORGE/CKD - will refer to Nephrology. - stable     11. Pt has Viagra and has not taken it. He is on Imdur.  He states he will not take Imdur that day should he take viagra.     12. Vora 20 LBs since 6 months ago.  Need to cut portions and loose wt. Be more active.     Fasting Labs   Counseling:  Heart Healthy Lifestyle, Stop Smoking, Low Salt Diet, Take Precautions to Prevent Falls, Regular Exercise and Walk Daily    Return in about 4 months (around 7/12/2024).      Electronically signed by SANDRO MAXWELL MD on 3/12/2024 at 1:09 PM

## 2024-03-13 ENCOUNTER — OFFICE VISIT (OUTPATIENT)
Dept: PULMONOLOGY | Age: 69
End: 2024-03-13
Payer: MEDICARE

## 2024-03-13 VITALS — SYSTOLIC BLOOD PRESSURE: 128 MMHG | OXYGEN SATURATION: 90 % | HEART RATE: 89 BPM | DIASTOLIC BLOOD PRESSURE: 80 MMHG

## 2024-03-13 DIAGNOSIS — J96.11 CHRONIC RESPIRATORY FAILURE WITH HYPOXIA AND HYPERCAPNIA (HCC): ICD-10-CM

## 2024-03-13 DIAGNOSIS — J98.4 LUNG DENSITY ON X-RAY: ICD-10-CM

## 2024-03-13 DIAGNOSIS — J84.10 PULMONARY INTERSTITIAL FIBROSIS (HCC): ICD-10-CM

## 2024-03-13 DIAGNOSIS — I10 ESSENTIAL HYPERTENSION, BENIGN: ICD-10-CM

## 2024-03-13 DIAGNOSIS — E66.9 OBESITY (BMI 30-39.9): ICD-10-CM

## 2024-03-13 DIAGNOSIS — J96.12 CHRONIC RESPIRATORY FAILURE WITH HYPOXIA AND HYPERCAPNIA (HCC): ICD-10-CM

## 2024-03-13 DIAGNOSIS — J44.9 CHRONIC OBSTRUCTIVE PULMONARY DISEASE, UNSPECIFIED COPD TYPE (HCC): Primary | ICD-10-CM

## 2024-03-13 DIAGNOSIS — G47.33 OSA (OBSTRUCTIVE SLEEP APNEA): ICD-10-CM

## 2024-03-13 PROCEDURE — 1123F ACP DISCUSS/DSCN MKR DOCD: CPT | Performed by: INTERNAL MEDICINE

## 2024-03-13 PROCEDURE — 3074F SYST BP LT 130 MM HG: CPT | Performed by: INTERNAL MEDICINE

## 2024-03-13 PROCEDURE — G8417 CALC BMI ABV UP PARAM F/U: HCPCS | Performed by: INTERNAL MEDICINE

## 2024-03-13 PROCEDURE — 3079F DIAST BP 80-89 MM HG: CPT | Performed by: INTERNAL MEDICINE

## 2024-03-13 PROCEDURE — 1036F TOBACCO NON-USER: CPT | Performed by: INTERNAL MEDICINE

## 2024-03-13 PROCEDURE — G8427 DOCREV CUR MEDS BY ELIG CLIN: HCPCS | Performed by: INTERNAL MEDICINE

## 2024-03-13 PROCEDURE — 3023F SPIROM DOC REV: CPT | Performed by: INTERNAL MEDICINE

## 2024-03-13 PROCEDURE — 99214 OFFICE O/P EST MOD 30 MIN: CPT | Performed by: INTERNAL MEDICINE

## 2024-03-13 PROCEDURE — G8484 FLU IMMUNIZE NO ADMIN: HCPCS | Performed by: INTERNAL MEDICINE

## 2024-03-13 PROCEDURE — 3017F COLORECTAL CA SCREEN DOC REV: CPT | Performed by: INTERNAL MEDICINE

## 2024-03-13 NOTE — PROGRESS NOTES
interstitial pulmonary fibrosis  2. Cardiomegaly  ]  No results found for this or any previous visit.  ]  Results for orders placed during the hospital encounter of 12/20/22    CT CHEST WO CONTRAST    Narrative  EXAMINATION:  CT OF THE CHEST WITHOUT CONTRAST 12/20/2022 1:59 pm    TECHNIQUE:  CT of the chest was performed without the administration of intravenous  contrast. Multiplanar reformatted images are provided for review. Automated  exposure control, iterative reconstruction, and/or weight based adjustment of  the mA/kV was utilized to reduce the radiation dose to as low as reasonably  achievable.    COMPARISON:  Chest x-ray 12/03/2022    HISTORY:  ORDERING SYSTEM PROVIDED HISTORY: Lung density on x-ray  TECHNOLOGIST PROVIDED HISTORY:  Reason for exam:->lung density  What reading provider will be dictating this exam?->CRC    FINDINGS:  Mediastinum: Thyroid is homogeneous in attenuation. No bulky mediastinal  adenopathy. Central airways are patent. Esophagus is normal course and  caliber.  Cardiac size within normal limits without pericardial effusion.    Lungs/pleura: Chronic changes of moderate upper lobe predominant  centrilobular emphysema along with central bronchiectasis.  Midlung scarring  atelectasis.  Subpleural reticulation with honeycombing consistent of  superimposed pulmonary fibrosis upon chronic obstructive pulmonary disease  findings.  No focal consolidation of acute airspace disease.  Right lower  lobe 5 mm noncalcified mildly irregular or spiculated pulmonary nodule.    Upper Abdomen: Visualized portions of the upper abdomen unremarkable.    Soft Tissues/Bones: No acute osseous or soft tissue findings. No aggressive  osseous lesion.    Impression  Chronic obstructive pulmonary disease with emphysematous changes and central  bronchiectasis.  Superimposed or intermixed pulmonary fibrotic change with  honeycombing.  5 mm noncalcified right lower lobe pulmonary nodule.  CT lung  rads category 3

## 2024-03-18 DIAGNOSIS — I25.10 CORONARY ARTERY DISEASE INVOLVING NATIVE CORONARY ARTERY OF NATIVE HEART WITHOUT ANGINA PECTORIS: Primary | ICD-10-CM

## 2024-03-18 NOTE — TELEPHONE ENCOUNTER
Requesting medication refill. Please approve or deny this request.    Rx requested:  Requested Prescriptions     Pending Prescriptions Disp Refills    metoprolol tartrate (LOPRESSOR) 25 MG tablet 60 tablet 3     Sig: Take 1 tablet by mouth 2 times daily         Last Office Visit:   3/12/2024      Next Visit Date:  Future Appointments   Date Time Provider Department Center   7/16/2024  1:00 PM Marc Banda MD [place] [facility]   7/17/2024  9:30 AM Abiel Burnette MD [facility]   10/15/2024  1:30 PM Igor Garcia MD [facility] Neurology -               Last refill 5/11/2023. Please approve or deny.

## 2024-03-19 ENCOUNTER — TELEPHONE (OUTPATIENT)
Dept: FAMILY MEDICINE CLINIC | Age: 69
End: 2024-03-19

## 2024-03-19 NOTE — TELEPHONE ENCOUNTER
Patient is requesting a letter from his provider stating that it would be beneficial to the patient to have a walk-in shower. Patient's apartment building, [facility], approached him about it and stated that all they needed was a letter from his provider. Patient states that the letter could be mailed to him.

## 2024-04-17 ENCOUNTER — TELEPHONE (OUTPATIENT)
Dept: PULMONOLOGY | Age: 69
End: 2024-04-17

## 2024-04-17 DIAGNOSIS — G47.33 OSA (OBSTRUCTIVE SLEEP APNEA): Primary | ICD-10-CM

## 2024-04-17 NOTE — TELEPHONE ENCOUNTER
DEVOTED CALLED IN TO THE OFFICE BECAUSE THE PT NEEDS O2 RX SENT TO [facility].      PLEASE FAX RX

## 2024-06-10 ENCOUNTER — HOSPITAL ENCOUNTER (OUTPATIENT)
Dept: GENERAL RADIOLOGY | Age: 69
Discharge: HOME | End: 2024-06-12
Attending: INTERNAL MEDICINE
Payer: COMMERCIAL

## 2024-06-10 ENCOUNTER — HOSPITAL ENCOUNTER (OUTPATIENT)
Dept: ULTRASOUND IMAGING | Age: 69
Discharge: HOME | End: 2024-06-12
Attending: INTERNAL MEDICINE
Payer: COMMERCIAL

## 2024-06-10 DIAGNOSIS — R09.89 BILATERAL CAROTID BRUITS: ICD-10-CM

## 2024-06-10 DIAGNOSIS — J44.9 CHRONIC OBSTRUCTIVE PULMONARY DISEASE, UNSPECIFIED COPD TYPE (HCC): ICD-10-CM

## 2024-06-10 PROCEDURE — 71046 X-RAY EXAM CHEST 2 VIEWS: CPT

## 2024-06-10 PROCEDURE — 93880 EXTRACRANIAL BILAT STUDY: CPT

## 2024-06-25 DIAGNOSIS — F51.04 PSYCHOPHYSIOLOGICAL INSOMNIA: ICD-10-CM

## 2024-06-26 RX ORDER — TRAZODONE HYDROCHLORIDE 100 MG/1
100 TABLET ORAL NIGHTLY
Qty: 90 TABLET | Refills: 1 | Status: SHIPPED | OUTPATIENT
Start: 2024-06-26

## 2024-07-01 RX ORDER — ATORVASTATIN CALCIUM 40 MG/1
TABLET, FILM COATED ORAL
Qty: 90 TABLET | Refills: 3 | Status: SHIPPED | OUTPATIENT
Start: 2024-07-01

## 2024-07-01 RX ORDER — POTASSIUM CHLORIDE 20 MEQ/1
20 TABLET, EXTENDED RELEASE ORAL DAILY
Qty: 90 TABLET | Refills: 3 | Status: SHIPPED | OUTPATIENT
Start: 2024-07-01

## 2024-07-01 RX ORDER — ISOSORBIDE MONONITRATE 60 MG/1
60 TABLET, EXTENDED RELEASE ORAL DAILY
Qty: 90 TABLET | Refills: 3 | Status: SHIPPED | OUTPATIENT
Start: 2024-07-01

## 2024-07-01 RX ORDER — CHOLECALCIFEROL (VITAMIN D3) 125 MCG
1 CAPSULE ORAL DAILY
Qty: 30 TABLET | Refills: 3 | OUTPATIENT
Start: 2024-07-01

## 2024-07-01 NOTE — TELEPHONE ENCOUNTER
KCL dose changed to 20mEq/1 tablet daily per LOV 03/12/2024.     Requesting medication refill. Please approve or deny this request.    Rx requested:  Requested Prescriptions     Pending Prescriptions Disp Refills    potassium chloride (KLOR-CON M) 20 MEQ extended release tablet [Pharmacy Med Name: potassium chloride ER 20 mEq tablet,extended release(part/cryst)] 180 tablet 3     Sig: TAKE 2 TABLETS BY MOUTH DAILY    isosorbide mononitrate (IMDUR) 60 MG extended release tablet [Pharmacy Med Name: isosorbide mononitrate ER 60 mg tablet,extended release 24 hr] 30 tablet 3     Sig: TAKE 1 TABLET BY MOUTH EVERY DAY    atorvastatin (LIPITOR) 40 MG tablet [Pharmacy Med Name: atorvastatin 40 mg tablet] 30 tablet 3     Sig: TAKE 1 TABLET BY MOUTH IN THE EVENING DAILY    Cholecalciferol (VITAMIN D3) 50 MCG (2000 UT) TABS [Pharmacy Med Name: Vitamin D3 50 mcg (2,000 unit) tablet] 30 tablet 3     Sig: TAKE 1 TABLET BY MOUTH EVERY DAY         Last Office Visit:   3/12/2024      Next Visit Date:  Future Appointments   Date Time Provider Department Center   7/16/2024  1:00 PM Marc Banda MD [place] [facility]   7/17/2024  9:30 AM Makadia, Abiel P, MD [place] [facility]   10/15/2024  1:30 PM Igor Garcia MD [place] [facility] Neu[facility] -

## 2024-07-17 ENCOUNTER — OFFICE VISIT (OUTPATIENT)
Dept: PULMONOLOGY | Age: 69
End: 2024-07-17
Payer: COMMERCIAL

## 2024-07-17 VITALS
WEIGHT: 266 LBS | SYSTOLIC BLOOD PRESSURE: 122 MMHG | DIASTOLIC BLOOD PRESSURE: 70 MMHG | BODY MASS INDEX: 38.17 KG/M2 | OXYGEN SATURATION: 90 % | HEART RATE: 83 BPM

## 2024-07-17 DIAGNOSIS — J44.9 CHRONIC OBSTRUCTIVE PULMONARY DISEASE, UNSPECIFIED COPD TYPE (HCC): ICD-10-CM

## 2024-07-17 DIAGNOSIS — J96.11 CHRONIC RESPIRATORY FAILURE WITH HYPOXIA AND HYPERCAPNIA (HCC): Primary | ICD-10-CM

## 2024-07-17 DIAGNOSIS — G47.33 OSA (OBSTRUCTIVE SLEEP APNEA): ICD-10-CM

## 2024-07-17 DIAGNOSIS — E66.9 OBESITY (BMI 30-39.9): ICD-10-CM

## 2024-07-17 DIAGNOSIS — J96.12 CHRONIC RESPIRATORY FAILURE WITH HYPOXIA AND HYPERCAPNIA (HCC): Primary | ICD-10-CM

## 2024-07-17 DIAGNOSIS — J84.10 PULMONARY INTERSTITIAL FIBROSIS (HCC): ICD-10-CM

## 2024-07-17 PROCEDURE — 3078F DIAST BP <80 MM HG: CPT | Performed by: INTERNAL MEDICINE

## 2024-07-17 PROCEDURE — 1123F ACP DISCUSS/DSCN MKR DOCD: CPT | Performed by: INTERNAL MEDICINE

## 2024-07-17 PROCEDURE — 3074F SYST BP LT 130 MM HG: CPT | Performed by: INTERNAL MEDICINE

## 2024-07-17 PROCEDURE — 99214 OFFICE O/P EST MOD 30 MIN: CPT | Performed by: INTERNAL MEDICINE

## 2024-07-17 RX ORDER — IPRATROPIUM BROMIDE AND ALBUTEROL SULFATE 2.5; .5 MG/3ML; MG/3ML
1 SOLUTION RESPIRATORY (INHALATION) 4 TIMES DAILY
Qty: 360 ML | Refills: 3 | Status: SHIPPED | OUTPATIENT
Start: 2024-07-17

## 2024-07-17 NOTE — PROGRESS NOTES
interstitial fibrosis.  There is no  focal consolidation to suggest pneumonia.  There is no pneumothorax.    Impression  1. Emphysematous changes with superimposed interstitial pulmonary fibrosis  2. There is no pneumothorax or findings to suggest pneumonia.      XR CHEST PORTABLE    Narrative  EXAMINATION:  ONE XRAY VIEW OF THE CHEST    4/8/2023 10:25 am    COMPARISON:  CT scan of the chest dated 12/20/2022    HISTORY:  ORDERING SYSTEM PROVIDED HISTORY: sob  TECHNOLOGIST PROVIDED HISTORY:  Reason for exam:->sob  What reading provider will be dictating this exam?->CRC    FINDINGS:  The heart is mildly enlarged.  There is no mediastinal widening    There are emphysematous changes with significant interstitial pulmonary  fibrosis.  There is no pleural thickening or pleural effusion.    Impression  1. Emphysematous changes with significant interstitial pulmonary fibrosis  2. Cardiomegaly  ]  No results found for this or any previous visit.  ]  Results for orders placed during the hospital encounter of 12/20/22    CT CHEST WO CONTRAST    Narrative  EXAMINATION:  CT OF THE CHEST WITHOUT CONTRAST 12/20/2022 1:59 pm    TECHNIQUE:  CT of the chest was performed without the administration of intravenous  contrast. Multiplanar reformatted images are provided for review. Automated  exposure control, iterative reconstruction, and/or weight based adjustment of  the mA/kV was utilized to reduce the radiation dose to as low as reasonably  achievable.    COMPARISON:  Chest x-ray 12/03/2022    HISTORY:  ORDERING SYSTEM PROVIDED HISTORY: Lung density on x-ray  TECHNOLOGIST PROVIDED HISTORY:  Reason for exam:->lung density  What reading provider will be dictating this exam?->CRC    FINDINGS:  Mediastinum: Thyroid is homogeneous in attenuation. No bulky mediastinal  adenopathy. Central airways are patent. Esophagus is normal course and  caliber.  Cardiac size within normal limits without pericardial effusion.    Lungs/pleura: Chronic

## 2024-08-05 ENCOUNTER — OFFICE VISIT (OUTPATIENT)
Dept: FAMILY MEDICINE CLINIC | Age: 69
End: 2024-08-05
Payer: COMMERCIAL

## 2024-08-05 VITALS
HEIGHT: 70 IN | OXYGEN SATURATION: 61 % | HEART RATE: 94 BPM | SYSTOLIC BLOOD PRESSURE: 130 MMHG | WEIGHT: 264 LBS | BODY MASS INDEX: 37.8 KG/M2 | DIASTOLIC BLOOD PRESSURE: 86 MMHG

## 2024-08-05 DIAGNOSIS — H91.90 HEARING LOSS, UNSPECIFIED HEARING LOSS TYPE, UNSPECIFIED LATERALITY: ICD-10-CM

## 2024-08-05 DIAGNOSIS — K59.00 CONSTIPATION, UNSPECIFIED CONSTIPATION TYPE: ICD-10-CM

## 2024-08-05 DIAGNOSIS — I87.2 VENOUS STASIS DERMATITIS OF BOTH LOWER EXTREMITIES: Primary | ICD-10-CM

## 2024-08-05 DIAGNOSIS — Z71.85 IMMUNIZATION COUNSELING: ICD-10-CM

## 2024-08-05 PROBLEM — J96.12 CHRONIC RESPIRATORY FAILURE WITH HYPOXIA AND HYPERCAPNIA (HCC): Status: RESOLVED | Noted: 2023-04-09 | Resolved: 2024-08-05

## 2024-08-05 PROBLEM — J96.11 CHRONIC RESPIRATORY FAILURE WITH HYPOXIA AND HYPERCAPNIA (HCC): Status: RESOLVED | Noted: 2023-04-09 | Resolved: 2024-08-05

## 2024-08-05 PROBLEM — R41.3 MEMORY DEFICIT: Status: RESOLVED | Noted: 2023-05-08 | Resolved: 2024-08-05

## 2024-08-05 PROBLEM — E66.01 SEVERE OBESITY (BMI 35.0-39.9) WITH COMORBIDITY (HCC): Status: RESOLVED | Noted: 2023-06-08 | Resolved: 2024-08-05

## 2024-08-05 PROCEDURE — 3075F SYST BP GE 130 - 139MM HG: CPT | Performed by: FAMILY MEDICINE

## 2024-08-05 PROCEDURE — 3079F DIAST BP 80-89 MM HG: CPT | Performed by: FAMILY MEDICINE

## 2024-08-05 PROCEDURE — 99214 OFFICE O/P EST MOD 30 MIN: CPT | Performed by: FAMILY MEDICINE

## 2024-08-05 PROCEDURE — 1123F ACP DISCUSS/DSCN MKR DOCD: CPT | Performed by: FAMILY MEDICINE

## 2024-08-05 RX ORDER — AMMONIUM LACTATE 12 G/100G
LOTION TOPICAL
Qty: 225 G | Refills: 0 | Status: SHIPPED | OUTPATIENT
Start: 2024-08-05

## 2024-08-05 RX ORDER — PSEUDOEPHEDRINE HCL 30 MG
100 TABLET ORAL 2 TIMES DAILY
Qty: 60 CAPSULE | Refills: 11 | Status: SHIPPED | OUTPATIENT
Start: 2024-08-05

## 2024-08-05 SDOH — ECONOMIC STABILITY: INCOME INSECURITY: HOW HARD IS IT FOR YOU TO PAY FOR THE VERY BASICS LIKE FOOD, HOUSING, MEDICAL CARE, AND HEATING?: NOT HARD AT ALL

## 2024-08-05 SDOH — ECONOMIC STABILITY: FOOD INSECURITY: WITHIN THE PAST 12 MONTHS, YOU WORRIED THAT YOUR FOOD WOULD RUN OUT BEFORE YOU GOT MONEY TO BUY MORE.: NEVER TRUE

## 2024-08-05 SDOH — ECONOMIC STABILITY: FOOD INSECURITY: WITHIN THE PAST 12 MONTHS, THE FOOD YOU BOUGHT JUST DIDN'T LAST AND YOU DIDN'T HAVE MONEY TO GET MORE.: NEVER TRUE

## 2024-08-05 ASSESSMENT — ENCOUNTER SYMPTOMS
RHINORRHEA: 0
SHORTNESS OF BREATH: 0
SORE THROAT: 0
WHEEZING: 0
COUGH: 0
DIARRHEA: 0
ABDOMINAL PAIN: 0
CONSTIPATION: 1

## 2024-08-05 NOTE — PROGRESS NOTES
[facility] PRIMARY [facility]ARE  [address] [place] [id number]  Dept: [phone]  Dept Fax: [phone]     Chief Complaint:  Chief Complaint   Patient presents with    Leg Swelling     Bilateral swelling, left more than right x 3 years. He states when taking Lasix he is not urinating as often as he should.    Bloated       Vitals:    08/05/24 1016   BP: 130/86   Pulse: 94   SpO2: (!) 61%   Weight: 74.6 kg (164 lb 6.4 oz)   Height: 1.778 m (5' 10\")       HPI:  69 y.o.male who presents for the following:      LE swelling: x3 years; taking lasix which isn't working for this any more; interested in a lymphedema pump    GI problem: gets constipated with cheese which he loves to eat; wonders if he should restart the colace    Hearing problem: feels he is losing his hearing; wonders if there is a pill to help    Wants to know if he should get his shingles shots.    Wants to know if he can purchase the water cooler tanks in the waiting room    -----------------------------------------------------------------------------    Assessment/Plan:  69 y.o. male here mainly for the following:  Constipation  Should restart the colace; diet changes juan miguel help as well  LE swelling  Venous stasis dermatitis L>R  We'll try for a lymphedema pump for the LLE  Discussed about decreased salt/fluid in diet, elevation, compression stockings (he is unable to use)  Hearing loss  Benign exam; likely has SN hearing loss; can offer ENT or audiology visit in the future  Shingles  Encouraged that he get these when he is ready     Diagnosis Orders   1. Venous stasis dermatitis of both lower extremities  ammonium lactate (LAC-HYDRIN) 12 % lotion    Misc. Devices MISC      2. Constipation, unspecified constipation type  docusate (COLACE, DULCOLAX) 100 MG CAPS      3. Hearing loss, unspecified hearing loss type, unspecified laterality        4. Immunization counseling             Return if symptoms worsen or fail to

## 2024-08-12 DIAGNOSIS — I89.0 LYMPHEDEMA OF BOTH LOWER EXTREMITIES: Primary | ICD-10-CM

## 2024-08-12 NOTE — TELEPHONE ENCOUNTER
Patient needs a new order for a lymphedema pump printed as the previous DME company it was sent to did not carry the item. Order for [employer] specifically needs rx to state: Type of pneumatic compressor; Segmental, Segmental w/o GERMANIA pressure, or segmental with GERMANIA. Also needs measurement of the area. Order sheet and equipment order requirement form placed in your office for review and signature.

## 2024-08-19 ENCOUNTER — OFFICE VISIT (OUTPATIENT)
Dept: CARDIOLOGY CLINIC | Age: 69
End: 2024-08-19
Payer: COMMERCIAL

## 2024-08-19 VITALS
DIASTOLIC BLOOD PRESSURE: 70 MMHG | WEIGHT: 268.4 LBS | OXYGEN SATURATION: 79 % | HEART RATE: 100 BPM | BODY MASS INDEX: 38.51 KG/M2 | SYSTOLIC BLOOD PRESSURE: 146 MMHG

## 2024-08-19 DIAGNOSIS — I89.0 LYMPHEDEMA OF BOTH LOWER EXTREMITIES: ICD-10-CM

## 2024-08-19 DIAGNOSIS — I25.119 ATHEROSCLEROSIS OF NATIVE CORONARY ARTERY WITH ANGINA PECTORIS, UNSPECIFIED WHETHER NATIVE OR TRANSPLANTED HEART (HCC): ICD-10-CM

## 2024-08-19 DIAGNOSIS — J44.9 CHRONIC OBSTRUCTIVE PULMONARY DISEASE, UNSPECIFIED COPD TYPE (HCC): ICD-10-CM

## 2024-08-19 DIAGNOSIS — Q21.12 PFO (PATENT FORAMEN OVALE): ICD-10-CM

## 2024-08-19 DIAGNOSIS — R09.89 BILATERAL CAROTID BRUITS: ICD-10-CM

## 2024-08-19 DIAGNOSIS — I25.119 ATHEROSCLEROSIS OF NATIVE CORONARY ARTERY OF NATIVE HEART WITH ANGINA PECTORIS (HCC): ICD-10-CM

## 2024-08-19 DIAGNOSIS — E66.01 SEVERE OBESITY (BMI 35.0-39.9) WITH COMORBIDITY (HCC): ICD-10-CM

## 2024-08-19 DIAGNOSIS — E78.5 DYSLIPIDEMIA: ICD-10-CM

## 2024-08-19 DIAGNOSIS — I48.91 ATRIAL FIBRILLATION, UNSPECIFIED TYPE (HCC): ICD-10-CM

## 2024-08-19 DIAGNOSIS — I25.10 CORONARY ARTERY DISEASE INVOLVING NATIVE CORONARY ARTERY OF NATIVE HEART WITHOUT ANGINA PECTORIS: ICD-10-CM

## 2024-08-19 DIAGNOSIS — I10 ESSENTIAL HYPERTENSION, BENIGN: Primary | ICD-10-CM

## 2024-08-19 DIAGNOSIS — I50.33 ACUTE ON CHRONIC DIASTOLIC (CONGESTIVE) HEART FAILURE (HCC): ICD-10-CM

## 2024-08-19 PROCEDURE — 3077F SYST BP >= 140 MM HG: CPT | Performed by: INTERNAL MEDICINE

## 2024-08-19 PROCEDURE — 99214 OFFICE O/P EST MOD 30 MIN: CPT | Performed by: INTERNAL MEDICINE

## 2024-08-19 PROCEDURE — 93000 ELECTROCARDIOGRAM COMPLETE: CPT | Performed by: INTERNAL MEDICINE

## 2024-08-19 PROCEDURE — 3078F DIAST BP <80 MM HG: CPT | Performed by: INTERNAL MEDICINE

## 2024-08-19 PROCEDURE — 1123F ACP DISCUSS/DSCN MKR DOCD: CPT | Performed by: INTERNAL MEDICINE

## 2024-08-19 RX ORDER — DABIGATRAN ETEXILATE 150 MG/1
150 CAPSULE ORAL 2 TIMES DAILY
Qty: 180 CAPSULE | Refills: 1 | Status: SHIPPED | OUTPATIENT
Start: 2024-08-19

## 2024-08-19 ASSESSMENT — ENCOUNTER SYMPTOMS
GASTROINTESTINAL NEGATIVE: 1
STRIDOR: 0
WHEEZING: 0
CHEST TIGHTNESS: 0
NAUSEA: 0
EYES NEGATIVE: 1
SHORTNESS OF BREATH: 1
BLOOD IN STOOL: 0
COUGH: 0

## 2024-08-19 NOTE — PROGRESS NOTES
Regular Exercise and Walk Daily    Return in about 4 months (around 12/19/2024).      Electronically signed by SANDRO MAXWELL MD on 8/19/2024 at 2:48 PM

## 2024-10-07 ENCOUNTER — TELEPHONE (OUTPATIENT)
Dept: CARDIOLOGY CLINIC | Age: 69
End: 2024-10-07

## 2024-10-07 DIAGNOSIS — I48.91 ATRIAL FIBRILLATION, UNSPECIFIED TYPE (HCC): Primary | ICD-10-CM

## 2024-10-07 NOTE — TELEPHONE ENCOUNTER
SPOKE WITH PATIENT STATES HE CALLED PHARMACY AND HE NEVER PICKED THEM UP BECAUSE IT COSTS TOO MUCH. PATIENT HAS NOT BEEN ON ELIQUIS  OR ANYTHING SINCE AUG. PATIENT HAS APPOINTMENT IN DEC 20 TH.  SAMPLES OF ELIQUIS GIVEN     PATIENT AWARE TO  IN [place]. MESSAGE SENT TO DR MAXWELL TO LET HIM KNOW

## 2024-10-07 NOTE — TELEPHONE ENCOUNTER
PATIENT CALLED OFFICE REQUESTING SAMPLES OF ELIQUIS. NOT ON MED LIST  AT LOV SWITCHED TO PRADAXA 150 MG     SPOKE WITH PATIENT TO CALL LOCAL PHARMACY TO SEE IF HE HAS BEEN TAKING THIS. STATES HE DOES NOT KNOW IF HE IS

## 2024-10-07 NOTE — TELEPHONE ENCOUNTER
SPOKE WITH PATIENT STATES HE CALLED PHARMACY AND HE NEVER PICKED THEM UP BECAUSE IT COSTS TOO MUCH. PATIENT STATES HE HAS NOT BEEN TAKING ANYTHING SINCE LAST APPOINTMENT IN  AUGUST. HAS APPOINTMENT IN DEC 20 TH.  SAMPLES OF ELIQUIS GIVEN TO PATIENT TO     PATIENT CAN'T AFFORD ELIQUIS OR PRADAXA     PLEASE ADVISE

## 2024-10-10 NOTE — TELEPHONE ENCOUNTER
PATIENT AWARE. I STRESSED THE IMPORTANCE OF PATIENT TO  SAMPLES ASAP AS HE SHOULD NOT BE WITHOUT MEDICATION. REFERRAL TO COUMADIN CLINIC PLACE AND PATIENT AWARE THEY WILL CONTACT HIM

## 2024-10-15 ENCOUNTER — TELEPHONE (OUTPATIENT)
Dept: FAMILY MEDICINE CLINIC | Age: 69
End: 2024-10-15

## 2024-10-15 RX ORDER — WARFARIN SODIUM 5 MG/1
5 TABLET ORAL DAILY
Qty: 30 TABLET | Refills: 0 | Status: SHIPPED | OUTPATIENT
Start: 2024-10-15

## 2024-10-15 RX ORDER — WARFARIN SODIUM 5 MG/1
5 TABLET ORAL DAILY
COMMUNITY
End: 2024-10-15 | Stop reason: SDUPTHER

## 2024-10-15 NOTE — TELEPHONE ENCOUNTER
Requesting medication refill. Please approve or deny this request.    Rx requested:  Requested Prescriptions     Pending Prescriptions Disp Refills    warfarin (COUMADIN) 5 MG tablet 30 tablet 11     Sig: Take 1 tablet by mouth daily         Last Office Visit:   8/19/2024      Next Visit Date:  Future Appointments   Date Time Provider Department Center   11/6/2024 10:30 AM Makadia, Abiel P, MD [place] [facility]   12/20/2024  3:00 PM Marc Banda MD [place] [facility]

## 2024-10-15 NOTE — TELEPHONE ENCOUNTER
Patient states that his cardiologist is going to start sending him to the Coumadin clinic in [place]. Patient states that he has a really hard time getting to [place] and would like to know if Dr. Felder would take that over.

## 2024-10-16 RX ORDER — CHOLECALCIFEROL (VITAMIN D3) 50 MCG
1 TABLET ORAL DAILY
Qty: 30 TABLET | Refills: 5 | Status: SHIPPED | OUTPATIENT
Start: 2024-10-16

## 2024-10-16 NOTE — TELEPHONE ENCOUNTER
Requesting medication refill. Please approve or deny this request.    Rx requested:  Requested Prescriptions     Pending Prescriptions Disp Refills    Cholecalciferol (VITAMIN D3) 50 MCG (2000 UT) TABS [Pharmacy Med Name: Vitamin D3 50 mcg (2,000 unit) tablet] 30 tablet 5     Sig: TAKE 1 TABLET BY MOUTH EVERY DAY         Last Office Visit:   8/19/2024      Next Visit Date:  Future Appointments   Date Time Provider Department Center   11/6/2024 10:30 AM Abiel Burnette MD [facility]   12/20/2024  3:00 PM Marc Banda MD [facility] [facility]

## 2024-11-06 ENCOUNTER — OFFICE VISIT (OUTPATIENT)
Dept: PULMONOLOGY | Age: 69
End: 2024-11-06
Payer: COMMERCIAL

## 2024-11-06 VITALS
SYSTOLIC BLOOD PRESSURE: 160 MMHG | BODY MASS INDEX: 39.89 KG/M2 | HEART RATE: 96 BPM | OXYGEN SATURATION: 90 % | WEIGHT: 278 LBS | DIASTOLIC BLOOD PRESSURE: 80 MMHG

## 2024-11-06 DIAGNOSIS — E66.9 OBESITY (BMI 30-39.9): ICD-10-CM

## 2024-11-06 DIAGNOSIS — G47.33 OSA (OBSTRUCTIVE SLEEP APNEA): ICD-10-CM

## 2024-11-06 DIAGNOSIS — J96.12 CHRONIC RESPIRATORY FAILURE WITH HYPOXIA AND HYPERCAPNIA: ICD-10-CM

## 2024-11-06 DIAGNOSIS — J44.9 CHRONIC OBSTRUCTIVE PULMONARY DISEASE, UNSPECIFIED COPD TYPE (HCC): Primary | ICD-10-CM

## 2024-11-06 DIAGNOSIS — J84.10 PULMONARY INTERSTITIAL FIBROSIS (HCC): ICD-10-CM

## 2024-11-06 DIAGNOSIS — J96.11 CHRONIC RESPIRATORY FAILURE WITH HYPOXIA AND HYPERCAPNIA: ICD-10-CM

## 2024-11-06 DIAGNOSIS — J98.4 LUNG DENSITY ON X-RAY: ICD-10-CM

## 2024-11-06 PROCEDURE — 99214 OFFICE O/P EST MOD 30 MIN: CPT | Performed by: INTERNAL MEDICINE

## 2024-11-06 PROCEDURE — 3079F DIAST BP 80-89 MM HG: CPT | Performed by: INTERNAL MEDICINE

## 2024-11-06 PROCEDURE — 3077F SYST BP >= 140 MM HG: CPT | Performed by: INTERNAL MEDICINE

## 2024-11-06 PROCEDURE — 1123F ACP DISCUSS/DSCN MKR DOCD: CPT | Performed by: INTERNAL MEDICINE

## 2024-11-06 NOTE — PROGRESS NOTES
Pulmonary interstitial fibrosis (HCC)  CXR Somewhat advanced reticulation of bilateral lung markings. Mild atelectasis  in the bilateral lower lobes.  Reactive airways disease/chronic bronchitis is  considered    4. JONNY (obstructive sleep apnea)  He has JONNY and chronic respiratory failure he was on NIV but stop using it, currently only on O2.     5. Obesity (BMI 30-39.9)  He is advised try to lose weight. obesity related risk explained to the patient ,  Current weight:  126.1 kg (278 lb) Lbs. BMI:  Body mass index is 39.89 kg/m².  Suggested weight control approaches, including dietary changes , exercise, behavioral modification.    Return in about 3 months (around 2/6/2025) for chronic respiratory failure, shortness of breath, COPD.      Abiel Burnette MD

## 2024-11-14 DIAGNOSIS — I87.2 VENOUS STASIS DERMATITIS OF BOTH LOWER EXTREMITIES: ICD-10-CM

## 2024-11-15 ENCOUNTER — TELEPHONE (OUTPATIENT)
Dept: CARDIOLOGY CLINIC | Age: 69
End: 2024-11-15

## 2024-11-15 RX ORDER — WARFARIN SODIUM 5 MG/1
5 TABLET ORAL DAILY
Qty: 30 TABLET | Refills: 0 | Status: SHIPPED | OUTPATIENT
Start: 2024-11-15

## 2024-11-15 RX ORDER — AMMONIUM LACTATE 12 G/100G
LOTION TOPICAL
Qty: 226 G | Refills: 0 | Status: SHIPPED | OUTPATIENT
Start: 2024-11-15

## 2024-11-15 NOTE — TELEPHONE ENCOUNTER
Comments:     Last Office Visit (last PCP visit):   8/5/2024    Next Visit Date:  Future Appointments   Date Time Provider Department Center   12/20/2024  3:00 PM Marc Banda MD [facility]   2/26/2025 11:00 AM Abiel Burnette MD [facility]       **If hasn't been seen in over a year OR hasn't followed up according to last diabetes/ADHD visit, make appointment for patient before sending refill to provider.    Rx requested:  Requested Prescriptions     Pending Prescriptions Disp Refills    ammonium lactate (LAC-HYDRIN) 12 % lotion [Pharmacy Med Name: ammonium lactate 12 % lotion] 226 g 0     Sig: Apply topically as needed.

## 2024-11-15 NOTE — TELEPHONE ENCOUNTER
Called patient to find out whether he is taking Coumadin or not since he has not been to the Coumadin clinic and his appointment was cancelled due to the patient telling them he was not taking Coumadin. Patient states that he has been taking it for about a month now. Patient states at the time of his appointment, he was not taking Coumadin, and he told the Coumadin clinic that, and they cancelled his appointment. He has not been seen in the Coumadin clinic as of yet. Spoke to patient about the importance of the Coumadin Clinic managing his INR and Coumadin dosage. Patient states that transportation is a big issue for him and he states \"I don't want to take the Coumadin anymore, anyway.\" Patient states that because transportation is a problem for him, he will not be able to make it to appointments for the Coumadin Clinic. Patient is also concerned about his payments and not being able to afford the visits to the Coumadin Clinic. Offered to call the Coumadin Clinic to have them call the patient to set up an appointment for the patient to come in, and patient states \"well you can call them, but I probably won't participate.\" Explained to patient the risks that he is taking by not taking the Coumadin (blood clot developing in legs, arms, or lungs, and stroke.) Patient verbalizes understanding and says \"ok.\" Patient is agreeable to having the Coumadin Clinic call him and speak him to about setting an appointment up to go to the clinic, but again reiterates that he \"probably won't go.\"      Requesting medication refill. Please approve or deny this request.    Rx requested:  Requested Prescriptions     Pending Prescriptions Disp Refills    warfarin (COUMADIN) 5 MG tablet [Pharmacy Med Name: warfarin 5 mg tablet] 30 tablet 0     Sig: Take 1 tablet by mouth daily         Last Office Visit:   8/19/2024      Next Visit Date:  Future Appointments   Date Time Provider Department Center   12/20/2024  3:00 PM Marc Banda MD

## 2024-11-15 NOTE — TELEPHONE ENCOUNTER
Received a call back from patient. Patient wondering whether he will be receiving a refill on the Coumadin or not. Notified patient that I was not sure yet, but I had sent a message to Dr. Banda regarding the Coumadin.   Patient states that he has an appointment 11/19/2024 with the Coumadin clinic. Patient confirmed that he will go to his appointment with the Coumadin clinic.   Refills will be sent to the pharmacy for him.

## 2024-11-19 ENCOUNTER — TELEPHONE (OUTPATIENT)
Age: 69
End: 2024-11-19

## 2024-11-19 NOTE — TELEPHONE ENCOUNTER
Call placed to Dr. Banda's office - pt arrived to clinic 1.5 hr after scheduled initial visit. When offered to reschedule appt, pt declined and stated he will just \"stop taking it\" referring to warfarin. MD office notified.

## 2024-11-22 ENCOUNTER — TELEPHONE (OUTPATIENT)
Dept: CARDIOLOGY CLINIC | Age: 69
End: 2024-11-22

## 2024-11-22 NOTE — TELEPHONE ENCOUNTER
PATIENT CALLED OFFICE STATES THAT HE IS GOING TO CONTINUE TO TAKE THE COUMADIN. HE PREVIOUSLY SAID HE WAS NOT GOING TO TAKE IT.

## 2024-11-26 ENCOUNTER — ANTI-COAG VISIT (OUTPATIENT)
Age: 69
End: 2024-11-26
Payer: COMMERCIAL

## 2024-11-26 DIAGNOSIS — I48.91 ATRIAL FIBRILLATION, UNSPECIFIED TYPE (HCC): Primary | ICD-10-CM

## 2024-11-26 LAB
INTERNATIONAL NORMALIZATION RATIO, POC: 4.3
PROTHROMBIN TIME, POC: 0

## 2024-11-26 PROCEDURE — 85610 PROTHROMBIN TIME: CPT

## 2024-11-26 PROCEDURE — 99203 OFFICE O/P NEW LOW 30 MIN: CPT

## 2024-11-26 NOTE — PROGRESS NOTES
Oral Anticoagulation Patient Education    Counseling provided to: patient  Anticoagulant: Warfarin   Handouts provided to patient include: medication, medications that increase risk of bleeding, nosebleeds    Counseling points included:  1. Indication for anticoagulation: AFib  2. Explanation of medication  3. Education on A. Fib and stroke;  4. Missed doses and timing of medication (contact healthcare provider if missed multiple doses)  5. Side effects: bruising and bleeding with emphasis on seeking urgent medical care for signs of bleeding.  6. Food or drug interactions that can increase risk of bleeding  7. OTC pain relief options: Tylenol vs NSAIDs  8. Contact provider if:   A. Changes made to medications   B. Uncontrolled bleeding/ Signs and symptoms of recurrent VTE   C. Procedures   D. Trauma and/or fall    E. Pregnancy (for women of child bearing age)       INR  4.3 is supratherapetuic for this patient (goal range 2-3) and is reflective of 35 mg TWD  Patient verifies current dosing regimen, patient able to verbally recall dose  Patient reports 1  missed doses since last INR   Patient denies s/sx clotting and/or stroke  Patient denies hematuria, epistaxis, rectal bleeding  Patient denies changes in diet, alcohol, or tobacco use States drinks 12 beers a month  Reviewed medication list and drug allergies with patient, updated any medication additions or modifications accordingly  Patient was given sample Eliquis and counselled to not take both.  Patient stated insurance will cover Eliquis after the new year.  Advised to let us know if the doctor approves the switch.  Patient also denies any pending medical or dental procedures scheduled at this time  Patient was instructed to hold dose 11/27 and take 2.5 mg Thursdays and 5 mg all other days  (27.5 mg TWD) and RTC 1 week.    For Pharmacy Admin Tracking Only    Intervention Detail: Dose Adjustment: 1, reason: Therapy Optimization  Total # of Interventions

## 2024-12-04 ENCOUNTER — ANTI-COAG VISIT (OUTPATIENT)
Age: 69
End: 2024-12-04
Payer: COMMERCIAL

## 2024-12-04 DIAGNOSIS — I48.91 ATRIAL FIBRILLATION, UNSPECIFIED TYPE (HCC): Primary | ICD-10-CM

## 2024-12-04 LAB
INTERNATIONAL NORMALIZATION RATIO, POC: 4.8
PROTHROMBIN TIME, POC: 0

## 2024-12-04 PROCEDURE — 85610 PROTHROMBIN TIME: CPT

## 2024-12-04 PROCEDURE — 99213 OFFICE O/P EST LOW 20 MIN: CPT

## 2024-12-04 NOTE — PROGRESS NOTES
Mr. Lele Roy is a 69 y.o. y/o male with history of Afib who presents today for anticoagulation monitoring and adjustment.  INR 4.8 is supratherapeutic for this patient (goal range 2-3) and is reflective of 27.5 mg TWD  Patient verifies current dosing regimen, patient able to verbally recall dose  Patient reports 0 missed doses since last INR   Patient denies s/sx clotting and/or stroke  Patient denies hematuria, epistaxis, rectal bleeding  Patient denies changes in diet, alcohol, or tobacco use  Reviewed medication list and drug allergies with patient, updated any medication additions or modifications accordingly  Patient also denies any pending medical or dental procedures scheduled at this time  Patient was instructed to hold x 2 then take 2.5 mg daily and RTC 1 week    For Pharmacy Admin Tracking Only    Intervention Detail: Dose Adjustment: 2, reason: Therapy De-escalation  Total # of Interventions Recommended: 2  Total # of Interventions Accepted: 2  Time Spent (min): 15

## 2024-12-11 ENCOUNTER — ANTI-COAG VISIT (OUTPATIENT)
Age: 69
End: 2024-12-11
Payer: COMMERCIAL

## 2024-12-11 DIAGNOSIS — I48.91 ATRIAL FIBRILLATION, UNSPECIFIED TYPE (HCC): Primary | ICD-10-CM

## 2024-12-11 LAB
INTERNATIONAL NORMALIZATION RATIO, POC: 1.8
PROTHROMBIN TIME, POC: 0

## 2024-12-11 PROCEDURE — 85610 PROTHROMBIN TIME: CPT

## 2024-12-11 PROCEDURE — 99212 OFFICE O/P EST SF 10 MIN: CPT

## 2024-12-11 NOTE — PROGRESS NOTES
Mr. Lele Roy is a 69 y.o. y/o male with history of Afib who presents today for anticoagulation monitoring and adjustment.  INR 1.8 is therapeutic for this patient (goal range 2-3) and is reflective of 12.5 mg TWD (reflects 2 held doses for supratherapeutic INR)  Patient verifies current dosing regimen, patient able to verbally recall dose  Patient reports 2 held doses since last INR   Patient denies s/sx clotting and/or stroke  Patient denies hematuria, epistaxis, rectal bleeding, does have minor nose bleeds occasionally due to dryness and pt wears O2, recommended saline nasal spray to help moisturize nasal passages  Patient denies changes in diet, alcohol, or tobacco use  Reviewed medication list and drug allergies with patient, updated any medication additions or modifications accordingly  Patient also denies any pending medical or dental procedures scheduled at this time  Patient was instructed to take 5 mg today then take 2.5 mg daily and RTC 1 week    For Pharmacy Admin Tracking Only    Intervention Detail: Dose Adjustment: 1, reason: Therapy Optimization  Total # of Interventions Recommended: 1  Total # of Interventions Accepted: 1  Time Spent (min): 15

## 2024-12-18 ENCOUNTER — ANTI-COAG VISIT (OUTPATIENT)
Age: 69
End: 2024-12-18
Payer: COMMERCIAL

## 2024-12-18 DIAGNOSIS — I48.0 PAROXYSMAL ATRIAL FIBRILLATION (HCC): Primary | ICD-10-CM

## 2024-12-18 LAB
INTERNATIONAL NORMALIZATION RATIO, POC: 1.6
PROTHROMBIN TIME, POC: 0

## 2024-12-18 PROCEDURE — 85610 PROTHROMBIN TIME: CPT

## 2024-12-18 PROCEDURE — 99213 OFFICE O/P EST LOW 20 MIN: CPT

## 2024-12-18 NOTE — PROGRESS NOTES
Mr. Lele Roy is a 69 y.o. y/o male with history of Afib who presents today for anticoagulation monitoring and adjustment.  INR 1.6 is sub therapeutic for this patient (goal range 2-3) and is reflective of 20 mg TWD from last 7 days  Patient verifies current dosing regimen, patient able to verbally recall dose  Patient reports 0  missed doses since last INR   Patient denies s/sx clotting and/or stroke  Patient denies hematuria, epistaxis, rectal bleeding  Patient denies changes in diet, alcohol, or tobacco use  Reviewed medication list and drug allergies with patient, updated any medication additions or modifications accordingly  Patient also denies any pending medical or dental procedures scheduled at this time  Patient was instructed to boost to 5 mg today and tomorrow, then increase to 22.5 mg TWD  and RTC 1 week  For Pharmacy Admin Tracking Only    Intervention Detail: Dose Adjustment: 1, reason: Therapy Optimization  Total # of Interventions Recommended: 2  Total # of Interventions Accepted: 2  Time Spent (min): 15

## 2024-12-21 DIAGNOSIS — F51.04 PSYCHOPHYSIOLOGICAL INSOMNIA: ICD-10-CM

## 2024-12-22 RX ORDER — TRAZODONE HYDROCHLORIDE 100 MG/1
100 TABLET ORAL NIGHTLY
Qty: 90 TABLET | Refills: 1 | Status: SHIPPED | OUTPATIENT
Start: 2024-12-22

## 2024-12-26 ENCOUNTER — ANTI-COAG VISIT (OUTPATIENT)
Age: 69
End: 2024-12-26
Payer: COMMERCIAL

## 2024-12-26 DIAGNOSIS — I48.0 PAROXYSMAL ATRIAL FIBRILLATION (HCC): Primary | ICD-10-CM

## 2024-12-26 LAB
INTERNATIONAL NORMALIZATION RATIO, POC: 2.2
PROTHROMBIN TIME, POC: 0

## 2024-12-26 PROCEDURE — 99211 OFF/OP EST MAY X REQ PHY/QHP: CPT

## 2024-12-26 PROCEDURE — 85610 PROTHROMBIN TIME: CPT

## 2024-12-26 RX ORDER — WARFARIN SODIUM 2.5 MG/1
TABLET ORAL
Qty: 120 TABLET | Refills: 1 | Status: SHIPPED | OUTPATIENT
Start: 2024-12-26

## 2024-12-30 ENCOUNTER — TELEPHONE (OUTPATIENT)
Dept: PULMONOLOGY | Age: 69
End: 2024-12-30

## 2024-12-30 NOTE — TELEPHONE ENCOUNTER
PATIENT'S INSURANCE IS CHANGING TO [employer] 1/1/25. I HAVE ENTERED INSURANCE INFO BUT PATIENT STATES HE IS UNABLE TO COME TO OFFICE AT THIS TIME TO GET COPY OF CARD. HE STATES THAT WITH HIS NEW INSURANCE HIS OXYGEN HAS TO GO TO [facility]. CAN YOU PLEASE SEND ORDER.

## 2025-01-06 ENCOUNTER — ANTI-COAG VISIT (OUTPATIENT)
Age: 70
End: 2025-01-06
Payer: COMMERCIAL

## 2025-01-06 DIAGNOSIS — I48.0 PAROXYSMAL ATRIAL FIBRILLATION (HCC): Primary | ICD-10-CM

## 2025-01-06 LAB
INTERNATIONAL NORMALIZATION RATIO, POC: 1.9
INTERNATIONAL NORMALIZATION RATIO, POC: 1.9
PROTHROMBIN TIME, POC: 0
PROTHROMBIN TIME, POC: 0

## 2025-01-06 PROCEDURE — 85610 PROTHROMBIN TIME: CPT

## 2025-01-06 PROCEDURE — 99213 OFFICE O/P EST LOW 20 MIN: CPT

## 2025-01-06 NOTE — PROGRESS NOTES
Mr. Lele Roy is a 69 y.o. y/o male with history of Afib who presents today for anticoagulation monitoring and adjustment.  INR 1.9 is sub therapeutic for this patient (goal range 2-3) and is reflective of 22.5 mg TWD  Patient verifies current dosing regimen, patient able to verbally recall dose  Patient reports 0  missed doses since last INR   Patient denies s/sx clotting and/or stroke  Patient denies hematuria, epistaxis, rectal bleeding  Patient denies changes in diet, alcohol, or tobacco use  Reviewed medication list and drug allergies with patient, updated any medication additions or modifications accordingly  Patient also denies any pending medical or dental procedures scheduled at this time  Patient was instructed to boost today to 5 mg, then increase to 25 mg TWD and RTC 2 weeks  For Pharmacy Admin Tracking Only    Intervention Detail: Dose Adjustment: 2, reason: Therapy Optimization  Total # of Interventions Recommended: 2  Total # of Interventions Accepted: 2  Time Spent (min): 15

## 2025-01-10 ENCOUNTER — TELEPHONE (OUTPATIENT)
Dept: PULMONOLOGY | Age: 70
End: 2025-01-10

## 2025-01-10 NOTE — TELEPHONE ENCOUNTER
[facility] called stating they got his order for O2 and testing buy needs the testing of him going up to 6L. That it shows that he was given O2 until he was above 88 and it was 6L

## 2025-01-20 ENCOUNTER — TELEPHONE (OUTPATIENT)
Age: 70
End: 2025-01-20

## 2025-01-20 DIAGNOSIS — I48.91 ATRIAL FIBRILLATION, UNSPECIFIED TYPE (HCC): Primary | ICD-10-CM

## 2025-01-20 NOTE — TELEPHONE ENCOUNTER
Patient called to reschedule appointment. Usually goes to [facility], however, patient asking to be seen at [facility] to line up with his next cardiology appointment and condense travel time. Patient rescheduled for [facility] and anticoag tracker updated

## 2025-01-22 ENCOUNTER — APPOINTMENT (OUTPATIENT)
Age: 70
End: 2025-01-22
Payer: COMMERCIAL

## 2025-01-27 ENCOUNTER — TELEPHONE (OUTPATIENT)
Dept: FAMILY MEDICINE CLINIC | Age: 70
End: 2025-01-27

## 2025-01-27 ENCOUNTER — OFFICE VISIT (OUTPATIENT)
Dept: FAMILY MEDICINE CLINIC | Age: 70
End: 2025-01-27
Payer: MEDICARE

## 2025-01-27 ENCOUNTER — HOSPITAL ENCOUNTER (OUTPATIENT)
Age: 70
Setting detail: SPECIMEN
Discharge: HOME | End: 2025-01-27
Payer: MEDICARE

## 2025-01-27 VITALS
SYSTOLIC BLOOD PRESSURE: 122 MMHG | OXYGEN SATURATION: 70 % | HEIGHT: 70 IN | HEART RATE: 96 BPM | WEIGHT: 273 LBS | DIASTOLIC BLOOD PRESSURE: 76 MMHG | BODY MASS INDEX: 39.08 KG/M2

## 2025-01-27 DIAGNOSIS — Z23 NEED FOR INFLUENZA VACCINATION: ICD-10-CM

## 2025-01-27 DIAGNOSIS — Z12.5 PROSTATE CANCER SCREENING: ICD-10-CM

## 2025-01-27 DIAGNOSIS — Z00.00 MEDICARE ANNUAL WELLNESS VISIT, SUBSEQUENT: ICD-10-CM

## 2025-01-27 DIAGNOSIS — Z00.00 MEDICARE ANNUAL WELLNESS VISIT, SUBSEQUENT: Primary | ICD-10-CM

## 2025-01-27 DIAGNOSIS — I48.91 ATRIAL FIBRILLATION, UNSPECIFIED TYPE (HCC): ICD-10-CM

## 2025-01-27 DIAGNOSIS — J84.10 PULMONARY INTERSTITIAL FIBROSIS (HCC): ICD-10-CM

## 2025-01-27 DIAGNOSIS — I50.33 ACUTE ON CHRONIC DIASTOLIC (CONGESTIVE) HEART FAILURE (HCC): ICD-10-CM

## 2025-01-27 DIAGNOSIS — I89.0 LYMPHEDEMA OF BOTH LOWER EXTREMITIES: ICD-10-CM

## 2025-01-27 PROBLEM — J98.4 LUNG DENSITY ON X-RAY: Status: RESOLVED | Noted: 2022-12-06 | Resolved: 2025-01-27

## 2025-01-27 PROBLEM — I20.9 ANGINA PECTORIS, UNSPECIFIED (HCC): Status: RESOLVED | Noted: 2024-03-12 | Resolved: 2025-01-27

## 2025-01-27 PROBLEM — R09.89 BILATERAL CAROTID BRUITS: Status: RESOLVED | Noted: 2020-01-06 | Resolved: 2025-01-27

## 2025-01-27 PROBLEM — R60.0 BILATERAL LOWER EXTREMITY EDEMA: Status: RESOLVED | Noted: 2020-06-22 | Resolved: 2025-01-27

## 2025-01-27 LAB
ALBUMIN SERPL-MCNC: 4.3 G/DL (ref 3.5–4.6)
ALP SERPL-CCNC: 101 U/L (ref 35–104)
ALT SERPL-CCNC: 6 U/L (ref 0–41)
ANION GAP SERPL CALCULATED.3IONS-SCNC: 10 MEQ/L (ref 9–15)
AST SERPL-CCNC: 16 U/L (ref 0–40)
BILIRUB SERPL-MCNC: 0.6 MG/DL (ref 0.2–0.7)
BUN SERPL-MCNC: 10 MG/DL (ref 8–23)
CALCIUM SERPL-MCNC: 10 MG/DL (ref 8.5–9.9)
CHLORIDE SERPL-SCNC: 96 MEQ/L (ref 95–107)
CHOLEST SERPL-MCNC: 124 MG/DL (ref 0–199)
CO2 SERPL-SCNC: 34 MEQ/L (ref 20–31)
CREAT SERPL-MCNC: 1.05 MG/DL (ref 0.7–1.2)
GLOBULIN SER CALC-MCNC: 2.8 G/DL (ref 2.3–3.5)
GLUCOSE FASTING: 88 MG/DL (ref 70–99)
HDLC SERPL-MCNC: 41 MG/DL (ref 40–59)
LDL CHOLESTEROL: 58 MG/DL (ref 0–129)
POTASSIUM SERPL-SCNC: 5.2 MEQ/L (ref 3.4–4.9)
PROT SERPL-MCNC: 7.1 G/DL (ref 6.3–8)
PSA SERPL-MCNC: 1.37 NG/ML (ref 0–4)
SODIUM SERPL-SCNC: 140 MEQ/L (ref 135–144)
TRIGLYCERIDE, FASTING: 125 MG/DL (ref 0–150)

## 2025-01-27 PROCEDURE — 3078F DIAST BP <80 MM HG: CPT | Performed by: FAMILY MEDICINE

## 2025-01-27 PROCEDURE — 1123F ACP DISCUSS/DSCN MKR DOCD: CPT | Performed by: FAMILY MEDICINE

## 2025-01-27 PROCEDURE — 3074F SYST BP LT 130 MM HG: CPT | Performed by: FAMILY MEDICINE

## 2025-01-27 PROCEDURE — 1159F MED LIST DOCD IN RCRD: CPT | Performed by: FAMILY MEDICINE

## 2025-01-27 PROCEDURE — 84153 ASSAY OF PSA TOTAL: CPT

## 2025-01-27 PROCEDURE — 90653 IIV ADJUVANT VACCINE IM: CPT | Performed by: FAMILY MEDICINE

## 2025-01-27 PROCEDURE — 80061 LIPID PANEL: CPT

## 2025-01-27 PROCEDURE — G0103 PSA SCREENING: HCPCS

## 2025-01-27 PROCEDURE — 3017F COLORECTAL CA SCREEN DOC REV: CPT | Performed by: FAMILY MEDICINE

## 2025-01-27 PROCEDURE — 36415 COLL VENOUS BLD VENIPUNCTURE: CPT | Performed by: FAMILY MEDICINE

## 2025-01-27 PROCEDURE — G0439 PPPS, SUBSEQ VISIT: HCPCS | Performed by: FAMILY MEDICINE

## 2025-01-27 PROCEDURE — 80053 COMPREHEN METABOLIC PANEL: CPT

## 2025-01-27 PROCEDURE — G0008 ADMIN INFLUENZA VIRUS VAC: HCPCS | Performed by: FAMILY MEDICINE

## 2025-01-27 RX ORDER — BISACODYL 5 MG
5 TABLET, DELAYED RELEASE (ENTERIC COATED) ORAL 2 TIMES DAILY
COMMUNITY
Start: 2024-12-27

## 2025-01-27 SDOH — ECONOMIC STABILITY: FOOD INSECURITY: WITHIN THE PAST 12 MONTHS, THE FOOD YOU BOUGHT JUST DIDN'T LAST AND YOU DIDN'T HAVE MONEY TO GET MORE.: NEVER TRUE

## 2025-01-27 SDOH — ECONOMIC STABILITY: FOOD INSECURITY: WITHIN THE PAST 12 MONTHS, YOU WORRIED THAT YOUR FOOD WOULD RUN OUT BEFORE YOU GOT MONEY TO BUY MORE.: NEVER TRUE

## 2025-01-27 ASSESSMENT — PATIENT HEALTH QUESTIONNAIRE - PHQ9
SUM OF ALL RESPONSES TO PHQ QUESTIONS 1-9: 0
SUM OF ALL RESPONSES TO PHQ QUESTIONS 1-9: 0
SUM OF ALL RESPONSES TO PHQ9 QUESTIONS 1 & 2: 0
SUM OF ALL RESPONSES TO PHQ QUESTIONS 1-9: 0
SUM OF ALL RESPONSES TO PHQ QUESTIONS 1-9: 0
1. LITTLE INTEREST OR PLEASURE IN DOING THINGS: NOT AT ALL
2. FEELING DOWN, DEPRESSED OR HOPELESS: NOT AT ALL

## 2025-01-27 ASSESSMENT — LIFESTYLE VARIABLES
HOW MANY STANDARD DRINKS CONTAINING ALCOHOL DO YOU HAVE ON A TYPICAL DAY: 1 OR 2
HOW OFTEN DO YOU HAVE A DRINK CONTAINING ALCOHOL: MONTHLY OR LESS

## 2025-01-27 NOTE — TELEPHONE ENCOUNTER
Pt states he is taking trazodone for sleep but he is only sleeping 3-4 hours per night, he wants to know if he takes an extra 1/2 tablet will that make him sleep longer?

## 2025-01-27 NOTE — PROGRESS NOTES
in exercise at this level?: 0 min  Interventions:  See AVS for additional education material  See A/P for plan and any pertinent orders     Abnormal BMI (obese):  Body mass index is 39.17 kg/m². (!) Abnormal  Interventions:  See AVS for additional education material  See A/P for plan and any pertinent orders          Vision Screen:  Do you have difficulty driving, watching TV, or doing any of your daily activities because of your eyesight?: (!) Yes  Have you had an eye exam within the past year?: Yes  Interventions:   See AVS for additional education material  See A/P for any pertinent orders    Safety:  Do you have any tripping hazards - loose or unsecured carpets or rugs?: (!) Yes (o2 hose)  Interventions:  See AVS for additional education material  See A/P for plan and any pertinent orders    ADL's:   Patient reports needing help with:  Select all that apply: (!) Shopping, Housekeeping, Transportation  Interventions:  See AVS for additional education material  See A/P for plan and any pertinent orders    Advanced Directives:  Do you have a Living Will?: (!) No    Intervention:  see ACP note         Lung Cancer Screening:  See notes                  Objective   Vitals:    01/27/25 0929   BP: 122/76   Pulse: 96   SpO2: (!) 70%   Weight: 123.8 kg (273 lb)   Height: 1.778 m (5' 10\")      Body mass index is 39.17 kg/m².                  No Known Allergies  Prior to Visit Medications    Medication Sig Taking? Authorizing Provider   BISACODYL 5 MG EC tablet Take 1 tablet by mouth 2 times daily Yes Provider, MD Mitzi   warfarin (COUMADIN) 2.5 MG tablet Take as directed by [facility] Anticoagulation Management Service. Quantity equals 90 day supply. Yes Marc Banda MD   traZODone (DESYREL) 100 MG tablet TAKE 1 TABLET BY MOUTH EVERY NIGHT AT BEDTIME Yes Yovanny Felder MD   ammonium lactate (LAC-HYDRIN) 12 % lotion Apply topically as needed. Yes Yovanny Felder MD   Cholecalciferol (VITAMIN D3) 50 MCG (2000 UT) TABS

## 2025-01-28 ENCOUNTER — ANTI-COAG VISIT (OUTPATIENT)
Age: 70
End: 2025-01-28
Payer: MEDICARE

## 2025-01-28 ENCOUNTER — OFFICE VISIT (OUTPATIENT)
Dept: CARDIOLOGY CLINIC | Age: 70
End: 2025-01-28

## 2025-01-28 VITALS
DIASTOLIC BLOOD PRESSURE: 70 MMHG | HEART RATE: 109 BPM | SYSTOLIC BLOOD PRESSURE: 110 MMHG | BODY MASS INDEX: 39.17 KG/M2 | RESPIRATION RATE: 16 BRPM | WEIGHT: 273 LBS

## 2025-01-28 DIAGNOSIS — I48.91 ATRIAL FIBRILLATION, UNSPECIFIED TYPE (HCC): Primary | ICD-10-CM

## 2025-01-28 DIAGNOSIS — I10 HYPERTENSION, UNSPECIFIED TYPE: Primary | ICD-10-CM

## 2025-01-28 LAB
INTERNATIONAL NORMALIZATION RATIO, POC: 3.8
PROTHROMBIN TIME, POC: 0

## 2025-01-28 PROCEDURE — 85610 PROTHROMBIN TIME: CPT | Performed by: PHARMACIST

## 2025-01-28 PROCEDURE — 99213 OFFICE O/P EST LOW 20 MIN: CPT | Performed by: PHARMACIST

## 2025-01-28 RX ORDER — TORSEMIDE 100 MG/1
100 TABLET ORAL DAILY
Qty: 90 TABLET | Refills: 3 | Status: SHIPPED | OUTPATIENT
Start: 2025-01-28

## 2025-01-28 RX ORDER — SPIRONOLACTONE 50 MG/1
50 TABLET, FILM COATED ORAL DAILY
Qty: 90 TABLET | Refills: 3 | Status: SHIPPED | OUTPATIENT
Start: 2025-01-28

## 2025-01-28 ASSESSMENT — ENCOUNTER SYMPTOMS
COUGH: 0
WHEEZING: 0
STRIDOR: 0
CHEST TIGHTNESS: 0
GASTROINTESTINAL NEGATIVE: 1
NAUSEA: 0
BLOOD IN STOOL: 0
EYES NEGATIVE: 1
SHORTNESS OF BREATH: 1

## 2025-01-28 NOTE — PROGRESS NOTES
Mr. Lele Roy is a 69 y.o. y/o male with history of Afib who presents today for anticoagulation monitoring and adjustment.    INR 3.8 is supratherapeutic for this patient (goal range 2-3) and is reflective of 25 mg TWD  Patient verifies current dosing regimen, patient able to verbally recall dose  Patient reports no missed doses since last INR     Patient denies s/sx clotting and/or stroke  Patient denies hematuria, epistaxis, rectal bleeding  Patient denies changes in diet, alcohol, or tobacco use    Reviewed medication list and drug allergies with patient, updated any medication additions or modifications accordingly    Patient also denies any pending medical or dental procedures scheduled at this time    Patient was instructed to take 2.5mg tonight, reducing TWD to 22.5mg (10%) decrease and RTC 2 weeks.  Patient was instructed to also eat extra serving of vitamin k containing foods (broccoli, \"greens\", spinach, brussel sprouts).    For Pharmacy Admin Tracking Only    Intervention Detail: Dose Adjustment: 2, reason: Therapy Optimization  Total # of Interventions Recommended: 2  Total # of Interventions Accepted: 2  Time Spent (min): 15  Fela Easton [facility]  1/28/2025  2:08 PM

## 2025-01-28 NOTE — PROGRESS NOTES
Subsequent Progress Note    Patient: Lele Roy  YOB: 1955  MRN: [id number]    Chief Complaint: hf LE edema copd garnett   Chief Complaint   Patient presents with    Follow-up       CV Data:  7/2019 echo EF 60  9/2019 spect negative   9/2019 CUS- mild   9/2019 Abd US negative AAA  12/14/2020 RCA SHIRIN EF 60  1/21 PVR negative  1/21 CUS mild   4/23 Echo EF 60 RVSP 67  5/5/23 BARBARA  PFO EF 55    7/23 He was sent to [facility] for PFO closure but PFO could not be reproduced.   6/24 CUS mild    Subjective/HPI: no edema anymore on diuretics. No cp +garnett chronic 3L o2      1/6/2020 still on 3L O2 SUBJECTIVE: till struggles to stop smoke. No cp. No falls no bleed. Takes meds. Walks.     5/19/2020 TELEHEALTH EVALUATION -- Audio/Visual (During COVID-19 public health emergency)    Doing well no cp still has sob.  Still trying to stop smoke. Uses 3L O2    6/22/2020 TELEHEALTH EVALUATION -- Audio/Visual (During COVID-19 public health emergency)    Called in 10 days ago c/o LE Edema. We advised low salt, walk and compression. Edema is now completely resolved. He feels much better. He admits he took lots of salty foods few weeks ago. No cp    7/27/2020  Leg edema was better but now came back again. He recently ran out of couple of his meds to include ACEI and BB.     8/10/2020 leg edema much improved with low salt and Fluid restrict.  Now has 1-2+. No cp chronic SOB on O2.     9/11/2020 still has GARNETT. Uses 3L O2. Sate are 80s but feels comfortable at rest.  No CP no bleed no falls.     12/3/2020 no cp . GARNETT is worse. Weak and tired. No bleed. No falls.     12/121/220 feels ok. No cp no sob no falls no bleed. Takes meds. Trying to quit cigs    3/3/21 still SOB using 3L O2. Low stamina no cp no falls no bleed no edema takes meds. Not ilene active.     6/3/21 no cp still uses 3L O2. No falls no bleed.     9/3/21 chronic sob 3.5 L O2. No cp no falls no bleed. Takes meds    1/7/22 on 3L no cp but still SOB. LE edema little  lethargic

## 2025-02-11 ENCOUNTER — TELEPHONE (OUTPATIENT)
Age: 70
End: 2025-02-11

## 2025-02-11 DIAGNOSIS — I48.91 ATRIAL FIBRILLATION, UNSPECIFIED TYPE (HCC): Primary | ICD-10-CM

## 2025-02-11 NOTE — TELEPHONE ENCOUNTER
Pt called to cancel appt tomorrow 2/12 due to illness. Pt unable to reschedule until 2/26 @ 1230 due to transportation issues as he already has an appt with a different provider on 2/26 @ 11 at the same location. Appt rescheduled.

## 2025-02-26 ENCOUNTER — ANTI-COAG VISIT (OUTPATIENT)
Age: 70
End: 2025-02-26
Payer: MEDICARE

## 2025-02-26 ENCOUNTER — HOSPITAL ENCOUNTER (OUTPATIENT)
Dept: LAB | Age: 70
Discharge: HOME | End: 2025-02-26
Payer: MEDICARE

## 2025-02-26 ENCOUNTER — OFFICE VISIT (OUTPATIENT)
Dept: PULMONOLOGY | Age: 70
End: 2025-02-26
Payer: MEDICARE

## 2025-02-26 VITALS — OXYGEN SATURATION: 90 % | DIASTOLIC BLOOD PRESSURE: 72 MMHG | SYSTOLIC BLOOD PRESSURE: 100 MMHG | HEART RATE: 85 BPM

## 2025-02-26 DIAGNOSIS — I48.0 PAROXYSMAL ATRIAL FIBRILLATION (HCC): Primary | ICD-10-CM

## 2025-02-26 DIAGNOSIS — J44.9 CHRONIC OBSTRUCTIVE PULMONARY DISEASE, UNSPECIFIED COPD TYPE (HCC): ICD-10-CM

## 2025-02-26 DIAGNOSIS — G47.33 OSA (OBSTRUCTIVE SLEEP APNEA): ICD-10-CM

## 2025-02-26 DIAGNOSIS — E66.9 OBESITY (BMI 30-39.9): ICD-10-CM

## 2025-02-26 DIAGNOSIS — J96.11 CHRONIC RESPIRATORY FAILURE WITH HYPOXIA AND HYPERCAPNIA (HCC): Primary | ICD-10-CM

## 2025-02-26 DIAGNOSIS — J84.10 PULMONARY INTERSTITIAL FIBROSIS (HCC): ICD-10-CM

## 2025-02-26 DIAGNOSIS — I10 HYPERTENSION, UNSPECIFIED TYPE: ICD-10-CM

## 2025-02-26 DIAGNOSIS — J96.12 CHRONIC RESPIRATORY FAILURE WITH HYPOXIA AND HYPERCAPNIA (HCC): Primary | ICD-10-CM

## 2025-02-26 LAB
ALBUMIN SERPL-MCNC: 4.4 G/DL (ref 3.5–4.6)
ALP SERPL-CCNC: 91 U/L (ref 35–104)
ALT SERPL-CCNC: 11 U/L (ref 0–41)
ANION GAP SERPL CALCULATED.3IONS-SCNC: 7 MEQ/L (ref 9–15)
AST SERPL-CCNC: 19 U/L (ref 0–40)
BILIRUB SERPL-MCNC: 0.6 MG/DL (ref 0.2–0.7)
BUN SERPL-MCNC: 44 MG/DL (ref 8–23)
CALCIUM SERPL-MCNC: 10.3 MG/DL (ref 8.5–9.9)
CHLORIDE SERPL-SCNC: 91 MEQ/L (ref 95–107)
CO2 SERPL-SCNC: 37 MEQ/L (ref 20–31)
CREAT SERPL-MCNC: 1.68 MG/DL (ref 0.7–1.2)
ERYTHROCYTE [DISTWIDTH] IN BLOOD BY AUTOMATED COUNT: 14.1 % (ref 11.5–14.5)
GLOBULIN SER CALC-MCNC: 2.9 G/DL (ref 2.3–3.5)
GLUCOSE SERPL-MCNC: 104 MG/DL (ref 70–99)
HCT VFR BLD AUTO: 46.2 % (ref 42–52)
HGB BLD-MCNC: 14.6 G/DL (ref 14–18)
INTERNATIONAL NORMALIZATION RATIO, POC: 3.3
MAGNESIUM SERPL-MCNC: 2.5 MG/DL (ref 1.7–2.4)
MCH RBC QN AUTO: 29 PG (ref 27–31.3)
MCHC RBC AUTO-ENTMCNC: 31.6 % (ref 33–37)
MCV RBC AUTO: 91.7 FL (ref 79–92.2)
PLATELET # BLD AUTO: 214 K/UL (ref 130–400)
POTASSIUM SERPL-SCNC: 5.5 MEQ/L (ref 3.4–4.9)
PROT SERPL-MCNC: 7.3 G/DL (ref 6.3–8)
PROTHROMBIN TIME, POC: 0
RBC # BLD AUTO: 5.04 M/UL (ref 4.7–6.1)
SODIUM SERPL-SCNC: 135 MEQ/L (ref 135–144)
TSH SERPL-MCNC: 1.11 UIU/ML (ref 0.44–3.86)
WBC # BLD AUTO: 11.6 K/UL (ref 4.8–10.8)

## 2025-02-26 PROCEDURE — 80053 COMPREHEN METABOLIC PANEL: CPT

## 2025-02-26 PROCEDURE — 84443 ASSAY THYROID STIM HORMONE: CPT

## 2025-02-26 PROCEDURE — 1159F MED LIST DOCD IN RCRD: CPT | Performed by: INTERNAL MEDICINE

## 2025-02-26 PROCEDURE — 85610 PROTHROMBIN TIME: CPT

## 2025-02-26 PROCEDURE — 99214 OFFICE O/P EST MOD 30 MIN: CPT | Performed by: INTERNAL MEDICINE

## 2025-02-26 PROCEDURE — 83735 ASSAY OF MAGNESIUM: CPT

## 2025-02-26 PROCEDURE — 3078F DIAST BP <80 MM HG: CPT | Performed by: INTERNAL MEDICINE

## 2025-02-26 PROCEDURE — 3074F SYST BP LT 130 MM HG: CPT | Performed by: INTERNAL MEDICINE

## 2025-02-26 PROCEDURE — 99213 OFFICE O/P EST LOW 20 MIN: CPT

## 2025-02-26 PROCEDURE — 1123F ACP DISCUSS/DSCN MKR DOCD: CPT | Performed by: INTERNAL MEDICINE

## 2025-02-26 PROCEDURE — 85027 COMPLETE CBC AUTOMATED: CPT

## 2025-02-26 PROCEDURE — 36415 COLL VENOUS BLD VENIPUNCTURE: CPT

## 2025-02-26 ASSESSMENT — ENCOUNTER SYMPTOMS
ABDOMINAL PAIN: 0
SHORTNESS OF BREATH: 1
RHINORRHEA: 0
WHEEZING: 1
VOMITING: 0
NAUSEA: 0
COUGH: 1
DIARRHEA: 0
CHEST TIGHTNESS: 0
EYE ITCHING: 0
SORE THROAT: 0
VOICE CHANGE: 0

## 2025-02-26 NOTE — PROGRESS NOTES
Mr. Lele Roy is a 69 y.o. y/o male with history of Afib who presents today for anticoagulation monitoring and adjustment.  INR 3.3 is supra therapeutic for this patient (goal range 2-3) and is reflective of 22.5 mg TWD  Patient verifies current dosing regimen, patient able to verbally recall dose  Patient reports 0  missed doses since last INR   Patient denies s/sx clotting and/or stroke  Patient denies hematuria, epistaxis, rectal bleeding  Patient denies changes in diet, alcohol, or tobacco use  Reviewed medication list and drug allergies with patient, updated any medication additions or modifications accordingly  Patient also denies any pending medical or dental procedures scheduled at this time  Patient was instructed to decrease to 20 mg TWD and RTC 2 weeks  For Pharmacy Admin Tracking Only    Intervention Detail: Dose Adjustment: 1, reason: Therapy Optimization  Total # of Interventions Recommended: 2  Total # of Interventions Accepted: 2  Time Spent (min): 15

## 2025-02-26 NOTE — PROGRESS NOTES
Subjective:             Lele Roy is a 69 y.o. male who complains today of:     Chief Complaint   Patient presents with    Follow-up     3m f/u on COPD, chronic respiratory failure with hypoxia        HPI  He is using  6 lit 24 hour day. he said pulse oxy 90% with pulse air , with change to continuous flow  6 lit Spo2 90%  He is on neb with duoneb QID, albuterol HFA prn.  need refill of neb solution   No C/o chest congestion any more .  C/o shortness of breath with exertion .  C/o Cough with thick yellowish  mucus.   C/o Off and on Wheezing.No Chest tightness.  No Chest pain with radiation  or pleuritic pain.  Chronic  leg edema.  No Fever or chills.  No Rhinorrhea and postnasal drip.      CXR 5/23   1. Redemonstration of chronic irregular interstitial opacities bilaterally  which could indicate chronic interstitial lung disease.  2. No evidence of pneumonia or pleural effusion.    Allergies:  Patient has no known allergies.  Past Medical History:   Diagnosis Date    CHF (congestive heart failure) ([facility])     COPD (chronic obstructive pulmonary disease) ([facility])     Hypertension     Lung disease     Osteoarthritis     hands worst    Sleep apnea      Past Surgical History:   Procedure Laterality Date    CHOLECYSTECTOMY      CORONARY ANGIOPLASTY WITH STENT PLACEMENT  12/14/2020    DIAGNOSTIC CARDIAC CATH LAB PROCEDURE  12/14/2020    PTCA  12/14/2020    ROTATOR CUFF REPAIR      right     Family History   Problem Relation Age of Onset    Heart Disease Mother     Other Father      Social History     Socioeconomic History    Marital status: Single     Spouse name: Not on file    Number of children: Not on file    Years of education: Not on file    Highest education level: Not on file   Occupational History    Not on file   Tobacco Use    Smoking status: Former     Current packs/day: 0.00     Average packs/day: 2.0 packs/day for 49.5 years (99.1 ttl pk-yrs)     Types: Cigarettes     Start date: 06/1971     Quit date:

## 2025-02-27 ENCOUNTER — TELEPHONE (OUTPATIENT)
Dept: CARDIOLOGY CLINIC | Age: 70
End: 2025-02-27

## 2025-02-27 DIAGNOSIS — I50.33 ACUTE ON CHRONIC DIASTOLIC (CONGESTIVE) HEART FAILURE (HCC): Primary | ICD-10-CM

## 2025-02-27 RX ORDER — TORSEMIDE 100 MG/1
50 TABLET ORAL DAILY
Qty: 45 TABLET | Refills: 3
Start: 2025-02-27

## 2025-02-27 NOTE — TELEPHONE ENCOUNTER
Called and spoke to patient. Notified patient that Dr. Banda would like him to stop his KCL and lower torsemide to 50mg daily. He will also get his BMP rechecked in 3 weeks. Order placed for BMP.     ----- Message from Dr. Marc Banda MD sent at 2/27/2025  9:49 AM EST -----  Stop KCL.  Lower Torsemide to 50 mg qd.  Recheck BMP in 3 weeks.  ----- Message -----  From: Jonas Mendez Incoming Lab Results From [facility]  Sent: 2/26/2025   4:17 PM EST  To: Marc LARA MD

## 2025-03-12 ENCOUNTER — TELEPHONE (OUTPATIENT)
Age: 70
End: 2025-03-12

## 2025-03-17 RX ORDER — CLOPIDOGREL BISULFATE 75 MG/1
75 TABLET ORAL DAILY
Qty: 90 TABLET | Refills: 3 | Status: SHIPPED | OUTPATIENT
Start: 2025-03-17

## 2025-03-19 ENCOUNTER — ANTI-COAG VISIT (OUTPATIENT)
Age: 70
End: 2025-03-19
Payer: MEDICARE

## 2025-03-19 ENCOUNTER — HOSPITAL ENCOUNTER (OUTPATIENT)
Dept: LAB | Age: 70
Discharge: HOME | End: 2025-03-19
Payer: MEDICARE

## 2025-03-19 DIAGNOSIS — I48.0 PAROXYSMAL ATRIAL FIBRILLATION (HCC): Primary | ICD-10-CM

## 2025-03-19 DIAGNOSIS — I50.33 ACUTE ON CHRONIC DIASTOLIC (CONGESTIVE) HEART FAILURE: ICD-10-CM

## 2025-03-19 LAB
ANION GAP SERPL CALCULATED.3IONS-SCNC: 8 MEQ/L (ref 9–15)
BUN SERPL-MCNC: 28 MG/DL (ref 8–23)
CALCIUM SERPL-MCNC: 9.8 MG/DL (ref 8.5–9.9)
CHLORIDE SERPL-SCNC: 94 MEQ/L (ref 95–107)
CO2 SERPL-SCNC: 34 MEQ/L (ref 20–31)
CREAT SERPL-MCNC: 1.29 MG/DL (ref 0.7–1.2)
GLUCOSE SERPL-MCNC: 92 MG/DL (ref 70–99)
INTERNATIONAL NORMALIZATION RATIO, POC: 2
POTASSIUM SERPL-SCNC: 4.8 MEQ/L (ref 3.4–4.9)
PROTHROMBIN TIME, POC: 0
SODIUM SERPL-SCNC: 136 MEQ/L (ref 135–144)

## 2025-03-19 PROCEDURE — 36415 COLL VENOUS BLD VENIPUNCTURE: CPT

## 2025-03-19 PROCEDURE — 85610 PROTHROMBIN TIME: CPT

## 2025-03-19 PROCEDURE — 99211 OFF/OP EST MAY X REQ PHY/QHP: CPT

## 2025-03-19 PROCEDURE — 80048 BASIC METABOLIC PNL TOTAL CA: CPT

## 2025-03-19 NOTE — PROGRESS NOTES
Mr. Lele Roy is a 69 y.o. y/o male with history of Afib who presents today for anticoagulation monitoring and adjustment.  INR 2.0 is therapeutic for this patient (goal range 2-3) and is reflective of 20 mg TWD  Patient verifies current dosing regimen, patient able to verbally recall dose  Patient reports 0  missed doses since last INR   Patient denies s/sx clotting and/or stroke  Patient denies hematuria, epistaxis, rectal bleeding  Patient denies changes in diet, alcohol, or tobacco use  Reviewed medication list and drug allergies with patient, updated any medication additions or modifications accordingly  Patient also denies any pending medical or dental procedures scheduled at this time  Patient was instructed to continue 20 mg TWD and RTC 3 weeks  For Pharmacy Admin Tracking Only    Intervention Detail: Adherence Monitorin  Total # of Interventions Recommended: 1  Total # of Interventions Accepted: 1  Time Spent (min): 15

## 2025-03-28 ENCOUNTER — TELEPHONE (OUTPATIENT)
Dept: CARDIOLOGY CLINIC | Age: 70
End: 2025-03-28

## 2025-03-31 NOTE — TELEPHONE ENCOUNTER
Per Dr. Banda:  Kidney FXN is better.  K was stopped due to K being too high.     Called and spoke to patient. Notified patient of Dr. Banda's message. Patient verbalized understanding.     Patient states that he has noticed an increase in swelling in his legs since decreasing his dose of Torsemide.

## 2025-03-31 NOTE — TELEPHONE ENCOUNTER
Per Dr. Banda:  Yes I understand but it was lowered due to his Kidneys. Now Kidney is better.  He needs to lower his Salt intake to < 2000 mg/day and fluid restrict to < 60 OZ /day     Called and spoke to patient. Notified him of Dr. Banda's response to his message regarding his legs having an increase in swelling.   Notified patient of Dr. Banda's response. Patient verbalized understanding. No additional questions or concerns.

## 2025-04-09 ENCOUNTER — ANTI-COAG VISIT (OUTPATIENT)
Age: 70
End: 2025-04-09
Payer: MEDICARE

## 2025-04-09 DIAGNOSIS — I48.0 PAROXYSMAL ATRIAL FIBRILLATION (HCC): Primary | ICD-10-CM

## 2025-04-09 LAB
INTERNATIONAL NORMALIZATION RATIO, POC: 3.2
PROTHROMBIN TIME, POC: 0

## 2025-04-09 PROCEDURE — 99212 OFFICE O/P EST SF 10 MIN: CPT

## 2025-04-09 PROCEDURE — 85610 PROTHROMBIN TIME: CPT

## 2025-04-09 NOTE — PROGRESS NOTES
Mr. Lele Roy is a 69 y.o. y/o male with history of Afib who presents today for anticoagulation monitoring and adjustment.  INR 3.2 is supra therapeutic for this patient (goal range 2-3) and is reflective of 20 mg Total Weekly Dose  Patient verifies current dosing regimen, patient able to verbally recall dose  Patient reports 0  missed doses since last INR   Patient denies s/sx clotting and/or stroke  Patient denies hematuria, epistaxis, rectal bleeding  Patient denies changes in diet, alcohol, or tobacco use  Reviewed medication list and drug allergies with patient, updated any medication additions or modifications accordingly  Patient also denies any pending medical or dental procedures scheduled at this time  Patient was instructed to decrease today's dose only to 1.25 mg, then resume 20 mg TWD and Return To Clinic in 1 week (usual 2-3 weeks)  For Pharmacy Admin Tracking Only    Intervention Detail: Dose Adjustment: 1, reason: Therapy Optimization  Total # of Interventions Recommended: 1  Total # of Interventions Accepted: 1  Time Spent (min): 15

## 2025-04-16 ENCOUNTER — ANTI-COAG VISIT (OUTPATIENT)
Age: 70
End: 2025-04-16
Payer: MEDICARE

## 2025-04-16 DIAGNOSIS — I48.91 ATRIAL FIBRILLATION, UNSPECIFIED TYPE (HCC): Primary | ICD-10-CM

## 2025-04-16 LAB
INTERNATIONAL NORMALIZATION RATIO, POC: 2 (ref 2–3)
PROTHROMBIN TIME, POC: 0

## 2025-04-16 PROCEDURE — 99211 OFF/OP EST MAY X REQ PHY/QHP: CPT | Performed by: PHARMACIST

## 2025-04-16 PROCEDURE — 85610 PROTHROMBIN TIME: CPT | Performed by: PHARMACIST

## 2025-04-16 NOTE — PROGRESS NOTES
Mr. Lele Roy is a 69 y.o. y/o male with history of Afib who presents today for anticoagulation monitoring and adjustment.  INR 2.0 is therapeutic for this patient (goal range 2-3) and is reflective of 18.75 mg TWD  Patient verifies current dosing regimen, patient able to verbally recall dose  Patient reports no  missed doses since last INR   Patient denies s/sx clotting and/or stroke  Patient denies hematuria, epistaxis, rectal bleeding  Patient denies changes in diet, alcohol, or tobacco use  Reviewed medication list and drug allergies with patient, updated any medication additions or modifications accordingly  Patient also denies any pending medical or dental procedures scheduled at this time  Patient was instructed to continue 20 mg TWD and RTC 3 weeks    Steve Bradley R.Ph.  2025  12:38 PM      For Pharmacy Admin Tracking Only    Intervention Detail: Adherence Monitorin  Total # of Interventions Recommended: 0  Total # of Interventions Accepted: 0  Time Spent (min): 15

## 2025-05-05 DIAGNOSIS — I25.10 CORONARY ARTERY DISEASE INVOLVING NATIVE CORONARY ARTERY OF NATIVE HEART WITHOUT ANGINA PECTORIS: ICD-10-CM

## 2025-05-05 RX ORDER — METOPROLOL TARTRATE 25 MG/1
25 TABLET, FILM COATED ORAL 2 TIMES DAILY
Qty: 180 TABLET | Refills: 2 | Status: SHIPPED | OUTPATIENT
Start: 2025-05-05

## 2025-05-05 NOTE — TELEPHONE ENCOUNTER
Requesting medication refill. Please approve or deny this request.    Rx requested:  Requested Prescriptions     Pending Prescriptions Disp Refills    metoprolol tartrate (LOPRESSOR) 25 MG tablet [Pharmacy Med Name: metoprolol tartrate 25 mg tablet] 180 tablet 3     Sig: Take 1 tablet by mouth 2 times daily         Last Office Visit:   1/28/2025      Next Visit Date:  Future Appointments   Date Time Provider Department Center   5/7/2025 12:30 PM SCHEDULE, [facility]   5/13/2025 11:00 AM Yovanny Felder MD [place] [facility]   7/16/2025 11:30 AM Makadia, Abiel P, MD [facility]   7/22/2025 12:00 PM Marc Banda MD [facility]

## 2025-05-07 ENCOUNTER — ANTI-COAG VISIT (OUTPATIENT)
Age: 70
End: 2025-05-07
Payer: MEDICARE

## 2025-05-07 DIAGNOSIS — I48.0 PAROXYSMAL ATRIAL FIBRILLATION (HCC): Primary | ICD-10-CM

## 2025-05-07 LAB
INTERNATIONAL NORMALIZATION RATIO, POC: 2.2
PROTHROMBIN TIME, POC: 0

## 2025-05-07 PROCEDURE — 85610 PROTHROMBIN TIME: CPT

## 2025-05-07 PROCEDURE — 99211 OFF/OP EST MAY X REQ PHY/QHP: CPT

## 2025-05-07 NOTE — PROGRESS NOTES
Mr. Lele Roy is a 70 y.o. y/o male with history of Afib who presents today for anticoagulation monitoring and adjustment.  INR 2.2 is therapeutic for this patient (goal range 2-3) and is reflective of 20 mg Total Weekly Dose  Patient verifies current dosing regimen, patient able to verbally recall dose  Patient reports 0  missed doses since last INR   Patient denies s/sx clotting and/or stroke  Patient denies hematuria, epistaxis, rectal bleeding  Patient denies changes in diet, alcohol, or tobacco use  Reviewed medication list and drug allergies with patient, updated any medication additions or modifications accordingly  Patient also denies any pending medical or dental procedures scheduled at this time  Patient was instructed to continue 20 mg TWD and RTC 4 weeks  For Pharmacy Admin Tracking Only    Intervention Detail: Adherence Monitorin  Total # of Interventions Recommended: 1  Total # of Interventions Accepted: 1  Time Spent (min): 15

## 2025-05-13 ENCOUNTER — OFFICE VISIT (OUTPATIENT)
Dept: FAMILY MEDICINE CLINIC | Age: 70
End: 2025-05-13
Payer: MEDICARE

## 2025-05-13 VITALS
WEIGHT: 253 LBS | SYSTOLIC BLOOD PRESSURE: 122 MMHG | HEART RATE: 59 BPM | TEMPERATURE: 97 F | HEIGHT: 70 IN | BODY MASS INDEX: 36.22 KG/M2 | DIASTOLIC BLOOD PRESSURE: 82 MMHG

## 2025-05-13 DIAGNOSIS — R53.81 PHYSICAL DECONDITIONING: ICD-10-CM

## 2025-05-13 DIAGNOSIS — I89.0 LYMPHEDEMA OF BOTH LOWER EXTREMITIES: Primary | ICD-10-CM

## 2025-05-13 PROCEDURE — 99214 OFFICE O/P EST MOD 30 MIN: CPT | Performed by: FAMILY MEDICINE

## 2025-05-13 PROCEDURE — 1159F MED LIST DOCD IN RCRD: CPT | Performed by: FAMILY MEDICINE

## 2025-05-13 PROCEDURE — 1123F ACP DISCUSS/DSCN MKR DOCD: CPT | Performed by: FAMILY MEDICINE

## 2025-05-13 PROCEDURE — 3079F DIAST BP 80-89 MM HG: CPT | Performed by: FAMILY MEDICINE

## 2025-05-13 PROCEDURE — 3074F SYST BP LT 130 MM HG: CPT | Performed by: FAMILY MEDICINE

## 2025-05-13 NOTE — PROGRESS NOTES
[facility]  [address]  [facility] [address]  Dept: [phone]  Dept Fax: [phone]     Chief Complaint:  Chief Complaint   Patient presents with    Swelling     B/L lower legs, recurrent problem    Discuss Medications     Docusate, bisacodyl; triamcinolone, ammonium lactate;  unsure of which of each of these he is supposed to be taking; tylenol says he takes 4 500mg per day wants to know if this is too much: Lasix    Other     States he is using 6L of O2 but his chart is listed as 3L       Vitals:    05/13/25 1133   BP: 122/82   Pulse: 59   Temp: 97 °F (36.1 °C)   TempSrc: Infrared   Weight: 114.8 kg (253 lb)   Height: 1.778 m (5' 10\")       HPI:  70 y.o.male who presents for the following:      LE swelling: longstanding; using lymphedema pump; using torsemide from cardiology; dose was reduced due to JORGE    Wt management: interested in home health for PT/OT; he is actively working on wt loss; needs help putting moistruizer on the LEs; actively adjusted diet and achieved 20 lbs wt loss this past 4mo    Wt Readings from Last 20 Encounters:   05/13/25 114.8 kg (253 lb)   01/28/25 123.8 kg (273 lb)   01/27/25 123.8 kg (273 lb)   11/06/24 126.1 kg (278 lb)   08/19/24 121.7 kg (268 lb 6.4 oz)   08/05/24 119.7 kg (264 lb)   07/17/24 120.7 kg (266 lb)   03/12/24 121.6 kg (268 lb)   01/10/24 117.9 kg (260 lb)   11/08/23 112.9 kg (249 lb)   10/17/23 113.1 kg (249 lb 6.4 oz)   10/02/23 111.8 kg (246 lb 6.4 oz)   08/04/23 109.7 kg (241 lb 12.8 oz)   08/01/23 112.2 kg (247 lb 6.4 oz)   06/13/23 111.9 kg (246 lb 12.8 oz)   06/13/23 111.9 kg (246 lb 12.8 oz)   06/08/23 111.2 kg (245 lb 3.2 oz)   05/17/23 104.9 kg (231 lb 4.8 oz)   05/05/23 107 kg (235 lb 14.3 oz)   04/27/23 110.7 kg (244 lb)         -----------------------------------------------------------------------------    Assessment/Plan:  70 y.o. male here mainly for the following:  Deconditioning  He has severe COPD and usually

## 2025-05-16 ENCOUNTER — TELEPHONE (OUTPATIENT)
Dept: FAMILY MEDICINE CLINIC | Age: 70
End: 2025-05-16

## 2025-05-16 NOTE — TELEPHONE ENCOUNTER
[facility] called to inform us while doing their initial evaluation for patient they found a few medications with possible interactions.     They asked if you would review these.     acetaminophen reaction with warfarin       atorvastatin interaction with clopidogrel       clopidogrel interaction with warfarin     warfarin interaction with coenzyme Q10       Also states patient is no longer taking   albuterol sulfate HFA, guaiFENesin,   Sildenafil, or Furosemide

## 2025-05-16 NOTE — TELEPHONE ENCOUNTER
I deleted the meds he is not taking but I left the albuterol inhaler. The other meds interactions are benign and he can remain on them.

## 2025-05-28 DIAGNOSIS — F51.04 PSYCHOPHYSIOLOGICAL INSOMNIA: ICD-10-CM

## 2025-05-28 RX ORDER — TRAZODONE HYDROCHLORIDE 100 MG/1
100 TABLET ORAL NIGHTLY
Qty: 90 TABLET | Refills: 1 | Status: SHIPPED | OUTPATIENT
Start: 2025-05-28

## 2025-05-28 NOTE — TELEPHONE ENCOUNTER
Comments:     Last Office Visit (last PCP visit):   5/13/2025    Next Visit Date:  Future Appointments   Date Time Provider Department Center   6/4/2025 12:30 PM SCHEDULE, [facility]   7/16/2025 11:30 AM Makadia, Abiel P, MD [facility]   7/22/2025 12:00 PM Marc Banda MD [facility]       **If hasn't been seen in over a year OR hasn't followed up according to last diabetes/ADHD visit, make appointment for patient before sending refill to provider.    Rx requested:  Requested Prescriptions     Pending Prescriptions Disp Refills    traZODone (DESYREL) 100 MG tablet 90 tablet 1     Sig: Take 1 tablet by mouth nightly

## 2025-06-04 ENCOUNTER — ANTI-COAG VISIT (OUTPATIENT)
Age: 70
End: 2025-06-04
Payer: MEDICARE

## 2025-06-04 DIAGNOSIS — I48.0 PAROXYSMAL ATRIAL FIBRILLATION (HCC): Primary | ICD-10-CM

## 2025-06-04 LAB
INTERNATIONAL NORMALIZATION RATIO, POC: 3.2
PROTHROMBIN TIME, POC: 0

## 2025-06-04 PROCEDURE — 85610 PROTHROMBIN TIME: CPT

## 2025-06-04 PROCEDURE — 99212 OFFICE O/P EST SF 10 MIN: CPT

## 2025-06-04 NOTE — PROGRESS NOTES
Mr. Lele Roy is a 70 y.o. y/o male with history of Afib who presents today for anticoagulation monitoring and adjustment.  INR 3.2 is supra therapeutic for this patient (goal range 2-3) and is reflective of 20 mg Total Weekly Dose  Patient verifies current dosing regimen, patient able to verbally recall dose  Patient reports 0  missed doses since last INR   Patient denies s/sx clotting and/or stroke  Patient denies hematuria, epistaxis, rectal bleeding  Patient denies changes in diet, alcohol, or tobacco use  Reviewed medication list and drug allergies with patient, updated any medication additions or modifications accordingly  Patient states using Tylenol  Patient also denies any pending medical or dental procedures scheduled at this time  Patient was instructed to hold warfarin today, then resume  and RTC 3 weeks  For Pharmacy Admin Tracking Only    Intervention Detail: Dose Adjustment: 1, reason: Therapy Optimization  Total # of Interventions Recommended: 1  Total # of Interventions Accepted: 1  Time Spent (min): 15

## 2025-06-25 ENCOUNTER — ANTI-COAG VISIT (OUTPATIENT)
Age: 70
End: 2025-06-25
Payer: MEDICARE

## 2025-06-25 DIAGNOSIS — I48.91 ATRIAL FIBRILLATION, UNSPECIFIED TYPE (HCC): Primary | ICD-10-CM

## 2025-06-25 LAB
INTERNATIONAL NORMALIZATION RATIO, POC: 2.6
PROTHROMBIN TIME, POC: 0

## 2025-06-25 PROCEDURE — 99211 OFF/OP EST MAY X REQ PHY/QHP: CPT

## 2025-06-25 PROCEDURE — 85610 PROTHROMBIN TIME: CPT

## 2025-06-25 NOTE — PROGRESS NOTES
Mr. Lele Roy is a 70 y.o. y/o male with history of Afib who presents today for anticoagulation monitoring and adjustment.  INR 2.6 is therapeutic for this patient (goal range 2-3) and is reflective of 20 mg Total Weekly Dose  Patient verifies current dosing regimen, patient able to verbally recall dose  Patient reports 0  missed doses since last INR   Patient denies s/sx clotting and/or stroke  Patient denies hematuria, epistaxis, rectal bleeding  Patient denies changes in diet, alcohol, or tobacco use  Reviewed medication list and drug allergies with patient, updated any medication additions or modifications accordingly  Patient also denies any pending medical or dental procedures scheduled at this time  Patient was instructed to continue 20 mg TWD and Return To Clinic in 4 weeks  For Pharmacy Admin Tracking Only    Intervention Detail: Adherence Monitorin  Total # of Interventions Recommended: 1  Total # of Interventions Accepted: 1  Time Spent (min): 15

## 2025-07-16 ENCOUNTER — OFFICE VISIT (OUTPATIENT)
Dept: PULMONOLOGY | Age: 70
End: 2025-07-16
Payer: MEDICARE

## 2025-07-16 ENCOUNTER — ANTI-COAG VISIT (OUTPATIENT)
Age: 70
End: 2025-07-16
Payer: MEDICARE

## 2025-07-16 VITALS
SYSTOLIC BLOOD PRESSURE: 112 MMHG | WEIGHT: 249 LBS | BODY MASS INDEX: 35.73 KG/M2 | OXYGEN SATURATION: 96 % | HEART RATE: 63 BPM | DIASTOLIC BLOOD PRESSURE: 68 MMHG

## 2025-07-16 DIAGNOSIS — J96.11 CHRONIC RESPIRATORY FAILURE WITH HYPOXIA AND HYPERCAPNIA (HCC): Primary | ICD-10-CM

## 2025-07-16 DIAGNOSIS — E66.9 OBESITY (BMI 30-39.9): ICD-10-CM

## 2025-07-16 DIAGNOSIS — J84.10 PULMONARY INTERSTITIAL FIBROSIS (HCC): ICD-10-CM

## 2025-07-16 DIAGNOSIS — G47.33 OSA (OBSTRUCTIVE SLEEP APNEA): ICD-10-CM

## 2025-07-16 DIAGNOSIS — J96.12 CHRONIC RESPIRATORY FAILURE WITH HYPOXIA AND HYPERCAPNIA (HCC): Primary | ICD-10-CM

## 2025-07-16 DIAGNOSIS — J44.9 CHRONIC OBSTRUCTIVE PULMONARY DISEASE, UNSPECIFIED COPD TYPE (HCC): ICD-10-CM

## 2025-07-16 DIAGNOSIS — I48.0 PAROXYSMAL ATRIAL FIBRILLATION (HCC): Primary | ICD-10-CM

## 2025-07-16 LAB
INTERNATIONAL NORMALIZATION RATIO, POC: 2.6
PROTHROMBIN TIME, POC: 0

## 2025-07-16 PROCEDURE — 3074F SYST BP LT 130 MM HG: CPT | Performed by: INTERNAL MEDICINE

## 2025-07-16 PROCEDURE — 1159F MED LIST DOCD IN RCRD: CPT | Performed by: INTERNAL MEDICINE

## 2025-07-16 PROCEDURE — 99214 OFFICE O/P EST MOD 30 MIN: CPT | Performed by: INTERNAL MEDICINE

## 2025-07-16 PROCEDURE — 85610 PROTHROMBIN TIME: CPT

## 2025-07-16 PROCEDURE — 1123F ACP DISCUSS/DSCN MKR DOCD: CPT | Performed by: INTERNAL MEDICINE

## 2025-07-16 PROCEDURE — 3078F DIAST BP <80 MM HG: CPT | Performed by: INTERNAL MEDICINE

## 2025-07-16 PROCEDURE — 99211 OFF/OP EST MAY X REQ PHY/QHP: CPT

## 2025-07-16 RX ORDER — WARFARIN SODIUM 2.5 MG/1
TABLET ORAL
Qty: 115 TABLET | Refills: 1 | Status: SHIPPED | OUTPATIENT
Start: 2025-07-16

## 2025-07-16 ASSESSMENT — ENCOUNTER SYMPTOMS
SHORTNESS OF BREATH: 1
WHEEZING: 1
DIARRHEA: 0
RHINORRHEA: 0
VOMITING: 0
VOICE CHANGE: 0
CHEST TIGHTNESS: 0
SORE THROAT: 0
COUGH: 1
ABDOMINAL PAIN: 0
NAUSEA: 0
EYE ITCHING: 0

## 2025-07-16 NOTE — PROGRESS NOTES
Subjective:             Lele Roy is a 70 y.o. male who complains today of:     Chief Complaint   Patient presents with    Follow-up     4m f/u on chronic respiratory failure with hypoxia and hypercapnia, COPD        HPI  He is using  6 lit 24 hour day. he said pulse oxy 90% with pulse air , with change to continuous flow  6 lit Spo2 90%  He is on neb with duoneb QID, albuterol HFA prn.  need refill of neb solution   No C/o chest congestion any more .  C/o shortness of breath with exertion .  C/o Cough with thick yellowish  mucus.   C/o Off and on Wheezing.No Chest tightness.  No Chest pain with radiation  or pleuritic pain.  Chronic  leg edema.  No Fever or chills.  No Rhinorrhea and postnasal drip.      CXR 5/23   1. Redemonstration of chronic irregular interstitial opacities bilaterally  which could indicate chronic interstitial lung disease.  2. No evidence of pneumonia or pleural effusion.    Allergies:  Patient has no known allergies.  Past Medical History:   Diagnosis Date    CHF (congestive heart failure) ([facility])     COPD (chronic obstructive pulmonary disease) ([facility])     Hypertension     Lung disease     Osteoarthritis     hands worst    Sleep apnea      Past Surgical History:   Procedure Laterality Date    CHOLECYSTECTOMY      CORONARY ANGIOPLASTY WITH STENT PLACEMENT  12/14/2020    DIAGNOSTIC CARDIAC CATH LAB PROCEDURE  12/14/2020    PTCA  12/14/2020    ROTATOR CUFF REPAIR      right     Family History   Problem Relation Age of Onset    Heart Disease Mother     Other Father      Social History     Socioeconomic History    Marital status: Single     Spouse name: Not on file    Number of children: Not on file    Years of education: Not on file    Highest education level: Not on file   Occupational History    Not on file   Tobacco Use    Smoking status: Former     Current packs/day: 0.00     Average packs/day: 2.0 packs/day for 49.5 years (99.1 ttl pk-yrs)     Types: Cigarettes     Start date: 06/1971

## 2025-07-22 ENCOUNTER — OFFICE VISIT (OUTPATIENT)
Age: 70
End: 2025-07-22
Payer: MEDICARE

## 2025-07-22 VITALS
BODY MASS INDEX: 35.87 KG/M2 | SYSTOLIC BLOOD PRESSURE: 90 MMHG | DIASTOLIC BLOOD PRESSURE: 60 MMHG | HEART RATE: 89 BPM | RESPIRATION RATE: 20 BRPM | OXYGEN SATURATION: 90 % | WEIGHT: 250 LBS

## 2025-07-22 DIAGNOSIS — I10 ESSENTIAL HYPERTENSION, BENIGN: Primary | ICD-10-CM

## 2025-07-22 DIAGNOSIS — Q21.12 PFO (PATENT FORAMEN OVALE): ICD-10-CM

## 2025-07-22 DIAGNOSIS — I25.119 ATHEROSCLEROSIS OF NATIVE CORONARY ARTERY WITH ANGINA PECTORIS, UNSPECIFIED WHETHER NATIVE OR TRANSPLANTED HEART: ICD-10-CM

## 2025-07-22 DIAGNOSIS — I25.10 CORONARY ARTERY DISEASE INVOLVING NATIVE CORONARY ARTERY OF NATIVE HEART WITHOUT ANGINA PECTORIS: ICD-10-CM

## 2025-07-22 DIAGNOSIS — I50.33 ACUTE ON CHRONIC DIASTOLIC (CONGESTIVE) HEART FAILURE (HCC): ICD-10-CM

## 2025-07-22 DIAGNOSIS — I10 HYPERTENSION, UNSPECIFIED TYPE: ICD-10-CM

## 2025-07-22 DIAGNOSIS — E78.5 DYSLIPIDEMIA: ICD-10-CM

## 2025-07-22 DIAGNOSIS — J44.9 CHRONIC OBSTRUCTIVE PULMONARY DISEASE, UNSPECIFIED COPD TYPE (HCC): ICD-10-CM

## 2025-07-22 DIAGNOSIS — E66.01 SEVERE OBESITY (BMI 35.0-39.9) WITH COMORBIDITY (HCC): ICD-10-CM

## 2025-07-22 DIAGNOSIS — I25.119 ATHEROSCLEROSIS OF NATIVE CORONARY ARTERY OF NATIVE HEART WITH ANGINA PECTORIS: ICD-10-CM

## 2025-07-22 DIAGNOSIS — I48.91 ATRIAL FIBRILLATION, UNSPECIFIED TYPE (HCC): ICD-10-CM

## 2025-07-22 PROCEDURE — 1159F MED LIST DOCD IN RCRD: CPT | Performed by: INTERNAL MEDICINE

## 2025-07-22 PROCEDURE — 1123F ACP DISCUSS/DSCN MKR DOCD: CPT | Performed by: INTERNAL MEDICINE

## 2025-07-22 PROCEDURE — 3074F SYST BP LT 130 MM HG: CPT | Performed by: INTERNAL MEDICINE

## 2025-07-22 PROCEDURE — 93000 ELECTROCARDIOGRAM COMPLETE: CPT | Performed by: INTERNAL MEDICINE

## 2025-07-22 PROCEDURE — 99214 OFFICE O/P EST MOD 30 MIN: CPT | Performed by: INTERNAL MEDICINE

## 2025-07-22 PROCEDURE — 3078F DIAST BP <80 MM HG: CPT | Performed by: INTERNAL MEDICINE

## 2025-07-22 RX ORDER — TORSEMIDE 100 MG/1
50 TABLET ORAL DAILY
Qty: 45 TABLET | Refills: 3 | Status: SHIPPED | OUTPATIENT
Start: 2025-07-22

## 2025-07-22 ASSESSMENT — ENCOUNTER SYMPTOMS
CHEST TIGHTNESS: 0
BLOOD IN STOOL: 0
NAUSEA: 0
GASTROINTESTINAL NEGATIVE: 1
STRIDOR: 0
SHORTNESS OF BREATH: 1
EYES NEGATIVE: 1
WHEEZING: 0
COUGH: 0

## 2025-07-22 NOTE — PROGRESS NOTES
2 PUFFS INTO THE LUNGS EVERY 6 HOURS AS NEEDED FOR WHEEZING 8.5 g 3    acetaminophen (TYLENOL) 500 MG tablet Take 1 tablet by mouth 3 times daily as needed for Pain (Patient taking differently: Take 1 tablet by mouth 3 times daily as needed for Pain Taking 4 tablets per day) 90 tablet 1    coenzyme Q10 100 MG CAPS capsule Take 1 capsule by mouth daily 30 capsule 0     No current facility-administered medications for this visit.       Review of Systems:   Review of Systems   Constitutional: Negative.  Negative for diaphoresis and fatigue.   HENT: Negative.     Eyes: Negative.    Respiratory:  Positive for shortness of breath. Negative for cough, chest tightness, wheezing and stridor.    Cardiovascular:  Positive for leg swelling. Negative for chest pain and palpitations.   Gastrointestinal: Negative.  Negative for blood in stool and nausea.   Genitourinary: Negative.    Musculoskeletal: Negative.    Skin: Negative.    Neurological: Negative.  Negative for dizziness, syncope, weakness and light-headedness.   Hematological: Negative.    Psychiatric/Behavioral: Negative.           Physical Examination:    BP 90/60 (BP Site: Right Upper Arm, Patient Position: Sitting, BP Cuff Size: Medium Adult)   Pulse 89   Resp 20   Wt 113.4 kg (250 lb)   SpO2 90%   BMI 35.87 kg/m²    Physical Exam   Constitutional: He appears healthy. No distress.   HENT:   Normal cephalic and Atraumatic   Eyes: Pupils are equal, round, and reactive to light.   Neck: Thyroid normal. No JVD present. No neck adenopathy. No thyromegaly present.   Cardiovascular: Normal rate and regular rhythm. Exam reveals decreased pulses.   Murmur heard.  Pulses:       Carotid pulses are  on the right side with bruit and  on the left side with bruit.  Pulmonary/Chest: Effort normal. He has no rales. He has scattered wheezes. He exhibits no tenderness.   Fine bibasilar crackles.    Abdominal: Soft. Bowel sounds are normal. There is no abdominal tenderness.

## 2025-08-12 DIAGNOSIS — N52.9 ERECTILE DYSFUNCTION, UNSPECIFIED ERECTILE DYSFUNCTION TYPE: ICD-10-CM

## 2025-08-12 DIAGNOSIS — K59.09 OTHER CONSTIPATION: Primary | ICD-10-CM

## 2025-08-12 DIAGNOSIS — J44.9 CHRONIC OBSTRUCTIVE PULMONARY DISEASE, UNSPECIFIED COPD TYPE (HCC): ICD-10-CM

## 2025-08-12 RX ORDER — SILDENAFIL 25 MG/1
25 TABLET, FILM COATED ORAL PRN
Qty: 30 TABLET | Status: CANCELLED | OUTPATIENT
Start: 2025-08-12

## 2025-08-12 RX ORDER — BISACODYL 5 MG
5 TABLET, DELAYED RELEASE (ENTERIC COATED) ORAL 2 TIMES DAILY
Status: CANCELLED | OUTPATIENT
Start: 2025-08-12

## 2025-08-12 RX ORDER — SILDENAFIL 25 MG/1
25 TABLET, FILM COATED ORAL PRN
Qty: 10 TABLET | Refills: 3 | Status: SHIPPED | OUTPATIENT
Start: 2025-08-12

## 2025-08-12 RX ORDER — SILDENAFIL 25 MG/1
25 TABLET, FILM COATED ORAL PRN
COMMUNITY
End: 2025-08-12

## 2025-08-12 RX ORDER — BISACODYL 5 MG/1
5 TABLET, DELAYED RELEASE ORAL DAILY
Qty: 30 TABLET | Refills: 5 | Status: SHIPPED | OUTPATIENT
Start: 2025-08-12

## 2025-08-13 ENCOUNTER — ANTI-COAG VISIT (OUTPATIENT)
Age: 70
End: 2025-08-13
Payer: MEDICARE

## 2025-08-13 DIAGNOSIS — I48.0 PAROXYSMAL ATRIAL FIBRILLATION (HCC): Primary | ICD-10-CM

## 2025-08-13 LAB
INTERNATIONAL NORMALIZATION RATIO, POC: 7.2
PROTHROMBIN TIME, POC: NORMAL

## 2025-08-13 PROCEDURE — 85610 PROTHROMBIN TIME: CPT

## 2025-08-13 PROCEDURE — 99212 OFFICE O/P EST SF 10 MIN: CPT

## 2025-08-13 RX ORDER — IPRATROPIUM BROMIDE AND ALBUTEROL SULFATE 2.5; .5 MG/3ML; MG/3ML
1 SOLUTION RESPIRATORY (INHALATION) 4 TIMES DAILY
Qty: 360 ML | Refills: 3 | Status: SHIPPED | OUTPATIENT
Start: 2025-08-13

## 2025-08-20 ENCOUNTER — ANTI-COAG VISIT (OUTPATIENT)
Age: 70
End: 2025-08-20
Payer: MEDICARE

## 2025-08-20 DIAGNOSIS — I48.0 PAROXYSMAL ATRIAL FIBRILLATION (HCC): Primary | ICD-10-CM

## 2025-08-20 LAB
INTERNATIONAL NORMALIZATION RATIO, POC: 2.1
PROTHROMBIN TIME, POC: NORMAL

## 2025-08-20 PROCEDURE — 99211 OFF/OP EST MAY X REQ PHY/QHP: CPT

## 2025-08-20 PROCEDURE — 85610 PROTHROMBIN TIME: CPT

## 2025-09-03 ENCOUNTER — ANTI-COAG VISIT (OUTPATIENT)
Age: 70
End: 2025-09-03
Payer: MEDICARE

## 2025-09-03 DIAGNOSIS — I48.0 PAROXYSMAL ATRIAL FIBRILLATION (HCC): Primary | ICD-10-CM

## 2025-09-03 LAB
INTERNATIONAL NORMALIZATION RATIO, POC: 5.5
PROTHROMBIN TIME, POC: NORMAL

## 2025-09-03 PROCEDURE — 99213 OFFICE O/P EST LOW 20 MIN: CPT

## 2025-09-03 PROCEDURE — 85610 PROTHROMBIN TIME: CPT
